# Patient Record
Sex: MALE | Race: WHITE | NOT HISPANIC OR LATINO | Employment: OTHER | URBAN - METROPOLITAN AREA
[De-identification: names, ages, dates, MRNs, and addresses within clinical notes are randomized per-mention and may not be internally consistent; named-entity substitution may affect disease eponyms.]

---

## 2023-02-24 PROBLEM — R13.10 DYSPHAGIA: Status: ACTIVE | Noted: 2023-02-24

## 2023-02-24 PROBLEM — G47.61 PERIODIC LIMB MOVEMENTS OF SLEEP: Status: ACTIVE | Noted: 2023-02-24

## 2023-02-24 PROBLEM — M79.604 PAIN IN BOTH LOWER EXTREMITIES: Status: ACTIVE | Noted: 2023-02-24

## 2023-02-24 PROBLEM — K52.9 CHRONIC DIARRHEA: Status: ACTIVE | Noted: 2023-02-24

## 2023-02-24 PROBLEM — R19.7 DIARRHEA: Status: ACTIVE | Noted: 2023-02-24

## 2023-02-24 PROBLEM — K59.00 CONSTIPATION: Status: ACTIVE | Noted: 2023-02-24

## 2023-02-24 PROBLEM — G62.9 NEUROPATHY: Status: ACTIVE | Noted: 2023-02-24

## 2023-02-24 PROBLEM — K21.9 ACID REFLUX: Status: ACTIVE | Noted: 2023-02-24

## 2023-02-24 PROBLEM — R60.9 EDEMA: Status: ACTIVE | Noted: 2023-02-24

## 2023-02-24 PROBLEM — R06.09 DYSPNEA ON EXERTION: Status: ACTIVE | Noted: 2023-02-24

## 2023-02-24 PROBLEM — J30.9 ALLERGIC RHINITIS: Status: ACTIVE | Noted: 2023-02-24

## 2023-02-24 PROBLEM — R10.9 RIGHT FLANK PAIN: Status: ACTIVE | Noted: 2023-02-24

## 2023-02-24 PROBLEM — N31.9 NEUROGENIC BLADDER: Status: ACTIVE | Noted: 2023-02-24

## 2023-02-24 PROBLEM — N31.8 FREQUENCY-URGENCY SYNDROME: Status: ACTIVE | Noted: 2023-02-24

## 2023-02-24 PROBLEM — I42.9 CARDIOMYOPATHY (MULTI): Status: ACTIVE | Noted: 2023-02-24

## 2023-02-24 PROBLEM — I73.9 CLAUDICATION, INTERMITTENT (CMS-HCC): Status: ACTIVE | Noted: 2023-02-24

## 2023-02-24 PROBLEM — M54.9 BACK PAIN: Status: ACTIVE | Noted: 2023-02-24

## 2023-02-24 PROBLEM — F51.04 CHRONIC INSOMNIA: Status: ACTIVE | Noted: 2023-02-24

## 2023-02-24 PROBLEM — R21 RASH: Status: ACTIVE | Noted: 2023-02-24

## 2023-02-24 PROBLEM — I48.91 ATRIAL FIBRILLATION (MULTI): Status: ACTIVE | Noted: 2023-02-24

## 2023-02-24 PROBLEM — S91.301A WOUND, OPEN, FOOT, RIGHT, INITIAL ENCOUNTER: Status: ACTIVE | Noted: 2023-02-24

## 2023-02-24 PROBLEM — I73.9 PVD (PERIPHERAL VASCULAR DISEASE) (CMS-HCC): Status: ACTIVE | Noted: 2023-02-24

## 2023-02-24 PROBLEM — R35.1 NOCTURIA: Status: ACTIVE | Noted: 2023-02-24

## 2023-02-24 PROBLEM — R20.2 PARESTHESIA: Status: ACTIVE | Noted: 2023-02-24

## 2023-02-24 PROBLEM — M79.605 PAIN IN BOTH LOWER EXTREMITIES: Status: ACTIVE | Noted: 2023-02-24

## 2023-02-24 PROBLEM — R42 DIZZINESS: Status: ACTIVE | Noted: 2023-02-24

## 2023-02-24 PROBLEM — Z95.810 CARDIAC DEFIBRILLATOR IN PLACE: Status: ACTIVE | Noted: 2023-02-24

## 2023-02-24 PROBLEM — I74.09 AORTOILIAC OCCLUSIVE DISEASE (MULTI): Status: ACTIVE | Noted: 2023-02-24

## 2023-02-24 PROBLEM — L98.9 SKIN LESION: Status: ACTIVE | Noted: 2023-02-24

## 2023-02-24 PROBLEM — Z95.0 PRESENCE OF CARDIAC PACEMAKER: Status: ACTIVE | Noted: 2023-02-24

## 2023-02-24 PROBLEM — J32.9 CHRONIC SINUSITIS: Status: ACTIVE | Noted: 2023-02-24

## 2023-02-24 PROBLEM — M79.606 CHRONIC LEG PAIN: Status: ACTIVE | Noted: 2023-02-24

## 2023-02-24 PROBLEM — E11.9 DIABETES MELLITUS, TYPE 2 (MULTI): Status: ACTIVE | Noted: 2023-02-24

## 2023-02-24 PROBLEM — R05.9 COUGH: Status: ACTIVE | Noted: 2023-02-24

## 2023-02-24 PROBLEM — Z86.79 HISTORY OF CARDIOMYOPATHY IN ADULTHOOD: Status: ACTIVE | Noted: 2023-02-24

## 2023-02-24 PROBLEM — G47.34 SLEEP-RELATED HYPOXIA: Status: ACTIVE | Noted: 2023-02-24

## 2023-02-24 PROBLEM — N32.89 UNSTABLE BLADDER: Status: ACTIVE | Noted: 2023-02-24

## 2023-02-24 PROBLEM — M54.50 LUMBAGO: Status: ACTIVE | Noted: 2023-02-24

## 2023-02-24 PROBLEM — R27.0 ATAXIA: Status: ACTIVE | Noted: 2023-02-24

## 2023-02-24 PROBLEM — E11.42 DIABETIC PERIPHERAL NEUROPATHY (MULTI): Status: ACTIVE | Noted: 2023-02-24

## 2023-02-24 PROBLEM — M54.16 LUMBAR RADICULOPATHY: Status: ACTIVE | Noted: 2023-02-24

## 2023-02-24 PROBLEM — I25.10 ASHD (ARTERIOSCLEROTIC HEART DISEASE): Status: ACTIVE | Noted: 2023-02-24

## 2023-02-24 PROBLEM — E78.5 DYSLIPIDEMIA: Status: ACTIVE | Noted: 2023-02-24

## 2023-02-24 PROBLEM — N20.1 CALCULUS OF RIGHT URETER: Status: ACTIVE | Noted: 2023-02-24

## 2023-02-24 PROBLEM — G62.9 PERIPHERAL NEUROPATHY: Status: ACTIVE | Noted: 2023-02-24

## 2023-02-24 PROBLEM — I48.19 PERSISTENT ATRIAL FIBRILLATION WITH RVR (MULTI): Status: ACTIVE | Noted: 2023-02-24

## 2023-02-24 PROBLEM — R30.0 DYSURIA: Status: ACTIVE | Noted: 2023-02-24

## 2023-02-24 PROBLEM — N28.9 RENAL INSUFFICIENCY: Status: ACTIVE | Noted: 2023-02-24

## 2023-02-24 PROBLEM — S81.801A LEG WOUND, RIGHT, INITIAL ENCOUNTER: Status: ACTIVE | Noted: 2023-02-24

## 2023-02-24 PROBLEM — I50.42 CHRONIC COMBINED SYSTOLIC AND DIASTOLIC HEART FAILURE, NYHA CLASS 2 (MULTI): Status: ACTIVE | Noted: 2023-02-24

## 2023-02-24 PROBLEM — J44.9 COPD (CHRONIC OBSTRUCTIVE PULMONARY DISEASE) (MULTI): Status: ACTIVE | Noted: 2023-02-24

## 2023-02-24 PROBLEM — G25.81 RESTLESS LEGS SYNDROME: Status: ACTIVE | Noted: 2023-02-24

## 2023-02-24 PROBLEM — E11.40 CHRONIC PAINFUL DIABETIC NEUROPATHY (MULTI): Status: ACTIVE | Noted: 2023-02-24

## 2023-02-24 PROBLEM — G47.33 OSA (OBSTRUCTIVE SLEEP APNEA): Status: ACTIVE | Noted: 2023-02-24

## 2023-02-24 PROBLEM — N40.0 BPH (BENIGN PROSTATIC HYPERPLASIA): Status: ACTIVE | Noted: 2023-02-24

## 2023-02-24 PROBLEM — G89.29 CHRONIC LEG PAIN: Status: ACTIVE | Noted: 2023-02-24

## 2023-02-24 PROBLEM — M48.07 SPINAL STENOSIS OF LUMBOSACRAL REGION: Status: ACTIVE | Noted: 2023-02-24

## 2023-02-24 PROBLEM — R79.1 ELEVATED INR: Status: ACTIVE | Noted: 2023-02-24

## 2023-02-24 PROBLEM — R06.02 SOBOE (SHORTNESS OF BREATH ON EXERTION): Status: ACTIVE | Noted: 2023-02-24

## 2023-02-24 PROBLEM — R53.83 FATIGUE: Status: ACTIVE | Noted: 2023-02-24

## 2023-02-24 RX ORDER — GUAIFENESIN 600 MG/1
TABLET, EXTENDED RELEASE ORAL
COMMUNITY
Start: 2022-09-22 | End: 2023-04-20 | Stop reason: ALTCHOICE

## 2023-02-24 RX ORDER — GLIPIZIDE 2.5 MG/1
2 TABLET, EXTENDED RELEASE ORAL DAILY
COMMUNITY
Start: 2021-07-20 | End: 2023-05-03 | Stop reason: SDUPTHER

## 2023-02-24 RX ORDER — HYDROCORTISONE, IODOQUINOL 10; 10 MG/G; MG/G
CREAM TOPICAL
COMMUNITY
Start: 2022-06-13 | End: 2024-06-03 | Stop reason: WASHOUT

## 2023-02-24 RX ORDER — LANOLIN ALCOHOL/MO/W.PET/CERES
1 CREAM (GRAM) TOPICAL DAILY
COMMUNITY
End: 2024-02-15 | Stop reason: ALTCHOICE

## 2023-02-24 RX ORDER — OXCARBAZEPINE 150 MG/1
TABLET, FILM COATED ORAL
COMMUNITY
End: 2024-01-03 | Stop reason: SDUPTHER

## 2023-02-24 RX ORDER — BUDESONIDE, GLYCOPYRROLATE, AND FORMOTEROL FUMARATE 160; 9; 4.8 UG/1; UG/1; UG/1
AEROSOL, METERED RESPIRATORY (INHALATION)
COMMUNITY
Start: 2022-10-31

## 2023-02-24 RX ORDER — ATORVASTATIN CALCIUM 10 MG/1
1 TABLET, FILM COATED ORAL DAILY
COMMUNITY
Start: 2015-09-11 | End: 2024-01-16 | Stop reason: SDUPTHER

## 2023-02-24 RX ORDER — FUROSEMIDE 20 MG/1
2 TABLET ORAL DAILY
COMMUNITY
Start: 2019-10-21 | End: 2023-03-21 | Stop reason: SDUPTHER

## 2023-02-24 RX ORDER — KETOCONAZOLE 20 MG/G
CREAM TOPICAL
COMMUNITY
Start: 2022-07-21 | End: 2024-06-03 | Stop reason: WASHOUT

## 2023-02-24 RX ORDER — LISINOPRIL 5 MG/1
1 TABLET ORAL DAILY
COMMUNITY
Start: 2015-11-23 | End: 2024-01-16 | Stop reason: SDUPTHER

## 2023-02-24 RX ORDER — CLONAZEPAM 0.5 MG/1
TABLET ORAL
COMMUNITY
Start: 2019-11-07 | End: 2023-10-31 | Stop reason: SDUPTHER

## 2023-02-24 RX ORDER — OMEPRAZOLE 40 MG/1
1 CAPSULE, DELAYED RELEASE ORAL DAILY
COMMUNITY
Start: 2019-07-22 | End: 2023-03-15 | Stop reason: SDUPTHER

## 2023-02-24 RX ORDER — WARFARIN SODIUM 5 MG/1
1.5 TABLET ORAL DAILY
COMMUNITY
Start: 2015-09-28 | End: 2023-10-31 | Stop reason: SDUPTHER

## 2023-02-24 RX ORDER — BLOOD SUGAR DIAGNOSTIC
1 STRIP MISCELLANEOUS 2 TIMES DAILY
COMMUNITY
Start: 2016-09-02 | End: 2023-03-21 | Stop reason: SDUPTHER

## 2023-03-07 ENCOUNTER — ANTICOAGULATION - OTHER VISIT (DOAC) (OUTPATIENT)
Dept: PRIMARY CARE | Facility: CLINIC | Age: 88
End: 2023-03-07
Payer: MEDICARE

## 2023-03-14 ENCOUNTER — TELEPHONE (OUTPATIENT)
Dept: PRIMARY CARE | Facility: CLINIC | Age: 88
End: 2023-03-14
Payer: MEDICARE

## 2023-03-14 ENCOUNTER — HOME MONITORING (OUTPATIENT)
Dept: PRIMARY CARE | Facility: CLINIC | Age: 88
End: 2023-03-14
Payer: MEDICARE

## 2023-03-14 LAB
INR IN PPP BY COAGULATION ASSAY EXTERNAL: 2.1
PROTHROMBIN TIME (PT) IN PPP BY COAGULATION ASSAY EXTERNAL: NORMAL SECONDS

## 2023-03-15 ENCOUNTER — TELEPHONE (OUTPATIENT)
Dept: PRIMARY CARE | Facility: CLINIC | Age: 88
End: 2023-03-15
Payer: MEDICARE

## 2023-03-15 DIAGNOSIS — K21.9 GASTROESOPHAGEAL REFLUX DISEASE WITHOUT ESOPHAGITIS: Primary | ICD-10-CM

## 2023-03-15 RX ORDER — OMEPRAZOLE 40 MG/1
40 CAPSULE, DELAYED RELEASE ORAL
Qty: 30 CAPSULE | Refills: 11 | Status: SHIPPED | OUTPATIENT
Start: 2023-03-15 | End: 2024-03-07 | Stop reason: SDUPTHER

## 2023-03-20 ENCOUNTER — TELEPHONE (OUTPATIENT)
Dept: PRIMARY CARE | Facility: CLINIC | Age: 88
End: 2023-03-20
Payer: MEDICARE

## 2023-03-20 NOTE — TELEPHONE ENCOUNTER
He needs a refill on his furosmide 25 mg takes it 2 time a day and he also needs one touch stripts and lancets please send to [facility]

## 2023-03-21 ENCOUNTER — OFFICE VISIT (OUTPATIENT)
Dept: PRIMARY CARE | Facility: CLINIC | Age: 88
End: 2023-03-21
Payer: MEDICARE

## 2023-03-21 ENCOUNTER — ANTICOAGULATION - WARFARIN VISIT (OUTPATIENT)
Dept: PRIMARY CARE | Facility: CLINIC | Age: 88
End: 2023-03-21

## 2023-03-21 VITALS
BODY MASS INDEX: 26.18 KG/M2 | RESPIRATION RATE: 16 BRPM | WEIGHT: 187 LBS | HEIGHT: 71 IN | DIASTOLIC BLOOD PRESSURE: 74 MMHG | SYSTOLIC BLOOD PRESSURE: 132 MMHG | HEART RATE: 85 BPM | OXYGEN SATURATION: 98 %

## 2023-03-21 DIAGNOSIS — I74.09 AORTOILIAC OCCLUSIVE DISEASE (MULTI): ICD-10-CM

## 2023-03-21 DIAGNOSIS — K21.9 GASTROESOPHAGEAL REFLUX DISEASE WITHOUT ESOPHAGITIS: ICD-10-CM

## 2023-03-21 DIAGNOSIS — E11.9 TYPE 2 DIABETES MELLITUS WITHOUT COMPLICATION, WITHOUT LONG-TERM CURRENT USE OF INSULIN (MULTI): Primary | ICD-10-CM

## 2023-03-21 DIAGNOSIS — I73.9 CLAUDICATION, INTERMITTENT (CMS-HCC): ICD-10-CM

## 2023-03-21 DIAGNOSIS — E11.9 TYPE 2 DIABETES MELLITUS WITHOUT COMPLICATION, WITHOUT LONG-TERM CURRENT USE OF INSULIN (MULTI): ICD-10-CM

## 2023-03-21 DIAGNOSIS — E55.9 VITAMIN D DEFICIENCY: ICD-10-CM

## 2023-03-21 DIAGNOSIS — Z00.00 MEDICARE ANNUAL WELLNESS VISIT, SUBSEQUENT: Primary | ICD-10-CM

## 2023-03-21 DIAGNOSIS — I42.8 OTHER CARDIOMYOPATHY (MULTI): ICD-10-CM

## 2023-03-21 DIAGNOSIS — J44.9 CHRONIC OBSTRUCTIVE PULMONARY DISEASE, UNSPECIFIED COPD TYPE (MULTI): ICD-10-CM

## 2023-03-21 DIAGNOSIS — E53.8 VITAMIN B12 DEFICIENCY: ICD-10-CM

## 2023-03-21 DIAGNOSIS — I50.42 CHRONIC COMBINED SYSTOLIC AND DIASTOLIC HEART FAILURE, NYHA CLASS 2 (MULTI): ICD-10-CM

## 2023-03-21 DIAGNOSIS — I25.10 ASHD (ARTERIOSCLEROTIC HEART DISEASE): ICD-10-CM

## 2023-03-21 DIAGNOSIS — E11.42 DIABETIC PERIPHERAL NEUROPATHY (MULTI): ICD-10-CM

## 2023-03-21 DIAGNOSIS — D64.9 FATIGUE ASSOCIATED WITH ANEMIA: ICD-10-CM

## 2023-03-21 DIAGNOSIS — I48.19 PERSISTENT ATRIAL FIBRILLATION WITH RVR (MULTI): ICD-10-CM

## 2023-03-21 DIAGNOSIS — I48.91 ATRIAL FIBRILLATION, UNSPECIFIED TYPE (MULTI): ICD-10-CM

## 2023-03-21 DIAGNOSIS — E78.5 DYSLIPIDEMIA: ICD-10-CM

## 2023-03-21 DIAGNOSIS — N28.9 RENAL INSUFFICIENCY: ICD-10-CM

## 2023-03-21 DIAGNOSIS — E11.40 CHRONIC PAINFUL DIABETIC NEUROPATHY (MULTI): ICD-10-CM

## 2023-03-21 DIAGNOSIS — N40.1 BENIGN PROSTATIC HYPERPLASIA WITH LOWER URINARY TRACT SYMPTOMS, SYMPTOM DETAILS UNSPECIFIED: ICD-10-CM

## 2023-03-21 DIAGNOSIS — Z95.810 CARDIAC DEFIBRILLATOR IN PLACE: ICD-10-CM

## 2023-03-21 DIAGNOSIS — I73.9 PVD (PERIPHERAL VASCULAR DISEASE) (CMS-HCC): ICD-10-CM

## 2023-03-21 PROBLEM — J32.9 CHRONIC SINUSITIS: Status: RESOLVED | Noted: 2023-02-24 | Resolved: 2023-03-21

## 2023-03-21 PROBLEM — R06.09 DYSPNEA ON EXERTION: Status: RESOLVED | Noted: 2023-02-24 | Resolved: 2023-03-21

## 2023-03-21 PROBLEM — N32.89 UNSTABLE BLADDER: Status: RESOLVED | Noted: 2023-02-24 | Resolved: 2023-03-21

## 2023-03-21 PROBLEM — G62.9 PERIPHERAL NEUROPATHY: Status: RESOLVED | Noted: 2023-02-24 | Resolved: 2023-03-21

## 2023-03-21 PROBLEM — M54.50 LUMBAGO: Status: RESOLVED | Noted: 2023-02-24 | Resolved: 2023-03-21

## 2023-03-21 PROBLEM — K52.9 CHRONIC DIARRHEA: Status: RESOLVED | Noted: 2023-02-24 | Resolved: 2023-03-21

## 2023-03-21 PROBLEM — L98.9 SKIN LESION: Status: RESOLVED | Noted: 2023-02-24 | Resolved: 2023-03-21

## 2023-03-21 PROBLEM — R27.0 ATAXIA: Status: RESOLVED | Noted: 2023-02-24 | Resolved: 2023-03-21

## 2023-03-21 PROBLEM — Z86.79 HISTORY OF CARDIOMYOPATHY IN ADULTHOOD: Status: RESOLVED | Noted: 2023-02-24 | Resolved: 2023-03-21

## 2023-03-21 PROBLEM — G47.34 SLEEP-RELATED HYPOXIA: Status: RESOLVED | Noted: 2023-02-24 | Resolved: 2023-03-21

## 2023-03-21 PROBLEM — K59.00 CONSTIPATION: Status: RESOLVED | Noted: 2023-02-24 | Resolved: 2023-03-21

## 2023-03-21 PROBLEM — N31.9 NEUROGENIC BLADDER: Status: RESOLVED | Noted: 2023-02-24 | Resolved: 2023-03-21

## 2023-03-21 PROBLEM — R10.9 RIGHT FLANK PAIN: Status: RESOLVED | Noted: 2023-02-24 | Resolved: 2023-03-21

## 2023-03-21 PROBLEM — R79.1 ELEVATED INR: Status: RESOLVED | Noted: 2023-02-24 | Resolved: 2023-03-21

## 2023-03-21 PROBLEM — N20.1 CALCULUS OF RIGHT URETER: Status: RESOLVED | Noted: 2023-02-24 | Resolved: 2023-03-21

## 2023-03-21 PROBLEM — G47.61 PERIODIC LIMB MOVEMENTS OF SLEEP: Status: RESOLVED | Noted: 2023-02-24 | Resolved: 2023-03-21

## 2023-03-21 PROBLEM — M54.9 BACK PAIN: Status: RESOLVED | Noted: 2023-02-24 | Resolved: 2023-03-21

## 2023-03-21 PROBLEM — R53.83 FATIGUE: Status: RESOLVED | Noted: 2023-02-24 | Resolved: 2023-03-21

## 2023-03-21 PROBLEM — R60.9 EDEMA: Status: RESOLVED | Noted: 2023-02-24 | Resolved: 2023-03-21

## 2023-03-21 PROBLEM — R30.0 DYSURIA: Status: RESOLVED | Noted: 2023-02-24 | Resolved: 2023-03-21

## 2023-03-21 PROBLEM — R19.7 DIARRHEA: Status: RESOLVED | Noted: 2023-02-24 | Resolved: 2023-03-21

## 2023-03-21 PROBLEM — S81.801A LEG WOUND, RIGHT, INITIAL ENCOUNTER: Status: RESOLVED | Noted: 2023-02-24 | Resolved: 2023-03-21

## 2023-03-21 PROBLEM — R21 RASH: Status: RESOLVED | Noted: 2023-02-24 | Resolved: 2023-03-21

## 2023-03-21 PROBLEM — M79.605 PAIN IN BOTH LOWER EXTREMITIES: Status: RESOLVED | Noted: 2023-02-24 | Resolved: 2023-03-21

## 2023-03-21 PROBLEM — N31.8 FREQUENCY-URGENCY SYNDROME: Status: RESOLVED | Noted: 2023-02-24 | Resolved: 2023-03-21

## 2023-03-21 PROBLEM — M48.07 SPINAL STENOSIS OF LUMBOSACRAL REGION: Status: RESOLVED | Noted: 2023-02-24 | Resolved: 2023-03-21

## 2023-03-21 PROBLEM — M54.16 LUMBAR RADICULOPATHY: Status: RESOLVED | Noted: 2023-02-24 | Resolved: 2023-03-21

## 2023-03-21 PROBLEM — M79.606 CHRONIC LEG PAIN: Status: RESOLVED | Noted: 2023-02-24 | Resolved: 2023-03-21

## 2023-03-21 PROBLEM — S91.301A WOUND, OPEN, FOOT, RIGHT, INITIAL ENCOUNTER: Status: RESOLVED | Noted: 2023-02-24 | Resolved: 2023-03-21

## 2023-03-21 PROBLEM — J30.9 ALLERGIC RHINITIS: Status: RESOLVED | Noted: 2023-02-24 | Resolved: 2023-03-21

## 2023-03-21 PROBLEM — R20.2 PARESTHESIA: Status: RESOLVED | Noted: 2023-02-24 | Resolved: 2023-03-21

## 2023-03-21 PROBLEM — R42 DIZZINESS: Status: RESOLVED | Noted: 2023-02-24 | Resolved: 2023-03-21

## 2023-03-21 PROBLEM — G89.29 CHRONIC LEG PAIN: Status: RESOLVED | Noted: 2023-02-24 | Resolved: 2023-03-21

## 2023-03-21 PROBLEM — R13.10 DYSPHAGIA: Status: RESOLVED | Noted: 2023-02-24 | Resolved: 2023-03-21

## 2023-03-21 PROBLEM — M79.604 PAIN IN BOTH LOWER EXTREMITIES: Status: RESOLVED | Noted: 2023-02-24 | Resolved: 2023-03-21

## 2023-03-21 PROBLEM — R35.1 NOCTURIA: Status: RESOLVED | Noted: 2023-02-24 | Resolved: 2023-03-21

## 2023-03-21 PROBLEM — Z95.0 PRESENCE OF CARDIAC PACEMAKER: Status: RESOLVED | Noted: 2023-02-24 | Resolved: 2023-03-21

## 2023-03-21 PROBLEM — R05.9 COUGH: Status: RESOLVED | Noted: 2023-02-24 | Resolved: 2023-03-21

## 2023-03-21 PROBLEM — G62.9 NEUROPATHY: Status: RESOLVED | Noted: 2023-02-24 | Resolved: 2023-03-21

## 2023-03-21 PROCEDURE — 1036F TOBACCO NON-USER: CPT

## 2023-03-21 PROCEDURE — 99214 OFFICE O/P EST MOD 30 MIN: CPT

## 2023-03-21 PROCEDURE — 1160F RVW MEDS BY RX/DR IN RCRD: CPT

## 2023-03-21 PROCEDURE — 1159F MED LIST DOCD IN RCRD: CPT

## 2023-03-21 RX ORDER — BLOOD SUGAR DIAGNOSTIC
1 STRIP MISCELLANEOUS 2 TIMES DAILY
Qty: 90 STRIP | Refills: 2 | Status: SHIPPED | OUTPATIENT
Start: 2023-03-21 | End: 2023-08-24 | Stop reason: SDUPTHER

## 2023-03-21 RX ORDER — FUROSEMIDE 20 MG/1
40 TABLET ORAL DAILY
Qty: 90 TABLET | Refills: 2 | Status: SHIPPED | OUTPATIENT
Start: 2023-03-21 | End: 2023-07-31 | Stop reason: SDUPTHER

## 2023-03-21 RX ORDER — LANCETS
EACH MISCELLANEOUS
Qty: 90 EACH | Refills: 2 | Status: SHIPPED | OUTPATIENT
Start: 2023-03-21 | End: 2023-08-24 | Stop reason: SDUPTHER

## 2023-03-21 RX ORDER — LANCETS
EACH MISCELLANEOUS
COMMUNITY
Start: 2022-08-06 | End: 2023-03-21 | Stop reason: SDUPTHER

## 2023-03-21 ASSESSMENT — ENCOUNTER SYMPTOMS
ALLERGIC/IMMUNOLOGIC NEGATIVE: 1
CONSTITUTIONAL NEGATIVE: 1
GASTROINTESTINAL NEGATIVE: 1
NEUROLOGICAL NEGATIVE: 1
CARDIOVASCULAR NEGATIVE: 1
EYES NEGATIVE: 1
PSYCHIATRIC NEGATIVE: 1
HEMATOLOGIC/LYMPHATIC NEGATIVE: 1
OCCASIONAL FEELINGS OF UNSTEADINESS: 0
MUSCULOSKELETAL NEGATIVE: 1
LOSS OF SENSATION IN FEET: 0
RESPIRATORY NEGATIVE: 1
ENDOCRINE NEGATIVE: 1

## 2023-03-21 ASSESSMENT — PATIENT HEALTH QUESTIONNAIRE - PHQ9
SUM OF ALL RESPONSES TO PHQ9 QUESTIONS 1 AND 2: 0
2. FEELING DOWN, DEPRESSED OR HOPELESS: NOT AT ALL
1. LITTLE INTEREST OR PLEASURE IN DOING THINGS: NOT AT ALL

## 2023-03-21 ASSESSMENT — COLUMBIA-SUICIDE SEVERITY RATING SCALE - C-SSRS
1. IN THE PAST MONTH, HAVE YOU WISHED YOU WERE DEAD OR WISHED YOU COULD GO TO SLEEP AND NOT WAKE UP?: NO
6. HAVE YOU EVER DONE ANYTHING, STARTED TO DO ANYTHING, OR PREPARED TO DO ANYTHING TO END YOUR LIFE?: NO
2. HAVE YOU ACTUALLY HAD ANY THOUGHTS OF KILLING YOURSELF?: NO

## 2023-03-21 NOTE — ASSESSMENT & PLAN NOTE
Hemoglobin A1c is ordered.  Continue to monitor sugars closely, follow diabetic diet and encouraged daily activity or exercise as able.  Continue glipizide 2.5 mg oral capsule daily.  Follow-up with endocrinology in 1 month.

## 2023-03-21 NOTE — ASSESSMENT & PLAN NOTE
Symptoms controlled with over-the-counter cream and nonpharmacological remedies.  Continue clonazepam 0.5 mg oral capsule at bedtime.

## 2023-03-21 NOTE — ASSESSMENT & PLAN NOTE
Patient under direct management of cardiology Dr. Milner.  Symptoms controlled.  Continue atorvastatin 10 mg oral tablet daily

## 2023-03-21 NOTE — ASSESSMENT & PLAN NOTE
Patient is currently on warfarin 5 mg oral tablet 1/2 tablet daily.  Patient's recent INR was 1.7.  He does report having increased green leafy vegetables due to [place] weekend.  Patient was instructed to take 10 mg of warfarin today and to continue the original dosage for the next 6 days and to reevaluate Monday, March 28.  Patient has a pacemaker.  Continues annual follow-up with his cardiologist.

## 2023-03-21 NOTE — TELEPHONE ENCOUNTER
He needs a refill on his Furosemide 20 mg takes 2 times a day,one touch ultra strips and lancets please send to [facility]

## 2023-03-21 NOTE — PROGRESS NOTES
"Subjective   Patient ID: Michele Maya is a 89 y.o. male who presents for No chief complaint on file..    HPI an 89-year-old male with past medical history of persistent A-fib currently on anticoagulant therapy Coumadin 5 mg oral tablet daily, pacemaker initiated in 1996 with a replacement battery in 2016, diabetic neuropathy, obstructive sleep apnea, COPD, ASHD, cardiomyopathy, PVD, acid reflux, BPH, renal insufficiency, type 2 diabetes mellitus, dyslipidemia arrives to the clinic with chief complaint of 6-month follow-up.  He is here to establish with a new provider as his previous one is now retired.  He reports that his INR that was completed today was 1.7.  He does state that due to [place] weekend, he was ingesting more green leafy vegetables.  He is wondering if he needs to go up on his dose for this week.  Other than that, he is here to report that he does have a follow-up appointment in April with his new endocrinologist for his type 2 diabetes mellitus.  He also has a follow-up appointment with his nephrologist sometime in May.  He reports no side effects or shortness of breath of his cardiovascular system.  He does follow annually with his cardiologist.  Other than this, patient denies any chest pain, shortness of breath, diarrhea, fevers, chills, head pain, COVID-like symptoms.    Review of Systems   Constitutional: Negative.    HENT: Negative.     Eyes: Negative.    Respiratory: Negative.     Cardiovascular: Negative.    Gastrointestinal: Negative.    Endocrine: Negative.    Genitourinary: Negative.    Musculoskeletal: Negative.    Skin: Negative.    Allergic/Immunologic: Negative.    Neurological: Negative.    Hematological: Negative.    Psychiatric/Behavioral: Negative.     All other systems reviewed and are negative.      Objective   /74   Pulse 85   Resp 16   Ht 1.803 m (5' 11\")   Wt 84.8 kg (187 lb)   SpO2 98%   BMI 26.08 kg/m²     Physical Exam  Vitals and nursing note " reviewed.   Constitutional:       Appearance: Normal appearance.   HENT:      Head: Normocephalic and atraumatic.      Right Ear: Tympanic membrane normal.      Left Ear: Tympanic membrane normal.      Nose: Nose normal.      Mouth/Throat:      Mouth: Mucous membranes are moist.      Pharynx: Oropharynx is clear.   Eyes:      Extraocular Movements: Extraocular movements intact.      Conjunctiva/sclera: Conjunctivae normal.      Pupils: Pupils are equal, round, and reactive to light.   Cardiovascular:      Rate and Rhythm: Normal rate and regular rhythm.   Pulmonary:      Effort: Pulmonary effort is normal.      Breath sounds: Normal breath sounds.   Abdominal:      General: Bowel sounds are normal.      Palpations: Abdomen is soft.   Musculoskeletal:         General: Normal range of motion.      Cervical back: Normal range of motion and neck supple.   Skin:     General: Skin is warm.      Capillary Refill: Capillary refill takes less than 2 seconds.   Neurological:      General: No focal deficit present.      Mental Status: He is alert and oriented to person, place, and time. Mental status is at baseline.   Psychiatric:         Mood and Affect: Mood normal.         Behavior: Behavior normal.         Thought Content: Thought content normal.         Judgment: Judgment normal.         Assessment/Plan   Problem List Items Addressed This Visit          Nervous    Chronic painful diabetic neuropathy (CMS/HCC)     Symptoms controlled with over-the-counter cream and nonpharmacological remedies.  Continue clonazepam 0.5 mg oral capsule at bedtime.         Relevant Orders    Comprehensive Metabolic Panel    Diabetic peripheral neuropathy (CMS/HCC)     Symptoms controlled with over-the-counter cream and nonpharmacological remedies.            Respiratory    COPD (chronic obstructive pulmonary disease) (CMS/HCC)     Continue Breztri Aerosphere and Mucinex.  Symptoms controlled.  No dyspnea upon exertion.            Circulatory     Aortoiliac occlusive disease (CMS/HCC)     Patient under direct management of cardiology Dr. Milner.  Symptoms controlled.         ASHD (arteriosclerotic heart disease)     Patient under direct management of cardiology Dr. Milner.  Symptoms controlled.  Continue atorvastatin 10 mg oral tablet daily         Relevant Orders    TSH with reflex to Free T4 if abnormal    Atrial fibrillation (CMS/HCC)     Patient is currently on warfarin 5 mg oral tablet 1/2 tablet daily.  Patient's recent INR was 1.7.  He does report having increased green leafy vegetables due to [place] weekend.  Patient was instructed to take 10 mg of warfarin today and to continue the original dosage for the next 6 days and to reevaluate Monday, March 28.  Patient has a pacemaker.  Continues annual follow-up with his cardiologist.         Relevant Orders    TSH with reflex to Free T4 if abnormal    Cardiac defibrillator in place     Patient under direct management of cardiology Dr. Milner.  Symptoms controlled.  Continue atorvastatin 10 mg oral tablet daily         Cardiomyopathy (CMS/HCC)     Patient under direct management of cardiology Dr. Milner.  Symptoms controlled.  Continue atorvastatin 10 mg oral tablet daily         Chronic combined systolic and diastolic heart failure, NYHA class 2 (CMS/[facility])     Patient under direct management of cardiology Dr. Milner.  Symptoms controlled.  Continue atorvastatin 10 mg oral tablet daily         Persistent atrial fibrillation with RVR (CMS/[facility])     Patient is currently on warfarin 5 mg oral tablet 1/2 tablet daily.  Patient's recent INR was 1.7.  He does report having increased green leafy vegetables due to [place] weekend.  Patient was instructed to take 10 mg of warfarin today and to continue the original dosage for the next 6 days and to reevaluate Monday, March 28.  Patient has a pacemaker.  Continues annual follow-up with his cardiologist.         PVD (peripheral vascular disease) (CMS/[facility])      Patient under direct management of cardiology Dr. Milner.  Symptoms controlled.  Continue atorvastatin 10 mg oral tablet daily            Digestive    Acid reflux     Symptoms controlled.  No return of acid reflux.  Continue omeprazole 40 mg oral capsule daily.            Genitourinary    BPH (benign prostatic hyperplasia)     PSA levels ordered.         Relevant Orders    PSA, Total and Free    Renal insufficiency       Musculoskeletal    Claudication, intermittent (CMS/HCC)     Symptoms are controlled.  No return of claudication.         Relevant Orders    CBC       Endocrine/Metabolic    Diabetes mellitus, type 2 (CMS/HCC)     Hemoglobin A1c is ordered.  Continue to monitor sugars closely, follow diabetic diet and encouraged daily activity or exercise as able.  Continue glipizide 2.5 mg oral capsule daily.  Follow-up with endocrinology in 1 month.         Relevant Orders    Hemoglobin A1C    Protein/Glucose, Urine       Other    Dyslipidemia     Continue atorvastatin 10 mg oral capsule daily.         Relevant Orders    Lipid Panel     Other Visit Diagnoses       Medicare annual wellness visit, subsequent    -  Primary    Relevant Orders    Follow Up In [facility] - PCP    Vitamin B12 deficiency        Relevant Orders    Vitamin B12    Vitamin D deficiency        Relevant Orders    Vitamin D, Total    Fatigue associated with anemia        Relevant Orders    CBC          It was a pleasure seeing in the office today.    Please begin the treatment plan as discussed in the office today.    Your next appoint will be a 6-month Medicare wellness.    Continue follow-up appointments with your nephrologist and endocrinologist and cardiologist.    I have ordered blood work for you to complete prior to your next appointment.  These labs require you to fast.    Continue weekly INR checks.    Increase Coumadin administration today to 10 mg daily then continue daily dose as prescribed originally.  Repeat INR next  week.    I also advised you to follow low fat diet and exercise for at least 30 minutes daily.    Anticipatory guidance, age appropriate vaccines, screening exams, health promotion and prevention discussed.    This document was generated using the assistance of voice recognition software. If there are any errors of spelling, grammar, syntax, or meaning; please feel free to contact me directly for clarification.

## 2023-03-28 ENCOUNTER — ANTICOAGULATION - WARFARIN VISIT (OUTPATIENT)
Dept: PRIMARY CARE | Facility: CLINIC | Age: 88
End: 2023-03-28
Payer: MEDICARE

## 2023-03-29 LAB
INR IN PPP BY COAGULATION ASSAY EXTERNAL: 1.9
PROTHROMBIN TIME (PT) IN PPP BY COAGULATION ASSAY EXTERNAL: NORMAL SECONDS

## 2023-03-29 NOTE — PROGRESS NOTES
Inr 1.9 today- current dose is 5 mg x 7 days, recheck 1 week- 4/5/23.  Continued current dose.    Mary Kirby MA

## 2023-04-04 LAB
INR IN PPP BY COAGULATION ASSAY EXTERNAL: 2.3
POC INR: 2.3
POC PROTHROMBIN TIME: NORMAL
PROTHROMBIN TIME (PT) IN PPP BY COAGULATION ASSAY EXTERNAL: NORMAL SECONDS

## 2023-04-05 ENCOUNTER — ANTICOAGULATION - WARFARIN VISIT (OUTPATIENT)
Dept: PRIMARY CARE | Facility: CLINIC | Age: 88
End: 2023-04-05
Payer: MEDICARE

## 2023-04-05 PROCEDURE — 85610 PROTHROMBIN TIME: CPT

## 2023-04-11 ENCOUNTER — ANTICOAGULATION - WARFARIN VISIT (OUTPATIENT)
Dept: PRIMARY CARE | Facility: CLINIC | Age: 88
End: 2023-04-11
Payer: MEDICARE

## 2023-04-11 LAB
INR IN PPP BY COAGULATION ASSAY EXTERNAL: 2.5
PROTHROMBIN TIME (PT) IN PPP BY COAGULATION ASSAY EXTERNAL: NORMAL SECONDS

## 2023-04-18 ENCOUNTER — ANTICOAGULATION - WARFARIN VISIT (OUTPATIENT)
Dept: PRIMARY CARE | Facility: CLINIC | Age: 88
End: 2023-04-18
Payer: MEDICARE

## 2023-04-18 LAB
INR IN PPP BY COAGULATION ASSAY EXTERNAL: 3.5
PROTHROMBIN TIME (PT) IN PPP BY COAGULATION ASSAY EXTERNAL: NORMAL SECONDS

## 2023-04-18 NOTE — PROGRESS NOTES
Today's inr is  3.5. patient was recently in [facility] and given antibiotics and steroid-prednisone- he completed them 4/17/23. Per rj continue his current dose and recheck in 1 week - it is elevated due to the prednisone and antibiotics.

## 2023-04-20 ENCOUNTER — OFFICE VISIT (OUTPATIENT)
Dept: PRIMARY CARE | Facility: CLINIC | Age: 88
End: 2023-04-20
Payer: MEDICARE

## 2023-04-20 VITALS
WEIGHT: 190 LBS | HEIGHT: 71 IN | BODY MASS INDEX: 26.6 KG/M2 | SYSTOLIC BLOOD PRESSURE: 130 MMHG | OXYGEN SATURATION: 95 % | HEART RATE: 61 BPM | DIASTOLIC BLOOD PRESSURE: 80 MMHG | RESPIRATION RATE: 18 BRPM

## 2023-04-20 DIAGNOSIS — M19.90 ARTHRITIS: Primary | ICD-10-CM

## 2023-04-20 PROCEDURE — 1160F RVW MEDS BY RX/DR IN RCRD: CPT

## 2023-04-20 PROCEDURE — 1036F TOBACCO NON-USER: CPT

## 2023-04-20 PROCEDURE — 99213 OFFICE O/P EST LOW 20 MIN: CPT

## 2023-04-20 PROCEDURE — 1159F MED LIST DOCD IN RCRD: CPT

## 2023-04-20 ASSESSMENT — ENCOUNTER SYMPTOMS
NEUROLOGICAL NEGATIVE: 1
CONSTITUTIONAL NEGATIVE: 1
GASTROINTESTINAL NEGATIVE: 1
ALLERGIC/IMMUNOLOGIC NEGATIVE: 1
EYES NEGATIVE: 1
CARDIOVASCULAR NEGATIVE: 1
PSYCHIATRIC NEGATIVE: 1
MUSCULOSKELETAL NEGATIVE: 1
ENDOCRINE NEGATIVE: 1
HEMATOLOGIC/LYMPHATIC NEGATIVE: 1
RESPIRATORY NEGATIVE: 1

## 2023-04-20 ASSESSMENT — PATIENT HEALTH QUESTIONNAIRE - PHQ9
2. FEELING DOWN, DEPRESSED OR HOPELESS: NOT AT ALL
1. LITTLE INTEREST OR PLEASURE IN DOING THINGS: NOT AT ALL
SUM OF ALL RESPONSES TO PHQ9 QUESTIONS 1 AND 2: 0

## 2023-04-20 NOTE — PROGRESS NOTES
Urgent care follow up     Still coughing  Right hand pain due to arthritis.    2.7 inr - current dose is  5 mg x 7 days

## 2023-04-20 NOTE — PROGRESS NOTES
"Subjective   Patient ID: Michele Maya is a 89 y.o. male who presents for Follow-up.    HPI an 89-year-old male with multiple comorbidities arrives to the clinic with chief complaint of worsening arthritis.  Patient reports having arthritic pain over the last few years.  He has tried over-the-counter Tylenol, IcyHot, Biofreeze, and other topical solutions.  He found no success with this.  He does have chronic kidney disease and currently follows nephrology.  [facility] who is hesitant about putting him on any arthritic medications due to his impaired kidney function.  He is here for further evaluation and health maintenance.  Patient denies any chest pain, shortness of breath, diarrhea, fevers, chills, head pain, COVID-like symptoms.    Review of Systems   Constitutional: Negative.    HENT: Negative.     Eyes: Negative.    Respiratory: Negative.     Cardiovascular: Negative.    Gastrointestinal: Negative.    Endocrine: Negative.    Genitourinary: Negative.    Musculoskeletal: Negative.    Skin: Negative.    Allergic/Immunologic: Negative.    Neurological: Negative.    Hematological: Negative.    Psychiatric/Behavioral: Negative.     All other systems reviewed and are negative.      Objective   /80 (BP Location: Right arm, BP Cuff Size: Large adult)   Pulse 61   Resp 18   Ht 1.803 m (5' 11\")   Wt 86.2 kg (190 lb)   SpO2 95%   BMI 26.50 kg/m²     Physical Exam  Vitals and nursing note reviewed.   Constitutional:       Appearance: Normal appearance.   HENT:      Head: Normocephalic and atraumatic.      Right Ear: Tympanic membrane normal.      Left Ear: Tympanic membrane normal.      Nose: Nose normal.      Mouth/Throat:      Mouth: Mucous membranes are moist.      Pharynx: Oropharynx is clear.   Eyes:      Extraocular Movements: Extraocular movements intact.      Conjunctiva/sclera: Conjunctivae normal.      Pupils: Pupils are equal, round, and reactive to light. "   Cardiovascular:      Rate and Rhythm: Normal rate and regular rhythm.   Pulmonary:      Effort: Pulmonary effort is normal.      Breath sounds: Normal breath sounds.   Abdominal:      General: Bowel sounds are normal.      Palpations: Abdomen is soft.   Musculoskeletal:         General: Normal range of motion.      Cervical back: Normal range of motion and neck supple.   Skin:     General: Skin is warm.      Capillary Refill: Capillary refill takes less than 2 seconds.   Neurological:      General: No focal deficit present.      Mental Status: He is alert and oriented to person, place, and time. Mental status is at baseline.   Psychiatric:         Mood and Affect: Mood normal.         Behavior: Behavior normal.         Thought Content: Thought content normal.         Judgment: Judgment normal.         Assessment/Plan   Problem List Items Addressed This Visit          Musculoskeletal    Arthritis - Primary    Relevant Orders    Referral to Pain Medicine     It was a pleasure seeing in the office today.    Due to your chronic kidney disease, we are very limited in the medications that we can prescribe for arthritis.  Continue using over-the-counter Tylenol, Biofreeze, IcyHot, and other topical regimens.  I did recommend pain management.  You agreed to the plan of care.  I have referred you to pain management today.  Please call the number in your discharge instructions to set an appointment up.    Continue the treatment plan and regimen set forth by your nephrologist and cardiologist.    Follow-up in September.    I also advised you to follow low fat diet and exercise for at least 30 minutes daily.    Anticipatory guidance, age appropriate vaccines, screening exams, health promotion and prevention discussed.    This document was generated using the assistance of voice recognition software. If there are any errors of spelling, grammar, syntax, or meaning; please feel free to contact me directly for clarification.

## 2023-04-20 NOTE — PATIENT INSTRUCTIONS
Follow up with scheduled appointment for this office    Follow up with cardiology and nephrology as scheduled    Pain management referral placed.

## 2023-04-25 ENCOUNTER — ANTICOAGULATION - WARFARIN VISIT (OUTPATIENT)
Dept: PRIMARY CARE | Facility: CLINIC | Age: 88
End: 2023-04-25
Payer: MEDICARE

## 2023-04-25 LAB
INR IN PPP BY COAGULATION ASSAY EXTERNAL: 2.7
PROTHROMBIN TIME (PT) IN PPP BY COAGULATION ASSAY EXTERNAL: NORMAL SECONDS

## 2023-05-02 ENCOUNTER — ANTICOAGULATION - WARFARIN VISIT (OUTPATIENT)
Dept: PRIMARY CARE | Facility: CLINIC | Age: 88
End: 2023-05-02
Payer: MEDICARE

## 2023-05-03 ENCOUNTER — TELEPHONE (OUTPATIENT)
Dept: PRIMARY CARE | Facility: CLINIC | Age: 88
End: 2023-05-03
Payer: MEDICARE

## 2023-05-03 DIAGNOSIS — E11.42 DIABETIC PERIPHERAL NEUROPATHY (MULTI): Primary | ICD-10-CM

## 2023-05-03 DIAGNOSIS — E11.00 TYPE 2 DIABETES MELLITUS WITH HYPEROSMOLARITY WITHOUT COMA, WITHOUT LONG-TERM CURRENT USE OF INSULIN (MULTI): ICD-10-CM

## 2023-05-03 LAB
ANION GAP IN SER/PLAS: 15 MMOL/L (ref 10–20)
APPEARANCE, URINE: NORMAL
BILIRUBIN, URINE: NEGATIVE
BLOOD, URINE: NEGATIVE
CALCIUM (MG/DL) IN SER/PLAS: 9.1 MG/DL (ref 8.6–10.3)
CARBON DIOXIDE, TOTAL (MMOL/L) IN SER/PLAS: 25 MMOL/L (ref 21–32)
CHLORIDE (MMOL/L) IN SER/PLAS: 102 MMOL/L (ref 98–107)
COLOR, URINE: YELLOW
CREATININE (MG/DL) IN SER/PLAS: 1.87 MG/DL (ref 0.5–1.3)
CREATININE (MG/DL) IN URINE: 95 MG/DL (ref 20–370)
GFR MALE: 34 ML/MIN/1.73M2
GLUCOSE (MG/DL) IN SER/PLAS: 165 MG/DL (ref 74–99)
GLUCOSE, URINE: NEGATIVE MG/DL
KETONES, URINE: NEGATIVE MG/DL
LEUKOCYTE ESTERASE, URINE: NEGATIVE
MAGNESIUM (MG/DL) IN SER/PLAS: 1.89 MG/DL (ref 1.6–2.4)
NITRITE, URINE: NEGATIVE
PH, URINE: 5 (ref 5–8)
POTASSIUM (MMOL/L) IN SER/PLAS: 4.6 MMOL/L (ref 3.5–5.3)
PROTEIN (MG/DL) IN URINE: 13 MG/DL (ref 5–25)
PROTEIN, URINE: NEGATIVE MG/DL
PROTEIN/CREATININE (MG/MG) IN URINE: 0.14 MG/MG CREAT (ref 0–0.17)
SODIUM (MMOL/L) IN SER/PLAS: 137 MMOL/L (ref 136–145)
SPECIFIC GRAVITY, URINE: 1.02 (ref 1–1.03)
UREA NITROGEN (MG/DL) IN SER/PLAS: 46 MG/DL (ref 6–23)
UROBILINOGEN, URINE: <2 MG/DL (ref 0–1.9)

## 2023-05-03 RX ORDER — GLIPIZIDE 2.5 MG/1
5 TABLET, EXTENDED RELEASE ORAL DAILY
Qty: 60 TABLET | Refills: 11 | Status: SHIPPED | OUTPATIENT
Start: 2023-05-03 | End: 2024-04-29 | Stop reason: SDUPTHER

## 2023-05-09 ENCOUNTER — ANTICOAGULATION - WARFARIN VISIT (OUTPATIENT)
Dept: PRIMARY CARE | Facility: CLINIC | Age: 88
End: 2023-05-09
Payer: MEDICARE

## 2023-05-09 LAB
INR IN PPP BY COAGULATION ASSAY EXTERNAL: 2.2
PROTHROMBIN TIME (PT) IN PPP BY COAGULATION ASSAY EXTERNAL: NORMAL SECONDS

## 2023-05-10 ENCOUNTER — TELEPHONE (OUTPATIENT)
Dept: PRIMARY CARE | Facility: CLINIC | Age: 88
End: 2023-05-10
Payer: MEDICARE

## 2023-05-23 ENCOUNTER — ANTICOAGULATION - WARFARIN VISIT (OUTPATIENT)
Dept: PRIMARY CARE | Facility: CLINIC | Age: 88
End: 2023-05-23
Payer: MEDICARE

## 2023-05-30 ENCOUNTER — ANTICOAGULATION - WARFARIN VISIT (OUTPATIENT)
Dept: PRIMARY CARE | Facility: CLINIC | Age: 88
End: 2023-05-30
Payer: MEDICARE

## 2023-05-30 LAB
INR IN PPP BY COAGULATION ASSAY EXTERNAL: 2.3
PROTHROMBIN TIME (PT) IN PPP BY COAGULATION ASSAY EXTERNAL: NORMAL SECONDS

## 2023-05-30 NOTE — PROGRESS NOTES
Patients wife notified for patient to continue with current dose of warfarin and recheck in a week

## 2023-06-06 ENCOUNTER — ANTICOAGULATION - WARFARIN VISIT (OUTPATIENT)
Dept: PRIMARY CARE | Facility: CLINIC | Age: 88
End: 2023-06-06
Payer: MEDICARE

## 2023-06-06 LAB
INR IN PPP BY COAGULATION ASSAY EXTERNAL: 2.4
PROTHROMBIN TIME (PT) IN PPP BY COAGULATION ASSAY EXTERNAL: NORMAL SECONDS

## 2023-06-06 NOTE — PROGRESS NOTES
Patients INR is 2.4, per RJ CNP patient is to continue current dose and recheck in a week, left a vm on patients vm

## 2023-06-13 ENCOUNTER — ANTICOAGULATION - WARFARIN VISIT (OUTPATIENT)
Dept: PRIMARY CARE | Facility: CLINIC | Age: 88
End: 2023-06-13
Payer: MEDICARE

## 2023-06-13 ENCOUNTER — TELEPHONE (OUTPATIENT)
Dept: PRIMARY CARE | Facility: CLINIC | Age: 88
End: 2023-06-13
Payer: MEDICARE

## 2023-06-13 NOTE — PROGRESS NOTES
We received patients INR at 2.2 per RJ patient is to continue current dose and recheck in 1 week patients wife is notified

## 2023-06-13 NOTE — TELEPHONE ENCOUNTER
I called patients wife to let her know about patients INR to have patient to continue current dose and patients wife wanted to inform you that [facility] and stated to the physician there that he's been having bad cramps in his calfs and the provider there suggested that patient not take his statin for 3 weeks to see if that helps with the cramping, if you have any other suggestions for this please advise

## 2023-06-20 ENCOUNTER — ANTICOAGULATION - WARFARIN VISIT (OUTPATIENT)
Dept: PRIMARY CARE | Facility: CLINIC | Age: 88
End: 2023-06-20
Payer: MEDICARE

## 2023-06-20 NOTE — PROGRESS NOTES
Patients INR came in at 2.3, per RJ patient is to continue current dose and recheck in a week, patients wife is notified

## 2023-06-27 ENCOUNTER — ANTICOAGULATION - WARFARIN VISIT (OUTPATIENT)
Dept: PRIMARY CARE | Facility: CLINIC | Age: 88
End: 2023-06-27
Payer: MEDICARE

## 2023-07-05 ENCOUNTER — ANTICOAGULATION - WARFARIN VISIT (OUTPATIENT)
Dept: PRIMARY CARE | Facility: CLINIC | Age: 88
End: 2023-07-05
Payer: MEDICARE

## 2023-07-05 NOTE — PROGRESS NOTES
Patients INR was 2.2, per RJ patient is to continue current dose and recheck in a week, patients wife is notified

## 2023-07-11 ENCOUNTER — ANTICOAGULATION - WARFARIN VISIT (OUTPATIENT)
Dept: PRIMARY CARE | Facility: CLINIC | Age: 88
End: 2023-07-11
Payer: MEDICARE

## 2023-07-18 ENCOUNTER — ANTICOAGULATION - WARFARIN VISIT (OUTPATIENT)
Dept: PRIMARY CARE | Facility: CLINIC | Age: 88
End: 2023-07-18
Payer: MEDICARE

## 2023-07-18 LAB
INR IN PPP BY COAGULATION ASSAY EXTERNAL: 2
PROTHROMBIN TIME (PT) IN PPP BY COAGULATION ASSAY EXTERNAL: NORMAL SECONDS

## 2023-07-18 NOTE — PROGRESS NOTES
Patients inr was received at 2.0, per RJ patient is to continue current dose of warfarin and recheck in a week, detailed vm was left for patient

## 2023-07-25 ENCOUNTER — ANTICOAGULATION - WARFARIN VISIT (OUTPATIENT)
Dept: PRIMARY CARE | Facility: CLINIC | Age: 88
End: 2023-07-25
Payer: MEDICARE

## 2023-07-25 LAB
INR IN PPP BY COAGULATION ASSAY EXTERNAL: 2.6
PROTHROMBIN TIME (PT) IN PPP BY COAGULATION ASSAY EXTERNAL: NORMAL SECONDS

## 2023-07-25 NOTE — PROGRESS NOTES
Per RJ patient is to continue current dose of warfarin and recheck in a week, patients wife is notified

## 2023-07-28 ENCOUNTER — TELEPHONE (OUTPATIENT)
Dept: PRIMARY CARE | Facility: CLINIC | Age: 88
End: 2023-07-28
Payer: MEDICARE

## 2023-07-28 DIAGNOSIS — E11.9 TYPE 2 DIABETES MELLITUS WITHOUT COMPLICATION, WITHOUT LONG-TERM CURRENT USE OF INSULIN (MULTI): Primary | ICD-10-CM

## 2023-07-28 NOTE — TELEPHONE ENCOUNTER
Can you please put in a endocrinology referral for this RJ patient, since RJ is leaving the referral will be invalid and needs to be sent in soon, please and thank you its for Dr. Leyva

## 2023-07-31 ENCOUNTER — TELEPHONE (OUTPATIENT)
Dept: PRIMARY CARE | Facility: CLINIC | Age: 88
End: 2023-07-31
Payer: MEDICARE

## 2023-07-31 DIAGNOSIS — I25.10 ASHD (ARTERIOSCLEROTIC HEART DISEASE): ICD-10-CM

## 2023-07-31 RX ORDER — FUROSEMIDE 20 MG/1
40 TABLET ORAL DAILY
Qty: 90 TABLET | Refills: 2 | Status: SHIPPED | OUTPATIENT
Start: 2023-07-31 | End: 2023-12-13 | Stop reason: SDUPTHER

## 2023-07-31 NOTE — TELEPHONE ENCOUNTER
Rx Refill Request Telephone Encounter    Name:  Michele Maya  :  [id number]  Medication Name:  Furosemide  20 mg          Specific Pharmacy location:  [facility]

## 2023-08-01 ENCOUNTER — ANTICOAGULATION - WARFARIN VISIT (OUTPATIENT)
Dept: PRIMARY CARE | Facility: CLINIC | Age: 88
End: 2023-08-01
Payer: MEDICARE

## 2023-08-08 ENCOUNTER — ANTICOAGULATION - WARFARIN VISIT (OUTPATIENT)
Dept: PRIMARY CARE | Facility: CLINIC | Age: 88
End: 2023-08-08
Payer: MEDICARE

## 2023-08-15 ENCOUNTER — ANTICOAGULATION - WARFARIN VISIT (OUTPATIENT)
Dept: PRIMARY CARE | Facility: CLINIC | Age: 88
End: 2023-08-15
Payer: MEDICARE

## 2023-08-22 ENCOUNTER — ANTICOAGULATION - WARFARIN VISIT (OUTPATIENT)
Dept: PRIMARY CARE | Facility: CLINIC | Age: 88
End: 2023-08-22
Payer: MEDICARE

## 2023-08-24 ENCOUNTER — TELEPHONE (OUTPATIENT)
Dept: PRIMARY CARE | Facility: CLINIC | Age: 88
End: 2023-08-24
Payer: MEDICARE

## 2023-08-24 DIAGNOSIS — E11.9 TYPE 2 DIABETES MELLITUS WITHOUT COMPLICATION, WITHOUT LONG-TERM CURRENT USE OF INSULIN (MULTI): ICD-10-CM

## 2023-08-24 RX ORDER — BLOOD SUGAR DIAGNOSTIC
1 STRIP MISCELLANEOUS 2 TIMES DAILY
Qty: 90 STRIP | Refills: 2 | Status: SHIPPED | OUTPATIENT
Start: 2023-08-24 | End: 2024-01-24 | Stop reason: SDUPTHER

## 2023-08-24 RX ORDER — LANCETS
EACH MISCELLANEOUS
Qty: 90 EACH | Refills: 2 | Status: SHIPPED | OUTPATIENT
Start: 2023-08-24

## 2023-08-30 ENCOUNTER — ANTICOAGULATION - WARFARIN VISIT (OUTPATIENT)
Dept: PRIMARY CARE | Facility: CLINIC | Age: 88
End: 2023-08-30
Payer: MEDICARE

## 2023-09-06 ENCOUNTER — ANTICOAGULATION - WARFARIN VISIT (OUTPATIENT)
Dept: PRIMARY CARE | Facility: CLINIC | Age: 88
End: 2023-09-06
Payer: MEDICARE

## 2023-09-13 ENCOUNTER — ANTICOAGULATION - WARFARIN VISIT (OUTPATIENT)
Dept: PRIMARY CARE | Facility: CLINIC | Age: 88
End: 2023-09-13
Payer: MEDICARE

## 2023-09-20 ENCOUNTER — ANTICOAGULATION - WARFARIN VISIT (OUTPATIENT)
Dept: PRIMARY CARE | Facility: CLINIC | Age: 88
End: 2023-09-20
Payer: MEDICARE

## 2023-09-20 NOTE — PROGRESS NOTES
Per Dr. French patient is to continue the current dose and recheck in a week, patients wife notified

## 2023-09-21 ENCOUNTER — APPOINTMENT (OUTPATIENT)
Dept: PRIMARY CARE | Facility: CLINIC | Age: 88
End: 2023-09-21
Payer: MEDICARE

## 2023-09-26 ENCOUNTER — ANTICOAGULATION - WARFARIN VISIT (OUTPATIENT)
Dept: PRIMARY CARE | Facility: CLINIC | Age: 88
End: 2023-09-26
Payer: MEDICARE

## 2023-10-02 ENCOUNTER — OFFICE VISIT (OUTPATIENT)
Dept: UROLOGY | Facility: CLINIC | Age: 88
End: 2023-10-02
Payer: MEDICARE

## 2023-10-02 DIAGNOSIS — N40.1 BENIGN PROSTATIC HYPERPLASIA WITH LOWER URINARY TRACT SYMPTOMS, SYMPTOM DETAILS UNSPECIFIED: Primary | ICD-10-CM

## 2023-10-02 DIAGNOSIS — R32 URINARY INCONTINENCE, UNSPECIFIED TYPE: ICD-10-CM

## 2023-10-02 LAB
POC BILIRUBIN, URINE: NEGATIVE
POC BLOOD, URINE: NEGATIVE
POC GLUCOSE, URINE: NEGATIVE MG/DL
POC KETONES, URINE: NEGATIVE MG/DL
POC LEUKOCYTES, URINE: NEGATIVE
POC NITRITE,URINE: NEGATIVE
POC PH, URINE: 5 PH
POC PROTEIN, URINE: NEGATIVE MG/DL
POC SPECIFIC GRAVITY, URINE: 1.02
POC UROBILINOGEN, URINE: 0.2 EU/DL

## 2023-10-02 PROCEDURE — 1160F RVW MEDS BY RX/DR IN RCRD: CPT | Performed by: UROLOGY

## 2023-10-02 PROCEDURE — 1036F TOBACCO NON-USER: CPT | Performed by: UROLOGY

## 2023-10-02 PROCEDURE — 99214 OFFICE O/P EST MOD 30 MIN: CPT | Performed by: UROLOGY

## 2023-10-02 PROCEDURE — 1159F MED LIST DOCD IN RCRD: CPT | Performed by: UROLOGY

## 2023-10-02 PROCEDURE — 81003 URINALYSIS AUTO W/O SCOPE: CPT | Performed by: UROLOGY

## 2023-10-02 RX ORDER — TAMSULOSIN HYDROCHLORIDE 0.4 MG/1
0.4 CAPSULE ORAL
Qty: 30 CAPSULE | Refills: 0 | Status: SHIPPED | OUTPATIENT
Start: 2023-10-02 | End: 2023-10-30 | Stop reason: SDUPTHER

## 2023-10-02 NOTE — PATIENT INSTRUCTIONS
"10/02/2023  Patient here for urinary incontinence.     Increased urinary incontinence during the day and at night. Weaker urinary stream. Urinary frequency 1-2x per hour.      No results found for: \"PSA\"     -Donaldo Benton     POC Specific Gravity, Urine  1.005 - 1.035 1.020   POC PH, Urine  No Reference Range Established PH 5.0   POC Protein, Urine  NEGATIVE, 30 (1+) mg/dl NEGATIVE   POC Glucose, Urine  NEGATIVE mg/dl NEGATIVE   POC Blood, Urine  NEGATIVE NEGATIVE   POC Ketones, Urine  NEGATIVE mg/dl NEGATIVE   POC Bilirubin, Urine  NEGATIVE NEGATIVE   POC Urobilinogen, Urine  0.2, 1.0 EU/DL 0.2   Poc Nitrate, Urine  NEGATIVE NEGATIVE   POC Leukocytes, Urine  NEGATIVE NEGATIVE     Complaint dysuria, incontinence    Patient has no nausea, no vomiting, no fever.    PVR:20 ml    JAY: Deferred    We discussed benign prostate hypertrophy  We discussed bladder empty well  All the questions were answered, the patient expressed understanding and agreed to the plan.    Impression  Right ureter calculus  Right flank pain  BPH  Neurogenic bladder  Nocturia  Frequency urgency     Plan  Flomax 0.4 mg daily x30-day trial  Conservative management for stone  Increase liquid intake  Possible surgical intervention  Call if pain worsens   Appointment 1 year     "

## 2023-10-02 NOTE — PROGRESS NOTES
"10/02/2023  Patient here for urinary incontinence.     Increased urinary incontinence during the day and at night. Weaker urinary stream. Urinary frequency 1-2x per hour.      No results found for: \"PSA\"     -Donaldo Serenity     POC Specific Gravity, Urine  1.005 - 1.035 1.020   POC PH, Urine  No Reference Range Established PH 5.0   POC Protein, Urine  NEGATIVE, 30 (1+) mg/dl NEGATIVE   POC Glucose, Urine  NEGATIVE mg/dl NEGATIVE   POC Blood, Urine  NEGATIVE NEGATIVE   POC Ketones, Urine  NEGATIVE mg/dl NEGATIVE   POC Bilirubin, Urine  NEGATIVE NEGATIVE   POC Urobilinogen, Urine  0.2, 1.0 EU/DL 0.2   Poc Nitrate, Urine  NEGATIVE NEGATIVE   POC Leukocytes, Urine  NEGATIVE NEGATIVE     Complaint dysuria, incontinence    Patient has no nausea, no vomiting, no fever.    PVR:20 ml    JAY: Deferred    We discussed benign prostate hypertrophy  We discussed bladder empty well  All the questions were answered, the patient expressed understanding and agreed to the plan.    Impression  Right ureter calculus  Right flank pain  BPH  Neurogenic bladder  Nocturia  Frequency urgency     Plan  Flomax 0.4 mg daily x30-day trial  Conservative management for stone  Increase liquid intake  Possible surgical intervention  Call if pain worsens   Appointment 1 year              10/04/21:     Patient here for urinary incontinence.     Increased urinary incontinence during the day and at night. Weaker urinary stream. Urinary frequency 1-2x per hour.      No results found for: \"PSA\"     -Donaldo Serenity     POC Specific Gravity, Urine  1.005 - 1.035 1.020   POC PH, Urine  No Reference Range Established PH 5.0   POC Protein, Urine  NEGATIVE, 30 (1+) mg/dl NEGATIVE   POC Glucose, Urine  NEGATIVE mg/dl NEGATIVE   POC Blood, Urine  NEGATIVE NEGATIVE   POC Ketones, Urine  NEGATIVE mg/dl NEGATIVE   POC Bilirubin, Urine  NEGATIVE NEGATIVE   POC Urobilinogen, Urine  0.2, 1.0 EU/DL 0.2   Poc Nitrate, Urine  NEGATIVE NEGATIVE   POC Leukocytes, " Urine  NEGATIVE NEGATIVE       10/04/21:   Patient here today following up after 10/02/21 ER visit for right flank pain, nausea, vomiting.   He was prescribed Cipro 500mg BID, Flomax 0.4mg daily and Percocet 5-325mg. He has some pain today, 2/10, intermittent. Pain started 10/01/21. First time for kidney stone.     Detrol LA does not seem to be helping. He has urinary hesitancy.      Patient has no fever      CT abd/pelvis done 10/2/21--IMPRESSION:  Enlarged heart. Transvenous pacemaker.  3 mm obstructing calculus in the distal right ureter.   Right hydronephrosis.     IO UA (automated w/o microscopy)           [id number] 01:22PM           Yinka Lara     Test Name       Result     Flag        Reference  IO Glucose - Urine         Negative                  IO Bilirubin       Negative                  IO Ketones      Negative                  IO Specific Gravity         1.015                       IO Blood          (++)moderate - 40                                IO pH               5.0                           IO Protein, Urine            (++)100                   IO Urobilinogen                                              Normal (0.2-1.0 mg/dl)  IO Nitrite, Urine              Negative                  IO Leukocytes Negative                  IO Glucose - Urine         Negative                  IO Bilirubin       Negative                  IO Ketones      Negative                  IO Specific Gravity         1.015                       IO Blood          (++)moderate - 40                                IO pH               5.0                           IO Protein, Urine            (++)100                   IO Urobilinogen                                              Normal (0.2-1.0 mg/dl)  IO Nitrite, Urine              Negative                  IO Leukocytes Negative     We discussed benign prostatic hyperplasia with mild voiding symptoms, continue Flomax 0.4mg daily , discontinue Detrol LA   We discussed right ureter  stone, right flank pain, recent ER visit, conservative management, increase liquid intake  All questions were answered, the patient expressed understanding and agreed to the plan.                                                                     Impression  Right ureter calculus  Right flank pain  BPH  Neurogenic bladder  Nocturia  Frequency urgency     Plan  Continue Flomax 0.4 mg daily  DC Detrol LA  Conservative management for stone  Increase liquid intake  Possible surgical intervention  Call if pain worsens   Appointment 1 week              9/17/21:   Patient is here today for cystoscopy for bph and dysuria.      Cystoscopy     Findings: Mild bulbar urethral stricture, mild enlarged prostate, trabeculated bladder, no stone no tumor     Pain evaluation: 0/10 before, 2/10 after      Prostate ultrasound: 18 cc prostate     We discussed intractable dysuria, Flomax and oxybutynin did not work  We discussed cystoscopy and ultrasound, both unremarkable  All the questions were answered, the patient expressed understanding and agreed to the plan.     Impression  BPH  Neurogenic bladder  Nocturia  Frequency urgency     Plan  Conservative management  No surgery for now  Continue Flomax 0.4 mg daily  DC oxybutynin  Detrol LA 4 mg daily x30 days trial call for refill  Appointment in 6 months           06/07/2021  Oxybutynin did not work, still complaining dysuria     Patient has no nausea, no vomiting, no fever.     PVR: 106 cc     We discussed intractable dysuria, Flomax and oxybutynin did not work  We discussed cystoscopy and ultrasound  All the questions were answered, the patient expressed understanding and agreed to the plan.     Impression  BPH  Neurogenic bladder  Nocturia  Frequency urgency     Plan  Continue Flomax 0.4 mg daily  DC oxybutynin 5 mg twice a day  Cystoscopy and prostate ultrasound.     I spent 25 minutes with this patient. Greater than 50% of this time was spent in counseling and/or coordination of  care        05/03/2021  87 gentleman presents urgency frequency and nocturia x3, for several years, on Flomax for 3 months     Patient has no nausea, no vomiting, no fever.     Exam: Uncircumcised, normal testes left spermatocele 3 cm     JAY: 1+, no nodule     IO Ultrasound, measurement post-void resid urine and/or bl cap; no imag         [id number] 02:21PM         Yinka Lara     Test Name       Result     Flag        Reference  IO Ultrasound, measurement post void resid urine and/or bl cap; non-imag       33 ml/min                                                                         We discussed benign prostate hypertrophy with moderate voiding symptoms  We discussed neurogenic bladder frequency urgency  All the questions were answered, the patient expressed understanding and agreed to the plan.     Impression  BPH  Neurogenic bladder  Nocturia  Frequency urgency     Plan  Continue Flomax 0.4 mg daily  Add oxybutynin 5 mg twice a day x 30 days  Appointment in 1 month      Review of Systems     Constitutional: no fever, no chills and not feeling poorly.   Eyes: no eyesight problems.   ENT: no hearing loss, no nosebleeds and no sore throat.   Cardiovascular: no chest pain, no palpitations and no extremity edema.   Respiratory: no shortness of breath, no wheezing, no cough and no shortness of breath during exertion.   Gastrointestinal: no abdominal pain, no constipation, no heartburn, no diarrhea, no vomiting and no blood in stools.   Genitourinary: no dysuria, no incontinence, normal micturition and no testicular pain.   Musculoskeletal: no arthralgias and no gait abnormality.   Integumentary: no skin lesions, no skin wound and no change in a mole.   Neurological: no confusion, no dizziness, no fainting and no difficulty walking.   Psychiatric: not suicidal, no anxiety and no depression.   All other systems have been reviewed and are negative for complaint.      Active Problems  Problems    · Acid reflux (530.81)  (K21.9)   · Allergic rhinitis (477.9) (J30.9)   · Anticoagulated on Coumadin (V58.61) (Z79.01)   · Aortoiliac occlusive disease (444.09) (I74.09)   · ASHD (arteriosclerotic heart disease) (414.00) (I25.10)   · Ataxia (781.3) (R27.0)   · Atrial fibrillation (427.31) (I48.91)   · Added by Problem List Migration; 2013-12-28   · Back pain (724.5) (M54.9)   · BPH (benign prostatic hyperplasia) (600.00) (N40.0)   · Cardiac defibrillator in place (V45.02) (Z95.810)   · Cardiomyopathy (425.4) (I42.9)   · Chronic diarrhea (787.91) (K52.9)   · Chronic insomnia (780.52) (F51.04)   · Chronic leg pain (729.5,338.29) (M79.606,G89.29)   · Chronic painful diabetic neuropathy (250.60,357.2) (E11.40)   · Chronic sinusitis (473.9) (J32.9)   · Claudication, intermittent (443.9) (I73.9)   · Constipation (564.00) (K59.00)   · COPD (chronic obstructive pulmonary disease) (496) (J44.9)   · Diabetes mellitus, type 2 (250.00) (E11.9)   · Diabetic peripheral neuropathy (250.60,357.2) (E11.42)   · Diarrhea (787.91) (R19.7)   · Dizziness (780.4) (R42)   · Dyslipidemia (272.4) (E78.5)   · Dysphagia (787.20) (R13.10)   · Dyspnea on exertion (786.09) (R06.00)   · Dysuria (788.1) (R30.0)   · Edema (782.3) (R60.9)   · Elevated INR (790.92) (R79.1)   · Fatigue (780.79) (R53.83)   · Frequency-urgency syndrome (596.51) (N31.8)   · Lumbago (724.2) (M54.5)   · Lumbar radiculopathy (724.4) (M54.16)   · Neurogenic bladder (596.54) (N31.9)   · Neuropathy (355.9) (G62.9)   · Nocturia (788.43) (R35.1)   · YAYA (obstructive sleep apnea) (327.23) (G47.33)   · Pacemaker (V45.01) (Z95.0)   · Pain in both lower extremities (729.5) (M79.604,M79.605)   · Paresthesia (782.0) (R20.2)   · Periodic limb movements of sleep (327.51) (G47.61)   · Peripheral neuropathy (356.9) (G62.9)   · Presence of cardiac pacemaker (V45.01) (Z95.0)   · PVD (peripheral vascular disease) (443.9) (I73.9)   · Renal insufficiency (593.9) (N28.9)   · Restless legs syndrome (333.94) (G25.81)   ·  Skin lesion (709.9) (L98.9)   · Sleep-related hypoxia (327.24) (G47.34)   · Spinal stenosis of lumbosacral region (724.02) (M48.07)   · Unstable bladder (596.89) (N32.89)     Past Medical History  Problems    · History of Anticoagulated on Coumadin (V58.61) (Z79.01)   · Atrial fibrillation (427.31) (I48.91)   · Added by Problem List Migration; 2013-12-28   · History of acute sinusitis (V12.69) (Z87.09)   · Resolved Date: 08 Nov 2019   · History of atrial fibrillation (V12.59) (Z86.79)   · History of diabetes mellitus (V12.29) (Z86.39)   · History of transient cerebral ischemia (V12.54) (Z86.73)   · History of Prostate cancer screening (V76.44) (Z12.5)   · done 07-     Surgical History  Problems    · History of Cardio-Defib Pulse Gen Venous Insertion Of Electrode For Ventricular Pacing   · History of Cataract Surgery   · History of Foot Surgery   · melanoma right toe/amputation   · History of Hand Surgery   · History of Knee Replacement   · right knee   · History of Neuroplasty Decompression Median Nerve At Carpal Tunnel   · History of Pacemaker Placement     Family History  Mother    · Family history of congestive heart failure (V17.49) (Z82.49)   · Family history of diabetes mellitus (V18.0) (Z83.3)  Father    · Family history of colorectal cancer (V16.0) (Z80.0)  Sister    · Family history of diabetes mellitus (V18.0) (Z83.3)  Brother    · Family history of CAD (coronary artery disease)     Social History  Problems    · Consumes alcohol occasionally (V49.89) (Z78.9)   · Daily caffeine consumption, 2-3 servings a day   · Former smoker (V15.82) (Z87.891)   · House   · Lives with spouse   ·    · Retired     Allergies  Medication    · amiodarone   Diarrhea; Recorded By: Rubia Estrada; 6/14/2018 10:15:02 AM   · carvedilol   Fatigue; Recorded By: Rubia Estrada; 5/31/2017 10:52:49 AM   · Tambocor   Unknown; Updated By: Rubia Estrada; 5/31/2017 10:52:49 AM   · Zocor   extreme fatigue; Updated By: Natalie,  Rubia; 5/31/2017 10:52:49 AM   · baclofen   Recorded By: Loree Pleitez; 11/7/2019 2:18:03 PM  Additional reactions - Other(See Desc)   · quinidine   Recorded By: Yinka Guillermo; 2/15/2016 8:43:32 AM     Current Meds     Medication Name Instruction   Atorvastatin Calcium 10 MG Oral Tablet Take 1 tablet daily   Citracal +D3 250-107-500 MG-MG-UNIT Oral Tablet Chewable 2 daily   clonazePAM 0.5 MG Oral Tablet TAKE 1 TABLET AT BEDTIME.   Doxycycline Hyclate 100 MG Oral Tablet 1 tablet daily   Fluticasone-Salmeterol 250-50 MCG/DOSE Inhalation Aerosol Powder Breath Activated INHALE 1 PUFF TWICE DAILY.   Furosemide 20 MG Oral Tablet TAKE 2 TABLET Daily   glipiZIDE ER 2.5 MG Oral Tablet Extended Release 24 Hour TAKE 2 TABLET Daily   Lisinopril 5 MG Oral Tablet TAKE 1 TABLET DAILY AS DIRECTED.   Metamucil CAPS TAKE 1 CAPSULE Daily   Omeprazole 40 MG Oral Capsule Delayed Release TAKE 1 CAPSULE Daily   OneTouch Ultra 2 w/Device Kit USE AS DIRECTED.   OneTouch Ultra In Vitro Strip USE 1 STRIP TWICE DAILY.   OneTouch UltraSoft Lancets USE AS DIRECTED.   PreserVision AREDS CAPS TAKE 2 CAPSULE Twice daily   Symbicort AERO     Tolterodine Tartrate ER 4 MG Oral Capsule Extended Release 24 Hour TAKE 1 CAPSULE Daily   Vitamin B-12 1000 MCG Oral Tablet TAKE 1 TABLET DAILY AS DIRECTED.   Warfarin Sodium 5 MG Oral Tablet TAKE AS DIRECTED.      Vitals  Vital Signs     Recorded: [id number] 01:13PM   Temperature 97.4 F   Heart Rate 88   Systolic 108   Diastolic 57   Height 6 ft    Weight 182 lb    BMI Calculated 24.68 kg/m2   BSA Calculated 2.05      Physical Exam     Constitutional: No acute distress, well appearing and well nourished.    HEENT: Normal appearance, no asymmetry.    Pulmonary: Respiratory effort: No retractions, no use of accessory muscles, normal diaphragmatic movement.    Cardiovascular:   - Extremities with no signs of clubbing, cyanosis, or edema.    Skin: No rash, no lesions, no ulcers, no pretibial edema, no jaundice.     Neurological: Alert and oriented to person, place, and time.    Psychiatric: No appreciable depression, no anxiety, no agitation. Normal mood and affect.       Results/Data  IO UA (automated w/o microscopy) 04Oct2021 01:22PM Yinka Lara      Test Name Result Flag Reference   IO Glucose - Urine Negative       IO Bilirubin Negative       IO Ketones Negative       IO Specific Gravity 1.015       IO Blood (++)moderate - 40       IO pH 5.0       IO Protein, Urine (++)100       IO Urobilinogen         Normal (0.2-1.0 mg/dl)   IO Nitrite, Urine Negative       IO Leukocytes Negative       IO Glucose - Urine Negative       IO Bilirubin Negative       IO Ketones Negative       IO Specific Gravity 1.015       IO Blood (++)moderate - 40       IO pH 5.0       IO Protein, Urine (++)100       IO Urobilinogen         Normal (0.2-1.0 mg/dl)   IO Nitrite, Urine Negative       IO Leukocytes Negative                       Signatures   Electronically signed by : Yinka Lara MD; Oct  4 2021  1:43PM EST (Author)

## 2023-10-03 ENCOUNTER — ANTICOAGULATION - WARFARIN VISIT (OUTPATIENT)
Dept: PRIMARY CARE | Facility: CLINIC | Age: 88
End: 2023-10-03
Payer: MEDICARE

## 2023-10-10 ENCOUNTER — ANTICOAGULATION - WARFARIN VISIT (OUTPATIENT)
Dept: PRIMARY CARE | Facility: CLINIC | Age: 88
End: 2023-10-10

## 2023-10-10 ENCOUNTER — OFFICE VISIT (OUTPATIENT)
Dept: PAIN MEDICINE | Facility: HOSPITAL | Age: 88
End: 2023-10-10
Payer: MEDICARE

## 2023-10-10 VITALS
HEIGHT: 71 IN | BODY MASS INDEX: 25.2 KG/M2 | SYSTOLIC BLOOD PRESSURE: 120 MMHG | WEIGHT: 180 LBS | HEART RATE: 63 BPM | DIASTOLIC BLOOD PRESSURE: 71 MMHG | RESPIRATION RATE: 16 BRPM

## 2023-10-10 DIAGNOSIS — M19.90 ARTHRITIS: Primary | ICD-10-CM

## 2023-10-10 LAB
INR IN PPP BY COAGULATION ASSAY EXTERNAL: 3.3
PROTHROMBIN TIME (PT) IN PPP BY COAGULATION ASSAY EXTERNAL: NORMAL SECONDS

## 2023-10-10 PROCEDURE — 1160F RVW MEDS BY RX/DR IN RCRD: CPT | Performed by: CLINICAL NURSE SPECIALIST

## 2023-10-10 PROCEDURE — 1036F TOBACCO NON-USER: CPT | Performed by: CLINICAL NURSE SPECIALIST

## 2023-10-10 PROCEDURE — 99214 OFFICE O/P EST MOD 30 MIN: CPT | Performed by: CLINICAL NURSE SPECIALIST

## 2023-10-10 PROCEDURE — 1159F MED LIST DOCD IN RCRD: CPT | Performed by: CLINICAL NURSE SPECIALIST

## 2023-10-10 PROCEDURE — 1125F AMNT PAIN NOTED PAIN PRSNT: CPT | Performed by: CLINICAL NURSE SPECIALIST

## 2023-10-10 ASSESSMENT — PAIN SCALES - GENERAL: PAINLEVEL: 8

## 2023-10-10 ASSESSMENT — ENCOUNTER SYMPTOMS
LOSS OF SENSATION IN FEET: 0
DEPRESSION: 0
OCCASIONAL FEELINGS OF UNSTEADINESS: 0
NUMBNESS: 1
JOINT SWELLING: 1
ARTHRALGIAS: 1

## 2023-10-10 ASSESSMENT — PATIENT HEALTH QUESTIONNAIRE - PHQ9
SUM OF ALL RESPONSES TO PHQ9 QUESTIONS 1 AND 2: 0
1. LITTLE INTEREST OR PLEASURE IN DOING THINGS: NOT AT ALL
2. FEELING DOWN, DEPRESSED OR HOPELESS: NOT AT ALL

## 2023-10-10 NOTE — ASSESSMENT & PLAN NOTE
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  Patient also has chronic neuropathy bilateral lower extremities and restless leg syndrome.  Previously tried duloxetine for neuropathic component of pain.  Patient discontinued this medication secondary to feeling it was ineffective.  He has failed multiple over-the-counter ointments.  Unfortunately the patient's medical conditions limit the addition of many neuropathic medications.  Patient has a history of CKD and is followed closely by nephrology.  We discussed a trial of a low-dose of gabapentin at bedtime if okay with nephrology.  Patient and his wife will discuss the addition of gabapentin at their next nephrology visit on 11/2/2023.  Patient will repeat lab work prior to this visit.  Patient may also benefit from a corticosteroid injection right middle finger.  Considering the amount of arthritis he may not get adequate relief with this injection.  We will discuss further injections at his next office visit.  Discussed low-dose opiate therapy.  Patient and wife state that they are not interested in opiates at this time.  Patient also continues on a low-dose of clonazepam at bedtime to help with sleep which has been very effective.  Plan discussed with patient and his wife.    -Suggest adding gabapentin 300 mg at bedtime if this dose is acceptable to nephrology.  Patient and wife will discuss the addition of gabapentin with nephrology at their next office visit and will notify our office if they wish to proceed.  -Patient and wife would like to retry Voltaren gel topically for joint pain and stiffness.  Also recommended they discuss utilizing Voltaren gel with nephrology.  -We will consider corticosteroid injections in the future.  -Patient will call our office and schedule a follow-up visit after discussing the addition of gabapentin and Voltaren gel with nephrology.

## 2023-10-10 NOTE — PROGRESS NOTES
Per praful patient is to hold warfarin today and then resume current dosage and recheck in a week, patients wife notified

## 2023-10-10 NOTE — PROGRESS NOTES
Subjective   Patient ID: Michele Maya is a 89 y.o. male who presents for Finger Pain.    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  Patient also has chronic neuropathy bilateral feet.  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  Options are limited and the patient has chronic kidney disease and will need to be careful with medication.  Low-dose opiates may be a reasonable option however he is on concomitant benzodiazepines at night for sleep.  Started patient on a low-dose of duloxetine 20 mg after approval by nephrology.  Patient presents at today's office visit with chronic pain to right middle finger as well as neuropathy bilateral feet.  Patient describes pain as burning, aching.  Pain is constant and limits his ability to  and to use his right hand effectively.  Patient discontinued duloxetine and states it was ineffective.  He did not feel any relief with his current dose.  He has tried multiple over-the-counter ointments and creams without success.  Previously tried Voltaren gel utilizing once daily.      7-8/10 pain score    OARRS:Sandy Sher, APRN-CNP, APRN-CNS on 10/10/2023  2:53 PM  I have personally reviewed the OARRS report for Michele Maya. I have considered the risks of abuse, dependence, addiction and diversion    Is the patient prescribed a combination of a benzodiazepine and opioid?  No    Last Urine Drug Screen / ordered today: No  No results found for this or any previous visit (from the past 8760 hour(s)).  N/A    Controlled Substance Agreement:  Date of the Last Agreement: n/a      Monitoring and compliance:    ORT: n/a    PDUQ: n/a    Office Agreement: 7/24/23      Review of Systems   Musculoskeletal:  Positive for arthralgias and joint swelling.   Neurological:   Positive for numbness.   All other systems reviewed and are negative.         Current Outpatient Medications:     atorvastatin (Lipitor) 10 mg tablet, Take 1 tablet (10 mg) by mouth once daily., Disp: , Rfl:     blood sugar diagnostic (OneTouch Ultra Test) strip, Inject 1 strip as directed 2 times a day., Disp: 90 strip, Rfl: 2    budesonide-glycopyr-formoterol (Breztri Aerosphere) 160-9-4.8 mcg/actuation HFA aerosol inhaler, Breztri Aerosphere 160-9-4.8 MCG/ACT Inhalation Aerosol  Refills: 0      Start : 31-Oct-2022  Active 5.9 GM Inhaler, Disp: , Rfl:     budesonide/glycopyr/formoterol (BREZTRI AEROSPHERE INHL), Inhale., Disp: , Rfl:     clonazePAM (KlonoPIN) 0.5 mg tablet, TAKE 1.5 TABLET Bedtime, Disp: , Rfl:     cyanocobalamin (Vitamin B-12) 1,000 mcg tablet, Take 1 tablet (1,000 mcg) by mouth once daily., Disp: , Rfl:     furosemide (Lasix) 20 mg tablet, Take 2 tablets (40 mg) by mouth once daily., Disp: 90 tablet, Rfl: 2    glipiZIDE XL (Glucotrol XL) 2.5 mg 24 hr tablet, Take 2 tablets (5 mg) by mouth once daily., Disp: 60 tablet, Rfl: 11    lancets (OneTouch UltraSoft Lancets) misc, test twice a day, Disp: 90 each, Rfl: 2    lisinopril 5 mg tablet, Take 1 tablet (5 mg) by mouth once daily. As directed, Disp: , Rfl:     omeprazole (PriLOSEC) 40 mg DR capsule, Take 1 capsule (40 mg) by mouth once daily in the morning. Take before meals., Disp: 30 capsule, Rfl: 11    warfarin (Coumadin) 5 mg tablet, Take 1.5 tablets (7.5 mg) by mouth once daily., Disp: , Rfl:     hydrocortisone-iodoquinoL (Dermazene) 1-1 % cream cream, APPLY TOPICALLY TWICE DAILY AS NEEDED TO GROIN AREA, Disp: , Rfl:     ketoconazole (NIZOral) 2 % cream, APPLY TWICE DAILY TO GROIN, Disp: , Rfl:     OXcarbazepine (Trileptal) 150 mg tablet, Take 150 mg every morning and 300 mg every evening, Disp: , Rfl:     tamsulosin (Flomax) 0.4 mg 24 hr capsule, Take 1 capsule (0.4 mg) by mouth once daily in the morning. Take before meals. (Patient not  taking: Reported on 10/10/2023), Disp: 30 capsule, Rfl: 0     Past Medical History:   Diagnosis Date    Encounter for screening for malignant neoplasm of prostate 06/15/2016    Prostate cancer screening    Long term (current) use of anticoagulants 06/15/2016    Anticoagulated on Coumadin    Personal history of other diseases of the circulatory system     History of atrial fibrillation    Personal history of other diseases of the respiratory system 11/01/2019    History of acute sinusitis    Personal history of other endocrine, nutritional and metabolic disease     History of diabetes mellitus    Personal history of transient ischemic attack (TIA), and cerebral infarction without residual deficits     History of transient cerebral ischemia    Unspecified atrial fibrillation (CMS/HCC) 10/31/2022    Atrial fibrillation        Past Surgical History:   Procedure Laterality Date    CARDIAC PACEMAKER PLACEMENT  06/08/2017    Pacemaker Placement    CARDIAC PACEMAKER PLACEMENT  06/08/2017    Pacemaker Placement    CARPAL TUNNEL RELEASE  02/15/2016    Neuroplasty Decompression Median Nerve At Carpal Tunnel    CATARACT EXTRACTION  02/15/2016    Cataract Surgery    FOOT SURGERY  06/15/2016    Foot Surgery    HAND SURGERY  02/15/2016    Hand Surgery                                                                                                                                                          OTHER SURGICAL HISTORY  06/08/2017    Cardio-Defib Pulse Gen Venous Insertion Of Electrode For Ventricular Pacing    OTHER SURGICAL HISTORY  06/08/2017    Cardio-Defib Pulse Gen Venous Insertion Of Electrode For Ventricular Pacing    OTHER SURGICAL HISTORY  06/08/2017    Cardio-Defib Pulse Gen Venous Insertion Of Electrode For Ventricular Pacing    TOTAL KNEE ARTHROPLASTY  05/26/2016    Knee Replacement        Family History   Problem Relation Name Age of Onset    Heart failure Mother      Diabetes Mother          mellitus    Colon  "cancer Father      Diabetes Sister          mellitus    Coronary artery disease Brother          Allergies   Allergen Reactions    Amiodarone Diarrhea    Baclofen Unknown    Carvedilol Other     fatigue    Cilostazol Unknown    Flecainide Unknown    Pentoxifylline Unknown    Quinidine Unknown    Simvastatin Other     Extreme fatigue        Objective     Visit Vitals  /71   Pulse 63 Comment: Co 95%   Resp 16   Ht 1.803 m (5' 11\")   Wt 81.6 kg (180 lb)   BMI 25.10 kg/m²   Smoking Status Former   BSA 2.02 m²        Physical Exam  Vitals and nursing note reviewed.   Constitutional:       Appearance: Normal appearance.   Musculoskeletal:         General: Swelling, tenderness and deformity present.      Comments: Tenderness/stiffness and swelling right middle finger.  Painful range of motion.   Neurological:      Mental Status: He is alert.           Assessment/Plan   Problem List Items Addressed This Visit             ICD-10-CM    Arthritis - Primary M19.90     89-year-old male with history of osteoarthritis of the MCP, PIP and DIP joint of his right middle finger.  Patient also has chronic neuropathy bilateral lower extremities and restless leg syndrome.  Previously tried duloxetine for neuropathic component of pain.  Patient discontinued this medication secondary to feeling it was ineffective.  He has failed multiple over-the-counter ointments.  Unfortunately the patient's medical conditions limit the addition of many neuropathic medications.  Patient has a history of CKD and is followed closely by nephrology.  We discussed a trial of a low-dose of gabapentin at bedtime if okay with nephrology.  Patient and his wife will discuss the addition of gabapentin at their next nephrology visit on 11/2/2023.  Patient will repeat lab work prior to this visit.  Patient may also benefit from a corticosteroid injection right middle finger.  Considering the amount of arthritis he may not get adequate relief with this " injection.  We will discuss further injections at his next office visit.  Discussed low-dose opiate therapy.  Patient and wife state that they are not interested in opiates at this time.  Patient also continues on a low-dose of clonazepam at bedtime to help with sleep which has been very effective.  Plan discussed with patient and his wife.    -Suggest adding gabapentin 300 mg at bedtime if this dose is acceptable to nephrology.  Patient and wife will discuss the addition of gabapentin with nephrology at their next office visit and will notify our office if they wish to proceed.  -Patient and wife would like to retry Voltaren gel topically for joint pain and stiffness.  Also recommended they discuss utilizing Voltaren gel with nephrology.  -We will consider corticosteroid injections in the future.  -Patient will call our office and schedule a follow-up visit after discussing the addition of gabapentin and Voltaren gel with nephrology.

## 2023-10-13 ENCOUNTER — EDUCATION (OUTPATIENT)
Dept: PAIN MEDICINE | Facility: HOSPITAL | Age: 88
End: 2023-10-13
Payer: MEDICARE

## 2023-10-13 DIAGNOSIS — M19.90 ARTHRITIS: Primary | ICD-10-CM

## 2023-10-17 ENCOUNTER — ANTICOAGULATION - WARFARIN VISIT (OUTPATIENT)
Dept: PRIMARY CARE | Facility: CLINIC | Age: 88
End: 2023-10-17
Payer: MEDICARE

## 2023-10-17 LAB
INR IN PPP BY COAGULATION ASSAY EXTERNAL: 2.9
PROTHROMBIN TIME (PT) IN PPP BY COAGULATION ASSAY EXTERNAL: NORMAL SECONDS

## 2023-10-24 ENCOUNTER — ANTICOAGULATION - WARFARIN VISIT (OUTPATIENT)
Dept: PRIMARY CARE | Facility: CLINIC | Age: 88
End: 2023-10-24
Payer: MEDICARE

## 2023-10-24 LAB
INR IN PPP BY COAGULATION ASSAY EXTERNAL: 2.8
PROTHROMBIN TIME (PT) IN PPP BY COAGULATION ASSAY EXTERNAL: NORMAL SECONDS

## 2023-10-26 ENCOUNTER — LAB (OUTPATIENT)
Dept: LAB | Facility: LAB | Age: 88
End: 2023-10-26
Payer: MEDICARE

## 2023-10-26 DIAGNOSIS — N18.32 CHRONIC KIDNEY DISEASE, STAGE 3B (MULTI): Primary | ICD-10-CM

## 2023-10-26 LAB
ANION GAP SERPL CALC-SCNC: 15 MMOL/L (ref 10–20)
BUN SERPL-MCNC: 39 MG/DL (ref 6–23)
CALCIUM SERPL-MCNC: 9 MG/DL (ref 8.6–10.3)
CHLORIDE SERPL-SCNC: 104 MMOL/L (ref 98–107)
CO2 SERPL-SCNC: 24 MMOL/L (ref 21–32)
CREAT SERPL-MCNC: 1.51 MG/DL (ref 0.5–1.3)
GFR SERPL CREATININE-BSD FRML MDRD: 44 ML/MIN/1.73M*2
GLUCOSE SERPL-MCNC: 161 MG/DL (ref 74–99)
POTASSIUM SERPL-SCNC: 4.2 MMOL/L (ref 3.5–5.3)
SODIUM SERPL-SCNC: 139 MMOL/L (ref 136–145)

## 2023-10-26 PROCEDURE — 80048 BASIC METABOLIC PNL TOTAL CA: CPT

## 2023-10-26 PROCEDURE — 36415 COLL VENOUS BLD VENIPUNCTURE: CPT

## 2023-10-30 ENCOUNTER — TELEPHONE (OUTPATIENT)
Dept: PRIMARY CARE | Facility: CLINIC | Age: 88
End: 2023-10-30
Payer: MEDICARE

## 2023-10-30 ENCOUNTER — TELEPHONE (OUTPATIENT)
Dept: UROLOGY | Facility: CLINIC | Age: 88
End: 2023-10-30
Payer: MEDICARE

## 2023-10-30 DIAGNOSIS — N40.1 BENIGN PROSTATIC HYPERPLASIA WITH LOWER URINARY TRACT SYMPTOMS, SYMPTOM DETAILS UNSPECIFIED: ICD-10-CM

## 2023-10-30 DIAGNOSIS — R32 URINARY INCONTINENCE, UNSPECIFIED TYPE: ICD-10-CM

## 2023-10-30 RX ORDER — TAMSULOSIN HYDROCHLORIDE 0.4 MG/1
0.4 CAPSULE ORAL
Qty: 90 CAPSULE | Refills: 3 | Status: SHIPPED | OUTPATIENT
Start: 2023-10-30 | End: 2024-02-01

## 2023-10-31 ENCOUNTER — TELEPHONE (OUTPATIENT)
Dept: NEUROLOGY | Facility: HOSPITAL | Age: 88
End: 2023-10-31
Payer: MEDICARE

## 2023-10-31 ENCOUNTER — ANTICOAGULATION - WARFARIN VISIT (OUTPATIENT)
Dept: PRIMARY CARE | Facility: CLINIC | Age: 88
End: 2023-10-31
Payer: MEDICARE

## 2023-10-31 DIAGNOSIS — G25.81 RESTLESS LEGS SYNDROME: Primary | ICD-10-CM

## 2023-10-31 DIAGNOSIS — I48.91 ATRIAL FIBRILLATION, UNSPECIFIED TYPE (MULTI): Primary | ICD-10-CM

## 2023-10-31 RX ORDER — WARFARIN SODIUM 5 MG/1
7.5 TABLET ORAL EVERY EVENING
Qty: 90 TABLET | Refills: 0 | Status: SHIPPED | OUTPATIENT
Start: 2023-10-31 | End: 2024-01-23 | Stop reason: SDUPTHER

## 2023-10-31 RX ORDER — CLONAZEPAM 0.5 MG/1
TABLET ORAL
Qty: 45 TABLET | Refills: 2 | Status: SHIPPED | OUTPATIENT
Start: 2023-10-31 | End: 2024-01-03 | Stop reason: SDUPTHER

## 2023-10-31 NOTE — TELEPHONE ENCOUNTER
Spouse called for refill on Clonazepam 0.5 mg 1.5 tab at bedtime    [facility].

## 2023-11-01 ENCOUNTER — OFFICE VISIT (OUTPATIENT)
Dept: PRIMARY CARE | Facility: CLINIC | Age: 88
End: 2023-11-01
Payer: MEDICARE

## 2023-11-01 VITALS
HEIGHT: 71 IN | WEIGHT: 180 LBS | RESPIRATION RATE: 16 BRPM | HEART RATE: 60 BPM | DIASTOLIC BLOOD PRESSURE: 78 MMHG | BODY MASS INDEX: 25.2 KG/M2 | SYSTOLIC BLOOD PRESSURE: 128 MMHG | OXYGEN SATURATION: 98 %

## 2023-11-01 DIAGNOSIS — Z23 FLU VACCINE NEED: ICD-10-CM

## 2023-11-01 DIAGNOSIS — Z78.9 ADVANCE DIRECTIVE IN CHART: ICD-10-CM

## 2023-11-01 DIAGNOSIS — I48.19 PERSISTENT ATRIAL FIBRILLATION WITH RVR (MULTI): ICD-10-CM

## 2023-11-01 DIAGNOSIS — N18.32 STAGE 3B CHRONIC KIDNEY DISEASE (MULTI): ICD-10-CM

## 2023-11-01 DIAGNOSIS — I50.42 CHRONIC COMBINED SYSTOLIC AND DIASTOLIC HEART FAILURE, NYHA CLASS 2 (MULTI): ICD-10-CM

## 2023-11-01 DIAGNOSIS — Z00.00 MEDICARE ANNUAL WELLNESS VISIT, SUBSEQUENT: Primary | ICD-10-CM

## 2023-11-01 DIAGNOSIS — E11.42 DIABETIC POLYNEUROPATHY ASSOCIATED WITH TYPE 2 DIABETES MELLITUS (MULTI): ICD-10-CM

## 2023-11-01 DIAGNOSIS — E78.5 DYSLIPIDEMIA: ICD-10-CM

## 2023-11-01 PROBLEM — L97.501: Status: ACTIVE | Noted: 2023-11-01

## 2023-11-01 PROBLEM — I73.9 PERIPHERAL VASCULAR DISEASE, UNSPECIFIED (CMS-HCC): Status: ACTIVE | Noted: 2023-11-01

## 2023-11-01 PROBLEM — K59.09 CHRONIC CONSTIPATION: Status: ACTIVE | Noted: 2023-11-01

## 2023-11-01 PROBLEM — E11.621 DIABETIC FOOT ULCER (MULTI): Status: ACTIVE | Noted: 2023-11-01

## 2023-11-01 PROBLEM — H90.3 SENSORINEURAL HEARING LOSS, BILATERAL: Status: ACTIVE | Noted: 2023-11-01

## 2023-11-01 PROBLEM — Z95.0 PRESENCE OF CARDIAC PACEMAKER: Status: ACTIVE | Noted: 2023-11-01

## 2023-11-01 PROBLEM — Z85.9 HISTORY OF MALIGNANT NEOPLASM: Status: ACTIVE | Noted: 2023-11-01

## 2023-11-01 PROBLEM — E53.8 VITAMIN B12 DEFICIENCY: Status: ACTIVE | Noted: 2023-11-01

## 2023-11-01 PROBLEM — E11.40 TYPE 2 DIABETES MELLITUS WITH DIABETIC NEUROPATHY, UNSPECIFIED (MULTI): Status: ACTIVE | Noted: 2023-11-01

## 2023-11-01 PROBLEM — I42.0 DILATED CARDIOMYOPATHY (MULTI): Status: ACTIVE | Noted: 2023-11-01

## 2023-11-01 PROBLEM — H90.6 MIXED CONDUCTIVE AND SENSORINEURAL HEARING LOSS OF BOTH EARS: Status: ACTIVE | Noted: 2023-11-01

## 2023-11-01 PROBLEM — E11.40 DIABETIC NEUROPATHY (MULTI): Status: ACTIVE | Noted: 2023-11-01

## 2023-11-01 PROBLEM — B35.1 ONYCHOMYCOSIS: Status: ACTIVE | Noted: 2023-11-01

## 2023-11-01 PROBLEM — B35.9 TINEA: Status: ACTIVE | Noted: 2023-11-01

## 2023-11-01 PROBLEM — L84 CALLUS OF TOE: Status: ACTIVE | Noted: 2023-11-01

## 2023-11-01 PROBLEM — I73.9 PERIPHERAL VASCULAR DISEASE (CMS-HCC): Status: ACTIVE | Noted: 2023-11-01

## 2023-11-01 PROBLEM — I48.20 CHRONIC ATRIAL FIBRILLATION (MULTI): Status: ACTIVE | Noted: 2023-11-01

## 2023-11-01 PROBLEM — L97.509 DIABETIC FOOT ULCER (MULTI): Status: ACTIVE | Noted: 2023-11-01

## 2023-11-01 PROBLEM — M25.512 PAIN IN LEFT SHOULDER: Status: ACTIVE | Noted: 2023-11-01

## 2023-11-01 PROBLEM — E11.9 DIABETES MELLITUS (MULTI): Status: ACTIVE | Noted: 2023-11-01

## 2023-11-01 PROBLEM — N18.30 STAGE 3 CHRONIC KIDNEY DISEASE (MULTI): Status: ACTIVE | Noted: 2023-11-01

## 2023-11-01 PROBLEM — M25.519 SHOULDER PAIN: Status: ACTIVE | Noted: 2023-11-01

## 2023-11-01 PROBLEM — I10 PRIMARY HYPERTENSION: Status: ACTIVE | Noted: 2023-11-01

## 2023-11-01 PROBLEM — G90.09 OTHER IDIOPATHIC PERIPHERAL AUTONOMIC NEUROPATHY: Status: ACTIVE | Noted: 2023-11-01

## 2023-11-01 PROBLEM — M54.2 NECK PAIN: Status: ACTIVE | Noted: 2023-11-01

## 2023-11-01 PROBLEM — E11.9 TYPE 2 DIABETES MELLITUS WITHOUT COMPLICATIONS (MULTI): Status: ACTIVE | Noted: 2023-11-01

## 2023-11-01 PROBLEM — E11.9 TYPE 2 DIABETES MELLITUS (MULTI): Status: ACTIVE | Noted: 2023-11-01

## 2023-11-01 PROBLEM — M20.60 ACQUIRED DEFORMITY OF TOE: Status: ACTIVE | Noted: 2023-11-01

## 2023-11-01 PROCEDURE — G0439 PPPS, SUBSEQ VISIT: HCPCS | Performed by: NURSE PRACTITIONER

## 2023-11-01 PROCEDURE — 1036F TOBACCO NON-USER: CPT | Performed by: NURSE PRACTITIONER

## 2023-11-01 PROCEDURE — 1170F FXNL STATUS ASSESSED: CPT | Performed by: NURSE PRACTITIONER

## 2023-11-01 PROCEDURE — G0008 ADMIN INFLUENZA VIRUS VAC: HCPCS | Performed by: NURSE PRACTITIONER

## 2023-11-01 PROCEDURE — 3074F SYST BP LT 130 MM HG: CPT | Performed by: NURSE PRACTITIONER

## 2023-11-01 PROCEDURE — 99214 OFFICE O/P EST MOD 30 MIN: CPT | Performed by: NURSE PRACTITIONER

## 2023-11-01 PROCEDURE — 1125F AMNT PAIN NOTED PAIN PRSNT: CPT | Performed by: NURSE PRACTITIONER

## 2023-11-01 PROCEDURE — 90662 IIV NO PRSV INCREASED AG IM: CPT | Performed by: NURSE PRACTITIONER

## 2023-11-01 PROCEDURE — 3078F DIAST BP <80 MM HG: CPT | Performed by: NURSE PRACTITIONER

## 2023-11-01 PROCEDURE — 1159F MED LIST DOCD IN RCRD: CPT | Performed by: NURSE PRACTITIONER

## 2023-11-01 PROCEDURE — G0444 DEPRESSION SCREEN ANNUAL: HCPCS | Performed by: NURSE PRACTITIONER

## 2023-11-01 PROCEDURE — 1160F RVW MEDS BY RX/DR IN RCRD: CPT | Performed by: NURSE PRACTITIONER

## 2023-11-01 ASSESSMENT — ENCOUNTER SYMPTOMS
DEPRESSION: 0
LOSS OF SENSATION IN FEET: 0
OCCASIONAL FEELINGS OF UNSTEADINESS: 0

## 2023-11-01 ASSESSMENT — ACTIVITIES OF DAILY LIVING (ADL)
DOING_HOUSEWORK: NEEDS ASSISTANCE
MANAGING_FINANCES: NEEDS ASSISTANCE
GROCERY_SHOPPING: NEEDS ASSISTANCE
BATHING: INDEPENDENT
DRESSING: INDEPENDENT
TAKING_MEDICATION: NEEDS ASSISTANCE

## 2023-11-01 NOTE — PROGRESS NOTES
"Subjective   Reason for Visit: Michele Maya is an 89 y.o. male here for a Medicare Wellness visit.     Past Medical, Surgical, and Family History reviewed and updated in chart.    Reviewed all medications by prescribing practitioner or clinical pharmacist (such as prescriptions, OTCs, herbal therapies and supplements) and documented in the medical record.    Patient is in a wheelchair accompanied by his wife following up for annual Medicare wellness exam and management of multiple chronic disease.  Advises that he takes his medication as prescribed with no side effect noted.  He is under the care of nephrology for management of chronic kidney disease, endocrinologist for management of diabetes, cardiologist, no cardiac comorbidities and pain management for management of chronic pain.  Advises he has no acute medical complaint.        Patient Care Team:  EUGENE Blue-CNP as PCP - General (Family Medicine)  Jasmyn French DO as PCP - [employer] PCP     Review of Systems   All other systems reviewed and are negative.      Objective   Vitals:  /78   Pulse 60   Resp 16   Ht 1.803 m (5' 11\")   Wt 81.6 kg (180 lb)   SpO2 98%   BMI 25.10 kg/m²       Physical Exam  Constitutional:       Appearance: Normal appearance.   HENT:      Head: Normocephalic and atraumatic.      Right Ear: External ear normal.      Left Ear: External ear normal.      Nose: Nose normal.      Mouth/Throat:      Mouth: Mucous membranes are moist.   Cardiovascular:      Rate and Rhythm: Normal rate and regular rhythm.      Pulses: Normal pulses.      Heart sounds: Normal heart sounds.   Pulmonary:      Effort: Pulmonary effort is normal.      Breath sounds: Normal breath sounds.   Abdominal:      General: Abdomen is flat. Bowel sounds are normal.      Palpations: Abdomen is soft.   Musculoskeletal:         General: Normal range of motion.      Cervical back: Normal range of motion and neck supple.   Skin:     " General: Skin is warm and dry.   Neurological:      Mental Status: He is alert and oriented to person, place, and time. Mental status is at baseline.   Psychiatric:         Mood and Affect: Mood normal.         Behavior: Behavior normal.         Thought Content: Thought content normal.         Judgment: Judgment normal.     PHQ-9 score equals 0, which indicates negative depression.    Assessment/Plan   Problem List Items Addressed This Visit    None  Visit Diagnoses       Flu vaccine need    -  Primary    Medicare annual wellness visit, subsequent        Routine general medical examination at health care facility

## 2023-11-22 ENCOUNTER — TELEPHONE (OUTPATIENT)
Dept: PRIMARY CARE | Facility: CLINIC | Age: 88
End: 2023-11-22
Payer: MEDICARE

## 2023-11-22 ENCOUNTER — ANTICOAGULATION - WARFARIN VISIT (OUTPATIENT)
Dept: PRIMARY CARE | Facility: CLINIC | Age: 88
End: 2023-11-22
Payer: MEDICARE

## 2023-11-22 LAB
INR IN PPP BY COAGULATION ASSAY EXTERNAL: 1.7
PROTHROMBIN TIME (PT) IN PPP BY COAGULATION ASSAY EXTERNAL: NORMAL SECONDS

## 2023-11-22 NOTE — TELEPHONE ENCOUNTER
She hasn't heard anything regarding his INR from yesterday. Please call recommendations for Warfarin.

## 2023-11-22 NOTE — PROGRESS NOTES
Per praful patient is to double warfarin dose today only and continue current dose tomorrow then recheck in a week, patients wife notified

## 2023-11-28 ENCOUNTER — OFFICE VISIT (OUTPATIENT)
Dept: VASCULAR SURGERY | Facility: CLINIC | Age: 88
End: 2023-11-28
Payer: MEDICARE

## 2023-11-28 ENCOUNTER — ANTICOAGULATION - WARFARIN VISIT (OUTPATIENT)
Dept: PRIMARY CARE | Facility: CLINIC | Age: 88
End: 2023-11-28
Payer: MEDICARE

## 2023-11-28 VITALS
SYSTOLIC BLOOD PRESSURE: 145 MMHG | HEART RATE: 67 BPM | DIASTOLIC BLOOD PRESSURE: 81 MMHG | WEIGHT: 183 LBS | BODY MASS INDEX: 25.52 KG/M2

## 2023-11-28 DIAGNOSIS — I73.9 PAD (PERIPHERAL ARTERY DISEASE) (CMS-HCC): Primary | ICD-10-CM

## 2023-11-28 PROCEDURE — 1160F RVW MEDS BY RX/DR IN RCRD: CPT | Performed by: SURGERY

## 2023-11-28 PROCEDURE — 3079F DIAST BP 80-89 MM HG: CPT | Performed by: SURGERY

## 2023-11-28 PROCEDURE — 1036F TOBACCO NON-USER: CPT | Performed by: SURGERY

## 2023-11-28 PROCEDURE — 99213 OFFICE O/P EST LOW 20 MIN: CPT | Performed by: SURGERY

## 2023-11-28 PROCEDURE — 1125F AMNT PAIN NOTED PAIN PRSNT: CPT | Performed by: SURGERY

## 2023-11-28 PROCEDURE — 3077F SYST BP >= 140 MM HG: CPT | Performed by: SURGERY

## 2023-11-28 PROCEDURE — 1159F MED LIST DOCD IN RCRD: CPT | Performed by: SURGERY

## 2023-11-28 RX ORDER — PENTOXIFYLLINE 400 MG/1
400 TABLET, EXTENDED RELEASE ORAL
Qty: 90 TABLET | Refills: 11 | Status: SHIPPED | OUTPATIENT
Start: 2023-11-28 | End: 2024-02-15 | Stop reason: ALTCHOICE

## 2023-11-28 NOTE — PROGRESS NOTES
Subjective   Patient ID: Michele Maya is a 89 y.o. male who presents for No chief complaint on file..  HPI  Patient still having calf claudication short distance also moves up to the [facility] area.  Definitely with short distance essentially unchanged since his last visit a year ago.  Could not tolerate Pletal with minimal improvement  No active ulcer sores  No evidence of rest pain noted    Review of Systems  Review of Systems    Constitutional:  no generalized malaise overall feels well, energy levels intact, no complaints specifically noted  HEENT:  No blurry vision, no visual aides noted, no hearing loss no ear ache no nose bleeds noted, no dysphagia, no congestion otherwise no pertinent positives noted  Cardiovascular:  no palpitations, chest pain or heaviness noted, no leg swelling, no numbness or tingling in the lower extremity noted  Respiratory:  no shortness of breath, no productive cough noted, no conversation dyspnea or difficulty breathing  Gastrointestinal:  no abdominal pain, no nausea or vomiting, appetite intact, no bowel irregularities noted  Genitourinary:   no urinary incontinence, frequency or urgency issues noted, no hematuria or burning sensation issues  Musculoskeletal:  No muscle aches or pains, no joint discomfort noted, no back pain noted otherwise feels well  Skin: no ulcerations, skin color issues or wounds upper or lower extremities  Neurologic: no dizziness, no hemiplegia, no hemiparesis, no obvious visual deficits noted  Psychiatric: no depression, no memory loss noted, no suicidal ideation  Endocrine: no weight loss or gain, no temperature concerns hot or cold intolerance  Hemogolotic/Lymphatic: no bruising, excessive bleeding, no swelling in the groins or neck noted      Objective   Physical Exam  Physical exam    Constitutional: alert and in no acute distress verbal  Eyes: No erythema swelling or discharge noted  Neck: supple, symmetric, trachea midline, no masses  noted  Cardiovascular: Carotid pulses 2+, no obvious bruit, no Jugular distension noted, no thrill, heart regular rate, lower extremity vascular exam intact, cap refill <2 sec  Pulmonary:  Bilateral breath sounds intact, clear with rales rhonchi or wheeze  Abdomen: soft non tender, no pulsatile masses noted, no rebound rigidity or guarding noted  Skin: intact warm no abnormal turgor  Psychiatric: alert without any obvious cognitive issues, oriented to person, place, and time      Assessment/Plan   PAD  Short distance claudication  Will trial Trental p.o. twice daily for a week then increase if tolerable  Continue active ambulation  Patient to call back for progress  Repeat PVR

## 2023-11-28 NOTE — PROGRESS NOTES
Per praful patient is to continue current dose and recheck in a week, left vm to notify patients wife

## 2023-11-30 ENCOUNTER — HOSPITAL ENCOUNTER (OUTPATIENT)
Dept: CARDIOLOGY | Facility: HOSPITAL | Age: 88
Discharge: HOME | End: 2023-11-30
Payer: MEDICARE

## 2023-11-30 DIAGNOSIS — Z95.0 PRESENCE OF CARDIAC PACEMAKER: ICD-10-CM

## 2023-11-30 DIAGNOSIS — I48.91 UNSPECIFIED ATRIAL FIBRILLATION (MULTI): ICD-10-CM

## 2023-11-30 DIAGNOSIS — Z95.0 PRESENCE OF CARDIAC PACEMAKER: Primary | ICD-10-CM

## 2023-11-30 DIAGNOSIS — I42.9 CARDIOMYOPATHY, UNSPECIFIED (MULTI): ICD-10-CM

## 2023-11-30 DIAGNOSIS — I44.2 ATRIOVENTRICULAR BLOCK, COMPLETE (MULTI): ICD-10-CM

## 2023-11-30 PROCEDURE — 93280 PM DEVICE PROGR EVAL DUAL: CPT | Performed by: INTERNAL MEDICINE

## 2023-11-30 PROCEDURE — 93280 PM DEVICE PROGR EVAL DUAL: CPT

## 2023-11-30 PROCEDURE — 93290 INTERROG DEV EVAL ICPMS IP: CPT | Performed by: INTERNAL MEDICINE

## 2023-12-04 DIAGNOSIS — I47.29 NSVT (NONSUSTAINED VENTRICULAR TACHYCARDIA) (MULTI): Primary | ICD-10-CM

## 2023-12-04 RX ORDER — METOPROLOL SUCCINATE 25 MG/1
25 TABLET, EXTENDED RELEASE ORAL DAILY
Qty: 90 TABLET | Refills: 3 | Status: SHIPPED | OUTPATIENT
Start: 2023-12-04

## 2023-12-04 NOTE — TELEPHONE ENCOUNTER
----- Message from Rod Milner DO sent at 11/30/2023  5:29 PM EST -----  Regarding: RE: orange alert/probable recurrent NSVT per clinic eval  Start the patient on metoprolol succinate 25 mg daily.  ----- Message -----  From: Ta Dickerson RN  Sent: 11/30/2023   5:01 PM EST  To: Rod Milner DO  Subject: FW: orange alert/probable recurrent NSVT per#    This one I am not so sure about. I don't know if I ever got an alert on him.  ----- Message -----  From: Shwetha Mann RN  Sent: 11/30/2023   4:55 PM EST  To: Ta Dickerson RN  Subject: orange alert/probable recurrent NSVT per cli#    1 'V sensing episode', 5 sec, max V 130 bpm, markers only- show runs of tachy, suggestive of NSVT (as pacer dependent) , but cannot verify.    Thx.

## 2023-12-04 NOTE — TELEPHONE ENCOUNTER
Ta Dickerson RN at 12/4/2023 11:42 AM    Status: Signed   ----- Message from Rod Milner DO sent at 11/30/2023  5:29 PM EST -----  Regarding: RE: orange alert/probable recurrent NSVT per clinic eval  Start the patient on metoprolol succinate 25 mg daily.  ----- Message -----  From: Ta Dickerson RN  Sent: 11/30/2023   5:01 PM EST  To: Rod Milner DO  Subject: FW: orange alert/probable recurrent NSVT per#     This one I am not so sure about. I don't know if I ever got an alert on him.  ----- Message -----  From: Shwetha Mann RN  Sent: 11/30/2023   4:55 PM EST  To: Ta Dickerson RN  Subject: orange alert/probable recurrent NSVT per cli#     1 'V sensing episode', 5 sec, max V 130 bpm, markers only- show runs of tachy, suggestive of NSVT (as pacer dependent) , but cannot verify.     Thx.     12/4/23  1147  Per Dr. Milner patient to start Metoprolol Succinate.    Called and spoke with spouse patient who relayed information to patient who was agreeable to start metoprolol succinate; also discussed medication with wife regarding him taking Trental and Flomax.    New prescription sent for approval.

## 2023-12-05 ENCOUNTER — ANTICOAGULATION - WARFARIN VISIT (OUTPATIENT)
Dept: PRIMARY CARE | Facility: CLINIC | Age: 88
End: 2023-12-05
Payer: MEDICARE

## 2023-12-13 ENCOUNTER — ANTICOAGULATION - WARFARIN VISIT (OUTPATIENT)
Dept: PRIMARY CARE | Facility: CLINIC | Age: 88
End: 2023-12-13
Payer: MEDICARE

## 2023-12-13 DIAGNOSIS — I25.10 ASHD (ARTERIOSCLEROTIC HEART DISEASE): ICD-10-CM

## 2023-12-13 LAB
INR IN PPP BY COAGULATION ASSAY EXTERNAL: 1.8
PROTHROMBIN TIME (PT) IN PPP BY COAGULATION ASSAY EXTERNAL: NORMAL SECONDS

## 2023-12-13 RX ORDER — FUROSEMIDE 20 MG/1
40 TABLET ORAL DAILY
Qty: 90 TABLET | Refills: 2 | Status: SHIPPED | OUTPATIENT
Start: 2023-12-13 | End: 2024-04-22 | Stop reason: WASHOUT

## 2023-12-13 NOTE — PROGRESS NOTES
Per praful patient is to double warfarin dose today only then continue current dose tomorrow and recheck in a week, patients wife notified

## 2023-12-15 ENCOUNTER — HOSPITAL ENCOUNTER (OUTPATIENT)
Dept: VASCULAR MEDICINE | Facility: HOSPITAL | Age: 88
Discharge: HOME | End: 2023-12-15
Payer: MEDICARE

## 2023-12-15 DIAGNOSIS — I73.9 PAD (PERIPHERAL ARTERY DISEASE) (CMS-HCC): ICD-10-CM

## 2023-12-15 PROCEDURE — 93922 UPR/L XTREMITY ART 2 LEVELS: CPT | Performed by: INTERNAL MEDICINE

## 2023-12-15 PROCEDURE — 93922 UPR/L XTREMITY ART 2 LEVELS: CPT

## 2023-12-18 ENCOUNTER — TELEPHONE (OUTPATIENT)
Dept: PAIN MEDICINE | Facility: HOSPITAL | Age: 88
End: 2023-12-18
Payer: MEDICARE

## 2023-12-18 NOTE — TELEPHONE ENCOUNTER
Called and spoke with pt's wife who sts pt was supposed to get an injection but never received a call back from out office for the injection to be scheduled.  Reinforced teaching for joint inj and transferred pt to JS to schedule injection.  Shayla Pleitez RN

## 2023-12-18 NOTE — TELEPHONE ENCOUNTER
Patient wife is calling in regards to a letter they received about pacemaker and holding blood thinners for a injection that is ordered but its not scheduled at this time.

## 2023-12-19 ENCOUNTER — ANTICOAGULATION - WARFARIN VISIT (OUTPATIENT)
Dept: PRIMARY CARE | Facility: CLINIC | Age: 88
End: 2023-12-19
Payer: MEDICARE

## 2024-01-03 ENCOUNTER — ANTICOAGULATION - WARFARIN VISIT (OUTPATIENT)
Dept: PRIMARY CARE | Facility: CLINIC | Age: 89
End: 2024-01-03
Payer: MEDICARE

## 2024-01-03 ENCOUNTER — OFFICE VISIT (OUTPATIENT)
Dept: NEUROLOGY | Facility: HOSPITAL | Age: 89
End: 2024-01-03
Payer: MEDICARE

## 2024-01-03 VITALS — HEART RATE: 73 BPM | DIASTOLIC BLOOD PRESSURE: 64 MMHG | SYSTOLIC BLOOD PRESSURE: 113 MMHG

## 2024-01-03 DIAGNOSIS — G25.81 RESTLESS LEGS SYNDROME: Primary | ICD-10-CM

## 2024-01-03 DIAGNOSIS — G90.09 OTHER IDIOPATHIC PERIPHERAL AUTONOMIC NEUROPATHY: ICD-10-CM

## 2024-01-03 DIAGNOSIS — G47.33 OSA (OBSTRUCTIVE SLEEP APNEA): ICD-10-CM

## 2024-01-03 DIAGNOSIS — I48.91 ATRIAL FIBRILLATION, UNSPECIFIED TYPE (MULTI): ICD-10-CM

## 2024-01-03 DIAGNOSIS — E11.42 DIABETIC PERIPHERAL NEUROPATHY (MULTI): ICD-10-CM

## 2024-01-03 DIAGNOSIS — I73.9 PVD (PERIPHERAL VASCULAR DISEASE) (CMS-HCC): ICD-10-CM

## 2024-01-03 PROCEDURE — 1157F ADVNC CARE PLAN IN RCRD: CPT | Performed by: PSYCHIATRY & NEUROLOGY

## 2024-01-03 PROCEDURE — 1125F AMNT PAIN NOTED PAIN PRSNT: CPT | Performed by: PSYCHIATRY & NEUROLOGY

## 2024-01-03 PROCEDURE — 1159F MED LIST DOCD IN RCRD: CPT | Performed by: PSYCHIATRY & NEUROLOGY

## 2024-01-03 PROCEDURE — 99214 OFFICE O/P EST MOD 30 MIN: CPT | Performed by: PSYCHIATRY & NEUROLOGY

## 2024-01-03 PROCEDURE — 3078F DIAST BP <80 MM HG: CPT | Performed by: PSYCHIATRY & NEUROLOGY

## 2024-01-03 PROCEDURE — 1160F RVW MEDS BY RX/DR IN RCRD: CPT | Performed by: PSYCHIATRY & NEUROLOGY

## 2024-01-03 PROCEDURE — 1036F TOBACCO NON-USER: CPT | Performed by: PSYCHIATRY & NEUROLOGY

## 2024-01-03 PROCEDURE — 3074F SYST BP LT 130 MM HG: CPT | Performed by: PSYCHIATRY & NEUROLOGY

## 2024-01-03 RX ORDER — INSULIN PUMP SYRINGE, 3 ML
1 EACH MISCELLANEOUS AS NEEDED
COMMUNITY

## 2024-01-03 RX ORDER — CLONAZEPAM 0.5 MG/1
TABLET ORAL
Qty: 45 TABLET | Refills: 2 | Status: SHIPPED | OUTPATIENT
Start: 2024-01-03 | End: 2024-05-02 | Stop reason: SDUPTHER

## 2024-01-03 RX ORDER — PSYLLIUM HUSK 0.4 G
CAPSULE ORAL
COMMUNITY
End: 2024-02-15 | Stop reason: ALTCHOICE

## 2024-01-03 RX ORDER — OXCARBAZEPINE 150 MG/1
TABLET, FILM COATED ORAL
Qty: 270 TABLET | Refills: 1 | Status: SHIPPED | OUTPATIENT
Start: 2024-01-03

## 2024-01-03 RX ORDER — PENTOXIFYLLINE 400 MG/1
400 TABLET, EXTENDED RELEASE ORAL EVERY 12 HOURS
COMMUNITY
End: 2024-01-03 | Stop reason: DRUGHIGH

## 2024-01-03 ASSESSMENT — ENCOUNTER SYMPTOMS
DEPRESSION: 0
OCCASIONAL FEELINGS OF UNSTEADINESS: 1
LOSS OF SENSATION IN FEET: 1

## 2024-01-03 ASSESSMENT — PAIN SCALES - GENERAL: PAINLEVEL: 1

## 2024-01-03 NOTE — PATIENT INSTRUCTIONS
Continue /300--erx'd  Continue clonazepam 0.5mg 1.5 tabs at bedtime--erx'd  Do ferritin/B12 level  Update CSA for benzo    Rtc 6 mo

## 2024-01-03 NOTE — PROGRESS NOTES
"Follow-up visit    Visit type: Follow-up    PCP: Charanjit Mujica, APRN-CNP.    Subjective     Michele Maya is a 89 y.o. year old male here for follow-up. Last seen 7/6/23.     Patient is accompanied by: spouse.       HPI    I first saw him 4/24/19 for neuropathy. Also has RLS. Prev saw Dr Estrada, then saw Dr Fan. Has had RLS since about 70 years old, legs move at night, has to rearrange feet, prevents him from falling asleep. Legs move as well during sleep. Once asleep, stays asleep. Was on clonazepam 3mg daily. On clonazepam 1mg qhs now, lowered by Dr Fan. Having some trouble falling asleep. Per wife, nighttime movements same even on lower dose of clonazepam. Ropinirole 0.25mg tried caused leg cramps. Pramipexole 0.125mg caused lightheadedness. Has \"very bad neuropathy\" in feet, presumed to be diabetic in origin. Having trouble in calves, muscles are tight in morning. NCT done? States has tried \"everything for neuropathy\". When asked to specify, tried gabapentin, pregabalin, and amitriptyline in past, didn't help. Tried duloxetine didn't help. Has tried \"seizure medications\". Has had back pain. s/p epidural injection and \"didn't work\". On warfarin for afib. CPK/ferritin wnl. Venlafaxine didn't help. Baclofen tried, had behavioral changes, irritable, swearing. Alpha lipoic acid tried didn't help. Horizant never tried due to expense. On B12. I tried to lower clonazepam to 0.25mg qhs, but pt had difficulty and dose increased back to 0.5mg qhs. Horizant 300mg once daily tried thru cash-pay program as well, had more leg movements. Was to continue clonazepam 0.5mg qhs. He was tested for YAYA by PCP and was ordered CPAP. Didn't tolerate. I offered to follow-up for CPAP but pt didn't want to--pt refused YAYA treatment. s/p epidural blocks in the past. Discussed Inspire therapy, not interested. EMG/NCT confirmed neuropathy--pt has tried and failed symptomatic neuropathy treatments in the past and " didn't want anymore treatment. Had seen vascular surgery, suspected vascular claudication, high risk for contrast-induced nephropathy and instrumentation. He is on clonazepam 0.75mg qhs. Previously discussed about use of oxcarbazepine for neuropathic help, prescribed, but evidently did not try. B12 level wnl. He sees vascular surgery for what was thought to be vascular claudication. I restarted trial of OXC, and last visit recommended increase dose incrementally up to 300mg bid and continue clonazepam 0.5mg 1.5 tabs at bedtime.    Comes in today for follow-up. On wheelchair.    Per spouse seems to be doing well on current meds.    Not on B12--on B complex tablet. 4/2022 B12 800+. No repeat B12 level.    No falls.    Sodium ok.      Patient Active Problem List   Diagnosis    Acid reflux    Aortoiliac occlusive disease (CMS/HCC)    ASHD (arteriosclerotic heart disease)    Atrial fibrillation (CMS/HCC)    BPH (benign prostatic hyperplasia)    Cardiac defibrillator in place    Cardiomyopathy (CMS/HCC)    Chronic combined systolic and diastolic heart failure, NYHA class 2 (CMS/[facility])    Chronic insomnia    Chronic painful diabetic neuropathy (CMS/HCC)    Claudication, intermittent (CMS/HCC)    COPD (chronic obstructive pulmonary disease) (CMS/HCC)    Diabetes mellitus, type 2 (CMS/HCC)    Dyslipidemia    Diabetic peripheral neuropathy (CMS/HCC)    YAYA (obstructive sleep apnea)    Persistent atrial fibrillation with RVR (CMS/HCC)    PVD (peripheral vascular disease) (CMS/[facility])    Renal insufficiency    Restless legs syndrome    SOBOE (shortness of breath on exertion)    Arthritis    Urinary incontinence    Acquired deformity of toe    Callus of toe    Diabetic foot ulcer (CMS/HCC)    Non-pressure chronic ulcer of other part of unspecified foot limited to breakdown of skin (CMS/HCC)    Diabetic polyneuropathy associated with type 2 diabetes mellitus (CMS/HCC)    History of malignant neoplasm    Hyperlipidemia    Sensorineural  hearing loss, bilateral    Mixed conductive and sensorineural hearing loss of both ears    Onychomycosis    Other idiopathic peripheral autonomic neuropathy    Pain in left shoulder    Shoulder pain    Primary hypertension    Stage 3 chronic kidney disease (CMS/[facility])    Tinea    Vitamin B12 deficiency    Dilated cardiomyopathy (CMS/[facility])    Chronic constipation    Diabetes mellitus (CMS/[facility])    Type 2 diabetes mellitus without complications (CMS/[facility])    Type 2 diabetes mellitus (CMS/[facility])    Diabetic neuropathy (CMS/[facility])    Type 2 diabetes mellitus with diabetic neuropathy, unspecified (CMS/[facility])    Neck pain    Chronic atrial fibrillation (CMS/[facility])    Presence of cardiac pacemaker    Peripheral vascular disease (CMS/[facility])    Peripheral vascular disease, unspecified (CMS/[facility])    Medicare annual wellness visit, subsequent    Flu vaccine need    Advance directive in chart       Allergies   Allergen Reactions    Other Unknown    Amiodarone Diarrhea    Baclofen Unknown    Carvedilol Other     fatigue    Cilostazol Unknown    Flecainide Unknown    Pentoxifylline Unknown    Quinidine Unknown    Simvastatin Other     Extreme fatigue       Current Outpatient Medications:     atorvastatin (Lipitor) 10 mg tablet, Take 1 tablet (10 mg) by mouth once daily., Disp: , Rfl:     blood sugar diagnostic (OneTouch Ultra Test) strip, Inject 1 strip as directed 2 times a day., Disp: 90 strip, Rfl: 2    budesonide-glycopyr-formoterol (Breztri Aerosphere) 160-9-4.8 mcg/actuation HFA aerosol inhaler, Breztri Aerosphere 160-9-4.8 MCG/ACT Inhalation Aerosol  Refills: 0      Start : 31-Oct-2022  Active 5.9 GM Inhaler, Disp: , Rfl:     budesonide/glycopyr/formoterol (BREZTRI AEROSPHERE INHL), Inhale., Disp: , Rfl:     FreeStyle glucose monitoring kit, 1 each if needed., Disp: , Rfl:     furosemide (Lasix) 20 mg tablet, Take 2 tablets (40 mg) by mouth once daily., Disp: 90 tablet, Rfl: 2    glipiZIDE XL (Glucotrol XL) 2.5 mg 24 hr tablet, Take 2  tablets (5 mg) by mouth once daily., Disp: 60 tablet, Rfl: 11    lancets (OneTouch UltraSoft Lancets) misc, test twice a day, Disp: 90 each, Rfl: 2    lisinopril 5 mg tablet, Take 1 tablet (5 mg) by mouth once daily. As directed, Disp: , Rfl:     metoprolol succinate XL (Toprol-XL) 25 mg 24 hr tablet, Take 1 tablet (25 mg) by mouth once daily. Do not crush or chew., Disp: 90 tablet, Rfl: 3    omeprazole (PriLOSEC) 40 mg DR capsule, Take 1 capsule (40 mg) by mouth once daily in the morning. Take before meals., Disp: 30 capsule, Rfl: 11    pentoxifylline (Trental) 400 mg ER tablet, Take 1 tablet (400 mg) by mouth 3 times a day with meals. Do not crush, chew, or split., Disp: 90 tablet, Rfl: 11    psyllium (MetamuciL) 0.4 gram capsule, as directed Orally, Disp: , Rfl:     tamsulosin (Flomax) 0.4 mg 24 hr capsule, Take 1 capsule (0.4 mg) by mouth once daily in the morning. Take before meals., Disp: 90 capsule, Rfl: 3    vitamin B complex/folic acid (B COMPLEX 100 ORAL), as directed Orally, Disp: , Rfl:     warfarin (Coumadin) 5 mg tablet, Take 1.5 tablets (7.5 mg) by mouth once daily in the evening., Disp: 90 tablet, Rfl: 0    clonazePAM (KlonoPIN) 0.5 mg tablet, TAKE 1.5 TABLET at Bedtime, Disp: 45 tablet, Rfl: 2    cyanocobalamin (Vitamin B-12) 1,000 mcg tablet, Take 1 tablet (1,000 mcg) by mouth once daily., Disp: , Rfl:     hydrocortisone-iodoquinoL (Dermazene) 1-1 % cream cream, APPLY TOPICALLY TWICE DAILY AS NEEDED TO GROIN AREA, Disp: , Rfl:     ketoconazole (NIZOral) 2 % cream, APPLY TWICE DAILY TO GROIN, Disp: , Rfl:     OXcarbazepine (Trileptal) 150 mg tablet, Take 150 mg every morning and 300 mg every evening, Disp: 270 tablet, Rfl: 1     Objective     /64   Pulse 73        Awake, alert, oriented x3, in no distress  Well-nourished, on wheelchair    Mental status exam as above, conversant   Full EOMs intact, no nystagmus, no ptosis   No facial droop   Hearing grossly intact   No dysarthria    Motor  strength is at least antigravity on all extremities  I did not have him stand or walk    Lab Results   Component Value Date    WBC 7.9 01/04/2023    HGB 13.8 01/04/2023    HCT 42.5 01/04/2023    MCV 99 01/04/2023     01/04/2023     Lab Results   Component Value Date    GLUCOSE 161 (H) 10/26/2023    CALCIUM 9.0 10/26/2023     10/26/2023    K 4.2 10/26/2023    CO2 24 10/26/2023     10/26/2023    BUN 39 (H) 10/26/2023    CREATININE 1.51 (H) 10/26/2023     Lab Results   Component Value Date    HGBA1C 7.9 07/19/2021      Lab Results   Component Value Date    TYMRFWVL74 805 07/26/2022       Assessment/Plan   Problem List Items Addressed This Visit             ICD-10-CM    Atrial fibrillation (CMS/[facility]) I48.91    Relevant Orders    Ferritin    Diabetic peripheral neuropathy (CMS/[facility]) E11.42    Relevant Medications    OXcarbazepine (Trileptal) 150 mg tablet    Other Relevant Orders    Vitamin B12    Follow Up In Neurology    YAYA (obstructive sleep apnea) G47.33    PVD (peripheral vascular disease) (CMS/[facility]) I73.9    Restless legs syndrome - Primary G25.81    Relevant Medications    clonazePAM (KlonoPIN) 0.5 mg tablet    Other Relevant Orders    Follow Up In Neurology    Other idiopathic peripheral autonomic neuropathy G90.09       1. Neuropathy, as stated  No records  On /300 (300mg bid wasn't more helpful), helping, continue--erx'd    2. Vascular claudication  3. Peripheral neuropathy, likely diabetic  Has pacemaker  s/p multiple failed symptomatic neuropathic treatment in past    4. RLS  5. PLMs  Ferritin/CPK wnl  Likely secondary RLS  PSG showed severe PLMs--pt has already tried and failed multiple meds including creams  On clonazepam 0.75mg qhs, continue--erx'd today    6. YAYA  YAYA, mild with sleep-related hypoxia  CPAP titration study showed hypoxemia improved with PAP therapy  On CPAP but couldn't tolerate (even at low pressures)--pt has stopped using and adamant on not using  Other options,  including oral appliance (thru dental evaluation) and/or Inspire upper airway stimulation surgery previously discussed  Not discussed today    I have personally reviewed the [place]AR[place] report for this patient. This report is in the electronic medical record. I have considered the risks of abuse, dependence, addiction and diversion. I believe that it is clinically appropriate for this patient to be prescribed this medication.     Need to update controlled substance agreement or clonazepam today    Plans:  Continue /300--erx'd  Continue clonazepam 0.5mg 1.5 tabs at bedtime--erx'd  Do ferritin/B12 level  Update CSA for benzo    Rtc 6 mo    All questions were answered.  Pt knows how to contact my office in case pt has any questions or concerns.    Willian Castellanos MD

## 2024-01-12 ENCOUNTER — TELEPHONE (OUTPATIENT)
Dept: VASCULAR SURGERY | Facility: CLINIC | Age: 89
End: 2024-01-12
Payer: MEDICARE

## 2024-01-16 ENCOUNTER — TELEPHONE (OUTPATIENT)
Dept: PRIMARY CARE | Facility: CLINIC | Age: 89
End: 2024-01-16
Payer: MEDICARE

## 2024-01-16 ENCOUNTER — ANTICOAGULATION - WARFARIN VISIT (OUTPATIENT)
Dept: PRIMARY CARE | Facility: CLINIC | Age: 89
End: 2024-01-16
Payer: MEDICARE

## 2024-01-16 DIAGNOSIS — I25.10 ASHD (ARTERIOSCLEROTIC HEART DISEASE): Primary | ICD-10-CM

## 2024-01-16 DIAGNOSIS — I10 PRIMARY HYPERTENSION: Primary | ICD-10-CM

## 2024-01-16 RX ORDER — ATORVASTATIN CALCIUM 10 MG/1
10 TABLET, FILM COATED ORAL DAILY
Qty: 90 TABLET | Refills: 3 | Status: SHIPPED | OUTPATIENT
Start: 2024-01-16 | End: 2024-02-07 | Stop reason: SDUPTHER

## 2024-01-16 RX ORDER — LISINOPRIL 5 MG/1
5 TABLET ORAL DAILY
Qty: 90 TABLET | Refills: 1 | Status: SHIPPED | OUTPATIENT
Start: 2024-01-16

## 2024-01-16 NOTE — PROGRESS NOTES
Per Charanjit patient is to double dose today only then resume current dose tomorrow and recheck in a week, patients wife notified

## 2024-01-23 ENCOUNTER — TELEPHONE (OUTPATIENT)
Dept: PRIMARY CARE | Facility: CLINIC | Age: 89
End: 2024-01-23
Payer: MEDICARE

## 2024-01-23 DIAGNOSIS — I48.91 ATRIAL FIBRILLATION, UNSPECIFIED TYPE (MULTI): ICD-10-CM

## 2024-01-23 RX ORDER — WARFARIN SODIUM 5 MG/1
7.5 TABLET ORAL EVERY EVENING
Qty: 180 TABLET | Refills: 2 | Status: SHIPPED | OUTPATIENT
Start: 2024-01-23

## 2024-01-23 NOTE — TELEPHONE ENCOUNTER
Med refill     warfarin (Coumadin) 5 mg tablet [[id number]]     Patient is requesting an increase in quantity has been having to occasionally take an extra pill when blood is to thick, throwing odd the quantity to make it 3 months.    [facility] [address] [address], [facility] [address]  Phone: [phone]  Fax: [phone]

## 2024-01-24 ENCOUNTER — TELEPHONE (OUTPATIENT)
Dept: PRIMARY CARE | Facility: CLINIC | Age: 89
End: 2024-01-24
Payer: MEDICARE

## 2024-01-24 DIAGNOSIS — E11.9 TYPE 2 DIABETES MELLITUS WITHOUT COMPLICATION, WITHOUT LONG-TERM CURRENT USE OF INSULIN (MULTI): ICD-10-CM

## 2024-01-24 RX ORDER — BLOOD SUGAR DIAGNOSTIC
1 STRIP MISCELLANEOUS 2 TIMES DAILY
Qty: 90 STRIP | Refills: 2 | Status: SHIPPED | OUTPATIENT
Start: 2024-01-24

## 2024-01-24 NOTE — TELEPHONE ENCOUNTER
One Touch Ultra Test Strips     [facility]    LOV: 11/01/23  NOV: 5/2/24

## 2024-01-26 ENCOUNTER — HOSPITAL ENCOUNTER (OUTPATIENT)
Dept: GASTROENTEROLOGY | Facility: HOSPITAL | Age: 89
Discharge: HOME | End: 2024-01-26
Payer: MEDICARE

## 2024-01-26 VITALS
TEMPERATURE: 97.9 F | DIASTOLIC BLOOD PRESSURE: 86 MMHG | OXYGEN SATURATION: 95 % | RESPIRATION RATE: 16 BRPM | HEART RATE: 63 BPM | SYSTOLIC BLOOD PRESSURE: 98 MMHG

## 2024-01-26 DIAGNOSIS — M15.2 OSTEOARTHRITIS OF PROXIMAL INTERPHALANGEAL (PIP) JOINT OF RIGHT MIDDLE FINGER: Primary | ICD-10-CM

## 2024-01-26 DIAGNOSIS — M19.90 ARTHRITIS: ICD-10-CM

## 2024-01-26 PROCEDURE — 20600 DRAIN/INJ JOINT/BURSA W/O US: CPT | Performed by: PHYSICAL MEDICINE & REHABILITATION

## 2024-01-26 PROCEDURE — 2500000005 HC RX 250 GENERAL PHARMACY W/O HCPCS: Performed by: PHYSICAL MEDICINE & REHABILITATION

## 2024-01-26 PROCEDURE — 2500000004 HC RX 250 GENERAL PHARMACY W/ HCPCS (ALT 636 FOR OP/ED): Performed by: PHYSICAL MEDICINE & REHABILITATION

## 2024-01-26 PROCEDURE — 20600 DRAIN/INJ JOINT/BURSA W/O US: CPT | Mod: RT | Performed by: PHYSICAL MEDICINE & REHABILITATION

## 2024-01-26 RX ORDER — LIDOCAINE HYDROCHLORIDE 5 MG/ML
INJECTION, SOLUTION INFILTRATION; INTRAVENOUS AS NEEDED
Status: COMPLETED | OUTPATIENT
Start: 2024-01-26 | End: 2024-01-26

## 2024-01-26 RX ORDER — METHYLPREDNISOLONE ACETATE 40 MG/ML
INJECTION, SUSPENSION INTRA-ARTICULAR; INTRALESIONAL; INTRAMUSCULAR; SOFT TISSUE AS NEEDED
Status: COMPLETED | OUTPATIENT
Start: 2024-01-26 | End: 2024-01-26

## 2024-01-26 RX ADMIN — LIDOCAINE HYDROCHLORIDE 10 ML: 5 INJECTION, SOLUTION INFILTRATION at 12:10

## 2024-01-26 RX ADMIN — METHYLPREDNISOLONE ACETATE 40 MG: 40 INJECTION, SUSPENSION INTRA-ARTICULAR; INTRALESIONAL; INTRAMUSCULAR; SOFT TISSUE at 12:10

## 2024-01-26 ASSESSMENT — COLUMBIA-SUICIDE SEVERITY RATING SCALE - C-SSRS
6. HAVE YOU EVER DONE ANYTHING, STARTED TO DO ANYTHING, OR PREPARED TO DO ANYTHING TO END YOUR LIFE?: NO
1. IN THE PAST MONTH, HAVE YOU WISHED YOU WERE DEAD OR WISHED YOU COULD GO TO SLEEP AND NOT WAKE UP?: NO
2. HAVE YOU ACTUALLY HAD ANY THOUGHTS OF KILLING YOURSELF?: NO

## 2024-01-26 ASSESSMENT — PAIN - FUNCTIONAL ASSESSMENT
PAIN_FUNCTIONAL_ASSESSMENT: 0-10
PAIN_FUNCTIONAL_ASSESSMENT: 0-10

## 2024-01-26 ASSESSMENT — PAIN SCALES - GENERAL
PAINLEVEL_OUTOF10: 0 - NO PAIN
PAINLEVEL_OUTOF10: 1

## 2024-01-26 NOTE — PROCEDURES
Joint Aspiration/Injection    Date/Time: 1/26/2024 12:13 PM    Performed by: Yinka Morales MD  Authorized by: Yinka Morales MD    Consent:     Consent obtained:  Written    Consent given by:  Patient    Risks, benefits, and alternatives were discussed: yes      Risks discussed:  Bleeding, infection, pain and nerve damage    Alternatives discussed:  No treatment, delayed treatment and alternative treatment  Universal protocol:     Procedure explained and questions answered to patient or proxy's satisfaction: yes      Relevant documents present and verified: yes      Test results available: yes      Imaging studies available: yes      Required blood products, implants, devices, and special equipment available: yes      Site/side marked: yes      Immediately prior to procedure, a time out was called: yes      Patient identity confirmed:  Verbally with patient, hospital-assigned identification number and arm band  Comments:      PIP Joint injection    The patient was positioned comfortably in the seated position, the 3rd PIP joint was prepped and draped in the usual sterile manner.  The 3rd PIP joint was palpated.  There was no evidence of infection at the site(s) of needle insertion.  A final time-out was performed.    The needle was inserted to the 3rd PIP joint.  The therapeutic solution was injected slowly while periodically aspirating to make sure there was no intravascular spread.  The needle was flushed and/or restyletted and removed.  Pressure was held over the site, and after ensuring hemostasis and the absence of hematoma, an adhesive bandage was applied to the site of needle insertion.     Laterality:  [Right]   Medication(s):  [1mL 1% lidocaine] [40 mg methylprednisolone]

## 2024-01-26 NOTE — DISCHARGE INSTRUCTIONS
You had a pain management procedure today.    Observe/ monitor for the following signs & symptoms:  If you notice Excessive bleeding (slow general oozing that completely soaks the dressing, or fresh bright red bleeding).   In either case, apply pressure to the area, elevate it if possible & call your doctor at once.    Also observe for:  Change in color  Numbness/tingling  Coldness to the touch  Swelling  Drainage  Temperature of 101.5 or higher.  Increased, uncontrollable pain.    *If you notice the above signs & symptoms, please call your doctor right away!*      Discharge Instructions:    Your pain may not be gone immediately after this procedure; it generally takes 3 to 5 days for the steroid to work.   Keep the needle site clean & dry for 24 hours.  Continue your present medications.  Make an appointment to see your doctor in 2-3 weeks.  If any problems occur, or if you have any further questions, please call as soon as possible. If you find that you cannot reach your doctor, but feel that the condition nees a doctor's attention, go to the closest emergency department & take this discharge paper with you.       Dr. Morales's Office: [phone]

## 2024-01-26 NOTE — H&P
Patient ID: Patient with right middle finger osteoarthritis who presents for the scheduled procedure.  No changes since last visit      Patient denies any recent antibiotic use or infections, denies any blood thinner use, and denies contrast or local anesthetic allergies           GENERAL EXAM  Vital Signs: Vital signs to include heart rate, respiration rate, blood pressure, and temperature were reviewed.  General Appearance:  Awake, alert, healthy appearing, well developed, No acute distress.  Head: Normocephalic without evidence of head injury.  Neck: The appearance of the neck was normal without swelling with a midline trachea.  Eyes: The eyelids and eyebrows exhibited no abnormalities.  Pupils were not pin-point.  Sclera was without icterus.  Lungs: Respiration rhythm and depth was normal.  Respiratory movements were normal without labored breathing.  Cardiovascular: No peripheral edema was present.    Neurological: Patient was oriented to time, place, and person.  Speech was normal.  Balance, gait, and stance were unremarkable.    Psychiatric: Appearance was normal with appropriate dress.  Mood was euthymic and affect was normal.  Skin: Affected regions were without ecchymosis or skin lesions.        Physical exam as above except:        Assessment/Plan    Patient with right middle finger osteoarthritis here for right middle finger injection

## 2024-01-30 ENCOUNTER — ANTICOAGULATION - WARFARIN VISIT (OUTPATIENT)
Dept: PRIMARY CARE | Facility: CLINIC | Age: 89
End: 2024-01-30
Payer: MEDICARE

## 2024-01-31 ENCOUNTER — TELEPHONE (OUTPATIENT)
Dept: CARDIOLOGY | Facility: CLINIC | Age: 89
End: 2024-01-31

## 2024-01-31 ENCOUNTER — LAB (OUTPATIENT)
Dept: LAB | Facility: LAB | Age: 89
End: 2024-01-31
Payer: MEDICARE

## 2024-01-31 DIAGNOSIS — Z86.79 PERSONAL HISTORY OF OTHER DISEASES OF THE CIRCULATORY SYSTEM: ICD-10-CM

## 2024-01-31 DIAGNOSIS — I48.19 OTHER PERSISTENT ATRIAL FIBRILLATION (MULTI): ICD-10-CM

## 2024-01-31 DIAGNOSIS — E78.5 HYPERLIPIDEMIA, UNSPECIFIED: ICD-10-CM

## 2024-01-31 DIAGNOSIS — Z95.0 PRESENCE OF CARDIAC PACEMAKER: ICD-10-CM

## 2024-01-31 DIAGNOSIS — E11.9 TYPE 2 DIABETES MELLITUS WITHOUT COMPLICATIONS (MULTI): ICD-10-CM

## 2024-01-31 DIAGNOSIS — Z79.01 LONG TERM (CURRENT) USE OF ANTICOAGULANTS: ICD-10-CM

## 2024-01-31 DIAGNOSIS — I25.10 ATHEROSCLEROTIC HEART DISEASE OF NATIVE CORONARY ARTERY WITHOUT ANGINA PECTORIS: Primary | ICD-10-CM

## 2024-01-31 DIAGNOSIS — I25.10 ATHEROSCLEROTIC HEART DISEASE OF NATIVE CORONARY ARTERY WITHOUT ANGINA PECTORIS: ICD-10-CM

## 2024-01-31 LAB
ALBUMIN SERPL BCP-MCNC: 4.2 G/DL (ref 3.4–5)
ALP SERPL-CCNC: 71 U/L (ref 33–136)
ALT SERPL W P-5'-P-CCNC: 24 U/L (ref 10–52)
ANION GAP SERPL CALC-SCNC: 14 MMOL/L (ref 10–20)
AST SERPL W P-5'-P-CCNC: 22 U/L (ref 9–39)
BILIRUB SERPL-MCNC: 0.5 MG/DL (ref 0–1.2)
BNP SERPL-MCNC: 52 PG/ML (ref 0–99)
BUN SERPL-MCNC: 50 MG/DL (ref 6–23)
CALCIUM SERPL-MCNC: 9.3 MG/DL (ref 8.6–10.3)
CHLORIDE SERPL-SCNC: 104 MMOL/L (ref 98–107)
CHOLEST SERPL-MCNC: 152 MG/DL (ref 0–199)
CHOLESTEROL/HDL RATIO: 4.5
CO2 SERPL-SCNC: 27 MMOL/L (ref 21–32)
CREAT SERPL-MCNC: 2.05 MG/DL (ref 0.5–1.3)
EGFRCR SERPLBLD CKD-EPI 2021: 30 ML/MIN/1.73M*2
ERYTHROCYTE [DISTWIDTH] IN BLOOD BY AUTOMATED COUNT: 13.3 % (ref 11.5–14.5)
GLUCOSE SERPL-MCNC: 159 MG/DL (ref 74–99)
HCT VFR BLD AUTO: 43.9 % (ref 41–52)
HDLC SERPL-MCNC: 33.5 MG/DL
HGB BLD-MCNC: 14.3 G/DL (ref 13.5–17.5)
LDLC SERPL CALC-MCNC: 94 MG/DL
MAGNESIUM SERPL-MCNC: 1.93 MG/DL (ref 1.6–2.4)
MCH RBC QN AUTO: 32.2 PG (ref 26–34)
MCHC RBC AUTO-ENTMCNC: 32.6 G/DL (ref 32–36)
MCV RBC AUTO: 99 FL (ref 80–100)
NON HDL CHOLESTEROL: 119 MG/DL (ref 0–149)
NRBC BLD-RTO: 0 /100 WBCS (ref 0–0)
PLATELET # BLD AUTO: 194 X10*3/UL (ref 150–450)
POTASSIUM SERPL-SCNC: 4.8 MMOL/L (ref 3.5–5.3)
PROT SERPL-MCNC: 7.2 G/DL (ref 6.4–8.2)
RBC # BLD AUTO: 4.44 X10*6/UL (ref 4.5–5.9)
SODIUM SERPL-SCNC: 140 MMOL/L (ref 136–145)
TRIGL SERPL-MCNC: 124 MG/DL (ref 0–149)
VLDL: 25 MG/DL (ref 0–40)
WBC # BLD AUTO: 8.3 X10*3/UL (ref 4.4–11.3)

## 2024-01-31 PROCEDURE — 36415 COLL VENOUS BLD VENIPUNCTURE: CPT

## 2024-01-31 PROCEDURE — 85027 COMPLETE CBC AUTOMATED: CPT

## 2024-01-31 PROCEDURE — 80053 COMPREHEN METABOLIC PANEL: CPT

## 2024-01-31 PROCEDURE — 83880 ASSAY OF NATRIURETIC PEPTIDE: CPT

## 2024-01-31 PROCEDURE — 83735 ASSAY OF MAGNESIUM: CPT

## 2024-01-31 PROCEDURE — 80061 LIPID PANEL: CPT

## 2024-01-31 NOTE — TELEPHONE ENCOUNTER
1/31/24  [id number]  Called results to wife of patient; patient taking a nap. Informed wife to ensure patient is getting enough water to drink.    Wife verbalized understanding.    ----- Message from Rod Milner DO sent at 1/31/2024 11:55 AM EST -----  Blood work shows elevated serum creatinine of 2.05.  Please make sure patient is staying well-hydrated.

## 2024-02-01 ENCOUNTER — OFFICE VISIT (OUTPATIENT)
Dept: UROLOGY | Facility: CLINIC | Age: 89
End: 2024-02-01
Payer: MEDICARE

## 2024-02-01 DIAGNOSIS — N40.1 BENIGN PROSTATIC HYPERPLASIA WITH LOWER URINARY TRACT SYMPTOMS, SYMPTOM DETAILS UNSPECIFIED: Primary | ICD-10-CM

## 2024-02-01 DIAGNOSIS — R32 URINARY INCONTINENCE, UNSPECIFIED TYPE: ICD-10-CM

## 2024-02-01 PROCEDURE — 1157F ADVNC CARE PLAN IN RCRD: CPT | Performed by: UROLOGY

## 2024-02-01 PROCEDURE — 1159F MED LIST DOCD IN RCRD: CPT | Performed by: UROLOGY

## 2024-02-01 PROCEDURE — 1160F RVW MEDS BY RX/DR IN RCRD: CPT | Performed by: UROLOGY

## 2024-02-01 PROCEDURE — 1036F TOBACCO NON-USER: CPT | Performed by: UROLOGY

## 2024-02-01 PROCEDURE — 99214 OFFICE O/P EST MOD 30 MIN: CPT | Performed by: UROLOGY

## 2024-02-01 PROCEDURE — 1125F AMNT PAIN NOTED PAIN PRSNT: CPT | Performed by: UROLOGY

## 2024-02-01 RX ORDER — FINASTERIDE 5 MG/1
5 TABLET, FILM COATED ORAL DAILY
Qty: 30 TABLET | Refills: 2 | Status: SHIPPED | OUTPATIENT
Start: 2024-02-01 | End: 2024-04-29 | Stop reason: SDUPTHER

## 2024-02-01 RX ORDER — TAMSULOSIN HYDROCHLORIDE 0.4 MG/1
0.4 CAPSULE ORAL DAILY
Qty: 90 CAPSULE | Refills: 3 | Status: SHIPPED | OUTPATIENT
Start: 2024-02-01

## 2024-02-01 NOTE — PROGRESS NOTES
"02/01/2024  Complaining oxybutynin and Flomax did not work well    Still complaining weak stream and frequency 20 times a day    We discussed benign prostate hypertrophy, continue alpha-blocker and oxybutynin  We discussed Proscar trial  We discussed the Short catheter option, surgery options etc.  All the questions were answered, the patient expressed understanding and agreed to the plan.    Impression  Right ureter calculus  Right flank pain  BPH  Neurogenic bladder  Nocturia  Frequency urgency     Plan  Continue Flomax 0.4 mg daily   Proscar 5 mg daily x 30-day trial refill x 2  Conservative management for stone  Increase liquid intake  Possible surgical intervention  Call if pain worsens   Appointment 3 m      Chief Complaint   Patient presents with    Urinary Incontinence     Patient still having a weak urinary stream and takes longer to void. He states Flomax is not helping         Physical Exam     TODAYS LAB RESULTS:    No urine sample      mL    ASSESSMENT&PLAN:      IMPRESSIONS:       10/02/2023  Patient here for urinary incontinence.      Increased urinary incontinence during the day and at night. Weaker urinary stream. Urinary frequency 1-2x per hour.       No results found for: \"PSA\"      -Donaldo Benton      POC Specific Gravity, Urine  1.005 - 1.035 1.020   POC PH, Urine  No Reference Range Established PH 5.0   POC Protein, Urine  NEGATIVE, 30 (1+) mg/dl NEGATIVE   POC Glucose, Urine  NEGATIVE mg/dl NEGATIVE   POC Blood, Urine  NEGATIVE NEGATIVE   POC Ketones, Urine  NEGATIVE mg/dl NEGATIVE   POC Bilirubin, Urine  NEGATIVE NEGATIVE   POC Urobilinogen, Urine  0.2, 1.0 EU/DL 0.2   Poc Nitrate, Urine  NEGATIVE NEGATIVE   POC Leukocytes, Urine  NEGATIVE NEGATIVE      Complaint dysuria, incontinence     Patient has no nausea, no vomiting, no fever.     PVR:20 ml     JAY: Deferred     We discussed benign prostate hypertrophy  We discussed bladder empty well  All the questions were answered, the " "patient expressed understanding and agreed to the plan.     Impression  Right ureter calculus  Right flank pain  BPH  Neurogenic bladder  Nocturia  Frequency urgency     Plan  Flomax 0.4 mg daily x30-day trial  Conservative management for stone  Increase liquid intake  Possible surgical intervention  Call if pain worsens   Appointment 1 year                 10/04/21:      Patient here for urinary incontinence.      Increased urinary incontinence during the day and at night. Weaker urinary stream. Urinary frequency 1-2x per hour.       No results found for: \"PSA\"      -Donaldo Serenity      POC Specific Gravity, Urine  1.005 - 1.035 1.020   POC PH, Urine  No Reference Range Established PH 5.0   POC Protein, Urine  NEGATIVE, 30 (1+) mg/dl NEGATIVE   POC Glucose, Urine  NEGATIVE mg/dl NEGATIVE   POC Blood, Urine  NEGATIVE NEGATIVE   POC Ketones, Urine  NEGATIVE mg/dl NEGATIVE   POC Bilirubin, Urine  NEGATIVE NEGATIVE   POC Urobilinogen, Urine  0.2, 1.0 EU/DL 0.2   Poc Nitrate, Urine  NEGATIVE NEGATIVE   POC Leukocytes, Urine  NEGATIVE NEGATIVE         10/04/21:   Patient here today following up after 10/02/21 ER visit for right flank pain, nausea, vomiting.   He was prescribed Cipro 500mg BID, Flomax 0.4mg daily and Percocet 5-325mg. He has some pain today, 2/10, intermittent. Pain started 10/01/21. First time for kidney stone.     Detrol LA does not seem to be helping. He has urinary hesitancy.      Patient has no fever      CT abd/pelvis done 10/2/21--IMPRESSION:  Enlarged heart. Transvenous pacemaker.  3 mm obstructing calculus in the distal right ureter.   Right hydronephrosis.     IO UA (automated w/o microscopy)           [id number] 01:22PM           Yinka Lara     Test Name       Result     Flag        Reference  IO Glucose - Urine         Negative                  IO Bilirubin       Negative                  IO Ketones      Negative                  IO Specific Gravity         1.015                       IO Blood   "        (++)moderate - 40                                IO pH               5.0                           IO Protein, Urine            (++)100                   IO Urobilinogen                                              Normal (0.2-1.0 mg/dl)  IO Nitrite, Urine              Negative                  IO Leukocytes Negative                  IO Glucose - Urine         Negative                  IO Bilirubin       Negative                  IO Ketones      Negative                  IO Specific Gravity         1.015                       IO Blood          (++)moderate - 40                                IO pH               5.0                           IO Protein, Urine            (++)100                   IO Urobilinogen                                              Normal (0.2-1.0 mg/dl)  IO Nitrite, Urine              Negative                  IO Leukocytes Negative     We discussed benign prostatic hyperplasia with mild voiding symptoms, continue Flomax 0.4mg daily , discontinue Detrol LA   We discussed right ureter stone, right flank pain, recent ER visit, conservative management, increase liquid intake  All questions were answered, the patient expressed understanding and agreed to the plan.                                                                     Impression  Right ureter calculus  Right flank pain  BPH  Neurogenic bladder  Nocturia  Frequency urgency     Plan  Continue Flomax 0.4 mg daily  DC Detrol LA  Conservative management for stone  Increase liquid intake  Possible surgical intervention  Call if pain worsens   Appointment 1 week              9/17/21:   Patient is here today for cystoscopy for bph and dysuria.      Cystoscopy     Findings: Mild bulbar urethral stricture, mild enlarged prostate, trabeculated bladder, no stone no tumor     Pain evaluation: 0/10 before, 2/10 after      Prostate ultrasound: 18 cc prostate     We discussed intractable dysuria, Flomax and oxybutynin did not work  We  discussed cystoscopy and ultrasound, both unremarkable  All the questions were answered, the patient expressed understanding and agreed to the plan.     Impression  BPH  Neurogenic bladder  Nocturia  Frequency urgency     Plan  Conservative management  No surgery for now  Continue Flomax 0.4 mg daily  DC oxybutynin  Detrol LA 4 mg daily x30 days trial call for refill  Appointment in 6 months           06/07/2021  Oxybutynin did not work, still complaining dysuria     Patient has no nausea, no vomiting, no fever.     PVR: 106 cc     We discussed intractable dysuria, Flomax and oxybutynin did not work  We discussed cystoscopy and ultrasound  All the questions were answered, the patient expressed understanding and agreed to the plan.     Impression  BPH  Neurogenic bladder  Nocturia  Frequency urgency     Plan  Continue Flomax 0.4 mg daily  DC oxybutynin 5 mg twice a day  Cystoscopy and prostate ultrasound.     I spent 25 minutes with this patient. Greater than 50% of this time was spent in counseling and/or coordination of care        05/03/2021  87 gentleman presents urgency frequency and nocturia x3, for several years, on Flomax for 3 months     Patient has no nausea, no vomiting, no fever.     Exam: Uncircumcised, normal testes left spermatocele 3 cm     JAY: 1+, no nodule     IO Ultrasound, measurement post-void resid urine and/or bl cap; no imag         [id number] 02:21PM         Yinka Lara     Test Name       Result     Flag        Reference  IO Ultrasound, measurement post void resid urine and/or bl cap; non-imag       33 ml/min                                                                         We discussed benign prostate hypertrophy with moderate voiding symptoms  We discussed neurogenic bladder frequency urgency  All the questions were answered, the patient expressed understanding and agreed to the plan.     Impression  BPH  Neurogenic bladder  Nocturia  Frequency urgency     Plan  Continue Flomax 0.4 mg  daily  Add oxybutynin 5 mg twice a day x 30 days  Appointment in 1 month

## 2024-02-03 PROBLEM — Z79.01 WARFARIN ANTICOAGULATION: Status: ACTIVE | Noted: 2024-02-03

## 2024-02-03 NOTE — PROGRESS NOTES
[facility]    Patient Name: Michele Maya  Patient : 1933      Scribe Attestation  By signing my name below, I, Ceci Ridley   attest that this documentation has been prepared under the direction and in the presence of Rod Milner DO.      Reason for visit:  This is a 89-year-old male here for follow-up regarding persistent atrial fibrillation/pacemaker placed after AV node ablation done in , anticoagulation with warfarin managed by his primary care provider, history of cardiomyopathy with now normalized LV function, moderate coronary artery disease as seen on cardiac catheterization done in 2019, dyslipidemia, and diabetes mellitus.     HPI:  This is a 89-year-old male here for follow-up regarding persistent atrial fibrillation/pacemaker placed after AV node ablation done in , anticoagulation with warfarin managed by his primary care provider, history of cardiomyopathy with now normalized LV function, moderate coronary artery disease as seen on cardiac catheterization done in 2019, dyslipidemia, and diabetes mellitus.  The patient was last evaluated by me in 2023.  At that visit I ordered blood work including CMP/lipid/magnesium/CBC/BNP to be drawn in 6 months, and asked the patient to follow-up in 6 months and sooner if necessary. CMP done on 24 showed normal serum sodium and potassium, and a serum creatinine of 2.05. Normal ALT and AST. Serum magnesium was 1.93. BNP was 52. CBC showed a hemoglobin of 14.3.  Lipid panel done in 2024 showed an LDL cholesterol of 94 and triglycerides of 124 on atorvastatin 10 mg daily.  ECG done today showed ventricularly paced rhythm with a heart rate of 67 bpm.  The patient reports that he has been feeling generally well from the cardiac standpoint. He denies any new chest pain, shortness of breath, palpitations and lightheadedness. The patient states that he has  cramping of his calf muscles which could be multifactorial. Holding Lipitor for a couple of weeks showed no improvement in his cramping. He states that he takes all of his medications as prescribed. During my exam, he was resting comfortably on the exam table.               Assessment/Plan:   1. Persistent atrial fibrillation/pacemaker  The patient has a history of atrial fibrillation and underwent AV node ablation and pacemaker implant done in 1996. Patient subsequently developed cardiomyopathy and had an ICD implanted but as his LV function improved it was transitioned to a CRT-D device.  The patient is on warfarin for thromboembolism prophylaxis and is being managed by his PCP.  ECG done today showed ventricularly paced rhythm with a heart rate of 67 bpm.     He denies chest pain, palpitations or lightheadedness.   Recent lab works as noted in the HPI.  Lab works as noted below will be done in 6 months prior to his next visit.  He should continue to follow with the device clinic.  Follow up in 6 months and sooner if necessary.      2. Anticoagulation with warfarin  The patient is on warfarin for persistent atrial fibrillation and is being managed by his PCP.  Recent lab works as noted in the HPI.   Lab works as noted below will be done in 6 months prior to his next visit.      3. History of cardiomyopathy  The patient has a history of cardiomyopathy which improved after placement of CRT-D device.  Echocardiogram done in October 2021 showed normal left ventricular systolic function, normal right ventricular systolic function, no significant valve abnormalities.   He does not appear significantly volume overloaded on exam today except for trace to 1+ pitting bilateral lower extremity edema.  He should continue current cardiac medications.  Recent lab works as noted in the HPI.   Lab works as noted below will be done in 6 months prior to his next visit.   I discussed with him the importance of following a low-sodium  heart healthy diet, wearing compression stockings and elevating legs when seated.   Follow up in 6 months and sooner if necessary.      4. Coronary artery disease  The patient has a history of moderate coronary artery disease as seen on cardiac catheterization done in December 2019.  ECG done today showed ventricularly paced rhythm with a heart rate of 67 bpm.    He denies anginal chest discomfort.   Blood pressure appears moderately controlled on exam today.  He should continue his current antihypertensive medications.  Echocardiogram done in October 2021 showed normal left ventricular systolic function, normal right ventricular systolic function, no significant valve abnormalities.   Lipid panel done in January 2024 showed an LDL cholesterol of 94 and triglycerides of 124 on atorvastatin 10 mg daily.   I would like to see a further decrease in the LDL cholesterol. I will increase atorvastatin to 20 mg daily.   He will call to let us know if muscle cramping worsens with higher dose of atorvastatin.   Recent lab works as noted in the HPI.   Lab works as noted below will be done in 6 months prior to his next visit.   Please see lifestyle recommendations below.  Follow up in 6 months and sooner if necessary.     5. Hypertension  The patient has a history of hypertension which appears moderately controlled on exam today.  He should continue his current antihypertensive medications.      6. Dyslipidemia  Lipid panel done in January 2024 showed an LDL cholesterol of 94 and triglycerides of 124 on atorvastatin 10 mg daily.   I would like to see a further decrease in the LDL cholesterol. I will increase atorvastatin to 20 mg daily.   He will call to let us know if muscle cramping worsens with higher dose of atorvastatin.   Please see lifestyle recommendations below.     7. Diabetes mellitus  Stable, medication management per PCP.     8. CKD   Serum creatinine done on 1/31/24 was 2.05.  Management as per PCP.  Lab works as  noted below will be done in 6 months prior to his next visit.          Order:   Increase atorvastatin to 20 mg daily.   BMP/CBC/Mg in 6 months  Follow-up in 6 months.      Lifestyle Recommendations  I recommend a whole-food plant-based diet, an eating pattern that encourages the consumption of unrefined plant foods (such as fruits, vegetables, tubers, whole grains, legumes, nuts and seeds) and discourages meats, dairy products, eggs and processed foods.     The AHA/ACC recommends that the patient consume a dietary pattern that emphasizes intake of vegetables, fruits, and whole grains; includes low-fat dairy products, poultry, fish, legumes, non-tropical vegetable oils, and nuts; and limits intake of sodium, sweets, sugar-sweetened beverages, and red meats.  Adapt this dietary pattern to appropriate calorie requirements (a 500-750 kcal/day deficit to loose weight), personal and cultural food preferences, and nutrition therapy for other medical conditions (including diabetes).  Achieve this pattern by following plans such as the [place] Mediterranean, DASH dietary pattern, or AHA diet.     Engage in 2 hours and 30 minutes per week of moderate-intensity physical activity, or 1 hour and 15 minutes (75 minutes) per week of vigorous-intensity aerobic physical activity, or an equivalent combination of moderate and vigorous-intensity aerobic physical activity. Aerobic activity should be performed in episodes of at least 10 minutes preferably spread throughout the week.     Adhering to a heart healthy diet, regular exercise habits, avoidance of tobacco products, and maintenance of a healthy weight are crucial components of their heart disease risk reduction.     Any positive review of systems not specifically addressed in the office visit today should be evaluated and treated by the patients primary care physician or in an emergency department if necessary     Patient was notified that results from ordered tests will be called  to the patient if it changes current management; it will otherwise be discussed at a future appointment and available on [facility].     Thank you for allowing me to participate in the care of this patient.        This document was generated using the assistance of voice recognition software. If there are any errors of spelling, grammar, syntax, or meaning; please feel free to contact me directly for clarification.    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    Past Surgical History:  He has a past surgical history that includes Carpal tunnel release (02/15/2016); Cataract extraction (02/15/2016); Hand surgery (02/15/2016); Total knee arthroplasty (05/26/2016); Foot surgery (06/15/2016); Cardiac pacemaker placement (06/08/2017); Cardiac pacemaker placement (06/08/2017); Other surgical history (06/08/2017); Other surgical history (06/08/2017); and Other surgical history (06/08/2017).      Social History:  He reports that he has quit smoking. His smoking use included cigarettes. He has been exposed to tobacco smoke. He has never used smokeless tobacco. He reports that he does not drink alcohol and does not use drugs.    Family History:  Family History   Problem Relation Name Age of Onset    Heart failure Mother      Diabetes Mother          mellitus    Colon cancer Father      Diabetes Sister          mellitus    Coronary artery disease Brother          Allergies:  Pentoxifylline, Other, Amiodarone, Baclofen,  Carvedilol, Cilostazol, Flecainide, Quinidine, and Simvastatin    Outpatient Medications:  Current Outpatient Medications   Medication Instructions    atorvastatin (LIPITOR) 10 mg, oral, Daily    blood sugar diagnostic (OneTouch Ultra Test) strip 1 strip, injection, 2 times daily    budesonide-glycopyr-formoterol (Breztri Aerosphere) 160-9-4.8 mcg/actuation HFA aerosol inhaler Breztri Aerosphere 160-9-4.8 MCG/ACT Inhalation Aerosol   Refills: 0        Start : 31-Oct-2022   Active  5.9 GM Inhaler    budesonide/glycopyr/formoterol (BREZTRI AEROSPHERE INHL) inhalation    clonazePAM (KlonoPIN) 0.5 mg tablet TAKE 1.5 TABLET at Bedtime    cyanocobalamin (Vitamin B-12) 1,000 mcg tablet 1 tablet, oral, Daily    finasteride (PROSCAR) 5 mg, oral, Daily, Do not crush, chew, or split.    FreeStyle glucose monitoring kit 1 each, miscellaneous, As needed    furosemide (LASIX) 40 mg, oral, Daily    glipiZIDE XL (GLUCOTROL XL) 5 mg, oral, Daily    hydrocortisone-iodoquinoL (Dermazene) 1-1 % cream cream APPLY TOPICALLY TWICE DAILY AS NEEDED TO GROIN AREA    ketoconazole (NIZOral) 2 % cream APPLY TWICE DAILY TO GROIN    lancets (OneTouch UltraSoft Lancets) misc test twice a day    lisinopril 5 mg, oral, Daily, As directed    metoprolol succinate XL (TOPROL-XL) 25 mg, oral, Daily, Do not crush or chew.    mv-min/FA/vit K/lutein/zeaxant (PRESERVISION AREDS 2 PLUS MV ORAL) oral    omeprazole (PRILOSEC) 40 mg, oral, Daily before breakfast    OXcarbazepine (Trileptal) 150 mg tablet Take 150 mg every morning and 300 mg every evening    pentoxifylline (TRENTAL) 400 mg, oral, 3 times daily with meals, Do not crush, chew, or split.    psyllium (MetamuciL) 0.4 gram capsule as directed Orally    tamsulosin (FLOMAX) 0.4 mg, oral, Daily, Daily before bed    vitamin B complex/folic acid (B COMPLEX 100 ORAL) as directed Orally    warfarin (COUMADIN) 7.5 mg, oral, Every evening        ROS:  A 14 point review of systems was done and is negative other  "than as stated in HPI    Vitals:      9/11/2023     2:48 PM 10/10/2023     2:03 PM 11/1/2023    10:33 AM 11/28/2023     1:11 PM 1/3/2024    11:39 AM 1/26/2024    11:38 AM 1/26/2024    12:12 PM   Vitals   Systolic 138 120 128 145 113 117 98   Diastolic 82 71 78 81 64 57 86   Heart Rate 89 63 60 67 73 62 63   Temp      36.6 °C (97.9 °F)    Resp  16 16   16 16   Height (in) 1.803 m (5' 11\") 1.803 m (5' 11\") 1.803 m (5' 11\")       Weight (lb) 188 180 180 183      BMI 26.22 kg/m2 25.1 kg/m2 25.1 kg/m2 25.52 kg/m2      BSA (m2) 2.07 m2 2.02 m2 2.02 m2 2.04 m2           Physical Exam:   Constitutional: Cooperative, in no acute distress, alert, appears stated age.  Skin: Skin color, texture, turgor normal. No rashes or lesions.  Head: Normocephalic. No masses, lesions, tenderness or abnormalities  Eyes: Extraocular movements are grossly intact.  Mouth and throat: Mucous membranes moist  Neck: Neck supple, no carotid bruits, no JVD  Respiratory: Lungs clear to auscultation, no wheezing or rhonchi, no use of accessory muscles  Chest wall: PPM, normal excursion with respiration  Cardiovascular: Regular rhythm without murmur  Gastrointestinal: Abdomen soft, nontender. Bowel sounds normal.  Musculoskeletal: Strength equal in upper extremities  Extremities: Trace to 1+ pitting edema  Neurologic: Sensation grossly intact, alert and oriented x3       Intake/Output:   No intake/output data recorded.    Outpatient Medications  Current Outpatient Medications on File Prior to Visit   Medication Sig Dispense Refill    atorvastatin (Lipitor) 10 mg tablet Take 1 tablet (10 mg) by mouth once daily. 90 tablet 3    blood sugar diagnostic (OneTouch Ultra Test) strip Inject 1 strip as directed 2 times a day. 90 strip 2    budesonide-glycopyr-formoterol (Breztri Aerosphere) 160-9-4.8 mcg/actuation HFA aerosol inhaler Breztri Aerosphere 160-9-4.8 MCG/ACT Inhalation Aerosol   Refills: 0        Start : 31-Oct-2022   Active  5.9 GM Inhaler      " budesonide/glycopyr/formoterol (BREZTRI AEROSPHERE INHL) Inhale.      clonazePAM (KlonoPIN) 0.5 mg tablet TAKE 1.5 TABLET at Bedtime 45 tablet 2    FreeStyle glucose monitoring kit 1 each if needed.      furosemide (Lasix) 20 mg tablet Take 2 tablets (40 mg) by mouth once daily. 90 tablet 2    glipiZIDE XL (Glucotrol XL) 2.5 mg 24 hr tablet Take 2 tablets (5 mg) by mouth once daily. 60 tablet 11    lancets (OneTouch UltraSoft Lancets) misc test twice a day 90 each 2    lisinopril 5 mg tablet Take 1 tablet (5 mg) by mouth once daily. As directed 90 tablet 1    metoprolol succinate XL (Toprol-XL) 25 mg 24 hr tablet Take 1 tablet (25 mg) by mouth once daily. Do not crush or chew. 90 tablet 3    mv-min/FA/vit K/lutein/zeaxant (PRESERVISION AREDS 2 PLUS MV ORAL) Take by mouth.      omeprazole (PriLOSEC) 40 mg DR capsule Take 1 capsule (40 mg) by mouth once daily in the morning. Take before meals. 30 capsule 11    OXcarbazepine (Trileptal) 150 mg tablet Take 150 mg every morning and 300 mg every evening 270 tablet 1    vitamin B complex/folic acid (B COMPLEX 100 ORAL) as directed Orally      warfarin (Coumadin) 5 mg tablet Take 1.5 tablets (7.5 mg) by mouth once daily in the evening. 180 tablet 2    cyanocobalamin (Vitamin B-12) 1,000 mcg tablet Take 1 tablet (1,000 mcg) by mouth once daily.      finasteride (Proscar) 5 mg tablet Take 1 tablet (5 mg) by mouth once daily. Do not crush, chew, or split. 30 tablet 2    hydrocortisone-iodoquinoL (Dermazene) 1-1 % cream cream APPLY TOPICALLY TWICE DAILY AS NEEDED TO GROIN AREA      ketoconazole (NIZOral) 2 % cream APPLY TWICE DAILY TO GROIN      pentoxifylline (Trental) 400 mg ER tablet Take 1 tablet (400 mg) by mouth 3 times a day with meals. Do not crush, chew, or split. 90 tablet 11    psyllium (MetamuciL) 0.4 gram capsule as directed Orally      tamsulosin (Flomax) 0.4 mg 24 hr capsule Take 1 capsule (0.4 mg) by mouth once daily. Daily before bed 90 capsule 3     [DISCONTINUED] tamsulosin (Flomax) 0.4 mg 24 hr capsule Take 1 capsule (0.4 mg) by mouth once daily in the morning. Take before meals. 90 capsule 3     No current facility-administered medications on file prior to visit.       Labs: (past 26 weeks)  Recent Results (from the past 4368 hour(s))   Protime-INR    Collection Time: 08/08/23 12:00 AM   Result Value Ref Range    Protime External  seconds    INR External 2.60    Protime-INR    Collection Time: 08/15/23 12:00 AM   Result Value Ref Range    Protime External  seconds    INR External 2.10    Protime-INR    Collection Time: 08/22/23 12:00 AM   Result Value Ref Range    Protime External  seconds    INR External 2.40    Protime-INR    Collection Time: 08/22/23 12:00 AM   Result Value Ref Range    Protime External  seconds    INR External 2.60    Protime-INR    Collection Time: 08/29/23 12:00 AM   Result Value Ref Range    Protime External  seconds    INR External 2.50    Protime-INR    Collection Time: 09/12/23 12:00 AM   Result Value Ref Range    Protime External  seconds    INR External 2.30    Protime-INR    Collection Time: 09/19/23 12:00 AM   Result Value Ref Range    Protime External  seconds    INR External 2.40    Protime-INR    Collection Time: 09/26/23 12:00 AM   Result Value Ref Range    Protime External  seconds    INR External 2.20    POCT UA Automated manually resulted    Collection Time: 10/02/23  3:13 PM   Result Value Ref Range    POC Specific Gravity, Urine 1.020 1.005 - 1.035    POC PH, Urine 5.0 No Reference Range Established PH    POC Protein, Urine NEGATIVE NEGATIVE, 30 (1+) mg/dl    POC Glucose, Urine NEGATIVE NEGATIVE mg/dl    POC Blood, Urine NEGATIVE NEGATIVE    POC Ketones, Urine NEGATIVE NEGATIVE mg/dl    POC Bilirubin, Urine NEGATIVE NEGATIVE    POC Urobilinogen, Urine 0.2 0.2, 1.0 EU/DL    Poc Nitrite, Urine NEGATIVE NEGATIVE    POC Leukocytes, Urine NEGATIVE NEGATIVE   Protime-INR    Collection Time: 10/03/23 12:00 AM   Result Value  Ref Range    Protime External  seconds    INR External 2.00    Protime-INR    Collection Time: 10/10/23 12:00 AM   Result Value Ref Range    Protime External  seconds    INR External 3.30    Protime-INR    Collection Time: 10/17/23 12:00 AM   Result Value Ref Range    Protime External  seconds    INR External 2.90    Protime-INR    Collection Time: 10/24/23 12:00 AM   Result Value Ref Range    Protime External  seconds    INR External 2.80    Basic Metabolic Panel    Collection Time: 10/26/23 10:46 AM   Result Value Ref Range    Glucose 161 (H) 74 - 99 mg/dL    Sodium 139 136 - 145 mmol/L    Potassium 4.2 3.5 - 5.3 mmol/L    Chloride 104 98 - 107 mmol/L    Bicarbonate 24 21 - 32 mmol/L    Anion Gap 15 10 - 20 mmol/L    Urea Nitrogen 39 (H) 6 - 23 mg/dL    Creatinine 1.51 (H) 0.50 - 1.30 mg/dL    eGFR 44 (L) >60 mL/min/1.73m*2    Calcium 9.0 8.6 - 10.3 mg/dL   Protime-INR    Collection Time: 10/31/23 12:00 AM   Result Value Ref Range    Protime External  seconds    INR External 2.00    Protime-INR    Collection Time: 11/21/23 12:00 AM   Result Value Ref Range    Protime External  seconds    INR External 1.70    Protime-INR    Collection Time: 11/28/23 12:00 AM   Result Value Ref Range    Protime External  seconds    INR External 1.90    Protime-INR    Collection Time: 12/05/23 12:00 AM   Result Value Ref Range    Protime External  seconds    INR External 1.90    Protime-INR    Collection Time: 12/13/23 12:00 AM   Result Value Ref Range    Protime External  seconds    INR External 1.80    Protime-INR    Collection Time: 12/19/23 12:00 AM   Result Value Ref Range    Protime External  seconds    INR External 2.30    Protime-INR    Collection Time: 01/02/24 12:00 AM   Result Value Ref Range    Protime External  seconds    INR External 2.70    Protime-INR    Collection Time: 01/16/24 12:00 AM   Result Value Ref Range    Protime External  seconds    INR External 1.70    Protime-INR    Collection Time: 01/30/24 12:00 AM    Result Value Ref Range    Protime External  seconds    INR External 2.10    B-Type Natriuretic Peptide    Collection Time: 01/31/24  9:30 AM   Result Value Ref Range    BNP 52 0 - 99 pg/mL   CBC    Collection Time: 01/31/24  9:30 AM   Result Value Ref Range    WBC 8.3 4.4 - 11.3 x10*3/uL    nRBC 0.0 0.0 - 0.0 /100 WBCs    RBC 4.44 (L) 4.50 - 5.90 x10*6/uL    Hemoglobin 14.3 13.5 - 17.5 g/dL    Hematocrit 43.9 41.0 - 52.0 %    MCV 99 80 - 100 fL    MCH 32.2 26.0 - 34.0 pg    MCHC 32.6 32.0 - 36.0 g/dL    RDW 13.3 11.5 - 14.5 %    Platelets 194 150 - 450 x10*3/uL   Comprehensive Metabolic Panel    Collection Time: 01/31/24  9:30 AM   Result Value Ref Range    Glucose 159 (H) 74 - 99 mg/dL    Sodium 140 136 - 145 mmol/L    Potassium 4.8 3.5 - 5.3 mmol/L    Chloride 104 98 - 107 mmol/L    Bicarbonate 27 21 - 32 mmol/L    Anion Gap 14 10 - 20 mmol/L    Urea Nitrogen 50 (H) 6 - 23 mg/dL    Creatinine 2.05 (H) 0.50 - 1.30 mg/dL    eGFR 30 (L) >60 mL/min/1.73m*2    Calcium 9.3 8.6 - 10.3 mg/dL    Albumin 4.2 3.4 - 5.0 g/dL    Alkaline Phosphatase 71 33 - 136 U/L    Total Protein 7.2 6.4 - 8.2 g/dL    AST 22 9 - 39 U/L    Bilirubin, Total 0.5 0.0 - 1.2 mg/dL    ALT 24 10 - 52 U/L   Lipid Panel    Collection Time: 01/31/24  9:30 AM   Result Value Ref Range    Cholesterol 152 0 - 199 mg/dL    HDL-Cholesterol 33.5 mg/dL    Cholesterol/HDL Ratio 4.5     LDL Calculated 94 <=99 mg/dL    VLDL 25 0 - 40 mg/dL    Triglycerides 124 0 - 149 mg/dL    Non HDL Cholesterol 119 0 - 149 mg/dL   Magnesium    Collection Time: 01/31/24  9:30 AM   Result Value Ref Range    Magnesium 1.93 1.60 - 2.40 mg/dL       ECG  No results found for this or any previous visit (from the past 4464 hour(s)).    Echocardiogram  No results found for this or any previous visit from the past 1095 days.      CV Studies:  EKG: No results found for this or any previous visit (from the past 4464 hour(s)).  Echocardiogram:   Echocardiogram     Narrative  [facility]  [place] [id number]  Phone [phone] Fax [phone]    TRANSTHORACIC ECHOCARDIOGRAM REPORT      Patient Name:     LESIA RODRIGUEZMARIOCHRISTIANE Reading Physician:   [id number] Suedep Blackwood MD  Study Date:       10/29/2021         Referring Physician: [id number] MELENDEZ  MRN/PID:          [id number]           PCP:  Accession/Order#: [id number]       Department Location: [facility]  YOB: 1933          Fellow:  Gender:           M                  Nurse:  Admit Date:       10/29/2021         Sonographer:         Raven Elias [facility]  Admission Status: Outpatient         Additional Staff:  Height:           180.34 cm          CC Report to:  Weight:           79.83 kg           Study Type:          Echocardiogram  BSA:              2.00 m2  Blood Pressure: 166 /102 mmHg    Diagnosis/ICD: R06.02-Shortness of breath  Indication:    Dyspnea on Exertion  Procedure/CPT: Echo Complete w Full [id number]    Patient History:  Pacer/Defib:       Permanent pacemaker  Pertinent History: Dyspnea.    Study Detail: The following Echo studies were performed: 2D, M-Mode, Doppler and  color flow. Technically challenging study due to prominent lung  artifact.      PHYSICIAN INTERPRETATION:  Left Ventricle: The left ventricular systolic function is normal. There are no regional wall motion abnormalities. The left ventricular cavity size is normal. There is mild left ventricular hypertrophy. Abnormal (paradoxical) septal motion, consistent with RV pacemaker. Spectral Doppler shows an impaired relaxation pattern of left ventricular diastolic filling.  Left Atrium: The left atrium is normal in size.  Right Ventricle: The right ventricle is normal in size. There is normal right ventricular global systolic function. A device is visualized in the right ventricle.  Right Atrium: The right atrium is normal in size.  Aortic Valve: The aortic valve appears structurally normal. There is no  evidence of aortic valve regurgitation. The peak instantaneous gradient of the aortic valve is 11.7 mmHg. The mean gradient of the aortic valve is 6.0 mmHg.  Mitral Valve: The mitral valve is normal in structure. There is no evidence of mitral valve regurgitation.  Tricuspid Valve: The tricuspid valve is structurally normal. There is trace tricuspid regurgitation.  Pulmonic Valve: The pulmonic valve is structurally normal. There is no indication of pulmonic valve regurgitation.  Pericardium: There is no pericardial effusion noted.  Aorta: The aortic root is normal.      CONCLUSIONS:  1. The left ventricular systolic function is normal.  2. Abnormal septal motion consistent with RV pacemaker.  3. Spectral Doppler shows an impaired relaxation pattern of left ventricular diastolic filling.    QUANTITATIVE DATA SUMMARY:  2D MEASUREMENTS:  Normal Ranges:  Ao Root d:     3.40 cm    (2.0-3.7cm)  IVSd:          1.25 cm    (0.6-1.1cm)  LVPWd:         1.33 cm    (0.6-1.1cm)  LVIDd:         4.47 cm    (3.9-5.9cm)  LVIDs:         3.56 cm  LV Mass Index: 109.1 g/m2  LV % FS        20.4 %    LA VOLUME:  Normal Ranges:  LA Vol A4C:        51.1 ml    (22+/-6mL/m2)  LA Vol A2C:        56.7 ml  LA Vol BP:         53.8 ml  LA Vol Index A4C:  25.6ml/m2  LA Vol Index A2C:  28.4 ml/m2  LA Vol Index BP:   27.0 ml/m2  LA Area A4C:       19.0 cm2  LA Area A2C:       20.0 cm2  LA Major Axis A4C: 6.0 cm  LA Major Axis A2C: 6.0 cm  LA Volume Index:   27.0 ml/m2    RA VOLUME BY A/L METHOD:  Normal Ranges:  RA Area A4C: 17.0 cm2    M-MODE MEASUREMENTS:  Normal Ranges:  Ao Root: 3.30 cm (2.0-3.7cm)  AoV Exc: 1.90 cm (1.5-2.5cm)  LAs:     2.80 cm (2.7-4.0cm)    AORTA MEASUREMENTS:  Normal Ranges:  AoV Exc:   1.90 cm (1.5-2.5cm)  Asc Ao, d: 3.30 cm (2.1-3.4cm)    LV SYSTOLIC FUNCTION BY 2D PLANIMETRY (MOD):  Normal Ranges:  EF-A4C View: 61.7 % (>55%)  EF-A2C View: 62.5 %  EF-Biplane:  61.1 %    LV DIASTOLIC FUNCTION:  Normal Ranges:  MV Peak E:     0.81 m/s   (0.7-1.2 m/s)  MV Peak A:    0.44 m/s   (0.42-0.7 m/s)  E/A Ratio:    1.86       (1.0-2.2)  MV e'         0.10 m/s   (>8.0)  MV lateral e' 0.10 m/s  MV medial e'  0.09 m/s  MV A Dur:     95.00 msec  E/e' Ratio:   8.57       (<8.0)  LV IVRT:      100 msec   (<100ms)    MITRAL VALVE:  Normal Ranges:  MV DT: 135 msec (150-240msec)    AORTIC VALVE:  Normal Ranges:  AoV Vmax:                1.71 m/s  (<1.7m/s)  AoV Peak P.7 mmHg (<20mmHg)  AoV Mean P.0 mmHg  (1.7-11.5mmHg)  LVOT Max King:            0.84 m/s  (<1.1m/s)  AoV VTI:                 33.00 cm  (18-25cm)  LVOT VTI:                17.80 cm  LVOT Diameter:           2.00 cm   (1.8-2.4cm)  AoV Area, VTI:           1.69 cm2  (2.5-5.5cm2)  AoV Area,Vmax:           1.54 cm2  (2.5-4.5cm2)  AoV Area, planim:        2.23 cm2  (2.5-4.5cm2)  AoV Dimensionless Index: 0.54    RIGHT VENTRICLE:  RV 1   3.5 cm  RV 2   3.1 cm  RV 3   6.0 cm  TAPSE: 16.1 mm  RV s'  0.13 m/s    TRICUSPID VALVE/RVSP:  Normal Ranges:  Peak TR Velocity: 2.70 m/s  RV Syst Pressure: 32.2 mmHg (< 30mmHg)  IVC Diam:         1.45 cm      [id number] Sudeep Blackwood MD  Electronically signed on 10/30/2021 at 10:29:19 AM         Final     Stress Testing IMGRESULT([id number]:1:1825):   NM CARDIAC STRESS REST (MYOCARDIAL PERFUSION MIBI) 10/29/2021    Narrative  Addendum Begins  MRN: [id number]  Patient Name: LESIA PEÑA    ADDENDUM:  Please disregard the prior report as it was signed in error.    STUDY:  CARDIAC STRESS/REST INJECTION; CARDIAC STRESS/REST (MYOCARDIAL  PERFUSION/MIBI); PART 2 STRESS OR REST (NO CHARGE); 10/29/2021 10:36  am    INDICATION:  SOBOE  R06.02: SOBOE (shortness of breath on exertion).    COMPARISON:  None.    ACCESSION NUMBER(S):  [id number]; [id number]; [id number]    ORDERING CLINICIAN:  GRISELDA MELENDEZ    TECHNIQUE:  DIVISION OF NUCLEAR MEDICINE  PHARMACOLOGIC STRESS MYOCARDIAL PERFUSION SCAN, ONE DAY PROTOCOL    The patient received an intravenous dose of  11.6 mCi of Tc-99m  tetrofosmin and resting emission tomographic (SPECT) images of the  myocardium were acquired. The patient then received an intravenous  infusion of 0.4mg regadenoson (Lexiscan) followed by an additional  dose of 35.4 mCi of Tc-99m tetrofosmin. Stress phase SPECT images of  the myocardium were then acquired. These included ECG-gated images to  assess and quantify ventricular function.    FINDINGS:  Stress and rest images both demonstrate a normal distribution of  perfusion throughout all LV segments with no sign of ischemia.    ECG-gated images demonstrate normal LV size and myocardial  contractility with an LV ejection fraction of  63 % (normal above 50  percent).    Impression  1. Normal stress myocardial perfusion imaging in response to  pharmacologic stress.  2. Well-maintained left ventricular function.    Addendum Ends  MRN: [id number]  Patient Name: LESIA PEÑA    STUDY:  CARDIAC STRESS/REST INJECTION; PART 2 STRESS OR REST (NO CHARGE);  CARDIAC STRESS/REST (MYOCARDIAL PERFUSION/MIBI);  10/29/2021 10:36 am    INDICATION:  SOBOE  R06.02: SOBOE (shortness of breath on exertion).    COMPARISON:  None.    ACCESSION NUMBER(S):  [id number]; [id number]; [id number]    ORDERING CLINICIAN:  GRISELDA MELENDEZ    TECHNIQUE:  DIVISION OF NUCLEAR MEDICINE  PHARMACOLOGIC STRESS MYOCARDIAL PERFUSION SCAN, ONE DAY PROTOCOL    The patient received an intravenous dose of  11.4 mCi of Tc-99m  tetrofosmin and resting emission tomographic (SPECT) images of the  myocardium were acquired. The patient then received an intravenous  infusion of 0.4mg regadenoson (Lexiscan) followed by an additional  dose of  34.2 mCi of Tc-99m tetrofosmin. Stress phase SPECT images of  the myocardium were then acquired. These included ECG-gated images to  assess and quantify ventricular function.    FINDINGS:  Stress and rest images both demonstrate a normal distribution of  perfusion throughout all LV segments with no sign of  ischemia.    ECG-gated images demonstrate normal LV size and myocardial  contractility with an LV ejection fraction of   86 % (normal above 50  percent).    IMPRESSION:  1. Normal stress myocardial perfusion imaging in response to  pharmacologic stress.  2. Well-maintained left ventricular function.    Cardiac Catheterization:   Adult Cath     [facility], Cath Lab  [address] [id number]  Phone [phone] Fax [phone]    Cardiovascular Catheterization Report    Patient Name:     LESIA PEÑA Performing Physician: [id number] Blackwood MD  Study Date:       12/9/2019        Verifying Physician:  [id number] Blackwood MD  MRN/PID:          [id number]         Cardiologist:  Accession/Order#: [id number]        Referring Physician:  [id number] Thor Baker MD  YOB: 1933        Referring Physician:  Gender:           M                Referring Physician:      Study: Left and Right Heart Catheterization      Indications:  LESIA PEÑA is a 86 year old male who presents with hypertension and dyslipidemia. Suspected coronary artery disease, cardiomyopathy and left ventricular dysfunction, with a chest pain assessment of atypical angina. Study performed as an elective cath procedure.    Medical History:  Stress test performed: No. CTA performed: No. Agatston accessed: No. LVEF Assessed: Yes.    Procedure Description:  After infiltration with 2% Lidocaine, the right radial artery was cannulated with a modified Seldinger technique. Subsequently a 6 [place] sheath was placed in the right radial artery. After infiltration of local anesthetic, the Right Brachial vein was cannulated with a micropuncture technique. A 5 [place] sheath was placed in the vein. Selective coronary catheterization was performed using a 6 Fr catheter(s) exchanged over a guide wire to cannulate the coronary arteries.  Additional catheter(s) used to visualize the coronary arteries were: Meka. A  balloon tipped catheter was advanced through the right heart to record pressures. Cardiac output was calculated via the Reggie method. After completion of the procedure, the arterial sheath was pulled and a TR Band Radial Compression Device was utilized to obtain patent hemostasis.    Coronary Angiography:  The coronary circulation is right dominant.    Left Main Coronary Artery:  The left main coronary artery is a normal caliber vessel. The left main arises normally from the left coronary sinus of Valsalva and bifurcates into the LAD and circumflex coronary arteries. The left main coronary artery showed no significant disease or stenosis greater than 30%.    Left Anterior Descending Coronary Artery Distribution:  The left anterior descending coronary artery is a normal caliber vessel. The LAD arises normally from the left main coronary artery. The LAD demonstrated mild atherosclerotic disease.    Circumflex Coronary Artery Distribution:  The circumflex coronary artery is a normal caliber vessel. The circumflex arises normally from the left main coronary artery and terminates in the AV groove. The circumflex revealed moderate atherosclerotic disease. The proximal to mid circumflex coronary artery showed 50% stenosis. This lesion was eccentric.    Right Heart Catheterization:  A balloon tipped catheter was advanced through the right heart to record pressures. Cardiac output was calculated via the Reggie method. Elevated left sided filling pressures with normal cardiac output. Elevated ventricular filling pressure. Cardiac output is normal. Preserved cardiac output at rest. No pulmonary vascular resistance.    Right Coronary Artery Distribution:    The right coronary artery is a normal caliber vessel. The RCA arises normally from the right sinus of Valsalva. The RCA showed no significant disease or stenosis greater than 30%.    Coronary Interventions:  Angiography reveals a 50% stenosis of the proximal to mid circumflex  and rFR - 1. Percutaneous coronary intervention was performed within the proximal to mid circumflex and rFR - 1.    Coronary Lesion Summary:  Vessel       Stenosis   Vessel Segment  Circumflex 50% stenosis proximal to mid        Cardiac Cath Transition of Care Summary:  Post Procedure Diagnosis: Single vessel disease.  Blood Loss:               Estimated blood loss during the procedure was 0 mls.  Specimens Removed:        Number of specimen(s) removed: none.      Recommendations:  Maximize medical therapy.    ____________________________________________________________________________________  CONCLUSIONS:  1. Moderate non-obstructive coronary artery disease.  2. Elevated LV filling pressures.    ____________________________________________________________________________________  CPT Codes:  Right Heart Cath, O2/Cardiac output (RH)-74236; Coronary Angiography S&I (RHC)([facility])-45935; Moderate Sedation Services initial 15 minutes patient >5 years-82198; Moderate Sedation Services 1st additional 15 minutes patient >5 years-90919; FFR, initial Vessel (PCI)-26734    ICD 10 Codes:  I20.9-Angina pectoris, unspecified Class III; I50.42-Chronic combined systolic (congestive) and diastolic (congestive) heart failure    [id number] Sudeep Blackwood MD  Performing Physician  Electronically signed by [id number] Sudeep Blackwood MD on 12/9/2019 at 3:02:59 PM      cc Report to: [id number] Thor Baker MD           Final   No results found for this or any previous visit from the past 3650 days.     Cardiac Scoring: No results found for this or any previous visit from the past 1825 days.    AAA : No results found for this or any previous visit from the past 1825 days.    OTHER: No results found for this or any previous visit from the past 1825 days.    LAST IMAGING RESULTS  Vascular US ankle brachial index (MARNI) without exercise                [address] [id number]       Phone [phone] Fax  [phone]       Vascular Lab Report     [facility] US PVR WITHOUT EXERCISE    Patient Name:      LESIA CARNEY MARIANA   Reading Physician:  [id number] Wm Rivero MD, RPVI  Study Date:        12/15/2023           Ordering Provider:  [id number] JONELLE GALLARDO  MRN/PID:           [id number]             Fellow:  Accession#:        [id number]         Technologist:       Alexandra Bruno RVT  Date of Birth/Age: 6/27/1933 / 89 years Technologist 2:  Gender:            M                    Encounter#:         [id number]  Admission Status:  Outpatient           Location Performed: [facility]       Diagnosis/ICD: Peripheral vascular disease, unspecified-I73.9       CONCLUSIONS:  Right Lower PVR: Right pressures of >220 mmHg suggest no compressibility of vessels and may make absolute Segmental Limb Pressures (SLP) unreliable. Decreased digital perfusion noted. Biphasic flow is noted in the right posterior tibial artery and right dorsalis pedis artery. Triphasic flow is noted in the right common femoral artery and right popliteal artery.  Left Lower PVR: Left pressures of >220 mmHg suggest no compressibility of vessels and may make absolute Segmental Limb Pressures (SLP) unreliable. Decreased digital perfusion noted. Biphasic flow is noted in the left posterior tibial artery and left dorsalis pedis artery. Triphasic flow is noted in the left common femoral artery and left popliteal artery.  *Based on doppler waveforms mild disease would be suspected at the tibial level bilaterally.     Comparison:  Compared with study from 8/17/2021, Right MARNI was .71 and today is non compressible. No change on the left.     Imaging & Doppler Findings:     RIGHT Lower PVR                Pressures Ratios  Right Posterior Tibial (Ankle) 150 mmHg  1.13  Right Dorsalis Pedis (Ankle)   200 mmHg  1.50  Right Digit (Great Toe)        32 mmHg   0.24          LEFT Lower PVR                Pressures  Ratios  Left Posterior Tibial (Ankle) 200 mmHg  1.50  Left Dorsalis Pedis (Ankle)   200 mmHg  1.50  Left Digit (Great Toe)        34 mmHg   0.26                             Right     Left  Brachial Pressure 133 mmHg 132 mmHg          [id number] Wm Rivero MD, RPVI  Electronically signed by [id number] Wm Rivero MD, RPELISHA on 12/17/2023 at 10:42:15 PM       ** Final **      Problem List Items Addressed This Visit       ASHD (arteriosclerotic heart disease)    Relevant Orders    ECG 12 Lead (Completed)    Cardiomyopathy (CMS/HCC)    Relevant Orders    ECG 12 Lead (Completed)    Diabetes mellitus, type 2 (CMS/HCC)    Relevant Orders    ECG 12 Lead (Completed)    Dyslipidemia    Relevant Orders    ECG 12 Lead (Completed)    Persistent atrial fibrillation with RVR (CMS/HCC) - Primary    Relevant Orders    ECG 12 Lead (Completed)    Primary hypertension    Presence of cardiac pacemaker    Relevant Orders    ECG 12 Lead (Completed)    Warfarin anticoagulation    Relevant Orders    ECG 12 Lead (Completed)    CKD (chronic kidney disease)               Rod Milner DO, FACC, FACOI

## 2024-02-07 ENCOUNTER — OFFICE VISIT (OUTPATIENT)
Dept: CARDIOLOGY | Facility: CLINIC | Age: 89
End: 2024-02-07
Payer: MEDICARE

## 2024-02-07 ENCOUNTER — ANTICOAGULATION - WARFARIN VISIT (OUTPATIENT)
Dept: PRIMARY CARE | Facility: CLINIC | Age: 89
End: 2024-02-07

## 2024-02-07 ENCOUNTER — TELEPHONE (OUTPATIENT)
Dept: PRIMARY CARE | Facility: CLINIC | Age: 89
End: 2024-02-07

## 2024-02-07 VITALS
HEART RATE: 67 BPM | BODY MASS INDEX: 25.2 KG/M2 | DIASTOLIC BLOOD PRESSURE: 68 MMHG | WEIGHT: 180 LBS | HEIGHT: 71 IN | SYSTOLIC BLOOD PRESSURE: 140 MMHG

## 2024-02-07 DIAGNOSIS — I10 PRIMARY HYPERTENSION: ICD-10-CM

## 2024-02-07 DIAGNOSIS — Z95.0 PRESENCE OF CARDIAC PACEMAKER: ICD-10-CM

## 2024-02-07 DIAGNOSIS — I25.10 ASHD (ARTERIOSCLEROTIC HEART DISEASE): ICD-10-CM

## 2024-02-07 DIAGNOSIS — E11.00 TYPE 2 DIABETES MELLITUS WITH HYPEROSMOLARITY WITHOUT COMA, WITHOUT LONG-TERM CURRENT USE OF INSULIN (MULTI): ICD-10-CM

## 2024-02-07 DIAGNOSIS — Z79.01 WARFARIN ANTICOAGULATION: ICD-10-CM

## 2024-02-07 DIAGNOSIS — N18.9 CHRONIC KIDNEY DISEASE, UNSPECIFIED CKD STAGE: ICD-10-CM

## 2024-02-07 DIAGNOSIS — E78.5 DYSLIPIDEMIA: ICD-10-CM

## 2024-02-07 DIAGNOSIS — I42.9 CARDIOMYOPATHY, UNSPECIFIED TYPE (MULTI): ICD-10-CM

## 2024-02-07 DIAGNOSIS — I48.19 PERSISTENT ATRIAL FIBRILLATION WITH RVR (MULTI): Primary | ICD-10-CM

## 2024-02-07 PROCEDURE — 1157F ADVNC CARE PLAN IN RCRD: CPT | Performed by: INTERNAL MEDICINE

## 2024-02-07 PROCEDURE — 99214 OFFICE O/P EST MOD 30 MIN: CPT | Performed by: INTERNAL MEDICINE

## 2024-02-07 PROCEDURE — 3077F SYST BP >= 140 MM HG: CPT | Performed by: INTERNAL MEDICINE

## 2024-02-07 PROCEDURE — 1125F AMNT PAIN NOTED PAIN PRSNT: CPT | Performed by: INTERNAL MEDICINE

## 2024-02-07 PROCEDURE — 1160F RVW MEDS BY RX/DR IN RCRD: CPT | Performed by: INTERNAL MEDICINE

## 2024-02-07 PROCEDURE — 1159F MED LIST DOCD IN RCRD: CPT | Performed by: INTERNAL MEDICINE

## 2024-02-07 PROCEDURE — 3078F DIAST BP <80 MM HG: CPT | Performed by: INTERNAL MEDICINE

## 2024-02-07 PROCEDURE — 93000 ELECTROCARDIOGRAM COMPLETE: CPT | Performed by: INTERNAL MEDICINE

## 2024-02-07 PROCEDURE — 1036F TOBACCO NON-USER: CPT | Performed by: INTERNAL MEDICINE

## 2024-02-07 RX ORDER — ATORVASTATIN CALCIUM 20 MG/1
20 TABLET, FILM COATED ORAL DAILY
Qty: 90 TABLET | Refills: 1 | Status: SHIPPED | OUTPATIENT
Start: 2024-02-07

## 2024-02-07 NOTE — TELEPHONE ENCOUNTER
Patients wife has some concerns with yesterdays INR. She said she would like a call regarding the INR.

## 2024-02-13 ENCOUNTER — ANTICOAGULATION - WARFARIN VISIT (OUTPATIENT)
Dept: PRIMARY CARE | Facility: CLINIC | Age: 89
End: 2024-02-13
Payer: MEDICARE

## 2024-02-15 ENCOUNTER — OFFICE VISIT (OUTPATIENT)
Dept: PAIN MEDICINE | Facility: HOSPITAL | Age: 89
End: 2024-02-15
Payer: MEDICARE

## 2024-02-15 VITALS
BODY MASS INDEX: 25.2 KG/M2 | SYSTOLIC BLOOD PRESSURE: 109 MMHG | HEART RATE: 68 BPM | HEIGHT: 71 IN | RESPIRATION RATE: 16 BRPM | DIASTOLIC BLOOD PRESSURE: 64 MMHG | WEIGHT: 180 LBS

## 2024-02-15 DIAGNOSIS — E11.40 CHRONIC PAINFUL DIABETIC NEUROPATHY (MULTI): ICD-10-CM

## 2024-02-15 DIAGNOSIS — E11.42 DIABETIC PERIPHERAL NEUROPATHY (MULTI): ICD-10-CM

## 2024-02-15 DIAGNOSIS — M19.90 ARTHRITIS: Primary | ICD-10-CM

## 2024-02-15 PROCEDURE — 1125F AMNT PAIN NOTED PAIN PRSNT: CPT | Performed by: CLINICAL NURSE SPECIALIST

## 2024-02-15 PROCEDURE — 1157F ADVNC CARE PLAN IN RCRD: CPT | Performed by: CLINICAL NURSE SPECIALIST

## 2024-02-15 PROCEDURE — 1036F TOBACCO NON-USER: CPT | Performed by: CLINICAL NURSE SPECIALIST

## 2024-02-15 PROCEDURE — 3078F DIAST BP <80 MM HG: CPT | Performed by: CLINICAL NURSE SPECIALIST

## 2024-02-15 PROCEDURE — 99214 OFFICE O/P EST MOD 30 MIN: CPT | Performed by: CLINICAL NURSE SPECIALIST

## 2024-02-15 PROCEDURE — 1160F RVW MEDS BY RX/DR IN RCRD: CPT | Performed by: CLINICAL NURSE SPECIALIST

## 2024-02-15 PROCEDURE — 1159F MED LIST DOCD IN RCRD: CPT | Performed by: CLINICAL NURSE SPECIALIST

## 2024-02-15 PROCEDURE — 3074F SYST BP LT 130 MM HG: CPT | Performed by: CLINICAL NURSE SPECIALIST

## 2024-02-15 SDOH — SOCIAL STABILITY: SOCIAL NETWORK: SOCIAL ACTIVITY:: 8

## 2024-02-15 ASSESSMENT — ENCOUNTER SYMPTOMS
DEPRESSION: 0
OCCASIONAL FEELINGS OF UNSTEADINESS: 1
LOSS OF SENSATION IN FEET: 1

## 2024-02-15 ASSESSMENT — COLUMBIA-SUICIDE SEVERITY RATING SCALE - C-SSRS
2. HAVE YOU ACTUALLY HAD ANY THOUGHTS OF KILLING YOURSELF?: NO
1. IN THE PAST MONTH, HAVE YOU WISHED YOU WERE DEAD OR WISHED YOU COULD GO TO SLEEP AND NOT WAKE UP?: NO
6. HAVE YOU EVER DONE ANYTHING, STARTED TO DO ANYTHING, OR PREPARED TO DO ANYTHING TO END YOUR LIFE?: NO

## 2024-02-15 ASSESSMENT — PATIENT HEALTH QUESTIONNAIRE - PHQ9
1. LITTLE INTEREST OR PLEASURE IN DOING THINGS: NOT AT ALL
2. FEELING DOWN, DEPRESSED OR HOPELESS: NOT AT ALL
SUM OF ALL RESPONSES TO PHQ9 QUESTIONS 1 AND 2: 0

## 2024-02-15 NOTE — ASSESSMENT & PLAN NOTE
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  Patient also has chronic neuropathy bilateral lower extremities and restless leg syndrome.  Previously tried duloxetine for neuropathic component of pain.  Patient discontinued this medication secondary to feeling it was ineffective.  He has failed multiple neuromodulators and over-the-counter ointments.  Unfortunately the patient's medical conditions limit the addition of many neuropathic medications.  Patient has a history of CKD and is followed closely by nephrology.  He does not want to retry gabapentin orally.  Unfortunately the patient did not receive relief with recent corticosteroid injection for right PIP joint.  Discussed low-dose opiate therapy and patient not interested at this time.   Patient also continues on a low-dose of clonazepam at bedtime to help with sleep which has been very effective.  Discussed the possibility of Qutenza treatments targeting diabetic peripheral neuropathy most bothersome to the patient's feet.  Provided written information for the patient and his wife to review.  Added NSAID compounding cream which the patient can utilize for polyarticular pain as well as neuropathic pain to lower extremities.  The patient and his wife will follow-up with our office as needed.  They may contact our office if they would like to proceed with Qutenza treatments targeting diabetic peripheral neuropathy.

## 2024-02-15 NOTE — PROGRESS NOTES
Subjective   Patient ID: Michele Maya is a 89 y.o. male who presents for finger pain and neuropathic pain    HPI     89-year-old male with history of osteoarthritis of the MCP, PIP and DIP joints of his right middle finger.  Patient also has chronic diabetic neuropathy bilateral feet.  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  Options are limited and the patient has chronic kidney disease and will need to be careful with medication.  Low-dose opiates may be a reasonable option however he is on concomitant benzodiazepines at night for sleep.  He wishes to avoid low-dose opiates at this time.  He continues to benefit from clonazepam which helps him sleep.  He has previously failed multiple neuromodulators including a recent trial of duloxetine.  He has tried multiple over-the-counter ointments with limited relief.  Patient was scheduled for right PIP joint injection at his last visit.  Patient presents at today's office visit with chronic pain to right middle finger as well as neuropathy bilateral feet.  Patient describes pain as burning, tingling and aching.  Pain is constant and limits his ability to  and to use his right hand effectively.  Neuropathic pain is most bothersome at night and interrupts his sleep.  Unfortunately patient did not get relief with recent steroid injection to right PIP joint.  Patient and wife do not want to restart gabapentin.  They have not had success with Voltaren gel.      Pt is here for injection follow up. Pt has finger pain and bilateral foot neuropathy.    Other medications: denies     1/26/23 Right third PIP injection-0% relief    OA 7/24/23  OARRS:  No data recorded  I have personally reviewed the OARRS report for Michele Maya. I have considered the risks of abuse, dependence, addiction and diversion    Is the patient  prescribed a combination of a benzodiazepine and opioid?  No    Last Urine Drug Screen / ordered today: No  No results found for this or any previous visit (from the past 8760 hour(s)).  N/A    Controlled Substance Agreement:  Date of the Last Agreement:       Monitoring and compliance:    ORT: NA    PDUQ: NA    Office Agreement:      Review of Systems    ROS:   General: No fevers, chills, weight loss  Skin: Negative for lesions  Eyes: No acute vision changes  Ears: No vertigo  Nose, mouth, throat: No difficulty swallowing or speaking  Respiratory: No cough, shortness of breath, cyanosis  Cardiovascular: Negative for chest pain syncope or palpitation  Gastrointestinal: No constipation, nausea, vomiting  Neurological: Negative for headache, positive for: paresthesia   Psychological: Negative for severe or debilitating anxiety, depression. Negative memory loss  Musculoskeletal: Positive for arthralgia and pain  Endocrine: Negative for weight gain, appetite changes, excessive sweating  Allergy/immune: Negative    All 13 systems were reviewed and are within normal levels except as noted or in the history of present illness.  Positive or pertinent negative responses are noted or were in the history of present illness. As noted, the patient denies significant or impairing weakness in the bilateral upper and lower extremities, medication induced constipation, and bowel or bladder incontinence.     Current Outpatient Medications:     atorvastatin (Lipitor) 20 mg tablet, Take 1 tablet (20 mg) by mouth once daily., Disp: 90 tablet, Rfl: 1    blood sugar diagnostic (OneTouch Ultra Test) strip, Inject 1 strip as directed 2 times a day., Disp: 90 strip, Rfl: 2    budesonide-glycopyr-formoterol (Breztri Aerosphere) 160-9-4.8 mcg/actuation HFA aerosol inhaler, Breztri Aerosphere 160-9-4.8 MCG/ACT Inhalation Aerosol  Refills: 0      Start : 31-Oct-2022  Active 5.9 GM Inhaler, Disp: , Rfl:     budesonide/glycopyr/formoterol (BREZTRI  AEROSPHERE INHL), Inhale., Disp: , Rfl:     clonazePAM (KlonoPIN) 0.5 mg tablet, TAKE 1.5 TABLET at Bedtime, Disp: 45 tablet, Rfl: 2    cyanocobalamin (Vitamin B-12) 1,000 mcg tablet, Take 1 tablet (1,000 mcg) by mouth once daily., Disp: , Rfl:     finasteride (Proscar) 5 mg tablet, Take 1 tablet (5 mg) by mouth once daily. Do not crush, chew, or split. (Patient not taking: Reported on 2/7/2024), Disp: 30 tablet, Rfl: 2    FreeStyle glucose monitoring kit, 1 each if needed., Disp: , Rfl:     furosemide (Lasix) 20 mg tablet, Take 2 tablets (40 mg) by mouth once daily., Disp: 90 tablet, Rfl: 2    glipiZIDE XL (Glucotrol XL) 2.5 mg 24 hr tablet, Take 2 tablets (5 mg) by mouth once daily., Disp: 60 tablet, Rfl: 11    hydrocortisone-iodoquinoL (Dermazene) 1-1 % cream cream, APPLY TOPICALLY TWICE DAILY AS NEEDED TO GROIN AREA, Disp: , Rfl:     ketoconazole (NIZOral) 2 % cream, APPLY TWICE DAILY TO GROIN, Disp: , Rfl:     lancets (OneTouch UltraSoft Lancets) misc, test twice a day, Disp: 90 each, Rfl: 2    lisinopril 5 mg tablet, Take 1 tablet (5 mg) by mouth once daily. As directed, Disp: 90 tablet, Rfl: 1    metoprolol succinate XL (Toprol-XL) 25 mg 24 hr tablet, Take 1 tablet (25 mg) by mouth once daily. Do not crush or chew., Disp: 90 tablet, Rfl: 3    mv-min/FA/vit K/lutein/zeaxant (PRESERVISION AREDS 2 PLUS MV ORAL), Take by mouth., Disp: , Rfl:     omeprazole (PriLOSEC) 40 mg DR capsule, Take 1 capsule (40 mg) by mouth once daily in the morning. Take before meals., Disp: 30 capsule, Rfl: 11    OXcarbazepine (Trileptal) 150 mg tablet, Take 150 mg every morning and 300 mg every evening, Disp: 270 tablet, Rfl: 1    pentoxifylline (Trental) 400 mg ER tablet, Take 1 tablet (400 mg) by mouth 3 times a day with meals. Do not crush, chew, or split. (Patient not taking: Reported on 2/7/2024), Disp: 90 tablet, Rfl: 11    psyllium (MetamuciL) 0.4 gram capsule, as directed Orally, Disp: , Rfl:     tamsulosin (Flomax) 0.4 mg 24  hr capsule, Take 1 capsule (0.4 mg) by mouth once daily. Daily before bed (Patient not taking: Reported on 2/7/2024), Disp: 90 capsule, Rfl: 3    vitamin B complex/folic acid (B COMPLEX 100 ORAL), as directed Orally, Disp: , Rfl:     warfarin (Coumadin) 5 mg tablet, Take 1.5 tablets (7.5 mg) by mouth once daily in the evening., Disp: 180 tablet, Rfl: 2     Past Medical History:   Diagnosis Date    Encounter for screening for malignant neoplasm of prostate 06/15/2016    Prostate cancer screening    Long term (current) use of anticoagulants 06/15/2016    Anticoagulated on Coumadin    Personal history of other diseases of the circulatory system     History of atrial fibrillation    Personal history of other diseases of the respiratory system 11/01/2019    History of acute sinusitis    Personal history of other endocrine, nutritional and metabolic disease     History of diabetes mellitus    Personal history of transient ischemic attack (TIA), and cerebral infarction without residual deficits     History of transient cerebral ischemia    Unspecified atrial fibrillation (CMS/HCC) 10/31/2022    Atrial fibrillation        Past Surgical History:   Procedure Laterality Date    CARDIAC PACEMAKER PLACEMENT  06/08/2017    Pacemaker Placement    CARDIAC PACEMAKER PLACEMENT  06/08/2017    Pacemaker Placement    CARPAL TUNNEL RELEASE  02/15/2016    Neuroplasty Decompression Median Nerve At Carpal Tunnel    CATARACT EXTRACTION  02/15/2016    Cataract Surgery    FOOT SURGERY  06/15/2016    Foot Surgery    HAND SURGERY  02/15/2016    Hand Surgery                                                                                                                                                          OTHER SURGICAL HISTORY  06/08/2017    Cardio-Defib Pulse Gen Venous Insertion Of Electrode For Ventricular Pacing    OTHER SURGICAL HISTORY  06/08/2017    Cardio-Defib Pulse Gen Venous Insertion Of Electrode For Ventricular Pacing    OTHER  SURGICAL HISTORY  06/08/2017    Cardio-Defib Pulse Gen Venous Insertion Of Electrode For Ventricular Pacing    TOTAL KNEE ARTHROPLASTY  05/26/2016    Knee Replacement        Family History   Problem Relation Name Age of Onset    Heart failure Mother      Diabetes Mother          mellitus    Colon cancer Father      Diabetes Sister          mellitus    Coronary artery disease Brother          Allergies   Allergen Reactions    Pentoxifylline Rash and Other     Burning blisters    Other Unknown    Amiodarone Diarrhea    Baclofen Unknown    Carvedilol Other     fatigue    Cilostazol Unknown    Flecainide Unknown    Quinidine Unknown    Simvastatin Other     Extreme fatigue        Objective     Visit Vitals  Smoking Status Former        Physical Exam    PE:  General: Well-developed, well-nourished, no acute distress. The patient demonstrates no pain behavior, symptom magnification or overt drug-seeking behavior.  Eye: Pupils appropriate for room lighting  Neck/thyroid: No obvious goiter or enlargement of neck noted  Respiratory exam: Normal respiratory effort, unlabored respiration. No accessory muscle use noted  Cardiac exam: Bilateral radial pulses intact  Abdominal: Nondistended  Spine, lumbar: Unable to assess gait or ambulation.  Patient arrives in a wheelchair.  No Tenderness to paraspinous musculature is noted.    MSK: Swelling, tenderness and joint deformity noted to right middle finger.  Painful with any range of motion.  Neurologic exam: Muscle strength is antigravity in all 4 extremities.  Diminished sensation bilateral lower extremities from mid calf to feet distally.  Psychiatric exam: Judgment and insight normal, affect normal, speech is fluent, affect appropriate, demonstrating no signs of hypersomnolence, sedation, or confusion          Assessment/Plan   Problem List Items Addressed This Visit             ICD-10-CM    Chronic painful diabetic neuropathy (CMS/HCC) E11.40    Diabetic peripheral neuropathy  (CMS/[facility]) E11.42    Arthritis - Primary M19.90     89-year-old male with history of osteoarthritis of the MCP, PIP and DIP joint of his right middle finger.  Patient also has chronic neuropathy bilateral lower extremities and restless leg syndrome.  Previously tried duloxetine for neuropathic component of pain.  Patient discontinued this medication secondary to feeling it was ineffective.  He has failed multiple neuromodulators and over-the-counter ointments.  Unfortunately the patient's medical conditions limit the addition of many neuropathic medications.  Patient has a history of CKD and is followed closely by nephrology.  He does not want to retry gabapentin orally.  Unfortunately the patient did not receive relief with recent corticosteroid injection for right PIP joint.  Discussed low-dose opiate therapy and patient not interested at this time.   Patient also continues on a low-dose of clonazepam at bedtime to help with sleep which has been very effective.  Discussed the possibility of Qutenza treatments targeting diabetic peripheral neuropathy most bothersome to the patient's feet.  Provided written information for the patient and his wife to review.  Added NSAID compounding cream which the patient can utilize for polyarticular pain as well as neuropathic pain to lower extremities.  The patient and his wife will follow-up with our office as needed.  They may contact our office if they would like to proceed with Qutenza treatments targeting diabetic peripheral neuropathy.

## 2024-02-20 ENCOUNTER — ANTICOAGULATION - WARFARIN VISIT (OUTPATIENT)
Dept: PRIMARY CARE | Facility: CLINIC | Age: 89
End: 2024-02-20
Payer: MEDICARE

## 2024-02-27 ENCOUNTER — ANTICOAGULATION - WARFARIN VISIT (OUTPATIENT)
Dept: PRIMARY CARE | Facility: CLINIC | Age: 89
End: 2024-02-27
Payer: MEDICARE

## 2024-02-27 NOTE — PROGRESS NOTES
per Charanjit patient is to continue current dose and recheck in a week, left detailed vm on patients phone

## 2024-03-05 ENCOUNTER — ANTICOAGULATION - WARFARIN VISIT (OUTPATIENT)
Dept: PRIMARY CARE | Facility: CLINIC | Age: 89
End: 2024-03-05
Payer: MEDICARE

## 2024-03-07 ENCOUNTER — TELEPHONE (OUTPATIENT)
Dept: PRIMARY CARE | Facility: CLINIC | Age: 89
End: 2024-03-07
Payer: MEDICARE

## 2024-03-07 DIAGNOSIS — K21.9 GASTROESOPHAGEAL REFLUX DISEASE WITHOUT ESOPHAGITIS: ICD-10-CM

## 2024-03-07 RX ORDER — OMEPRAZOLE 40 MG/1
40 CAPSULE, DELAYED RELEASE ORAL
Qty: 30 CAPSULE | Refills: 11 | Status: SHIPPED | OUTPATIENT
Start: 2024-03-07 | End: 2025-03-07

## 2024-03-12 ENCOUNTER — ANTICOAGULATION - WARFARIN VISIT (OUTPATIENT)
Dept: PRIMARY CARE | Facility: CLINIC | Age: 89
End: 2024-03-12
Payer: MEDICARE

## 2024-03-19 ENCOUNTER — ANTICOAGULATION - WARFARIN VISIT (OUTPATIENT)
Dept: PRIMARY CARE | Facility: CLINIC | Age: 89
End: 2024-03-19
Payer: MEDICARE

## 2024-03-19 ENCOUNTER — HOSPITAL ENCOUNTER (OUTPATIENT)
Dept: CARDIOLOGY | Facility: HOSPITAL | Age: 89
Discharge: HOME | End: 2024-03-19
Payer: MEDICARE

## 2024-03-19 DIAGNOSIS — Z95.0 PRESENCE OF CARDIAC PACEMAKER: ICD-10-CM

## 2024-03-19 DIAGNOSIS — I44.2 ATRIOVENTRICULAR BLOCK, COMPLETE (MULTI): ICD-10-CM

## 2024-03-19 PROCEDURE — 93296 REM INTERROG EVL PM/IDS: CPT

## 2024-03-19 NOTE — PROGRESS NOTES
per praful patient is to double dose today then continue current the rest of the week and recheck next week, patients wife notified

## 2024-03-21 ENCOUNTER — OFFICE VISIT (OUTPATIENT)
Dept: VASCULAR SURGERY | Facility: CLINIC | Age: 89
End: 2024-03-21
Payer: MEDICARE

## 2024-03-21 VITALS
SYSTOLIC BLOOD PRESSURE: 126 MMHG | WEIGHT: 180 LBS | DIASTOLIC BLOOD PRESSURE: 70 MMHG | HEART RATE: 86 BPM | BODY MASS INDEX: 25.1 KG/M2

## 2024-03-21 DIAGNOSIS — I73.9 PAD (PERIPHERAL ARTERY DISEASE) (CMS-HCC): Primary | ICD-10-CM

## 2024-03-21 PROCEDURE — 1036F TOBACCO NON-USER: CPT | Performed by: SURGERY

## 2024-03-21 PROCEDURE — 3074F SYST BP LT 130 MM HG: CPT | Performed by: SURGERY

## 2024-03-21 PROCEDURE — 1159F MED LIST DOCD IN RCRD: CPT | Performed by: SURGERY

## 2024-03-21 PROCEDURE — 3078F DIAST BP <80 MM HG: CPT | Performed by: SURGERY

## 2024-03-21 PROCEDURE — 99213 OFFICE O/P EST LOW 20 MIN: CPT | Performed by: SURGERY

## 2024-03-21 PROCEDURE — 1160F RVW MEDS BY RX/DR IN RCRD: CPT | Performed by: SURGERY

## 2024-03-21 PROCEDURE — 1157F ADVNC CARE PLAN IN RCRD: CPT | Performed by: SURGERY

## 2024-03-21 NOTE — PROGRESS NOTES
Subjective   Patient ID: Michele Maya is a 89 y.o. male who presents for Follow-up (3 mo ).  HPI  Patient follow-up secondary to peripheral arterial disease  Still having short distance claudication  With prolonged standing he does have some calf tightness and achiness  No active ulcer sores  Feet stable no active discoloration noted.    Review of Systems  Review of Systems    Constitutional:  no generalized malaise overall feels well, energy levels intact, no complaints specifically noted  HEENT:  No blurry vision, no visual aides noted, no hearing loss no ear ache no nose bleeds noted, no dysphagia, no congestion otherwise no pertinent positives noted  Cardiovascular:  no palpitations, chest pain or heaviness noted, no leg swelling, no numbness or tingling in the lower extremity noted  Respiratory:  no shortness of breath, no productive cough noted, no conversation dyspnea or difficulty breathing  Gastrointestinal:  no abdominal pain, no nausea or vomiting, appetite intact, no bowel irregularities noted  Genitourinary:   no urinary incontinence, frequency or urgency issues noted, no hematuria or burning sensation issues  Musculoskeletal:  No muscle aches or pains, no joint discomfort noted, no back pain noted otherwise feels well  Skin: no ulcerations, skin color issues or wounds upper or lower extremities  Neurologic: no dizziness, no hemiplegia, no hemiparesis, no obvious visual deficits noted  Psychiatric: no depression, no memory loss noted, no suicidal ideation  Endocrine: no weight loss or gain, no temperature concerns hot or cold intolerance  Hemogolotic/Lymphatic: no bruising, excessive bleeding, no swelling in the groins or neck noted    Objective   Physical Exam  Physical exam    Constitutional: alert and in no acute distress verbal  Eyes: No erythema swelling or discharge noted  Neck: supple, symmetric, trachea midline, no masses noted  Cardiovascular: Carotid pulses 2+, no obvious bruit, no  Jugular distension noted, no thrill, heart regular rate, lower extremity vascular exam intact, cap refill <2 sec  Pulmonary:  Bilateral breath sounds intact, clear with rales rhonchi or wheeze  Abdomen: soft non tender, no pulsatile masses noted, no rebound rigidity or guarding noted  Skin: intact warm no abnormal turgor  Psychiatric: alert without any obvious cognitive issues, oriented to person, place, and time    Assessment/Plan   Peripheral arterial disease  Multisegment vascular occlusive disease based on noninvasive vascular testing however nonocclusive vessels noted  Patient's renal function actually got worse over the last 3 months  We did discuss possibility of renal failure if contrast was utilized  Made possible recommendations to get CO2 angiography  Will see him back in 3 months and he wants to consider this as an outpatient.         Richard Burleson DO 03/21/24 2:35 PM

## 2024-03-27 ENCOUNTER — ANTICOAGULATION - WARFARIN VISIT (OUTPATIENT)
Dept: PRIMARY CARE | Facility: CLINIC | Age: 89
End: 2024-03-27
Payer: MEDICARE

## 2024-03-27 NOTE — PROGRESS NOTES
per praful patient is to double dose today continue current tomorrow and recheck next week, patients wife notified

## 2024-04-03 ENCOUNTER — ANTICOAGULATION - WARFARIN VISIT (OUTPATIENT)
Dept: PRIMARY CARE | Facility: CLINIC | Age: 89
End: 2024-04-03
Payer: MEDICARE

## 2024-04-08 ENCOUNTER — TELEPHONE (OUTPATIENT)
Dept: PAIN MEDICINE | Facility: HOSPITAL | Age: 89
End: 2024-04-08
Payer: MEDICARE

## 2024-04-08 ENCOUNTER — TELEPHONE (OUTPATIENT)
Dept: CARDIOLOGY | Facility: CLINIC | Age: 89
End: 2024-04-08
Payer: MEDICARE

## 2024-04-08 DIAGNOSIS — I50.42 CHRONIC COMBINED SYSTOLIC AND DIASTOLIC HEART FAILURE, NYHA CLASS 2 (MULTI): Primary | ICD-10-CM

## 2024-04-08 NOTE — TELEPHONE ENCOUNTER
4/8/24  1014  Called and spoke to wife of patient; informed of pushing up appt and for patient to have lab work done before appt.    Wife verbalized understanding.      Task sent to Alexandra for scheduling.    ----- Message from Rod Milner DO sent at 4/8/2024  9:43 AM EDT -----  Regarding: RE: MedicationInformation.  Yes, patient also needs to follow low-sodium heart healthy diet.  Have patient check BMP/BNP/magnesium a day prior to appointment.  ----- Message -----  From: Ta Dickerson RN  Sent: 4/8/2024   9:33 AM EDT  To: Rod Milner DO  Subject: FW: MedicationInformation.                       I called his wife and spoke to her. I don't see any meds he is on that would make his swelling worse.  His swelling is now above his ankles. No focal tenderness or warmth and is bilateral. He isn't due to be seen until aug.  Would you like him to be seen earlier?  ----- Message -----  From: Dina Carty  Sent: 4/8/2024   9:04 AM EDT  To: Ta Dickerson, RN  Subject: MedicationInformation.                            Patients wife said farhad' feet are swelling and she thinks it could be a side effect of one of his medications. Can you call her at [phone]. Thank You.

## 2024-04-08 NOTE — TELEPHONE ENCOUNTER
4/8/24  Reviewed patient's chart before calling wife of patient.   Called and spoke with wife of patient.  Wife reports swelling is above his ankles now which is has not been before.  Wife denies any focal tenderness, redness or warmth, that he does elevate his LE but it does not seem to help with swelling.      Informed wife of patient nurse would check into having August appt with Dr. Milner moved up.    Message sent to Dr. Milner about patient; waiting for response.       ----- Message from Dina Carty sent at 4/8/2024  9:02 AM EDT -----  Regarding: MedicationInformation.   Patients wife said farhad' feet are swelling and she thinks it could be a side effect of one of his medications. Can you call her at [phone]. Thank You.

## 2024-04-08 NOTE — TELEPHONE ENCOUNTER
Called pt's wife back and answered her questions regarding Qutenza.  She sts they will consider this and call back.  Shayla Pleitez RN

## 2024-04-09 ENCOUNTER — ANTICOAGULATION - WARFARIN VISIT (OUTPATIENT)
Dept: PRIMARY CARE | Facility: CLINIC | Age: 89
End: 2024-04-09
Payer: MEDICARE

## 2024-04-09 NOTE — PROGRESS NOTES
per Charanjit patient is to double dose today continue current dose tomorrow then recheck in a week, patients wife notified

## 2024-04-16 ENCOUNTER — ANTICOAGULATION - WARFARIN VISIT (OUTPATIENT)
Dept: PRIMARY CARE | Facility: CLINIC | Age: 89
End: 2024-04-16
Payer: MEDICARE

## 2024-04-20 ENCOUNTER — LAB (OUTPATIENT)
Dept: LAB | Facility: LAB | Age: 89
End: 2024-04-20
Payer: MEDICARE

## 2024-04-20 DIAGNOSIS — I50.42 CHRONIC COMBINED SYSTOLIC AND DIASTOLIC HEART FAILURE, NYHA CLASS 2 (MULTI): ICD-10-CM

## 2024-04-20 LAB
ANION GAP SERPL CALC-SCNC: 15 MMOL/L (ref 10–20)
BNP SERPL-MCNC: 76 PG/ML (ref 0–99)
BUN SERPL-MCNC: 50 MG/DL (ref 6–23)
CALCIUM SERPL-MCNC: 9.3 MG/DL (ref 8.6–10.3)
CHLORIDE SERPL-SCNC: 105 MMOL/L (ref 98–107)
CO2 SERPL-SCNC: 26 MMOL/L (ref 21–32)
CREAT SERPL-MCNC: 1.88 MG/DL (ref 0.5–1.3)
EGFRCR SERPLBLD CKD-EPI 2021: 34 ML/MIN/1.73M*2
GLUCOSE SERPL-MCNC: 161 MG/DL (ref 74–99)
MAGNESIUM SERPL-MCNC: 1.86 MG/DL (ref 1.6–2.4)
POTASSIUM SERPL-SCNC: 4.5 MMOL/L (ref 3.5–5.3)
SODIUM SERPL-SCNC: 141 MMOL/L (ref 136–145)

## 2024-04-20 PROCEDURE — 83880 ASSAY OF NATRIURETIC PEPTIDE: CPT

## 2024-04-20 PROCEDURE — 83735 ASSAY OF MAGNESIUM: CPT

## 2024-04-20 PROCEDURE — 80048 BASIC METABOLIC PNL TOTAL CA: CPT

## 2024-04-20 PROCEDURE — 36415 COLL VENOUS BLD VENIPUNCTURE: CPT

## 2024-04-21 PROBLEM — I50.30 (HFPEF) HEART FAILURE WITH PRESERVED EJECTION FRACTION (MULTI): Status: ACTIVE | Noted: 2024-04-21

## 2024-04-21 NOTE — PROGRESS NOTES
[facility] V[facility] Cardiology    Patient Name: Michele Maya  Patient : 1933    Scribe Attestation  By signing my name below, I, Carolyn Mcnamara  , Sheltonibquin   attest that this documentation has been prepared under the direction and in the presence of Rod Milner DO.      Reason for visit:  This is a 89-year-old male here for follow-up regarding persistent atrial fibrillation/pacemaker, anticoagulation with warfarin, HFpEF/history of cardiomyopathy, coronary artery disease as seen on cardiac catheterization done in 2019, hypertension, dyslipidemia, diabetes mellitus, and CKD.    HPI:  This is a 89-year-old male here for follow-up regarding persistent atrial fibrillation/pacemaker, anticoagulation with warfarin, HFpEF/history of cardiomyopathy, coronary artery disease as seen on cardiac catheterization done in 2019, hypertension, dyslipidemia, diabetes mellitus, and CKD.  The patient was last evaluated by me on 2024.  At that visit I increased atorvastatin to 20 mg daily, ordered blood work including BMP/CBC/magnesium to be drawn in 6 months, and asked the patient to follow-up in 6 months and sooner if necessary.  Patient was subsequently seen by Vascular Surgery in 2024 at which time CO2 angiography was recommended for his PAD.  Patient subsequently called the cardiology nursing line on 2024 where he reported worsening lower extremity edema.  Patient was subsequently scheduled a sooner follow-up appointment and blood work including BMP/BNP/magnesium were ordered to be done prior to the appointment. BMP done on 24 showed normal serum sodium and potassium with a serum creatinine of 1.88. Serum magnesium was 1.86 and BNP was 76.  ECG done today showed ventricularly paced rhythm with a heart rate of 67 bpm. The patient reports worsening lower extremity edema but denies any associated chest pain, worsening shortness of breath or palpitations. He  states that he takes all of his medications as prescribed. During my exam, he was resting comfortably on the exam table.            Assessment/Plan:   1. Persistent atrial fibrillation/pacemaker  The patient has a history of atrial fibrillation and underwent AV node ablation and pacemaker implant done in 1996. Patient subsequently developed cardiomyopathy and had an ICD implanted but as his LV function improved it was transitioned to a CRT-D device.  The patient is on warfarin for thromboembolism prophylaxis and is being managed by his PCP.  ECG done today showed ventricularly paced rhythm with a heart rate of 67 bpm.   He denies chest pain, palpitations or lightheadedness.   Recent lab works as noted in the HPI.  Lab works will be done in 1 week.  He should continue to follow with the device clinic.  Follow up in 1 month and sooner if necessary.      2. Anticoagulation with warfarin  The patient is on warfarin for persistent atrial fibrillation and is being managed by his PCP.  Recent lab works as noted in the HPI.   Lab works will be done in 1 week.    3. HFpEF/History of cardiomyopathy  The patient has HFpEF/history of cardiomyopathy which improved after placement of CRT-D device.  Echocardiogram done in October 2021 showed normal left ventricular systolic function, normal right ventricular systolic function, no significant valve abnormalities.   He does have 1+ pitting bilateral lower extremity edema on exam today.  I will discontinue furosemide and start torsemide 40 mg daily for 3 days then reduce to 20 mg daily.  He should continue his other cardiac medications.  Recent lab works as noted in the HPI.   Lab works will be done in 1 week.  I discussed with him the importance of following a low-sodium heart healthy diet, wearing compression stockings and elevating legs when seated.   Follow up in 1 month and sooner if necessary.      4. Coronary artery disease  The patient has a history of moderate coronary  artery disease as seen on cardiac catheterization done in December 2019.  ECG done today showed ventricularly paced rhythm with a heart rate of 67 bpm.   He denies anginal chest discomfort.   Blood pressure appears controlled on exam today.  He should continue his current antihypertensive medications.  Echocardiogram done in October 2021 showed normal left ventricular systolic function, normal right ventricular systolic function, no significant valve abnormalities.   Recent lab works as noted in the HPI.   Lipid panel done in January 2024 showed an LDL cholesterol of 94 and triglycerides of 124 while on atorvastatin 10 mg daily.   He is currently on atorvastatin to 20 mg daily.   Lab works will be done in 1 week.  Please see lifestyle recommendations below.  Follow up in 1 month and sooner if necessary.      5. Hypertension  The patient has a history of hypertension which appears controlled on exam today.  He should continue his current antihypertensive medications.      6. Dyslipidemia  Lipid panel done in January 2024 showed an LDL cholesterol of 94 and triglycerides of 124 while on atorvastatin 10 mg daily.   He is currently on atorvastatin to 20 mg daily.   Please see lifestyle recommendations below.     7. Diabetes mellitus  Stable, medication management per PCP.     8. CKD   Serum creatinine done on 4/20/24 was 1.88 consistent with known CKD.  Management as per PCP.        Orders:  Discontinue furosemide and start torsemide 40 mg daily for 3 days then reduce to 20 mg daily,   BMP/BNP/magnesium in 1 week  Follow-up in 1 month.    Lifestyle Recommendations  I recommend a whole-food plant-based diet, an eating pattern that encourages the consumption of unrefined plant foods (such as fruits, vegetables, tubers, whole grains, legumes, nuts and seeds) and discourages meats, dairy products, eggs and processed foods.     The AHA/ACC recommends that the patient consume a dietary pattern that emphasizes intake of  vegetables, fruits, and whole grains; includes low-fat dairy products, poultry, fish, legumes, non-tropical vegetable oils, and nuts; and limits intake of sodium, sweets, sugar-sweetened beverages, and red meats.  Adapt this dietary pattern to appropriate calorie requirements (a 500-750 kcal/day deficit to loose weight), personal and cultural food preferences, and nutrition therapy for other medical conditions (including diabetes).  Achieve this pattern by following plans such as the [place] Mediterranean, DASH dietary pattern, or AHA diet.     Engage in 2 hours and 30 minutes per week of moderate-intensity physical activity, or 1 hour and 15 minutes (75 minutes) per week of vigorous-intensity aerobic physical activity, or an equivalent combination of moderate and vigorous-intensity aerobic physical activity. Aerobic activity should be performed in episodes of at least 10 minutes preferably spread throughout the week.     Adhering to a heart healthy diet, regular exercise habits, avoidance of tobacco products, and maintenance of a healthy weight are crucial components of their heart disease risk reduction.     Any positive review of systems not specifically addressed in the office visit today should be evaluated and treated by the patients primary care physician or in an emergency department if necessary     Patient was notified that results from ordered tests will be called to the patient if it changes current management; it will otherwise be discussed at a future appointment and available on [facility].     Thank you for allowing me to participate in the care of this patient.        This document was generated using the assistance of voice recognition software. If there are any errors of spelling, grammar, syntax, or meaning; please feel free to contact me directly for clarification.    Past Medical History:  He has a past medical history of Encounter for screening for malignant neoplasm of prostate (06/15/2016),  Long term (current) use of anticoagulants (06/15/2016), Personal history of other diseases of the circulatory system, Personal history of other diseases of the respiratory system (11/01/2019), Personal history of other endocrine, nutritional and metabolic disease, Personal history of transient ischemic attack (TIA), and cerebral infarction without residual deficits, and Unspecified atrial fibrillation (Multi) (10/31/2022).    Past Surgical History:  He has a past surgical history that includes Carpal tunnel release (02/15/2016); Cataract extraction (02/15/2016); Hand surgery (02/15/2016); Total knee arthroplasty (05/26/2016); Foot surgery (06/15/2016); Cardiac pacemaker placement (06/08/2017); Cardiac pacemaker placement (06/08/2017); Other surgical history (06/08/2017); Other surgical history (06/08/2017); and Other surgical history (06/08/2017).      Social History:  He reports that he has quit smoking. His smoking use included cigarettes. He has been exposed to tobacco smoke. He has never used smokeless tobacco. He reports that he does not drink alcohol and does not use drugs.    Family History:  Family History   Problem Relation Name Age of Onset    Heart failure Mother      Diabetes Mother          mellitus    Colon cancer Father      Diabetes Sister          mellitus    Coronary artery disease Brother          Allergies:  Pentoxifylline, Other, Amiodarone, Baclofen, Carvedilol, Cilostazol, Flecainide, Quinidine, and Simvastatin    Outpatient Medications:  Current Outpatient Medications   Medication Instructions    atorvastatin (LIPITOR) 20 mg, oral, Daily    blood sugar diagnostic (OneTouch Ultra Test) strip 1 strip, injection, 2 times daily    budesonide-glycopyr-formoterol (Breztri Aerosphere) 160-9-4.8 mcg/actuation HFA aerosol inhaler Breztri Aerosphere 160-9-4.8 MCG/ACT Inhalation Aerosol   Refills: 0        Start : 31-Oct-2022   Active  5.9 GM Inhaler    clonazePAM (KlonoPIN) 0.5 mg tablet TAKE 1.5  "TABLET at Bedtime    finasteride (PROSCAR) 5 mg, oral, Daily, Do not crush, chew, or split.    FreeStyle glucose monitoring kit 1 each, miscellaneous, As needed    furosemide (LASIX) 40 mg, oral, Daily    glipiZIDE XL (GLUCOTROL XL) 5 mg, oral, Daily    hydrocortisone-iodoquinoL (Dermazene) 1-1 % cream cream APPLY TOPICALLY TWICE DAILY AS NEEDED TO GROIN AREA    ketoconazole (NIZOral) 2 % cream APPLY TWICE DAILY TO GROIN    lancets (OneTouch UltraSoft Lancets) misc test twice a day    lisinopril 5 mg, oral, Daily, As directed    metoprolol succinate XL (TOPROL-XL) 25 mg, oral, Daily, Do not crush or chew.    mv-min/FA/vit K/lutein/zeaxant (PRESERVISION AREDS 2 PLUS MV ORAL) oral    omeprazole (PRILOSEC) 40 mg, oral, Daily before breakfast    OXcarbazepine (Trileptal) 150 mg tablet Take 150 mg every morning and 300 mg every evening    tamsulosin (FLOMAX) 0.4 mg, oral, Daily, Daily before bed    vitamin B complex/folic acid (B COMPLEX 100 ORAL) as directed Orally    warfarin (COUMADIN) 7.5 mg, oral, Every evening        ROS:  A 14 point review of systems was done and is negative other than as stated in HPI    Vitals:      11/28/2023     1:11 PM 1/3/2024    11:39 AM 1/26/2024    11:38 AM 1/26/2024    12:12 PM 2/7/2024     3:59 PM 2/15/2024    12:22 PM 3/21/2024     1:11 PM   Vitals   Systolic 145 113 117 98 140 109 126   Diastolic 81 64 57 86 68 64 70   Heart Rate 67 73 62 63 67 68 86   Temp   36.6 °C (97.9 °F)       Resp   16 16  16    Height (in)     1.803 m (5' 11\") 1.803 m (5' 11\")    Weight (lb) 183    180 180 180   BMI 25.52 kg/m2    25.1 kg/m2 25.1 kg/m2 25.1 kg/m2   BSA (m2) 2.04 m2    2.02 m2 2.02 m2 2.02 m2        Physical Exam:   Constitutional: Cooperative, in no acute distress, alert, appears stated age.  Skin: Skin color, texture, turgor normal. No rashes or lesions.  Head: Normocephalic. No masses, lesions, tenderness or abnormalities  Eyes: Extraocular movements are grossly intact.  Mouth and throat: " Mucous membranes moist  Neck: Neck supple, no carotid bruits, no JVD  Respiratory: Lungs clear to auscultation, no wheezing or rhonchi, no use of accessory muscles  Chest wall: PPM, normal excursion with respiration  Cardiovascular: Regular rhythm without murmur  Gastrointestinal: Abdomen soft, nontender. Bowel sounds normal.  Musculoskeletal: Strength equal in upper extremities  Extremities:  1+ pitting edema  Neurologic: Sensation grossly intact, alert and oriented x3    Intake/Output:   No intake/output data recorded.    Outpatient Medications  Current Outpatient Medications on File Prior to Visit   Medication Sig Dispense Refill    atorvastatin (Lipitor) 20 mg tablet Take 1 tablet (20 mg) by mouth once daily. 90 tablet 1    blood sugar diagnostic (OneTouch Ultra Test) strip Inject 1 strip as directed 2 times a day. 90 strip 2    budesonide-glycopyr-formoterol (Breztri Aerosphere) 160-9-4.8 mcg/actuation HFA aerosol inhaler Breztri Aerosphere 160-9-4.8 MCG/ACT Inhalation Aerosol   Refills: 0        Start : 31-Oct-2022   Active  5.9 GM Inhaler      clonazePAM (KlonoPIN) 0.5 mg tablet TAKE 1.5 TABLET at Bedtime 45 tablet 2    finasteride (Proscar) 5 mg tablet Take 1 tablet (5 mg) by mouth once daily. Do not crush, chew, or split. 30 tablet 2    FreeStyle glucose monitoring kit 1 each if needed.      furosemide (Lasix) 20 mg tablet Take 2 tablets (40 mg) by mouth once daily. 90 tablet 2    glipiZIDE XL (Glucotrol XL) 2.5 mg 24 hr tablet Take 2 tablets (5 mg) by mouth once daily. 60 tablet 11    hydrocortisone-iodoquinoL (Dermazene) 1-1 % cream cream APPLY TOPICALLY TWICE DAILY AS NEEDED TO GROIN AREA      ketoconazole (NIZOral) 2 % cream APPLY TWICE DAILY TO GROIN      lancets (OneTouch UltraSoft Lancets) misc test twice a day 90 each 2    lisinopril 5 mg tablet Take 1 tablet (5 mg) by mouth once daily. As directed 90 tablet 1    metoprolol succinate XL (Toprol-XL) 25 mg 24 hr tablet Take 1 tablet (25 mg) by mouth  once daily. Do not crush or chew. 90 tablet 3    mv-min/FA/vit K/lutein/zeaxant (PRESERVISION AREDS 2 PLUS MV ORAL) Take by mouth.      omeprazole (PriLOSEC) 40 mg DR capsule Take 1 capsule (40 mg) by mouth once daily in the morning. Take before meals. 30 capsule 11    OXcarbazepine (Trileptal) 150 mg tablet Take 150 mg every morning and 300 mg every evening 270 tablet 1    tamsulosin (Flomax) 0.4 mg 24 hr capsule Take 1 capsule (0.4 mg) by mouth once daily. Daily before bed 90 capsule 3    vitamin B complex/folic acid (B COMPLEX 100 ORAL) as directed Orally      warfarin (Coumadin) 5 mg tablet Take 1.5 tablets (7.5 mg) by mouth once daily in the evening. 180 tablet 2     No current facility-administered medications on file prior to visit.       Labs: (past 26 weeks)  Recent Results (from the past 4368 hour(s))   Protime-INR    Collection Time: 10/24/23 12:00 AM   Result Value Ref Range    Protime External  seconds    INR External 2.80    Basic Metabolic Panel    Collection Time: 10/26/23 10:46 AM   Result Value Ref Range    Glucose 161 (H) 74 - 99 mg/dL    Sodium 139 136 - 145 mmol/L    Potassium 4.2 3.5 - 5.3 mmol/L    Chloride 104 98 - 107 mmol/L    Bicarbonate 24 21 - 32 mmol/L    Anion Gap 15 10 - 20 mmol/L    Urea Nitrogen 39 (H) 6 - 23 mg/dL    Creatinine 1.51 (H) 0.50 - 1.30 mg/dL    eGFR 44 (L) >60 mL/min/1.73m*2    Calcium 9.0 8.6 - 10.3 mg/dL   Protime-INR    Collection Time: 10/31/23 12:00 AM   Result Value Ref Range    Protime External  seconds    INR External 2.00    Protime-INR    Collection Time: 11/21/23 12:00 AM   Result Value Ref Range    Protime External  seconds    INR External 1.70    Protime-INR    Collection Time: 11/28/23 12:00 AM   Result Value Ref Range    Protime External  seconds    INR External 1.90    Protime-INR    Collection Time: 12/05/23 12:00 AM   Result Value Ref Range    Protime External  seconds    INR External 1.90    Protime-INR    Collection Time: 12/13/23 12:00 AM   Result  Value Ref Range    Protime External  seconds    INR External 1.80    Protime-INR    Collection Time: 12/19/23 12:00 AM   Result Value Ref Range    Protime External  seconds    INR External 2.30    Protime-INR    Collection Time: 01/02/24 12:00 AM   Result Value Ref Range    Protime External  seconds    INR External 2.70    Protime-INR    Collection Time: 01/16/24 12:00 AM   Result Value Ref Range    Protime External  seconds    INR External 1.70    Protime-INR    Collection Time: 01/30/24 12:00 AM   Result Value Ref Range    Protime External  seconds    INR External 2.10    B-Type Natriuretic Peptide    Collection Time: 01/31/24  9:30 AM   Result Value Ref Range    BNP 52 0 - 99 pg/mL   CBC    Collection Time: 01/31/24  9:30 AM   Result Value Ref Range    WBC 8.3 4.4 - 11.3 x10*3/uL    nRBC 0.0 0.0 - 0.0 /100 WBCs    RBC 4.44 (L) 4.50 - 5.90 x10*6/uL    Hemoglobin 14.3 13.5 - 17.5 g/dL    Hematocrit 43.9 41.0 - 52.0 %    MCV 99 80 - 100 fL    MCH 32.2 26.0 - 34.0 pg    MCHC 32.6 32.0 - 36.0 g/dL    RDW 13.3 11.5 - 14.5 %    Platelets 194 150 - 450 x10*3/uL   Comprehensive Metabolic Panel    Collection Time: 01/31/24  9:30 AM   Result Value Ref Range    Glucose 159 (H) 74 - 99 mg/dL    Sodium 140 136 - 145 mmol/L    Potassium 4.8 3.5 - 5.3 mmol/L    Chloride 104 98 - 107 mmol/L    Bicarbonate 27 21 - 32 mmol/L    Anion Gap 14 10 - 20 mmol/L    Urea Nitrogen 50 (H) 6 - 23 mg/dL    Creatinine 2.05 (H) 0.50 - 1.30 mg/dL    eGFR 30 (L) >60 mL/min/1.73m*2    Calcium 9.3 8.6 - 10.3 mg/dL    Albumin 4.2 3.4 - 5.0 g/dL    Alkaline Phosphatase 71 33 - 136 U/L    Total Protein 7.2 6.4 - 8.2 g/dL    AST 22 9 - 39 U/L    Bilirubin, Total 0.5 0.0 - 1.2 mg/dL    ALT 24 10 - 52 U/L   Lipid Panel    Collection Time: 01/31/24  9:30 AM   Result Value Ref Range    Cholesterol 152 0 - 199 mg/dL    HDL-Cholesterol 33.5 mg/dL    Cholesterol/HDL Ratio 4.5     LDL Calculated 94 <=99 mg/dL    VLDL 25 0 - 40 mg/dL    Triglycerides 124 0 - 149  mg/dL    Non HDL Cholesterol 119 0 - 149 mg/dL   Magnesium    Collection Time: 01/31/24  9:30 AM   Result Value Ref Range    Magnesium 1.93 1.60 - 2.40 mg/dL   Protime-INR    Collection Time: 02/06/24 12:00 AM   Result Value Ref Range    Protime External  seconds    INR External 1.80    Protime-INR    Collection Time: 02/13/24 12:00 AM   Result Value Ref Range    Protime External  seconds    INR External 2.30    Protime-INR    Collection Time: 02/20/24 12:00 AM   Result Value Ref Range    Protime External  seconds    INR External 2.30    Protime-INR    Collection Time: 02/27/24 12:00 AM   Result Value Ref Range    Protime External  seconds    INR External 2.30    Protime-INR    Collection Time: 03/05/24 12:00 AM   Result Value Ref Range    Protime External  seconds    INR External 2.10    Protime-INR    Collection Time: 03/12/24 12:00 AM   Result Value Ref Range    Protime External  seconds    INR External 2.10    Protime-INR    Collection Time: 03/19/24 12:00 AM   Result Value Ref Range    Protime External  seconds    INR External 1.80    Protime-INR    Collection Time: 03/26/24 12:00 AM   Result Value Ref Range    Protime External  seconds    INR External 1.90    Protime-INR    Collection Time: 04/02/24 12:00 AM   Result Value Ref Range    Protime External  seconds    INR External 2.40    Protime-INR    Collection Time: 04/09/24 12:00 AM   Result Value Ref Range    Protime External  seconds    INR External 1.80    Protime-INR    Collection Time: 04/16/24 12:00 AM   Result Value Ref Range    Protime External  seconds    INR External 2.00    Basic metabolic panel    Collection Time: 04/20/24  8:56 AM   Result Value Ref Range    Glucose 161 (H) 74 - 99 mg/dL    Sodium 141 136 - 145 mmol/L    Potassium 4.5 3.5 - 5.3 mmol/L    Chloride 105 98 - 107 mmol/L    Bicarbonate 26 21 - 32 mmol/L    Anion Gap 15 10 - 20 mmol/L    Urea Nitrogen 50 (H) 6 - 23 mg/dL    Creatinine 1.88 (H) 0.50 - 1.30 mg/dL    eGFR 34 (L) >60  mL/min/1.73m*2    Calcium 9.3 8.6 - 10.3 mg/dL   B-Type Natriuretic Peptide    Collection Time: 04/20/24  8:56 AM   Result Value Ref Range    BNP 76 0 - 99 pg/mL   Magnesium    Collection Time: 04/20/24  8:56 AM   Result Value Ref Range    Magnesium 1.86 1.60 - 2.40 mg/dL       ECG  Encounter Date: 02/07/24   ECG 12 Lead    Narrative    Ventricularly paced rhythm heart rate 67 bpm.       Echocardiogram  No results found for this or any previous visit from the past 1095 days.      CV Studies:  EKG:  Encounter Date: 02/07/24   ECG 12 Lead    Narrative    Ventricularly paced rhythm heart rate 67 bpm.     Echocardiogram:   [place]ocardi[place] [address]  Phone [phone] Fax [phone]    TRANSTHORACIC ECHOCARDIOGRAM REPORT      Patient Name:     LESIA PEÑA Reading Physician:   [id number] Sudeep Blackwood MD  Study Date:       10/29/2021         Referring Physician: [id number] MELENDEZ  MRN/PID:          [id number]           PCP:  Accession/Order#: [id number]       Department Location: [facility] Echo Lab  YOB: 1933          Fellow:  Gender:           M                  Nurse:  Admit Date:       10/29/2021         Sonographer:         Raven Elias RDCS  Admission Status: Outpatient         Additional Staff:  Height:           180.34 cm          CC Report to:  Weight:           79.83 kg           Study Type:          Echocardiogram  BSA:              2.00 m2  Blood Pressure: 166 /102 mmHg    Diagnosis/ICD: R06.02-Shortness of breath  Indication:    Dyspnea on Exertion  Procedure/CPT: Echo Complete w Full [id number]    Patient History:  Pacer/Defib:       Permanent pacemaker  Pertinent History: Dyspnea.    Study Detail: The following Echo studies were performed: 2D, M-Mode, Doppler and  color flow. Technically challenging study due to prominent lung  artifact.      PHYSICIAN INTERPRETATION:  Left Ventricle: The left ventricular  systolic function is normal. There are no regional wall motion abnormalities. The left ventricular cavity size is normal. There is mild left ventricular hypertrophy. Abnormal (paradoxical) septal motion, consistent with RV pacemaker. Spectral Doppler shows an impaired relaxation pattern of left ventricular diastolic filling.  Left Atrium: The left atrium is normal in size.  Right Ventricle: The right ventricle is normal in size. There is normal right ventricular global systolic function. A device is visualized in the right ventricle.  Right Atrium: The right atrium is normal in size.  Aortic Valve: The aortic valve appears structurally normal. There is no evidence of aortic valve regurgitation. The peak instantaneous gradient of the aortic valve is 11.7 mmHg. The mean gradient of the aortic valve is 6.0 mmHg.  Mitral Valve: The mitral valve is normal in structure. There is no evidence of mitral valve regurgitation.  Tricuspid Valve: The tricuspid valve is structurally normal. There is trace tricuspid regurgitation.  Pulmonic Valve: The pulmonic valve is structurally normal. There is no indication of pulmonic valve regurgitation.  Pericardium: There is no pericardial effusion noted.  Aorta: The aortic root is normal.      CONCLUSIONS:  1. The left ventricular systolic function is normal.  2. Abnormal septal motion consistent with RV pacemaker.  3. Spectral Doppler shows an impaired relaxation pattern of left ventricular diastolic filling.    QUANTITATIVE DATA SUMMARY:  2D MEASUREMENTS:  Normal Ranges:  Ao Root d:     3.40 cm    (2.0-3.7cm)  IVSd:          1.25 cm    (0.6-1.1cm)  LVPWd:         1.33 cm    (0.6-1.1cm)  LVIDd:         4.47 cm    (3.9-5.9cm)  LVIDs:         3.56 cm  LV Mass Index: 109.1 g/m2  LV % FS        20.4 %    LA VOLUME:  Normal Ranges:  LA Vol A4C:        51.1 ml    (22+/-6mL/m2)  LA Vol A2C:        56.7 ml  LA Vol BP:         53.8 ml  LA Vol Index A4C:  25.6ml/m2  LA Vol Index A2C:  28.4  ml/m2  LA Vol Index BP:   27.0 ml/m2  LA Area A4C:       19.0 cm2  LA Area A2C:       20.0 cm2  LA Major Axis A4C: 6.0 cm  LA Major Axis A2C: 6.0 cm  LA Volume Index:   27.0 ml/m2    RA VOLUME BY A/L METHOD:  Normal Ranges:  RA Area A4C: 17.0 cm2    M-MODE MEASUREMENTS:  Normal Ranges:  Ao Root: 3.30 cm (2.0-3.7cm)  AoV Exc: 1.90 cm (1.5-2.5cm)  LAs:     2.80 cm (2.7-4.0cm)    AORTA MEASUREMENTS:  Normal Ranges:  AoV Exc:   1.90 cm (1.5-2.5cm)  Asc Ao, d: 3.30 cm (2.1-3.4cm)    LV SYSTOLIC FUNCTION BY 2D PLANIMETRY (MOD):  Normal Ranges:  EF-A4C View: 61.7 % (>55%)  EF-A2C View: 62.5 %  EF-Biplane:  61.1 %    LV DIASTOLIC FUNCTION:  Normal Ranges:  MV Peak E:    0.81 m/s   (0.7-1.2 m/s)  MV Peak A:    0.44 m/s   (0.42-0.7 m/s)  E/A Ratio:    1.86       (1.0-2.2)  MV e'         0.10 m/s   (>8.0)  MV lateral e' 0.10 m/s  MV medial e'  0.09 m/s  MV A Dur:     95.00 msec  E/e' Ratio:   8.57       (<8.0)  LV IVRT:      100 msec   (<100ms)    MITRAL VALVE:  Normal Ranges:  MV DT: 135 msec (150-240msec)    AORTIC VALVE:  Normal Ranges:  AoV Vmax:                1.71 m/s  (<1.7m/s)  AoV Peak P.7 mmHg (<20mmHg)  AoV Mean P.0 mmHg  (1.7-11.5mmHg)  LVOT Max King:            0.84 m/s  (<1.1m/s)  AoV VTI:                 33.00 cm  (18-25cm)  LVOT VTI:                17.80 cm  LVOT Diameter:           2.00 cm   (1.8-2.4cm)  AoV Area, VTI:           1.69 cm2  (2.5-5.5cm2)  AoV Area,Vmax:           1.54 cm2  (2.5-4.5cm2)  AoV Area, planim:        2.23 cm2  (2.5-4.5cm2)  AoV Dimensionless Index: 0.54    RIGHT VENTRICLE:  RV 1   3.5 cm  RV 2   3.1 cm  RV 3   6.0 cm  TAPSE: 16.1 mm  RV s'  0.13 m/s    TRICUSPID VALVE/RVSP:  Normal Ranges:  Peak TR Velocity: 2.70 m/s  RV Syst Pressure: 32.2 mmHg (< 30mmHg)  IVC Diam:         1.45 cm      [id number] Sudeep Blackwood MD  Electronically signed on 10/30/2021 at 10:29:19 AM         Final     Stress Testing IMGRESULT(SPH0090:1:1825):   NM CARDIAC STRESS REST (MYOCARDIAL  PERFUSION MIBI) 10/29/2021    Narrative  Addendum Begins  MRN: [id number]  Patient Name: LESIA PEÑA    ADDENDUM:  Please disregard the prior report as it was signed in error.    STUDY:  CARDIAC STRESS/REST INJECTION; CARDIAC STRESS/REST (MYOCARDIAL  PERFUSION/MIBI); PART 2 STRESS OR REST (NO CHARGE); 10/29/2021 10:36  am    INDICATION:  SOBOE  R06.02: SOBOE (shortness of breath on exertion).    COMPARISON:  None.    ACCESSION NUMBER(S):  [id number]; [id number]; [id number]    ORDERING CLINICIAN:  GRISELDA MELENDEZ    TECHNIQUE:  DIVISION OF NUCLEAR MEDICINE  PHARMACOLOGIC STRESS MYOCARDIAL PERFUSION SCAN, ONE DAY PROTOCOL    The patient received an intravenous dose of 11.6 mCi of Tc-99m  tetrofosmin and resting emission tomographic (SPECT) images of the  myocardium were acquired. The patient then received an intravenous  infusion of 0.4mg regadenoson (Lexiscan) followed by an additional  dose of 35.4 mCi of Tc-99m tetrofosmin. Stress phase SPECT images of  the myocardium were then acquired. These included ECG-gated images to  assess and quantify ventricular function.    FINDINGS:  Stress and rest images both demonstrate a normal distribution of  perfusion throughout all LV segments with no sign of ischemia.    ECG-gated images demonstrate normal LV size and myocardial  contractility with an LV ejection fraction of  63 % (normal above 50  percent).    Impression  1. Normal stress myocardial perfusion imaging in response to  pharmacologic stress.  2. Well-maintained left ventricular function.    Addendum Ends  MRN: [id number]  Patient Name: LESIA PEÑA    STUDY:  CARDIAC STRESS/REST INJECTION; PART 2 STRESS OR REST (NO CHARGE);  CARDIAC STRESS/REST (MYOCARDIAL PERFUSION/MIBI);  10/29/2021 10:36 am    INDICATION:  SOBOE  R06.02: SOBOE (shortness of breath on exertion).    COMPARISON:  None.    ACCESSION NUMBER(S):  [id number]; [id number]; [id number]    ORDERING CLINICIAN:  GRISELDA MELENDEZ    TECHNIQUE:  DIVISION OF NUCLEAR  MEDICINE  PHARMACOLOGIC STRESS MYOCARDIAL PERFUSION SCAN, ONE DAY PROTOCOL    The patient received an intravenous dose of  11.4 mCi of Tc-99m  tetrofosmin and resting emission tomographic (SPECT) images of the  myocardium were acquired. The patient then received an intravenous  infusion of 0.4mg regadenoson (Lexiscan) followed by an additional  dose of  34.2 mCi of Tc-99m tetrofosmin. Stress phase SPECT images of  the myocardium were then acquired. These included ECG-gated images to  assess and quantify ventricular function.    FINDINGS:  Stress and rest images both demonstrate a normal distribution of  perfusion throughout all LV segments with no sign of ischemia.    ECG-gated images demonstrate normal LV size and myocardial  contractility with an LV ejection fraction of   86 % (normal above 50  percent).    IMPRESSION:  1. Normal stress myocardial perfusion imaging in response to  pharmacologic stress.  2. Well-maintained left ventricular function.    Cardiac Catheterization:   Adult Cath     [facility], Cath Lab  [address] [place] [address]  Phone [phone] Fax [phone]    Cardiovascular Catheterization Report    Patient Name:     LESIA PEÑA Performing Physician: [id number] Blackwood MD  Study Date:       12/9/2019        Verifying Physician:  [id number] Blackwood MD  MRN/PID:          [id number]         Cardiologist:  Accession/Order#: [id number]        Referring Physician:  [id number] Thor Baker MD  YOB: 1933        Referring Physician:  Gender:           M                Referring Physician:      Study: Left and Right Heart Catheterization      Indications:  LESIA PEÑA is a 86 year old male who presents with hypertension and dyslipidemia. Suspected coronary artery disease, cardiomyopathy and left ventricular dysfunction, with a chest pain assessment of atypical angina. Study performed as an elective cath procedure.    Medical History:  Stress test  performed: No. CTA performed: No. [facility]. LVEF Assessed: Yes.    Procedure Description:  After infiltration with 2% Lidocaine, the right radial artery was cannulated with a modified Seldinger technique. Subsequently a 6 Danish sheath was placed in the right radial artery. After infiltration of local anesthetic, the Right Brachial vein was cannulated with a micropuncture technique. A 5 Danish sheath was placed in the vein. Selective coronary catheterization was performed using a 6 Fr catheter(s) exchanged over a guide wire to cannulate the coronary arteries.  Additional catheter(s) used to visualize the coronary arteries were: Jacqi. A balloon tipped catheter was advanced through the right heart to record pressures. Cardiac output was calculated via the Reggie method. After completion of the procedure, the arterial sheath was pulled and a TR Band Radial Compression Device was utilized to obtain patent hemostasis.    Coronary Angiography:  The coronary circulation is right dominant.    Left Main Coronary Artery:  The left main coronary artery is a normal caliber vessel. The left main arises normally from the left coronary sinus of Valsalva and bifurcates into the LAD and circumflex coronary arteries. The left main coronary artery showed no significant disease or stenosis greater than 30%.    Left Anterior Descending Coronary Artery Distribution:  The left anterior descending coronary artery is a normal caliber vessel. The LAD arises normally from the left main coronary artery. The LAD demonstrated mild atherosclerotic disease.    Circumflex Coronary Artery Distribution:  The circumflex coronary artery is a normal caliber vessel. The circumflex arises normally from the left main coronary artery and terminates in the AV groove. The circumflex revealed moderate atherosclerotic disease. The proximal to mid circumflex coronary artery showed 50% stenosis. This lesion was eccentric.    Right Heart  Catheterization:  A balloon tipped catheter was advanced through the right heart to record pressures. Cardiac output was calculated via the Reggie method. Elevated left sided filling pressures with normal cardiac output. Elevated ventricular filling pressure. Cardiac output is normal. Preserved cardiac output at rest. No pulmonary vascular resistance.    Right Coronary Artery Distribution:    The right coronary artery is a normal caliber vessel. The RCA arises normally from the right sinus of Valsalva. The RCA showed no significant disease or stenosis greater than 30%.    Coronary Interventions:  Angiography reveals a 50% stenosis of the proximal to mid circumflex and rFR - 1. Percutaneous coronary intervention was performed within the proximal to mid circumflex and rFR - 1.    Coronary Lesion Summary:  Vessel       Stenosis   Vessel Segment  Circumflex 50% stenosis proximal to mid          Complications:  No in-lab complications observed.    Cardiac Cath Transition of Care Summary:  Post Procedure Diagnosis: Single vessel disease.  Blood Loss:               Estimated blood loss during the procedure was 0 mls.  Specimens Removed:        Number of specimen(s) removed: none.      Recommendations:  Maximize medical therapy.    ____________________________________________________________________________________  CONCLUSIONS:  1. Moderate non-obstructive coronary artery disease.  2. Elevated LV filling pressures.    ____________________________________________________________________________________  CPT Codes:  Right Heart Cath, O2/Cardiac output (RHC)-57732; Coronary Angiography S&I (RH)([facility])-49921; Moderate Sedation Services initial 15 minutes patient >5 years-10845; Moderate Sedation Services 1st additional 15 minutes patient >5 years-24583; FFR, initial Vessel (PCI)-50391    ICD 10 Codes:  I20.9-Angina pectoris, unspecified Class III; I50.42-Chronic combined systolic (congestive) and diastolic (congestive) heart  failure    [id number] Sudeep Blackwood MD  Performing Physician  Electronically signed by [id number] Sudeep Blackwood MD on 12/9/2019 at 3:02:59 PM      cc Report to: [id number] Thor Baker MD           Final   No results found for this or any previous visit from the past 3650 days.     Cardiac Scoring: No results found for this or any previous visit from the past 1825 days.    AAA : No results found for this or any previous visit from the past 1825 days.    OTHER: No results found for this or any previous visit from the past 1825 days.    LAST IMAGING RESULTS  ECG 12 Lead  Ventricularly paced rhythm heart rate 67 bpm.    Problem List Items Addressed This Visit       ASHD (arteriosclerotic heart disease)    Relevant Medications    torsemide (Demadex) 20 mg tablet    Other Relevant Orders    ECG 12 Lead (Completed)    Basic Metabolic Panel    B-Type Natriuretic Peptide    Magnesium    Follow Up In Cardiology    Cardiomyopathy (Multi)    Relevant Medications    torsemide (Demadex) 20 mg tablet    Other Relevant Orders    ECG 12 Lead (Completed)    Basic Metabolic Panel    B-Type Natriuretic Peptide    Magnesium    Follow Up In Cardiology    Dyslipidemia    Relevant Medications    torsemide (Demadex) 20 mg tablet    Other Relevant Orders    ECG 12 Lead (Completed)    Basic Metabolic Panel    B-Type Natriuretic Peptide    Magnesium    Follow Up In Cardiology    Persistent atrial fibrillation with RVR (Multi) - Primary    Relevant Medications    torsemide (Demadex) 20 mg tablet    Other Relevant Orders    ECG 12 Lead (Completed)    Basic Metabolic Panel    B-Type Natriuretic Peptide    Magnesium    Follow Up In Cardiology    Hyperlipidemia    Relevant Medications    torsemide (Demadex) 20 mg tablet    Other Relevant Orders    ECG 12 Lead (Completed)    Basic Metabolic Panel    B-Type Natriuretic Peptide    Magnesium    Follow Up In Cardiology    Primary hypertension    Relevant Medications    torsemide (Demadex) 20 mg tablet    Other Relevant  Orders    ECG 12 Lead (Completed)    Basic Metabolic Panel    B-Type Natriuretic Peptide    Magnesium    Follow Up In Cardiology    Type 2 diabetes mellitus without complications (Multi)    Relevant Medications    torsemide (Demadex) 20 mg tablet    Other Relevant Orders    ECG 12 Lead (Completed)    Basic Metabolic Panel    B-Type Natriuretic Peptide    Magnesium    Follow Up In Cardiology    Warfarin anticoagulation    Relevant Medications    torsemide (Demadex) 20 mg tablet    Other Relevant Orders    ECG 12 Lead (Completed)    Basic Metabolic Panel    B-Type Natriuretic Peptide    Magnesium    Follow Up In Cardiology    CKD (chronic kidney disease)    Relevant Medications    torsemide (Demadex) 20 mg tablet    Other Relevant Orders    ECG 12 Lead (Completed)    Basic Metabolic Panel    B-Type Natriuretic Peptide    Magnesium    Follow Up In Cardiology    (HFpEF) heart failure with preserved ejection fraction (Multi)    Relevant Medications    torsemide (Demadex) 20 mg tablet    Other Relevant Orders    ECG 12 Lead (Completed)    Basic Metabolic Panel    B-Type Natriuretic Peptide    Magnesium    Follow Up In Cardiology           Rod Milner DO, FACC, FACOI

## 2024-04-22 ENCOUNTER — OFFICE VISIT (OUTPATIENT)
Dept: CARDIOLOGY | Facility: CLINIC | Age: 89
End: 2024-04-22
Payer: MEDICARE

## 2024-04-22 VITALS
SYSTOLIC BLOOD PRESSURE: 140 MMHG | HEIGHT: 71 IN | HEART RATE: 67 BPM | BODY MASS INDEX: 26.88 KG/M2 | WEIGHT: 192 LBS | DIASTOLIC BLOOD PRESSURE: 80 MMHG

## 2024-04-22 DIAGNOSIS — Z79.01 WARFARIN ANTICOAGULATION: ICD-10-CM

## 2024-04-22 DIAGNOSIS — I42.9 CARDIOMYOPATHY, UNSPECIFIED TYPE (MULTI): ICD-10-CM

## 2024-04-22 DIAGNOSIS — E78.00 PURE HYPERCHOLESTEROLEMIA: ICD-10-CM

## 2024-04-22 DIAGNOSIS — E11.9 TYPE 2 DIABETES MELLITUS WITHOUT COMPLICATION, WITHOUT LONG-TERM CURRENT USE OF INSULIN (MULTI): ICD-10-CM

## 2024-04-22 DIAGNOSIS — N18.9 CHRONIC KIDNEY DISEASE, UNSPECIFIED CKD STAGE: ICD-10-CM

## 2024-04-22 DIAGNOSIS — I50.32 CHRONIC HEART FAILURE WITH PRESERVED EJECTION FRACTION (MULTI): ICD-10-CM

## 2024-04-22 DIAGNOSIS — E78.5 DYSLIPIDEMIA: ICD-10-CM

## 2024-04-22 DIAGNOSIS — I48.19 PERSISTENT ATRIAL FIBRILLATION WITH RVR (MULTI): Primary | ICD-10-CM

## 2024-04-22 DIAGNOSIS — I25.10 ASHD (ARTERIOSCLEROTIC HEART DISEASE): ICD-10-CM

## 2024-04-22 DIAGNOSIS — I10 PRIMARY HYPERTENSION: ICD-10-CM

## 2024-04-22 PROCEDURE — 1160F RVW MEDS BY RX/DR IN RCRD: CPT | Performed by: INTERNAL MEDICINE

## 2024-04-22 PROCEDURE — 3077F SYST BP >= 140 MM HG: CPT | Performed by: INTERNAL MEDICINE

## 2024-04-22 PROCEDURE — 1159F MED LIST DOCD IN RCRD: CPT | Performed by: INTERNAL MEDICINE

## 2024-04-22 PROCEDURE — 3079F DIAST BP 80-89 MM HG: CPT | Performed by: INTERNAL MEDICINE

## 2024-04-22 PROCEDURE — 93000 ELECTROCARDIOGRAM COMPLETE: CPT | Performed by: INTERNAL MEDICINE

## 2024-04-22 PROCEDURE — 99214 OFFICE O/P EST MOD 30 MIN: CPT | Performed by: INTERNAL MEDICINE

## 2024-04-22 PROCEDURE — 1157F ADVNC CARE PLAN IN RCRD: CPT | Performed by: INTERNAL MEDICINE

## 2024-04-22 PROCEDURE — 1036F TOBACCO NON-USER: CPT | Performed by: INTERNAL MEDICINE

## 2024-04-22 RX ORDER — TORSEMIDE 20 MG/1
20 TABLET ORAL DAILY
Qty: 30 TABLET | Refills: 3 | Status: SHIPPED | OUTPATIENT
Start: 2024-04-22 | End: 2025-04-22

## 2024-04-23 ENCOUNTER — ANTICOAGULATION - WARFARIN VISIT (OUTPATIENT)
Dept: PRIMARY CARE | Facility: CLINIC | Age: 89
End: 2024-04-23

## 2024-04-23 NOTE — PROGRESS NOTES
per praful patient is to double dose today and tomorrow then resume current dose and recheck in a week, patients wife notified

## 2024-04-29 ENCOUNTER — TELEPHONE (OUTPATIENT)
Dept: UROLOGY | Facility: CLINIC | Age: 89
End: 2024-04-29
Payer: MEDICARE

## 2024-04-29 ENCOUNTER — TELEPHONE (OUTPATIENT)
Dept: PRIMARY CARE | Facility: CLINIC | Age: 89
End: 2024-04-29
Payer: MEDICARE

## 2024-04-29 DIAGNOSIS — R32 URINARY INCONTINENCE, UNSPECIFIED TYPE: ICD-10-CM

## 2024-04-29 DIAGNOSIS — E11.00 TYPE 2 DIABETES MELLITUS WITH HYPEROSMOLARITY WITHOUT COMA, WITHOUT LONG-TERM CURRENT USE OF INSULIN (MULTI): ICD-10-CM

## 2024-04-29 DIAGNOSIS — N40.1 BENIGN PROSTATIC HYPERPLASIA WITH LOWER URINARY TRACT SYMPTOMS, SYMPTOM DETAILS UNSPECIFIED: ICD-10-CM

## 2024-04-29 RX ORDER — GLIPIZIDE 2.5 MG/1
5 TABLET, EXTENDED RELEASE ORAL DAILY
Qty: 60 TABLET | Refills: 11 | Status: SHIPPED | OUTPATIENT
Start: 2024-04-29 | End: 2025-04-29

## 2024-04-29 RX ORDER — FINASTERIDE 5 MG/1
5 TABLET, FILM COATED ORAL DAILY
Qty: 30 TABLET | Refills: 2 | Status: SHIPPED | OUTPATIENT
Start: 2024-04-29 | End: 2025-04-29

## 2024-04-29 NOTE — TELEPHONE ENCOUNTER
Refill on his Finasteride to be sent to [facility] please  Thank you

## 2024-04-29 NOTE — TELEPHONE ENCOUNTER
Left message on patient's voicemail to return call, his script was sent over to pharmacy by Dr. Lara

## 2024-04-30 ENCOUNTER — LAB (OUTPATIENT)
Dept: LAB | Facility: LAB | Age: 89
End: 2024-04-30
Payer: MEDICARE

## 2024-04-30 ENCOUNTER — ANTICOAGULATION - WARFARIN VISIT (OUTPATIENT)
Dept: PRIMARY CARE | Facility: CLINIC | Age: 89
End: 2024-04-30

## 2024-04-30 DIAGNOSIS — Z79.01 WARFARIN ANTICOAGULATION: ICD-10-CM

## 2024-04-30 DIAGNOSIS — I42.9 CARDIOMYOPATHY, UNSPECIFIED TYPE (MULTI): ICD-10-CM

## 2024-04-30 DIAGNOSIS — E78.00 PURE HYPERCHOLESTEROLEMIA: ICD-10-CM

## 2024-04-30 DIAGNOSIS — E11.9 TYPE 2 DIABETES MELLITUS WITHOUT COMPLICATION, WITHOUT LONG-TERM CURRENT USE OF INSULIN (MULTI): ICD-10-CM

## 2024-04-30 DIAGNOSIS — I10 PRIMARY HYPERTENSION: ICD-10-CM

## 2024-04-30 DIAGNOSIS — I25.10 ASHD (ARTERIOSCLEROTIC HEART DISEASE): ICD-10-CM

## 2024-04-30 DIAGNOSIS — I48.19 PERSISTENT ATRIAL FIBRILLATION WITH RVR (MULTI): ICD-10-CM

## 2024-04-30 DIAGNOSIS — E78.5 DYSLIPIDEMIA: ICD-10-CM

## 2024-04-30 DIAGNOSIS — I48.91 ATRIAL FIBRILLATION, UNSPECIFIED TYPE (MULTI): ICD-10-CM

## 2024-04-30 DIAGNOSIS — N18.9 CHRONIC KIDNEY DISEASE, UNSPECIFIED CKD STAGE: ICD-10-CM

## 2024-04-30 DIAGNOSIS — E11.42 DIABETIC PERIPHERAL NEUROPATHY (MULTI): ICD-10-CM

## 2024-04-30 DIAGNOSIS — I50.32 CHRONIC HEART FAILURE WITH PRESERVED EJECTION FRACTION (MULTI): ICD-10-CM

## 2024-04-30 LAB
ANION GAP SERPL CALC-SCNC: 13 MMOL/L (ref 10–20)
BNP SERPL-MCNC: 98 PG/ML (ref 0–99)
BUN SERPL-MCNC: 44 MG/DL (ref 6–23)
CALCIUM SERPL-MCNC: 9.1 MG/DL (ref 8.6–10.3)
CHLORIDE SERPL-SCNC: 105 MMOL/L (ref 98–107)
CO2 SERPL-SCNC: 27 MMOL/L (ref 21–32)
CREAT SERPL-MCNC: 1.71 MG/DL (ref 0.5–1.3)
EGFRCR SERPLBLD CKD-EPI 2021: 38 ML/MIN/1.73M*2
FERRITIN SERPL-MCNC: 187 NG/ML (ref 20–300)
GLUCOSE SERPL-MCNC: 156 MG/DL (ref 74–99)
INR IN PPP BY COAGULATION ASSAY EXTERNAL: 2.3
MAGNESIUM SERPL-MCNC: 1.79 MG/DL (ref 1.6–2.4)
POTASSIUM SERPL-SCNC: 4.5 MMOL/L (ref 3.5–5.3)
PROTHROMBIN TIME (PT) IN PPP BY COAGULATION ASSAY EXTERNAL: NORMAL SECONDS
SODIUM SERPL-SCNC: 140 MMOL/L (ref 136–145)
VIT B12 SERPL-MCNC: 634 PG/ML (ref 211–911)

## 2024-04-30 PROCEDURE — 83880 ASSAY OF NATRIURETIC PEPTIDE: CPT

## 2024-04-30 PROCEDURE — 82728 ASSAY OF FERRITIN: CPT

## 2024-04-30 PROCEDURE — 82607 VITAMIN B-12: CPT

## 2024-04-30 PROCEDURE — 36415 COLL VENOUS BLD VENIPUNCTURE: CPT

## 2024-04-30 PROCEDURE — 80048 BASIC METABOLIC PNL TOTAL CA: CPT

## 2024-04-30 PROCEDURE — 83735 ASSAY OF MAGNESIUM: CPT

## 2024-05-01 ENCOUNTER — TELEPHONE (OUTPATIENT)
Dept: CARDIOLOGY | Facility: CLINIC | Age: 89
End: 2024-05-01
Payer: MEDICARE

## 2024-05-01 NOTE — TELEPHONE ENCOUNTER
5/1/24  1529  Called results to wife of patient with wife verbalizing understanding.      ----- Message from Rod Milner DO sent at 4/30/2024  1:18 PM EDT -----  Serum creatinine stable at 1.71.  Continue current care.

## 2024-05-02 ENCOUNTER — TELEPHONE (OUTPATIENT)
Dept: NEUROLOGY | Facility: HOSPITAL | Age: 89
End: 2024-05-02

## 2024-05-02 ENCOUNTER — OFFICE VISIT (OUTPATIENT)
Dept: PRIMARY CARE | Facility: CLINIC | Age: 89
End: 2024-05-02
Payer: MEDICARE

## 2024-05-02 VITALS
OXYGEN SATURATION: 98 % | SYSTOLIC BLOOD PRESSURE: 118 MMHG | BODY MASS INDEX: 26.32 KG/M2 | HEART RATE: 68 BPM | WEIGHT: 188 LBS | RESPIRATION RATE: 16 BRPM | DIASTOLIC BLOOD PRESSURE: 74 MMHG | HEIGHT: 71 IN

## 2024-05-02 DIAGNOSIS — G25.81 RESTLESS LEGS SYNDROME: ICD-10-CM

## 2024-05-02 DIAGNOSIS — N18.32 STAGE 3B CHRONIC KIDNEY DISEASE (MULTI): ICD-10-CM

## 2024-05-02 DIAGNOSIS — I73.9 INTERMITTENT CLAUDICATION (CMS-HCC): ICD-10-CM

## 2024-05-02 DIAGNOSIS — E11.9 TYPE 2 DIABETES MELLITUS WITHOUT COMPLICATION, WITHOUT LONG-TERM CURRENT USE OF INSULIN (MULTI): ICD-10-CM

## 2024-05-02 DIAGNOSIS — I48.91 ATRIAL FIBRILLATION, UNSPECIFIED TYPE (MULTI): ICD-10-CM

## 2024-05-02 DIAGNOSIS — I50.32 CHRONIC HEART FAILURE WITH PRESERVED EJECTION FRACTION (MULTI): ICD-10-CM

## 2024-05-02 DIAGNOSIS — I74.09 AORTOILIAC OCCLUSIVE DISEASE (MULTI): ICD-10-CM

## 2024-05-02 DIAGNOSIS — J44.9 CHRONIC OBSTRUCTIVE PULMONARY DISEASE, UNSPECIFIED COPD TYPE (MULTI): ICD-10-CM

## 2024-05-02 DIAGNOSIS — N40.1 BENIGN PROSTATIC HYPERPLASIA WITH LOWER URINARY TRACT SYMPTOMS, SYMPTOM DETAILS UNSPECIFIED: ICD-10-CM

## 2024-05-02 DIAGNOSIS — K21.9 GASTROESOPHAGEAL REFLUX DISEASE WITHOUT ESOPHAGITIS: ICD-10-CM

## 2024-05-02 DIAGNOSIS — E11.42 DIABETIC POLYNEUROPATHY ASSOCIATED WITH TYPE 2 DIABETES MELLITUS (MULTI): ICD-10-CM

## 2024-05-02 DIAGNOSIS — G47.33 OBSTRUCTIVE SLEEP APNEA SYNDROME: ICD-10-CM

## 2024-05-02 DIAGNOSIS — Z00.00 MEDICARE ANNUAL WELLNESS VISIT, SUBSEQUENT: Primary | ICD-10-CM

## 2024-05-02 DIAGNOSIS — I50.42 CHRONIC COMBINED SYSTOLIC AND DIASTOLIC HEART FAILURE, NYHA CLASS 2 (MULTI): ICD-10-CM

## 2024-05-02 PROBLEM — L97.509 CHRONIC FOOT ULCER (MULTI): Status: RESOLVED | Noted: 2023-11-01 | Resolved: 2024-05-02

## 2024-05-02 PROBLEM — E53.8 COBALAMIN DEFICIENCY: Status: RESOLVED | Noted: 2023-11-01 | Resolved: 2024-05-02

## 2024-05-02 PROBLEM — R05.2 SUBACUTE COUGH: Status: ACTIVE | Noted: 2023-04-11

## 2024-05-02 PROBLEM — E11.621 DIABETIC FOOT ULCER (MULTI): Status: RESOLVED | Noted: 2023-11-01 | Resolved: 2024-05-02

## 2024-05-02 PROBLEM — Z86.73 HISTORY OF TRANSIENT ISCHEMIC ATTACK: Status: ACTIVE | Noted: 2024-05-02

## 2024-05-02 PROBLEM — E53.8 COBALAMIN DEFICIENCY: Status: ACTIVE | Noted: 2023-11-01

## 2024-05-02 PROBLEM — Z85.820 HISTORY OF MALIGNANT MELANOMA OF SKIN: Status: ACTIVE | Noted: 2024-05-02

## 2024-05-02 PROBLEM — N40.0 BENIGN PROSTATIC HYPERPLASIA: Status: RESOLVED | Noted: 2023-02-24 | Resolved: 2024-05-02

## 2024-05-02 PROBLEM — N18.9 CHRONIC KIDNEY DISEASE: Status: RESOLVED | Noted: 2023-07-24 | Resolved: 2024-05-02

## 2024-05-02 PROBLEM — S83.90XA SPRAIN OF KNEE: Status: ACTIVE | Noted: 2023-06-01

## 2024-05-02 PROBLEM — Z86.39 HISTORY OF DIABETES MELLITUS: Status: ACTIVE | Noted: 2024-05-02

## 2024-05-02 PROBLEM — Z86.39 HISTORY OF DIABETES MELLITUS: Status: RESOLVED | Noted: 2024-05-02 | Resolved: 2024-05-02

## 2024-05-02 PROBLEM — Z86.79 HISTORY OF ATRIAL FIBRILLATION: Status: ACTIVE | Noted: 2024-05-02

## 2024-05-02 PROBLEM — M25.512 PAIN OF LEFT SHOULDER REGION: Status: ACTIVE | Noted: 2023-11-01

## 2024-05-02 PROBLEM — I25.10 ARTERIOSCLEROSIS OF CORONARY ARTERY: Status: ACTIVE | Noted: 2022-10-21

## 2024-05-02 PROBLEM — E11.40 TYPE 2 DIABETES MELLITUS WITH DIABETIC NEUROPATHY, UNSPECIFIED (MULTI): Status: RESOLVED | Noted: 2023-11-01 | Resolved: 2024-05-02

## 2024-05-02 PROBLEM — N18.9 CKD (CHRONIC KIDNEY DISEASE): Status: RESOLVED | Noted: 2024-02-07 | Resolved: 2024-05-02

## 2024-05-02 PROBLEM — N18.9 CHRONIC KIDNEY DISEASE: Status: ACTIVE | Noted: 2023-07-24

## 2024-05-02 PROBLEM — N28.9 RENAL INSUFFICIENCY: Status: RESOLVED | Noted: 2023-02-24 | Resolved: 2024-05-02

## 2024-05-02 PROBLEM — L97.509 CHRONIC FOOT ULCER (MULTI): Status: ACTIVE | Noted: 2023-11-01

## 2024-05-02 PROBLEM — R05.9 COUGH, UNSPECIFIED: Status: ACTIVE | Noted: 2022-09-22

## 2024-05-02 PROBLEM — L97.509 DIABETIC FOOT ULCER (MULTI): Status: RESOLVED | Noted: 2023-11-01 | Resolved: 2024-05-02

## 2024-05-02 PROBLEM — R40.0 DAYTIME SOMNOLENCE: Status: ACTIVE | Noted: 2024-05-02

## 2024-05-02 PROBLEM — R09.02 HYPOXIA: Status: ACTIVE | Noted: 2024-05-02

## 2024-05-02 PROBLEM — N40.0 BENIGN PROSTATIC HYPERPLASIA: Status: ACTIVE | Noted: 2023-02-24

## 2024-05-02 PROBLEM — H90.3 SENSORINEURAL HEARING LOSS (SNHL) OF BOTH EARS: Status: ACTIVE | Noted: 2023-11-01

## 2024-05-02 PROBLEM — L97.501: Status: RESOLVED | Noted: 2023-11-01 | Resolved: 2024-05-02

## 2024-05-02 PROBLEM — E11.40 DIABETIC NEUROPATHY (MULTI): Status: RESOLVED | Noted: 2023-11-01 | Resolved: 2024-05-02

## 2024-05-02 PROBLEM — G90.09 IDIOPATHIC PERIPHERAL AUTONOMIC NEUROPATHY: Status: ACTIVE | Noted: 2023-11-01

## 2024-05-02 PROCEDURE — 3078F DIAST BP <80 MM HG: CPT | Performed by: NURSE PRACTITIONER

## 2024-05-02 PROCEDURE — 1036F TOBACCO NON-USER: CPT | Performed by: NURSE PRACTITIONER

## 2024-05-02 PROCEDURE — 1159F MED LIST DOCD IN RCRD: CPT | Performed by: NURSE PRACTITIONER

## 2024-05-02 PROCEDURE — 1170F FXNL STATUS ASSESSED: CPT | Performed by: NURSE PRACTITIONER

## 2024-05-02 PROCEDURE — 1157F ADVNC CARE PLAN IN RCRD: CPT | Performed by: NURSE PRACTITIONER

## 2024-05-02 PROCEDURE — 3074F SYST BP LT 130 MM HG: CPT | Performed by: NURSE PRACTITIONER

## 2024-05-02 PROCEDURE — 99214 OFFICE O/P EST MOD 30 MIN: CPT | Performed by: NURSE PRACTITIONER

## 2024-05-02 PROCEDURE — G0439 PPPS, SUBSEQ VISIT: HCPCS | Performed by: NURSE PRACTITIONER

## 2024-05-02 PROCEDURE — 1160F RVW MEDS BY RX/DR IN RCRD: CPT | Performed by: NURSE PRACTITIONER

## 2024-05-02 RX ORDER — CLONAZEPAM 0.5 MG/1
TABLET ORAL
Qty: 45 TABLET | Refills: 1 | Status: SHIPPED | OUTPATIENT
Start: 2024-05-02

## 2024-05-02 ASSESSMENT — ACTIVITIES OF DAILY LIVING (ADL)
MANAGING_FINANCES: INDEPENDENT
BATHING: INDEPENDENT
DOING_HOUSEWORK: INDEPENDENT
GROCERY_SHOPPING: INDEPENDENT
TAKING_MEDICATION: INDEPENDENT
DRESSING: INDEPENDENT

## 2024-05-02 ASSESSMENT — ANXIETY QUESTIONNAIRES
3. WORRYING TOO MUCH ABOUT DIFFERENT THINGS: NOT AT ALL
2. NOT BEING ABLE TO STOP OR CONTROL WORRYING: NOT AT ALL
5. BEING SO RESTLESS THAT IT IS HARD TO SIT STILL: NOT AT ALL
6. BECOMING EASILY ANNOYED OR IRRITABLE: NOT AT ALL
IF YOU CHECKED OFF ANY PROBLEMS ON THIS QUESTIONNAIRE, HOW DIFFICULT HAVE THESE PROBLEMS MADE IT FOR YOU TO DO YOUR WORK, TAKE CARE OF THINGS AT HOME, OR GET ALONG WITH OTHER PEOPLE: NOT DIFFICULT AT ALL
4. TROUBLE RELAXING: NOT AT ALL
GAD7 TOTAL SCORE: 0
7. FEELING AFRAID AS IF SOMETHING AWFUL MIGHT HAPPEN: NOT AT ALL
1. FEELING NERVOUS, ANXIOUS, OR ON EDGE: NOT AT ALL

## 2024-05-02 ASSESSMENT — PATIENT HEALTH QUESTIONNAIRE - PHQ9
1. LITTLE INTEREST OR PLEASURE IN DOING THINGS: NOT AT ALL
SUM OF ALL RESPONSES TO PHQ9 QUESTIONS 1 AND 2: 0
2. FEELING DOWN, DEPRESSED OR HOPELESS: NOT AT ALL

## 2024-05-02 ASSESSMENT — ENCOUNTER SYMPTOMS
OCCASIONAL FEELINGS OF UNSTEADINESS: 0
DEPRESSION: 0
LOSS OF SENSATION IN FEET: 0

## 2024-05-02 NOTE — TELEPHONE ENCOUNTER
RX REFILL  CLONAZEPAM (KLONOPIN) 0.5 MG   1.5 AT BEDTIME   [facility]

## 2024-05-02 NOTE — PROGRESS NOTES
"Subjective   Reason for Visit: Michele Maya is an 89 y.o. male here for a Medicare Wellness visit.     Past Medical, Surgical, and Family History reviewed and updated in chart.    Reviewed all medications by prescribing practitioner or clinical pharmacist (such as prescriptions, OTCs, herbal therapies and supplements) and documented in the medical record.    Patient is in a wheelchair accompanied by his wife and also following up for lab results review and management of multiple chronic diseases.  His lab results are stable.  Advises he takes his medications as prescribed with no side effect noted.  Reports he is doing great besides aches and pains secondary to aging.  Denies acute medical complaint.        Patient Care Team:  EUGENE Blue-CNP as PCP - General (Family Medicine)  Milton Heath MD as PCP - [employer] PCP     Review of Systems   All other systems reviewed and are negative.      Objective   Vitals:  Resp 16   Ht 1.803 m (5' 11\")   Wt 85.3 kg (188 lb)   BMI 26.22 kg/m²       Physical Exam  Constitutional:       Appearance: Normal appearance.   HENT:      Head: Normocephalic and atraumatic.      Right Ear: Tympanic membrane, ear canal and external ear normal.      Left Ear: Tympanic membrane, ear canal and external ear normal.      Nose: Nose normal.      Mouth/Throat:      Mouth: Mucous membranes are moist.   Eyes:      Extraocular Movements: Extraocular movements intact.      Conjunctiva/sclera: Conjunctivae normal.      Pupils: Pupils are equal, round, and reactive to light.   Cardiovascular:      Rate and Rhythm: Normal rate and regular rhythm.      Pulses: Normal pulses.      Heart sounds: Normal heart sounds.   Pulmonary:      Effort: Pulmonary effort is normal.      Breath sounds: Normal breath sounds.   Abdominal:      General: Bowel sounds are normal.      Palpations: Abdomen is soft.   Musculoskeletal:      Cervical back: Neck supple.   Skin:     General: " Skin is warm and dry.   Neurological:      General: No focal deficit present.      Mental Status: He is alert and oriented to person, place, and time.   Psychiatric:         Mood and Affect: Mood normal.         Behavior: Behavior normal.         Thought Content: Thought content normal.         Judgment: Judgment normal.         Assessment/Plan   Problem List Items Addressed This Visit    None  Visit Diagnoses       Routine general medical examination at health care facility    -  Primary

## 2024-05-02 NOTE — TELEPHONE ENCOUNTER
----- Message from Willian Hernandez MD sent at 5/1/2024  9:55 AM EDT -----  5/1/24 B12/ferritin levels ok.  Will have staff let pt know. Continue meds as is. See him back as scheduled.  Dr hernandez

## 2024-05-03 NOTE — PATIENT INSTRUCTIONS
Continue taking all current medication as prescribed.  Completed labs a few days prior to 6 months follow-up.

## 2024-05-07 ENCOUNTER — ANTICOAGULATION - WARFARIN VISIT (OUTPATIENT)
Dept: PRIMARY CARE | Facility: CLINIC | Age: 89
End: 2024-05-07
Payer: MEDICARE

## 2024-05-14 ENCOUNTER — ANTICOAGULATION - WARFARIN VISIT (OUTPATIENT)
Dept: PRIMARY CARE | Facility: CLINIC | Age: 89
End: 2024-05-14
Payer: MEDICARE

## 2024-05-14 NOTE — PROGRESS NOTES
per Charanjit patient is to double dose on sundays only continue current the rest of the week, patients wife notified

## 2024-05-20 ENCOUNTER — APPOINTMENT (OUTPATIENT)
Dept: CARDIOLOGY | Facility: CLINIC | Age: 89
End: 2024-05-20
Payer: MEDICARE

## 2024-05-21 ENCOUNTER — ANTICOAGULATION - WARFARIN VISIT (OUTPATIENT)
Dept: PRIMARY CARE | Facility: CLINIC | Age: 89
End: 2024-05-21
Payer: MEDICARE

## 2024-05-21 NOTE — PROGRESS NOTES
per Dr. Batista patient is to continue current dose and recheck in a week, patients wife notified

## 2024-05-28 ENCOUNTER — ANTICOAGULATION - WARFARIN VISIT (OUTPATIENT)
Dept: PRIMARY CARE | Facility: CLINIC | Age: 89
End: 2024-05-28
Payer: MEDICARE

## 2024-05-29 PROBLEM — Z86.79 HISTORY OF CARDIOMYOPATHY: Status: ACTIVE | Noted: 2023-02-24

## 2024-05-29 NOTE — PROGRESS NOTES
[facility] Heart and Vascular Cardiology    Patient Name: Michele Maya  Patient : 1933      Scribe Attestation  By signing my name below, I, Ceci Ridley   attest that this documentation has been prepared under the direction and in the presence of Rod Milner DO.      Reason for visit:  This is a 89-year-old male here for follow-up regarding persistent atrial fibrillation/pacemaker, anticoagulation with warfarin, HFpEF with history of cardiomyopathy, moderate coronary artery disease as seen on cardiac catheterization done in 2019, hypertension, dyslipidemia, diabetes mellitus, and CKD.     HPI:  This is a 89-year-old male here for follow-up regarding persistent atrial fibrillation/pacemaker, anticoagulation with warfarin, HFpEF with history of cardiomyopathy, moderate coronary artery disease as seen on cardiac catheterization done in 2019, hypertension, dyslipidemia, diabetes mellitus, and CKD.  Patient was last evaluated by me in 2024 at which time he was noted to be in decompensated heart failure.  I discontinued furosemide and started torsemide 40 mg daily for 3 days then asked that he reduce to 20 mg daily, ordered blood work including BMP/BNP/magnesium to be drawn in 1 week, and asked the patient to follow-up in 1 month.  BMP done 2024 showed normal serum sodium and potassium with a serum creatinine of 1.71, serum magnesium was 1.79, BNP was 98. ECG done today showed sinus rhythm with A-V dissociation and ventricular pacing with a heart rate of 66 bpm. The patient reports that he has been feeling generally well from the cardiac standpoint. He denies any new chest pain, shortness of breath, palpitations and lightheadedness but reports lower extremity edema. He states that he urinates frequently with adequate amounts after taking torsemide. He states that he takes all of his medications as prescribed. During my exam, he was resting comfortably  on the exam table.             Assessment/Plan:   1. Persistent atrial fibrillation/pacemaker  The patient has a history of atrial fibrillation and underwent AV node ablation and pacemaker implant done in 1996. Patient subsequently developed cardiomyopathy and had an ICD implanted but as his LV function improved it was transitioned to a CRT-D device.  The patient is on warfarin for thromboembolism prophylaxis, which should be continued.  Warfarin dosing and INR monitoring is being managed by his PCP.  ECG done today showed sinus rhythm with A-V dissociation and ventricular pacing with a heart rate of 66 bpm.     He denies chest pain, palpitations or lightheadedness.   Recent lab works as noted in the HPI.  Echocardiogram was ordered as noted below.   Lab works as noted below will be done in 3 months prior to his next visit.   He should continue to follow with the device clinic.  Follow up in 3 months and sooner if necessary.      2. Anticoagulation with warfarin  The patient is on anticoagulation with warfarin for persistent atrial fibrillation.  Warfarin dosing and INR monitoring is being managed by his PCP.  Recent lab works as noted in the HPI.   Lab works as noted below will be done in 3 months prior to his next visit.      3. HFpEF/History of cardiomyopathy  The patient has HFpEF with history of cardiomyopathy which improved after placement of CRT-D device.  Echocardiogram done in October 2021 showed normal left ventricular systolic function, normal right ventricular systolic function, no significant valve abnormalities.   He does have 1+ pitting bilateral lower extremity edema extending to the mid calf.   Suggested adding SGLT2 inhibitors to help with edema. He expressed understanding to the risks and benefits and would like to try empagliflozin.  I will start him on empagliflozin 10 mg daily.   He should continue his current cardiac medications.  Recent lab works as noted in the HPI.   Echocardiogram was  ordered as noted below.  Lab works as noted below will be done in 3 months prior to his next visit.   I discussed with him the importance of following a low-sodium heart healthy diet, wearing compression stockings and elevating legs when seated.   Follow up in 3 months and sooner if necessary.      4. Coronary artery disease  The patient has a history of moderate coronary artery disease as seen on cardiac catheterization done in December 2019.  ECG done today showed sinus rhythm with A-V dissociation and ventricular pacing with a heart rate of 66 bpm.    He denies anginal chest discomfort.   Blood pressure appears controlled on exam today.  He should continue his current antihypertensive medications.  Echocardiogram done in October 2021 showed normal left ventricular systolic function, normal right ventricular systolic function, no significant valve abnormalities.   Recent lab works as noted in the HPI.   Lipid panel done in January 2024 showed an LDL cholesterol of 94 and triglycerides of 124 while on atorvastatin 10 mg daily.   He is currently on atorvastatin to 20 mg daily.   Echocardiogram was ordered as noted below.  Lab works as noted below will be done in 3 months prior to his next visit.   Please see lifestyle recommendations below.  Follow up in 3 months and sooner if necessary.      5. Hypertension  The patient has a history of hypertension which appears controlled on exam today.  He should continue his current antihypertensive medications.      6. Dyslipidemia  Lipid panel done in January 2024 showed an LDL cholesterol of 94 and triglycerides of 124 while on atorvastatin 10 mg daily.   He is currently on atorvastatin to 20 mg daily.   Please see lifestyle recommendations below.     7. Diabetes mellitus  Stable, medication management per PCP.     8. CKD   Serum creatinine done on 4/30/24 was 1.71 consistent with known CKD.  Management as per PCP.       Orders:   Start empagliflozin 10 mg daily.    Echocardiogram,   BMP/BNP/magnesium in 3 months,   Follow-up in 3 months.    Lifestyle Recommendations  I recommend a whole-food plant-based diet, an eating pattern that encourages the consumption of unrefined plant foods (such as fruits, vegetables, tubers, whole grains, legumes, nuts and seeds) and discourages meats, dairy products, eggs and processed foods.     The AHA/ACC recommends that the patient consume a dietary pattern that emphasizes intake of vegetables, fruits, and whole grains; includes low-fat dairy products, poultry, fish, legumes, non-tropical vegetable oils, and nuts; and limits intake of sodium, sweets, sugar-sweetened beverages, and red meats.  Adapt this dietary pattern to appropriate calorie requirements (a 500-750 kcal/day deficit to loose weight), personal and cultural food preferences, and nutrition therapy for other medical conditions (including diabetes).  Achieve this pattern by following plans such as the [place] Mediterranean, DASH dietary pattern, or AHA diet.     Engage in 2 hours and 30 minutes per week of moderate-intensity physical activity, or 1 hour and 15 minutes (75 minutes) per week of vigorous-intensity aerobic physical activity, or an equivalent combination of moderate and vigorous-intensity aerobic physical activity. Aerobic activity should be performed in episodes of at least 10 minutes preferably spread throughout the week.     Adhering to a heart healthy diet, regular exercise habits, avoidance of tobacco products, and maintenance of a healthy weight are crucial components of their heart disease risk reduction.     Any positive review of systems not specifically addressed in the office visit today should be evaluated and treated by the patients primary care physician or in an emergency department if necessary     Patient was notified that results from ordered tests will be called to the patient if it changes current management; it will otherwise be discussed at a future  appointment and available on  [employer].     Thank you for allowing me to participate in the care of this patient.        This document was generated using the assistance of voice recognition software. If there are any errors of spelling, grammar, syntax, or meaning; please feel free to contact me directly for clarification.    Past Medical History:  He has a past medical history of Encounter for screening for malignant neoplasm of prostate (06/15/2016), Long term (current) use of anticoagulants (06/15/2016), Personal history of other diseases of the circulatory system, Personal history of other diseases of the respiratory system (11/01/2019), Personal history of other endocrine, nutritional and metabolic disease, Personal history of transient ischemic attack (TIA), and cerebral infarction without residual deficits, and Unspecified atrial fibrillation (Multi) (10/31/2022).    Past Surgical History:  He has a past surgical history that includes Carpal tunnel release (02/15/2016); Cataract extraction (02/15/2016); Hand surgery (02/15/2016); Total knee arthroplasty (05/26/2016); Foot surgery (06/15/2016); Cardiac pacemaker placement (06/08/2017); Cardiac pacemaker placement (06/08/2017); Other surgical history (06/08/2017); Other surgical history (06/08/2017); and Other surgical history (06/08/2017).      Social History:  He reports that he has quit smoking. His smoking use included cigarettes. He has been exposed to tobacco smoke. He has never used smokeless tobacco. He reports that he does not drink alcohol and does not use drugs.    Family History:  Family History   Problem Relation Name Age of Onset    Heart failure Mother      Diabetes Mother          mellitus    Colon cancer Father      Diabetes Sister          mellitus    Coronary artery disease Brother          Allergies:  Pentoxifylline, Other, Amiodarone, Baclofen, Carvedilol, Cilostazol, Flecainide, Quinidine, and Simvastatin    Outpatient  Medications:  Current Outpatient Medications   Medication Instructions    atorvastatin (LIPITOR) 20 mg, oral, Daily    blood sugar diagnostic (OneTouch Ultra Test) strip 1 strip, injection, 2 times daily    budesonide-glycopyr-formoterol (Breztri Aerosphere) 160-9-4.8 mcg/actuation HFA aerosol inhaler Breztri Aerosphere 160-9-4.8 MCG/ACT Inhalation Aerosol   Refills: 0        Start : 31-Oct-2022   Active  5.9 GM Inhaler    clonazePAM (KlonoPIN) 0.5 mg tablet TAKE 1.5 TABLET at Bedtime    finasteride (PROSCAR) 5 mg, oral, Daily, Do not crush, chew, or split.    FreeStyle glucose monitoring kit 1 each, miscellaneous, As needed    glipiZIDE XL (GLUCOTROL XL) 5 mg, oral, Daily    hydrocortisone-iodoquinoL (Dermazene) 1-1 % cream cream APPLY TOPICALLY TWICE DAILY AS NEEDED TO GROIN AREA    ketoconazole (NIZOral) 2 % cream APPLY TWICE DAILY TO GROIN    lancets (OneTouch UltraSoft Lancets) misc test twice a day    lisinopril 5 mg, oral, Daily, As directed    metoprolol succinate XL (TOPROL-XL) 25 mg, oral, Daily, Do not crush or chew.    mv-min/FA/vit K/lutein/zeaxant (PRESERVISION AREDS 2 PLUS MV ORAL) oral    omeprazole (PRILOSEC) 40 mg, oral, Daily before breakfast    OXcarbazepine (Trileptal) 150 mg tablet Take 150 mg every morning and 300 mg every evening    tamsulosin (FLOMAX) 0.4 mg, oral, Daily, Daily before bed    torsemide (DEMADEX) 20 mg, oral, Daily    vitamin B complex/folic acid (B COMPLEX 100 ORAL) as directed Orally    warfarin (COUMADIN) 7.5 mg, oral, Every evening        ROS:  A 14 point review of systems was done and is negative other than as stated in HPI    Vitals:      1/26/2024    11:38 AM 1/26/2024    12:12 PM 2/7/2024     3:59 PM 2/15/2024    12:22 PM 3/21/2024     1:11 PM 4/22/2024     3:39 PM 5/2/2024    10:58 AM   Vitals   Systolic 117 98 140 109 126 140 118   Diastolic 57 86 68 64 70 80 74   Heart Rate 62 63 67 68 86 67 68   Temp 36.6 °C (97.9 °F)         Resp 16 16  16   16   Height (in)    "1.803 m (5' 11\") 1.803 m (5' 11\")  1.803 m (5' 11\") 1.803 m (5' 11\")   Weight (lb)   180 180 180 192 188   BMI   25.1 kg/m2 25.1 kg/m2 25.1 kg/m2 26.78 kg/m2 26.22 kg/m2   BSA (m2)   2.02 m2 2.02 m2 2.02 m2 2.09 m2 2.07 m2        Physical Exam:   Constitutional: Cooperative, in no acute distress, alert, appears stated age.  Skin: Skin color, texture, turgor normal. No rashes or lesions.  Head: Normocephalic. No masses, lesions, tenderness or abnormalities  Eyes: Extraocular movements are grossly intact.  Mouth and throat: Mucous membranes moist  Neck: Neck supple, no carotid bruits, no JVD  Respiratory: Lungs clear to auscultation, no wheezing or rhonchi, no use of accessory muscles  Chest wall: PPM, normal excursion with respiration  Cardiovascular: Regular rhythm without murmur  Gastrointestinal: Abdomen soft, nontender. Bowel sounds normal.  Musculoskeletal: Strength equal in upper extremities  Extremities:  1+ pitting edema extending to the mid calf.  Neurologic: Sensation grossly intact, alert and oriented x3    Intake/Output:   No intake/output data recorded.    Outpatient Medications  Current Outpatient Medications on File Prior to Visit   Medication Sig Dispense Refill    atorvastatin (Lipitor) 20 mg tablet Take 1 tablet (20 mg) by mouth once daily. 90 tablet 1    blood sugar diagnostic (OneTouch Ultra Test) strip Inject 1 strip as directed 2 times a day. 90 strip 2    budesonide-glycopyr-formoterol (Breztri Aerosphere) 160-9-4.8 mcg/actuation HFA aerosol inhaler Breztri Aerosphere 160-9-4.8 MCG/ACT Inhalation Aerosol   Refills: 0        Start : 31-Oct-2022   Active  5.9 GM Inhaler      clonazePAM (KlonoPIN) 0.5 mg tablet TAKE 1.5 TABLET at Bedtime 45 tablet 1    finasteride (Proscar) 5 mg tablet Take 1 tablet (5 mg) by mouth once daily. Do not crush, chew, or split. 30 tablet 2    FreeStyle glucose monitoring kit 1 each if needed.      glipiZIDE XL (Glucotrol XL) 2.5 mg 24 hr tablet Take 2 tablets (5 mg) by " mouth once daily. 60 tablet 11    hydrocortisone-iodoquinoL (Dermazene) 1-1 % cream cream APPLY TOPICALLY TWICE DAILY AS NEEDED TO GROIN AREA      ketoconazole (NIZOral) 2 % cream APPLY TWICE DAILY TO GROIN      lancets (OneTouch UltraSoft Lancets) misc test twice a day 90 each 2    lisinopril 5 mg tablet Take 1 tablet (5 mg) by mouth once daily. As directed 90 tablet 1    metoprolol succinate XL (Toprol-XL) 25 mg 24 hr tablet Take 1 tablet (25 mg) by mouth once daily. Do not crush or chew. 90 tablet 3    mv-min/FA/vit K/lutein/zeaxant (PRESERVISION AREDS 2 PLUS MV ORAL) Take by mouth.      omeprazole (PriLOSEC) 40 mg DR capsule Take 1 capsule (40 mg) by mouth once daily in the morning. Take before meals. 30 capsule 11    OXcarbazepine (Trileptal) 150 mg tablet Take 150 mg every morning and 300 mg every evening 270 tablet 1    tamsulosin (Flomax) 0.4 mg 24 hr capsule Take 1 capsule (0.4 mg) by mouth once daily. Daily before bed 90 capsule 3    torsemide (Demadex) 20 mg tablet Take 1 tablet (20 mg) by mouth once daily. 30 tablet 3    vitamin B complex/folic acid (B COMPLEX 100 ORAL) as directed Orally      warfarin (Coumadin) 5 mg tablet Take 1.5 tablets (7.5 mg) by mouth once daily in the evening. 180 tablet 2     No current facility-administered medications on file prior to visit.       Labs: (past 26 weeks)  Recent Results (from the past 4368 hour(s))   Protime-INR    Collection Time: 12/05/23 12:00 AM   Result Value Ref Range    Protime External  seconds    INR External 1.90    Protime-INR    Collection Time: 12/13/23 12:00 AM   Result Value Ref Range    Protime External  seconds    INR External 1.80    Protime-INR    Collection Time: 12/19/23 12:00 AM   Result Value Ref Range    Protime External  seconds    INR External 2.30    Protime-INR    Collection Time: 01/02/24 12:00 AM   Result Value Ref Range    Protime External  seconds    INR External 2.70    Protime-INR    Collection Time: 01/16/24 12:00 AM   Result  Value Ref Range    Protime External  seconds    INR External 1.70    Protime-INR    Collection Time: 01/30/24 12:00 AM   Result Value Ref Range    Protime External  seconds    INR External 2.10    B-Type Natriuretic Peptide    Collection Time: 01/31/24  9:30 AM   Result Value Ref Range    BNP 52 0 - 99 pg/mL   CBC    Collection Time: 01/31/24  9:30 AM   Result Value Ref Range    WBC 8.3 4.4 - 11.3 x10*3/uL    nRBC 0.0 0.0 - 0.0 /100 WBCs    RBC 4.44 (L) 4.50 - 5.90 x10*6/uL    Hemoglobin 14.3 13.5 - 17.5 g/dL    Hematocrit 43.9 41.0 - 52.0 %    MCV 99 80 - 100 fL    MCH 32.2 26.0 - 34.0 pg    MCHC 32.6 32.0 - 36.0 g/dL    RDW 13.3 11.5 - 14.5 %    Platelets 194 150 - 450 x10*3/uL   Comprehensive Metabolic Panel    Collection Time: 01/31/24  9:30 AM   Result Value Ref Range    Glucose 159 (H) 74 - 99 mg/dL    Sodium 140 136 - 145 mmol/L    Potassium 4.8 3.5 - 5.3 mmol/L    Chloride 104 98 - 107 mmol/L    Bicarbonate 27 21 - 32 mmol/L    Anion Gap 14 10 - 20 mmol/L    Urea Nitrogen 50 (H) 6 - 23 mg/dL    Creatinine 2.05 (H) 0.50 - 1.30 mg/dL    eGFR 30 (L) >60 mL/min/1.73m*2    Calcium 9.3 8.6 - 10.3 mg/dL    Albumin 4.2 3.4 - 5.0 g/dL    Alkaline Phosphatase 71 33 - 136 U/L    Total Protein 7.2 6.4 - 8.2 g/dL    AST 22 9 - 39 U/L    Bilirubin, Total 0.5 0.0 - 1.2 mg/dL    ALT 24 10 - 52 U/L   Lipid Panel    Collection Time: 01/31/24  9:30 AM   Result Value Ref Range    Cholesterol 152 0 - 199 mg/dL    HDL-Cholesterol 33.5 mg/dL    Cholesterol/HDL Ratio 4.5     LDL Calculated 94 <=99 mg/dL    VLDL 25 0 - 40 mg/dL    Triglycerides 124 0 - 149 mg/dL    Non HDL Cholesterol 119 0 - 149 mg/dL   Magnesium    Collection Time: 01/31/24  9:30 AM   Result Value Ref Range    Magnesium 1.93 1.60 - 2.40 mg/dL   Protime-INR    Collection Time: 02/06/24 12:00 AM   Result Value Ref Range    Protime External  seconds    INR External 1.80    Protime-INR    Collection Time: 02/13/24 12:00 AM   Result Value Ref Range    Protime External   seconds    INR External 2.30    Protime-INR    Collection Time: 02/20/24 12:00 AM   Result Value Ref Range    Protime External  seconds    INR External 2.30    Protime-INR    Collection Time: 02/27/24 12:00 AM   Result Value Ref Range    Protime External  seconds    INR External 2.30    Protime-INR    Collection Time: 03/05/24 12:00 AM   Result Value Ref Range    Protime External  seconds    INR External 2.10    Protime-INR    Collection Time: 03/12/24 12:00 AM   Result Value Ref Range    Protime External  seconds    INR External 2.10    Protime-INR    Collection Time: 03/19/24 12:00 AM   Result Value Ref Range    Protime External  seconds    INR External 1.80    Protime-INR    Collection Time: 03/26/24 12:00 AM   Result Value Ref Range    Protime External  seconds    INR External 1.90    Protime-INR    Collection Time: 04/02/24 12:00 AM   Result Value Ref Range    Protime External  seconds    INR External 2.40    Protime-INR    Collection Time: 04/09/24 12:00 AM   Result Value Ref Range    Protime External  seconds    INR External 1.80    Protime-INR    Collection Time: 04/16/24 12:00 AM   Result Value Ref Range    Protime External  seconds    INR External 2.00    Basic metabolic panel    Collection Time: 04/20/24  8:56 AM   Result Value Ref Range    Glucose 161 (H) 74 - 99 mg/dL    Sodium 141 136 - 145 mmol/L    Potassium 4.5 3.5 - 5.3 mmol/L    Chloride 105 98 - 107 mmol/L    Bicarbonate 26 21 - 32 mmol/L    Anion Gap 15 10 - 20 mmol/L    Urea Nitrogen 50 (H) 6 - 23 mg/dL    Creatinine 1.88 (H) 0.50 - 1.30 mg/dL    eGFR 34 (L) >60 mL/min/1.73m*2    Calcium 9.3 8.6 - 10.3 mg/dL   B-Type Natriuretic Peptide    Collection Time: 04/20/24  8:56 AM   Result Value Ref Range    BNP 76 0 - 99 pg/mL   Magnesium    Collection Time: 04/20/24  8:56 AM   Result Value Ref Range    Magnesium 1.86 1.60 - 2.40 mg/dL   Protime-INR    Collection Time: 04/23/24 12:00 AM   Result Value Ref Range    Protime External  seconds    INR  External 1.80    Protime-INR    Collection Time: 04/30/24 12:00 AM   Result Value Ref Range    Protime External  seconds    INR External 2.30    Vitamin B12    Collection Time: 04/30/24 10:52 AM   Result Value Ref Range    Vitamin B12 634 211 - 911 pg/mL   Ferritin    Collection Time: 04/30/24 10:52 AM   Result Value Ref Range    Ferritin 187 20 - 300 ng/mL   Basic Metabolic Panel    Collection Time: 04/30/24 10:52 AM   Result Value Ref Range    Glucose 156 (H) 74 - 99 mg/dL    Sodium 140 136 - 145 mmol/L    Potassium 4.5 3.5 - 5.3 mmol/L    Chloride 105 98 - 107 mmol/L    Bicarbonate 27 21 - 32 mmol/L    Anion Gap 13 10 - 20 mmol/L    Urea Nitrogen 44 (H) 6 - 23 mg/dL    Creatinine 1.71 (H) 0.50 - 1.30 mg/dL    eGFR 38 (L) >60 mL/min/1.73m*2    Calcium 9.1 8.6 - 10.3 mg/dL   B-Type Natriuretic Peptide    Collection Time: 04/30/24 10:52 AM   Result Value Ref Range    BNP 98 0 - 99 pg/mL   Magnesium    Collection Time: 04/30/24 10:52 AM   Result Value Ref Range    Magnesium 1.79 1.60 - 2.40 mg/dL   Protime-INR    Collection Time: 05/07/24 12:00 AM   Result Value Ref Range    Protime External  seconds    INR External 2.20    Protime-INR    Collection Time: 05/14/24 12:00 AM   Result Value Ref Range    Protime External  seconds    INR External 1.90    Protime-INR    Collection Time: 05/21/24 12:00 AM   Result Value Ref Range    Protime External  seconds    INR External 2.40    Protime-INR    Collection Time: 05/28/24 12:00 AM   Result Value Ref Range    Protime External  seconds    INR External 2.50        ECG  Encounter Date: 04/22/24   ECG 12 Lead    Narrative    Ventricularly paced rhythm with heart rate of 67 bpm.       Echocardiogram  No results found for this or any previous visit from the past 1095 days.      CV Studies:  EKG:  Encounter Date: 04/22/24   ECG 12 Lead    Narrative    Ventricularly paced rhythm with heart rate of 67 bpm.     Echocardiogram:   Echocardiogram     Narrative  [facility]  [place] [id number]  Phone [phone] Fax [phone]    TRANSTHORACIC ECHOCARDIOGRAM REPORT      Patient Name:     LESIA CARNEY NILAMKY Reading Physician:   [id number] Sudeep Blackwood MD  Study Date:       10/29/2021         Referring Physician: [id number] MELENDEZ  MRN/PID:          [id number]           PCP:  Accession/Order#: [id number]       [place] [facility] [facility]  YOB: 1933          Fellow:  Gender:           M                  Nurse:  Admit Date:       10/29/2021         Sonographer:         Raven Elias [facility]  Admission Status: Outpatient         Additional Staff:  Height:           180.34 cm          CC Report to:  Weight:           79.83 kg           Study Type:          Echocardiogram  BSA:              2.00 m2  Blood Pressure: 166 /102 mmHg    Diagnosis/ICD: R06.02-Shortness of breath  Indication:    Dyspnea on Exertion  Procedure/CPT: Echo Complete w Full [id number]    Patient History:  Pacer/Defib:       Permanent pacemaker  Pertinent History: Dyspnea.    Study Detail: The following Echo studies were performed: 2D, M-Mode, Doppler and  color flow. Technically challenging study due to prominent lung  artifact.      PHYSICIAN INTERPRETATION:  Left Ventricle: The left ventricular systolic function is normal. There are no regional wall motion abnormalities. The left ventricular cavity size is normal. There is mild left ventricular hypertrophy. Abnormal (paradoxical) septal motion, consistent with RV pacemaker. Spectral Doppler shows an impaired relaxation pattern of left ventricular diastolic filling.  Left Atrium: The left atrium is normal in size.  Right Ventricle: The right ventricle is normal in size. There is normal right ventricular global systolic function. A device is visualized in the right ventricle.  Right Atrium: The right atrium is normal in size.  Aortic Valve: The aortic valve appears structurally normal. There is no evidence  of aortic valve regurgitation. The peak instantaneous gradient of the aortic valve is 11.7 mmHg. The mean gradient of the aortic valve is 6.0 mmHg.  Mitral Valve: The mitral valve is normal in structure. There is no evidence of mitral valve regurgitation.  Tricuspid Valve: The tricuspid valve is structurally normal. There is trace tricuspid regurgitation.  Pulmonic Valve: The pulmonic valve is structurally normal. There is no indication of pulmonic valve regurgitation.  Pericardium: There is no pericardial effusion noted.  Aorta: The aortic root is normal.      CONCLUSIONS:  1. The left ventricular systolic function is normal.  2. Abnormal septal motion consistent with RV pacemaker.  3. Spectral Doppler shows an impaired relaxation pattern of left ventricular diastolic filling.    QUANTITATIVE DATA SUMMARY:  2D MEASUREMENTS:  Normal Ranges:  Ao Root d:     3.40 cm    (2.0-3.7cm)  IVSd:          1.25 cm    (0.6-1.1cm)  LVPWd:         1.33 cm    (0.6-1.1cm)  LVIDd:         4.47 cm    (3.9-5.9cm)  LVIDs:         3.56 cm  LV Mass Index: 109.1 g/m2  LV % FS        20.4 %    LA VOLUME:  Normal Ranges:  LA Vol A4C:        51.1 ml    (22+/-6mL/m2)  LA Vol A2C:        56.7 ml  LA Vol BP:         53.8 ml  LA Vol Index A4C:  25.6ml/m2  LA Vol Index A2C:  28.4 ml/m2  LA Vol Index BP:   27.0 ml/m2  LA Area A4C:       19.0 cm2  LA Area A2C:       20.0 cm2  LA Major Axis A4C: 6.0 cm  LA Major Axis A2C: 6.0 cm  LA Volume Index:   27.0 ml/m2    RA VOLUME BY A/L METHOD:  Normal Ranges:  RA Area A4C: 17.0 cm2    M-MODE MEASUREMENTS:  Normal Ranges:  Ao Root: 3.30 cm (2.0-3.7cm)  AoV Exc: 1.90 cm (1.5-2.5cm)  LAs:     2.80 cm (2.7-4.0cm)    AORTA MEASUREMENTS:  Normal Ranges:  AoV Exc:   1.90 cm (1.5-2.5cm)  Asc Ao, d: 3.30 cm (2.1-3.4cm)    LV SYSTOLIC FUNCTION BY 2D PLANIMETRY (MOD):  Normal Ranges:  EF-A4C View: 61.7 % (>55%)  EF-A2C View: 62.5 %  EF-Biplane:  61.1 %    LV DIASTOLIC FUNCTION:  Normal Ranges:  MV Peak E:    0.81 m/s    (0.7-1.2 m/s)  MV Peak A:    0.44 m/s   (0.42-0.7 m/s)  E/A Ratio:    1.86       (1.0-2.2)  MV e'         0.10 m/s   (>8.0)  MV lateral e' 0.10 m/s  MV medial e'  0.09 m/s  MV A Dur:     95.00 msec  E/e' Ratio:   8.57       (<8.0)  LV IVRT:      100 msec   (<100ms)    MITRAL VALVE:  Normal Ranges:  MV DT: 135 msec (150-240msec)    AORTIC VALVE:  Normal Ranges:  AoV Vmax:                1.71 m/s  (<1.7m/s)  AoV Peak P.7 mmHg (<20mmHg)  AoV Mean P.0 mmHg  (1.7-11.5mmHg)  LVOT Max King:            0.84 m/s  (<1.1m/s)  AoV VTI:                 33.00 cm  (18-25cm)  LVOT VTI:                17.80 cm  LVOT Diameter:           2.00 cm   (1.8-2.4cm)  AoV Area, VTI:           1.69 cm2  (2.5-5.5cm2)  AoV Area,Vmax:           1.54 cm2  (2.5-4.5cm2)  AoV Area, planim:        2.23 cm2  (2.5-4.5cm2)  AoV Dimensionless Index: 0.54    RIGHT VENTRICLE:  RV 1   3.5 cm  RV 2   3.1 cm  RV 3   6.0 cm  TAPSE: 16.1 mm  RV s'  0.13 m/s    TRICUSPID VALVE/RVSP:  Normal Ranges:  Peak TR Velocity: 2.70 m/s  RV Syst Pressure: 32.2 mmHg (< 30mmHg)  IVC Diam:         1.45 cm      [id number] Sudeep Blackwood MD  Electronically signed on 10/30/2021 at 10:29:19 AM         Final     Stress Testing IMGRESULT([id number]:1:1825):   NM CARDIAC STRESS REST (MYOCARDIAL PERFUSION MIBI) 10/29/2021    Narrative  Addendum Begins  MRN: [id number]  Patient Name: LESIA PEÑA    ADDENDUM:  Please disregard the prior report as it was signed in error.    STUDY:  CARDIAC STRESS/REST INJECTION; CARDIAC STRESS/REST (MYOCARDIAL  PERFUSION/MIBI); PART 2 STRESS OR REST (NO CHARGE); 10/29/2021 10:36  am    INDICATION:  SOBOE  R06.02: SOBOE (shortness of breath on exertion).    COMPARISON:  None.    ACCESSION NUMBER(S):  [id number]; [id number]; [id number]    ORDERING CLINICIAN:  GRISELDA MELENDEZ    TECHNIQUE:  DIVISION OF NUCLEAR MEDICINE  PHARMACOLOGIC STRESS MYOCARDIAL PERFUSION SCAN, ONE DAY PROTOCOL    The patient received an intravenous dose of 11.6 mCi of  Tc-99m  tetrofosmin and resting emission tomographic (SPECT) images of the  myocardium were acquired. The patient then received an intravenous  infusion of 0.4mg regadenoson (Lexiscan) followed by an additional  dose of 35.4 mCi of Tc-99m tetrofosmin. Stress phase SPECT images of  the myocardium were then acquired. These included ECG-gated images to  assess and quantify ventricular function.    FINDINGS:  Stress and rest images both demonstrate a normal distribution of  perfusion throughout all LV segments with no sign of ischemia.    ECG-gated images demonstrate normal LV size and myocardial  contractility with an LV ejection fraction of  63 % (normal above 50  percent).    Impression  1. Normal stress myocardial perfusion imaging in response to  pharmacologic stress.  2. Well-maintained left ventricular function.    Addendum Ends  MRN: [id number]  Patient Name: LESIA PEÑA    STUDY:  CARDIAC STRESS/REST INJECTION; PART 2 STRESS OR REST (NO CHARGE);  CARDIAC STRESS/REST (MYOCARDIAL PERFUSION/MIBI);  10/29/2021 10:36 am    INDICATION:  SOBOE  R06.02: SOBOE (shortness of breath on exertion).    COMPARISON:  None.    ACCESSION NUMBER(S):  [id number]; [id number]; [id number]    ORDERING CLINICIAN:  GRISELDA MELENDEZ    TECHNIQUE:  DIVISION OF NUCLEAR MEDICINE  PHARMACOLOGIC STRESS MYOCARDIAL PERFUSION SCAN, ONE DAY PROTOCOL    The patient received an intravenous dose of  11.4 mCi of Tc-99m  tetrofosmin and resting emission tomographic (SPECT) images of the  myocardium were acquired. The patient then received an intravenous  infusion of 0.4mg regadenoson (Lexiscan) followed by an additional  dose of  34.2 mCi of Tc-99m tetrofosmin. Stress phase SPECT images of  the myocardium were then acquired. These included ECG-gated images to  assess and quantify ventricular function.    FINDINGS:  Stress and rest images both demonstrate a normal distribution of  perfusion throughout all LV segments with no sign of ischemia.    ECG-gated  images demonstrate normal LV size and myocardial  contractility with an LV ejection fraction of   86 % (normal above 50  percent).    IMPRESSION:  1. Normal stress myocardial perfusion imaging in response to  pharmacologic stress.  2. Well-maintained left ventricular function.    Cardiac Catheterization:   Adult Cath     [facility], Cath Lab  [address] [place] [address]  Phone [phone] Fax [phone]    Cardiovascular Catheterization Report    Patient Name:     LESIA PEÑA Performing Physician: [id number] Blackwood MD  Study Date:       12/9/2019        Verifying Physician:  Cristel Blackwood MD  MRN/PID:          [id number]         Cardiologist:  Accession/Order#: [id number]        Referring Physician:  [id number] Thor Baker MD  YOB: 1933        Referring Physician:  Gender:           M                Referring Physician:      Study: Left and Right Heart Catheterization      Indications:  LESIA PEÑA is a 86 year old male who presents with hypertension and dyslipidemia. Suspected coronary artery disease, cardiomyopathy and left ventricular dysfunction, with a chest pain assessment of atypical angina. Study performed as an elective cath procedure.    Medical History:  Stress test performed: No. CTA performed: No. Agatston accessed: No. LVEF Assessed: Yes.    Procedure Description:  After infiltration with 2% Lidocaine, the right radial artery was cannulated with a modified Seldinger technique. Subsequently a 6 Monegasque sheath was placed in the right radial artery. After infiltration of local anesthetic, the Right Brachial vein was cannulated with a micropuncture technique. A 5 Monegasque sheath was placed in the vein. Selective coronary catheterization was performed using a 6 Fr catheter(s) exchanged over a guide wire to cannulate the coronary arteries.  Additional catheter(s) used to visualize the coronary arteries were: Jacqi. A balloon tipped catheter was  advanced through the right heart to record pressures. Cardiac output was calculated via the Reggie method. After completion of the procedure, the arterial sheath was pulled and a TR Band Radial Compression Device was utilized to obtain patent hemostasis.    Coronary Angiography:  The coronary circulation is right dominant.    Left Main Coronary Artery:  The left main coronary artery is a normal caliber vessel. The left main arises normally from the left coronary sinus of Valsalva and bifurcates into the LAD and circumflex coronary arteries. The left main coronary artery showed no significant disease or stenosis greater than 30%.    Left Anterior Descending Coronary Artery Distribution:  The left anterior descending coronary artery is a normal caliber vessel. The LAD arises normally from the left main coronary artery. The LAD demonstrated mild atherosclerotic disease.    Circumflex Coronary Artery Distribution:  The circumflex coronary artery is a normal caliber vessel. The circumflex arises normally from the left main coronary artery and terminates in the AV groove. The circumflex revealed moderate atherosclerotic disease. The proximal to mid circumflex coronary artery showed 50% stenosis. This lesion was eccentric.    Right Heart Catheterization:  A balloon tipped catheter was advanced through the right heart to record pressures. Cardiac output was calculated via the Reggie method. Elevated left sided filling pressures with normal cardiac output. Elevated ventricular filling pressure. Cardiac output is normal. Preserved cardiac output at rest. No pulmonary vascular resistance.    Right Coronary Artery Distribution:    The right coronary artery is a normal caliber vessel. The RCA arises normally from the right sinus of Valsalva. The RCA showed no significant disease or stenosis greater than 30%.    Coronary Interventions:  Angiography reveals a 50% stenosis of the proximal to mid circumflex and rFR - 1. Percutaneous  coronary intervention was performed within the proximal to mid circumflex and rFR - 1.    Coronary Lesion Summary:  Vessel       Stenosis   Vessel Segment  Circumflex 50% stenosis proximal to mid        Complications:  No in-lab complications observed.    Cardiac Cath Transition of Care Summary:  Post Procedure Diagnosis: Single vessel disease.  Blood Loss:               Estimated blood loss during the procedure was 0 mls.  Specimens Removed:        Number of specimen(s) removed: none.      Recommendations:  Maximize medical therapy.    ____________________________________________________________________________________  CONCLUSIONS:  1. Moderate non-obstructive coronary artery disease.  2. Elevated LV filling pressures.    ____________________________________________________________________________________  CPT Codes:  Right Heart Cath, O2/Cardiac output (RHC)-57589; Coronary Angiography S&I (RHC)([facility])-68726; Moderate Sedation Services initial 15 minutes patient >5 years-17687; Moderate Sedation Services 1st additional 15 minutes patient >5 years-58723; FFR, initial Vessel (PCI)-14965    ICD 10 Codes:  I20.9-Angina pectoris, unspecified Class III; I50.42-Chronic combined systolic (congestive) and diastolic (congestive) heart failure    [id number] Sudeep Blackwood MD  Performing Physician  Electronically signed by [id number] Sudeep Blackwood MD on 12/9/2019 at 3:02:59 PM      cc Report to: [id number] Thor Baker MD           Final   No results found for this or any previous visit from the past 3650 days.     Cardiac Scoring: No results found for this or any previous visit from the past 1825 days.    AAA : No results found for this or any previous visit from the past 1825 days.    OTHER: No results found for this or any previous visit from the past 1825 days.    LAST IMAGING RESULTS  ECG 12 Lead  Ventricularly paced rhythm with heart rate of 67 bpm.      Problem List Items Addressed This Visit       ASHD (arteriosclerotic heart disease)     History of cardiomyopathy    Diabetes mellitus, type 2 (Multi)    Persistent atrial fibrillation with RVR (Multi) - Primary    Hyperlipidemia    Primary hypertension    Stage 3b chronic kidney disease (Multi)    Presence of cardiac pacemaker    Warfarin anticoagulation    (HFpEF) heart failure with preserved ejection fraction (Multi)            Rod Milner DO, CAROLC, FACOI

## 2024-06-03 ENCOUNTER — OFFICE VISIT (OUTPATIENT)
Dept: CARDIOLOGY | Facility: CLINIC | Age: 89
End: 2024-06-03
Payer: MEDICARE

## 2024-06-03 VITALS
HEIGHT: 71 IN | DIASTOLIC BLOOD PRESSURE: 60 MMHG | HEART RATE: 66 BPM | BODY MASS INDEX: 25.62 KG/M2 | WEIGHT: 183 LBS | SYSTOLIC BLOOD PRESSURE: 118 MMHG

## 2024-06-03 DIAGNOSIS — E78.00 PURE HYPERCHOLESTEROLEMIA: ICD-10-CM

## 2024-06-03 DIAGNOSIS — Z86.79 HISTORY OF CARDIOMYOPATHY: ICD-10-CM

## 2024-06-03 DIAGNOSIS — I48.19 PERSISTENT ATRIAL FIBRILLATION WITH RVR (MULTI): Primary | ICD-10-CM

## 2024-06-03 DIAGNOSIS — Z79.01 WARFARIN ANTICOAGULATION: ICD-10-CM

## 2024-06-03 DIAGNOSIS — I42.9 CARDIOMYOPATHY, UNSPECIFIED TYPE (MULTI): ICD-10-CM

## 2024-06-03 DIAGNOSIS — I25.10 ASHD (ARTERIOSCLEROTIC HEART DISEASE): ICD-10-CM

## 2024-06-03 DIAGNOSIS — N18.32 STAGE 3B CHRONIC KIDNEY DISEASE (MULTI): ICD-10-CM

## 2024-06-03 DIAGNOSIS — Z95.0 PRESENCE OF CARDIAC PACEMAKER: ICD-10-CM

## 2024-06-03 DIAGNOSIS — E11.00 TYPE 2 DIABETES MELLITUS WITH HYPEROSMOLARITY WITHOUT COMA, WITHOUT LONG-TERM CURRENT USE OF INSULIN (MULTI): ICD-10-CM

## 2024-06-03 DIAGNOSIS — N18.9 CHRONIC KIDNEY DISEASE, UNSPECIFIED CKD STAGE: ICD-10-CM

## 2024-06-03 DIAGNOSIS — E78.5 DYSLIPIDEMIA: ICD-10-CM

## 2024-06-03 DIAGNOSIS — E11.9 TYPE 2 DIABETES MELLITUS WITHOUT COMPLICATION, WITHOUT LONG-TERM CURRENT USE OF INSULIN (MULTI): ICD-10-CM

## 2024-06-03 DIAGNOSIS — I10 PRIMARY HYPERTENSION: ICD-10-CM

## 2024-06-03 DIAGNOSIS — I50.32 CHRONIC HEART FAILURE WITH PRESERVED EJECTION FRACTION (MULTI): ICD-10-CM

## 2024-06-03 PROCEDURE — 3074F SYST BP LT 130 MM HG: CPT | Performed by: INTERNAL MEDICINE

## 2024-06-03 PROCEDURE — 93000 ELECTROCARDIOGRAM COMPLETE: CPT | Performed by: INTERNAL MEDICINE

## 2024-06-03 PROCEDURE — 99214 OFFICE O/P EST MOD 30 MIN: CPT | Performed by: INTERNAL MEDICINE

## 2024-06-03 PROCEDURE — 1036F TOBACCO NON-USER: CPT | Performed by: INTERNAL MEDICINE

## 2024-06-03 PROCEDURE — 1157F ADVNC CARE PLAN IN RCRD: CPT | Performed by: INTERNAL MEDICINE

## 2024-06-03 PROCEDURE — 3078F DIAST BP <80 MM HG: CPT | Performed by: INTERNAL MEDICINE

## 2024-06-03 PROCEDURE — 1159F MED LIST DOCD IN RCRD: CPT | Performed by: INTERNAL MEDICINE

## 2024-06-04 ENCOUNTER — ANTICOAGULATION - WARFARIN VISIT (OUTPATIENT)
Dept: PRIMARY CARE | Facility: CLINIC | Age: 89
End: 2024-06-04
Payer: MEDICARE

## 2024-06-04 NOTE — PROGRESS NOTES
per Charanjit patient is to continue the current dose and recheck in a week, patients wife notified

## 2024-06-05 ENCOUNTER — LAB (OUTPATIENT)
Dept: LAB | Facility: LAB | Age: 89
End: 2024-06-05
Payer: MEDICARE

## 2024-06-05 DIAGNOSIS — N18.32 CHRONIC KIDNEY DISEASE, STAGE 3B (MULTI): ICD-10-CM

## 2024-06-05 DIAGNOSIS — N18.32 CHRONIC KIDNEY DISEASE, STAGE 3B (MULTI): Primary | ICD-10-CM

## 2024-06-05 LAB
ANION GAP SERPL CALC-SCNC: 14 MMOL/L (ref 10–20)
APPEARANCE UR: CLEAR
BILIRUB UR STRIP.AUTO-MCNC: NEGATIVE MG/DL
BUN SERPL-MCNC: 54 MG/DL (ref 6–23)
CALCIUM SERPL-MCNC: 9.2 MG/DL (ref 8.6–10.3)
CHLORIDE SERPL-SCNC: 102 MMOL/L (ref 98–107)
CO2 SERPL-SCNC: 27 MMOL/L (ref 21–32)
COLOR UR: NORMAL
CREAT SERPL-MCNC: 1.95 MG/DL (ref 0.5–1.3)
CREAT UR-MCNC: 57 MG/DL (ref 20–370)
EGFRCR SERPLBLD CKD-EPI 2021: 32 ML/MIN/1.73M*2
GLUCOSE SERPL-MCNC: 157 MG/DL (ref 74–99)
GLUCOSE UR STRIP.AUTO-MCNC: NORMAL MG/DL
KETONES UR STRIP.AUTO-MCNC: NEGATIVE MG/DL
LEUKOCYTE ESTERASE UR QL STRIP.AUTO: NEGATIVE
NITRITE UR QL STRIP.AUTO: NEGATIVE
PH UR STRIP.AUTO: 5 [PH]
POTASSIUM SERPL-SCNC: 4.6 MMOL/L (ref 3.5–5.3)
PROT UR STRIP.AUTO-MCNC: NEGATIVE MG/DL
PROT UR-ACNC: 4 MG/DL (ref 5–25)
PROT/CREAT UR: 0.07 MG/MG CREAT (ref 0–0.17)
RBC # UR STRIP.AUTO: NEGATIVE /UL
SODIUM SERPL-SCNC: 138 MMOL/L (ref 136–145)
SP GR UR STRIP.AUTO: 1.01
UROBILINOGEN UR STRIP.AUTO-MCNC: NORMAL MG/DL

## 2024-06-05 PROCEDURE — 36415 COLL VENOUS BLD VENIPUNCTURE: CPT

## 2024-06-05 PROCEDURE — 84156 ASSAY OF PROTEIN URINE: CPT

## 2024-06-05 PROCEDURE — 82570 ASSAY OF URINE CREATININE: CPT

## 2024-06-05 PROCEDURE — 80048 BASIC METABOLIC PNL TOTAL CA: CPT

## 2024-06-05 PROCEDURE — 81003 URINALYSIS AUTO W/O SCOPE: CPT

## 2024-06-11 ENCOUNTER — ANTICOAGULATION - WARFARIN VISIT (OUTPATIENT)
Dept: PRIMARY CARE | Facility: CLINIC | Age: 89
End: 2024-06-11
Payer: MEDICARE

## 2024-06-17 ENCOUNTER — TELEPHONE (OUTPATIENT)
Dept: NEUROLOGY | Facility: HOSPITAL | Age: 89
End: 2024-06-17
Payer: MEDICARE

## 2024-06-17 DIAGNOSIS — G25.81 RESTLESS LEGS SYNDROME: ICD-10-CM

## 2024-06-17 RX ORDER — CLONAZEPAM 0.5 MG/1
TABLET ORAL
Qty: 45 TABLET | Refills: 0 | Status: SHIPPED | OUTPATIENT
Start: 2024-06-17

## 2024-06-17 NOTE — TELEPHONE ENCOUNTER
RX Refill   Clonazepam (Klonopin) 0.5 mg   Take 1.5 mg at bedtime daily   [facility]   Thank You

## 2024-06-18 ENCOUNTER — ANTICOAGULATION - WARFARIN VISIT (OUTPATIENT)
Dept: PRIMARY CARE | Facility: CLINIC | Age: 89
End: 2024-06-18
Payer: MEDICARE

## 2024-06-18 ENCOUNTER — HOSPITAL ENCOUNTER (OUTPATIENT)
Dept: CARDIOLOGY | Facility: HOSPITAL | Age: 89
Discharge: HOME | End: 2024-06-18
Payer: MEDICARE

## 2024-06-18 DIAGNOSIS — I44.2 ATRIOVENTRICULAR BLOCK, COMPLETE (MULTI): ICD-10-CM

## 2024-06-18 DIAGNOSIS — Z95.0 PRESENCE OF CARDIAC PACEMAKER: Primary | ICD-10-CM

## 2024-06-18 DIAGNOSIS — Z95.0 PRESENCE OF CARDIAC PACEMAKER: ICD-10-CM

## 2024-06-18 PROCEDURE — 93296 REM INTERROG EVL PM/IDS: CPT

## 2024-06-19 ENCOUNTER — TELEPHONE (OUTPATIENT)
Dept: CARDIOLOGY | Facility: CLINIC | Age: 89
End: 2024-06-19
Payer: MEDICARE

## 2024-06-19 NOTE — TELEPHONE ENCOUNTER
6/19/24  1619  Noted.  Patient with a K+ WNL with last BMP.       ----- Message from Shwetha Mann RN sent at 6/18/2024  2:44 PM EDT -----  Regarding: BP- orange- recurrent NSVT per remote  1 NSVT @161 bpm, 3 sec (occurred 4.6.24 @07:12), EGM- probable NSVT 13 beats.  Unable to contact patient at this time (LMOM for return call).   Thx.

## 2024-06-21 ENCOUNTER — HOSPITAL ENCOUNTER (OUTPATIENT)
Dept: CARDIOLOGY | Facility: HOSPITAL | Age: 89
Discharge: HOME | End: 2024-06-21
Payer: MEDICARE

## 2024-06-21 DIAGNOSIS — Z79.01 WARFARIN ANTICOAGULATION: ICD-10-CM

## 2024-06-21 DIAGNOSIS — E78.5 DYSLIPIDEMIA: ICD-10-CM

## 2024-06-21 DIAGNOSIS — N18.9 CHRONIC KIDNEY DISEASE, UNSPECIFIED CKD STAGE: ICD-10-CM

## 2024-06-21 DIAGNOSIS — I10 PRIMARY HYPERTENSION: ICD-10-CM

## 2024-06-21 DIAGNOSIS — I42.9 CARDIOMYOPATHY, UNSPECIFIED TYPE (MULTI): ICD-10-CM

## 2024-06-21 DIAGNOSIS — N18.32 STAGE 3B CHRONIC KIDNEY DISEASE (MULTI): ICD-10-CM

## 2024-06-21 DIAGNOSIS — I25.10 ASHD (ARTERIOSCLEROTIC HEART DISEASE): ICD-10-CM

## 2024-06-21 DIAGNOSIS — E11.9 TYPE 2 DIABETES MELLITUS WITHOUT COMPLICATION, WITHOUT LONG-TERM CURRENT USE OF INSULIN (MULTI): ICD-10-CM

## 2024-06-21 DIAGNOSIS — E11.00 TYPE 2 DIABETES MELLITUS WITH HYPEROSMOLARITY WITHOUT COMA, WITHOUT LONG-TERM CURRENT USE OF INSULIN (MULTI): ICD-10-CM

## 2024-06-21 DIAGNOSIS — Z86.79 HISTORY OF CARDIOMYOPATHY: ICD-10-CM

## 2024-06-21 DIAGNOSIS — I50.32 CHRONIC HEART FAILURE WITH PRESERVED EJECTION FRACTION (MULTI): ICD-10-CM

## 2024-06-21 DIAGNOSIS — I48.19 PERSISTENT ATRIAL FIBRILLATION WITH RVR (MULTI): ICD-10-CM

## 2024-06-21 DIAGNOSIS — E78.00 PURE HYPERCHOLESTEROLEMIA: ICD-10-CM

## 2024-06-21 DIAGNOSIS — Z95.0 PRESENCE OF CARDIAC PACEMAKER: ICD-10-CM

## 2024-06-21 PROCEDURE — 93306 TTE W/DOPPLER COMPLETE: CPT | Performed by: INTERNAL MEDICINE

## 2024-06-21 PROCEDURE — 93306 TTE W/DOPPLER COMPLETE: CPT

## 2024-06-22 LAB
AORTIC VALVE MEAN GRADIENT: 5 MMHG
AORTIC VALVE PEAK VELOCITY: 1.66 M/S
AV PEAK GRADIENT: 11 MMHG
AVA (PEAK VEL): 2.14 CM2
AVA (VTI): 2.12 CM2
EJECTION FRACTION APICAL 4 CHAMBER: 62.4
LEFT ATRIUM VOLUME AREA LENGTH INDEX BSA: 13.8 ML/M2
LEFT VENTRICLE INTERNAL DIMENSION DIASTOLE: 4.97 CM (ref 3.5–6)
LEFT VENTRICULAR OUTFLOW TRACT DIAMETER: 1.97 CM
LV EJECTION FRACTION BIPLANE: 62 %
RIGHT VENTRICLE FREE WALL PEAK S': 15 CM/S
RIGHT VENTRICLE PEAK SYSTOLIC PRESSURE: 46.2 MMHG
TRICUSPID ANNULAR PLANE SYSTOLIC EXCURSION: 2.9 CM

## 2024-06-24 ENCOUNTER — TELEPHONE (OUTPATIENT)
Dept: CARDIOLOGY | Facility: CLINIC | Age: 89
End: 2024-06-24
Payer: MEDICARE

## 2024-06-24 NOTE — TELEPHONE ENCOUNTER
Echo results were reviewed by Dr. Milner and called to the patient. Patient OK to follow up in September as scheduled or sooner if necessary.

## 2024-06-25 ENCOUNTER — ANTICOAGULATION - WARFARIN VISIT (OUTPATIENT)
Dept: PRIMARY CARE | Facility: CLINIC | Age: 89
End: 2024-06-25
Payer: MEDICARE

## 2024-06-25 ENCOUNTER — TELEPHONE (OUTPATIENT)
Dept: CARDIOLOGY | Facility: CLINIC | Age: 89
End: 2024-06-25
Payer: MEDICARE

## 2024-06-25 NOTE — TELEPHONE ENCOUNTER
6/25/24  1017  Called results to wife of patient with wife verbalizing understanding.      ----- Message from Rod Milner DO sent at 6/23/2024  9:04 AM EDT -----  Normal left ventricular systolic function with an ejection fraction of 62%, mild aortic valve stenosis with a mean pressure gradient of 5.0 mmHg and a dimensionless index is 0.69.

## 2024-06-27 ENCOUNTER — APPOINTMENT (OUTPATIENT)
Dept: VASCULAR SURGERY | Facility: CLINIC | Age: 89
End: 2024-06-27
Payer: MEDICARE

## 2024-06-27 VITALS
BODY MASS INDEX: 25.1 KG/M2 | SYSTOLIC BLOOD PRESSURE: 101 MMHG | DIASTOLIC BLOOD PRESSURE: 61 MMHG | WEIGHT: 180 LBS | HEART RATE: 61 BPM

## 2024-06-27 DIAGNOSIS — I73.9 PAD (PERIPHERAL ARTERY DISEASE) (CMS-HCC): Primary | ICD-10-CM

## 2024-06-27 PROCEDURE — 99213 OFFICE O/P EST LOW 20 MIN: CPT | Performed by: SURGERY

## 2024-06-27 PROCEDURE — 3074F SYST BP LT 130 MM HG: CPT | Performed by: SURGERY

## 2024-06-27 PROCEDURE — 1157F ADVNC CARE PLAN IN RCRD: CPT | Performed by: SURGERY

## 2024-06-27 PROCEDURE — 3078F DIAST BP <80 MM HG: CPT | Performed by: SURGERY

## 2024-06-27 PROCEDURE — 1036F TOBACCO NON-USER: CPT | Performed by: SURGERY

## 2024-06-27 NOTE — PROGRESS NOTES
Subjective   Patient ID: Michele Maya is a 89 y.o. male who presents for Follow-up (3 mo PAD ).  HPI  Patient is status quo  No leg pain or discomfort at rest however difficult to walk in general  No active ulcer sores noted both lower extremities  No other specific complaints concerns or questions in the office today.    Review of Systems  Review of Systems    Constitutional:  no generalized malaise overall feels well, energy levels intact, no complaints specifically noted  HEENT:  No blurry vision, no visual aides noted, no hearing loss no ear ache no nose bleeds noted, no dysphagia, no congestion otherwise no pertinent positives noted  Cardiovascular:  no palpitations, chest pain or heaviness noted, no leg swelling, no numbness or tingling in the lower extremity noted  Respiratory:  no shortness of breath, no productive cough noted, no conversation dyspnea or difficulty breathing  Gastrointestinal:  no abdominal pain, no nausea or vomiting, appetite intact, no bowel irregularities noted  Genitourinary:   no urinary incontinence, frequency or urgency issues noted, no hematuria or burning sensation issues  Musculoskeletal:  No muscle aches or pains, no joint discomfort noted, no back pain noted otherwise feels well  Skin: no ulcerations, skin color issues or wounds upper or lower extremities  Neurologic: no dizziness, no hemiplegia, no hemiparesis, no obvious visual deficits noted  Psychiatric: no depression, no memory loss noted, no suicidal ideation  Endocrine: no weight loss or gain, no temperature concerns hot or cold intolerance  Hemogolotic/Lymphatic: no bruising, excessive bleeding, no swelling in the groins or neck noted    Objective   Physical Exam  Physical exam    Constitutional: alert and in no acute distress verbal  Eyes: No erythema swelling or discharge noted  Neck: supple, symmetric, trachea midline, no masses noted  Cardiovascular: Carotid pulses 2+, no obvious bruit, no Jugular distension  noted, no thrill, heart regular rate, lower extremity vascular exam intact, cap refill <2 sec  Pulmonary:  Bilateral breath sounds intact, clear with rales rhonchi or wheeze  Abdomen: soft non tender, no pulsatile masses noted, no rebound rigidity or guarding noted  Skin: intact warm no abnormal turgor  Psychiatric: alert without any obvious cognitive issues, oriented to person, place, and time    Assessment/Plan   Peripheral chill disease/stable  Continue activity ambulation is much as possible  No acute clinical changes as of now  Follow-up in 6 months  Call with question concerns or any worsening symptoms.         Richard Burleson DO 06/27/24 3:04 PM

## 2024-07-02 ENCOUNTER — TELEPHONE (OUTPATIENT)
Dept: PRIMARY CARE | Facility: CLINIC | Age: 89
End: 2024-07-02
Payer: MEDICARE

## 2024-07-02 ENCOUNTER — ANTICOAGULATION - WARFARIN VISIT (OUTPATIENT)
Dept: PRIMARY CARE | Facility: CLINIC | Age: 89
End: 2024-07-02
Payer: MEDICARE

## 2024-07-02 DIAGNOSIS — E11.9 TYPE 2 DIABETES MELLITUS WITHOUT COMPLICATION, WITHOUT LONG-TERM CURRENT USE OF INSULIN (MULTI): ICD-10-CM

## 2024-07-02 RX ORDER — BLOOD SUGAR DIAGNOSTIC
1 STRIP MISCELLANEOUS 2 TIMES DAILY
Qty: 90 STRIP | Refills: 3 | Status: SHIPPED | OUTPATIENT
Start: 2024-07-02

## 2024-07-02 NOTE — TELEPHONE ENCOUNTER
One touch ultra test strips.    [facility]     LOV: 5/2/24  NOV: 11/6/24

## 2024-07-03 ENCOUNTER — OFFICE VISIT (OUTPATIENT)
Dept: NEUROLOGY | Facility: HOSPITAL | Age: 89
End: 2024-07-03
Payer: MEDICARE

## 2024-07-03 VITALS
DIASTOLIC BLOOD PRESSURE: 68 MMHG | WEIGHT: 185 LBS | HEART RATE: 64 BPM | SYSTOLIC BLOOD PRESSURE: 119 MMHG | BODY MASS INDEX: 25.8 KG/M2

## 2024-07-03 DIAGNOSIS — E11.42 DIABETIC PERIPHERAL NEUROPATHY (MULTI): ICD-10-CM

## 2024-07-03 DIAGNOSIS — G25.81 RESTLESS LEGS SYNDROME: Primary | ICD-10-CM

## 2024-07-03 PROCEDURE — 3078F DIAST BP <80 MM HG: CPT | Performed by: PSYCHIATRY & NEUROLOGY

## 2024-07-03 PROCEDURE — 3074F SYST BP LT 130 MM HG: CPT | Performed by: PSYCHIATRY & NEUROLOGY

## 2024-07-03 PROCEDURE — 1160F RVW MEDS BY RX/DR IN RCRD: CPT | Performed by: PSYCHIATRY & NEUROLOGY

## 2024-07-03 PROCEDURE — 99214 OFFICE O/P EST MOD 30 MIN: CPT | Performed by: PSYCHIATRY & NEUROLOGY

## 2024-07-03 PROCEDURE — 1157F ADVNC CARE PLAN IN RCRD: CPT | Performed by: PSYCHIATRY & NEUROLOGY

## 2024-07-03 PROCEDURE — 1159F MED LIST DOCD IN RCRD: CPT | Performed by: PSYCHIATRY & NEUROLOGY

## 2024-07-03 PROCEDURE — 1036F TOBACCO NON-USER: CPT | Performed by: PSYCHIATRY & NEUROLOGY

## 2024-07-03 PROCEDURE — 1126F AMNT PAIN NOTED NONE PRSNT: CPT | Performed by: PSYCHIATRY & NEUROLOGY

## 2024-07-03 RX ORDER — CLONAZEPAM 0.5 MG/1
TABLET ORAL
Qty: 45 TABLET | Refills: 2 | Status: SHIPPED | OUTPATIENT
Start: 2024-07-03

## 2024-07-03 RX ORDER — GABAPENTIN 300 MG/1
300 CAPSULE ORAL NIGHTLY
Qty: 30 CAPSULE | Refills: 2 | Status: SHIPPED | OUTPATIENT
Start: 2024-07-03 | End: 2024-10-01

## 2024-07-03 RX ORDER — OXCARBAZEPINE 150 MG/1
TABLET, FILM COATED ORAL
Qty: 270 TABLET | Refills: 1 | Status: SHIPPED | OUTPATIENT
Start: 2024-07-03

## 2024-07-03 ASSESSMENT — ENCOUNTER SYMPTOMS
DEPRESSION: 0
OCCASIONAL FEELINGS OF UNSTEADINESS: 1
LOSS OF SENSATION IN FEET: 1

## 2024-07-03 ASSESSMENT — PAIN SCALES - GENERAL: PAINLEVEL: 0-NO PAIN

## 2024-07-03 NOTE — PATIENT INSTRUCTIONS
Continue /300--erx'd  Continue clonazepam 0.5mg 1.5 tabs at bedtime--erx'd  Add gabapentin 300mg at bedtime--warned about poss SE including risk of sedation and falls--take at 7pm. Advised to call office in ~1 mo, if no SE and if wanting to increase gabapentin, can try increasing (renally dosed)  Discussed need to improve diabetes control    Rtc 6 mo, sooner if needed

## 2024-07-03 NOTE — PROGRESS NOTES
"Follow-up visit    Visit type: Follow-up    PCP: Charanjit Mujica, APRN-CNP.    Subjective     Michele Maya is a 89 y.o. year old male here for follow-up. Last seen 1/3/24.     Patient is accompanied by: spouse.       HPI    I first saw him 4/24/19 for neuropathy. Also has RLS. Prev saw Dr Estrada, then saw Dr Fan. Has had RLS since about 70 years old, legs move at night, has to rearrange feet, prevents him from falling asleep. Legs move as well during sleep. Once asleep, stays asleep. Was on clonazepam 3mg daily. On clonazepam 1mg qhs now, lowered by Dr Fan. Having some trouble falling asleep. Per wife, nighttime movements same even on lower dose of clonazepam. Ropinirole 0.25mg tried caused leg cramps. Pramipexole 0.125mg caused lightheadedness. Has \"very bad neuropathy\" in feet, presumed to be diabetic in origin. Having trouble in calves, muscles are tight in morning. NCT done? States has tried \"everything for neuropathy\". When asked to specify, tried gabapentin, pregabalin, and amitriptyline in past, didn't help. Tried duloxetine didn't help. Has tried \"seizure medications\". Has had back pain. s/p epidural injection and \"didn't work\". On warfarin for afib. CPK/ferritin wnl. Venlafaxine didn't help. Baclofen tried, had behavioral changes, irritable, swearing. Alpha lipoic acid tried didn't help. Horizant never tried due to expense. On B12. I tried to lower clonazepam to 0.25mg qhs, but pt had difficulty and dose increased back to 0.5mg qhs. Horizant 300mg once daily tried thru cash-pay program as well, had more leg movements. Was to continue clonazepam 0.5mg qhs. He was tested for YAYA by PCP and was ordered CPAP. Didn't tolerate. I offered to follow-up for CPAP but pt didn't want to--pt refused YAYA treatment. s/p epidural blocks in the past. Discussed Inspire therapy, not interested. EMG/NCT confirmed neuropathy--pt has tried and failed symptomatic neuropathy treatments in the past and " "didn't want anymore treatment. Had seen vascular surgery, suspected vascular claudication, high risk for contrast-induced nephropathy and instrumentation. He is on clonazepam 0.75mg qhs. Previously discussed about use of oxcarbazepine for neuropathic help, prescribed, but evidently did not try. B12 level wnl. He sees vascular surgery for what was thought to be vascular claudication. I restarted trial of OXC, and last visit recommended increase dose incrementally up to 300mg bid and continue clonazepam 0.5mg 1.5 tabs at bedtime.    Comes in today for follow-up. On wheelchair.    Seems about the same as prior. He still wishes there are more options for him.     I don't have Hba1c numbers, but they tell me [facility] checks it, he is at about 8, and that \"8 is good for older people\".    6/2024 Sodium ok. Crea 1.95.      Patient Active Problem List   Diagnosis    Aortoiliac occlusive disease (Multi)    ASHD (arteriosclerotic heart disease)    Atrial fibrillation (Multi)    BPH (benign prostatic hyperplasia)    Cardiac defibrillator in place    History of cardiomyopathy    Chronic combined systolic and diastolic heart failure, NYHA class 2 (Multi)    Chronic insomnia    Chronic painful diabetic neuropathy (Multi)    Claudication, intermittent (CMS-HCC)    Diabetes mellitus, type 2 (Multi)    Dyslipidemia    YAYA (obstructive sleep apnea)    Persistent atrial fibrillation with RVR (Multi)    PVD (peripheral vascular disease) (CMS-HCC)    Restless legs syndrome    SOBOE (shortness of breath on exertion)    Arthritis    Urinary incontinence    Acquired deformity of toe    Callus of toe    Diabetic polyneuropathy associated with type 2 diabetes mellitus (Multi)    History of malignant neoplasm    Hyperlipidemia    Sensorineural hearing loss, bilateral    Mixed conductive and sensorineural hearing loss of both ears    Onychomycosis    Other idiopathic peripheral autonomic neuropathy    Pain in left shoulder    Shoulder pain    Primary " hypertension    Stage 3b chronic kidney disease (Multi)    Tinea    Vitamin B12 deficiency    Dilated cardiomyopathy (Multi)    Chronic constipation    Neck pain    Chronic atrial fibrillation (Multi)    Presence of cardiac pacemaker    Peripheral vascular disease, unspecified (CMS-formerly [facility])    Medicare annual wellness visit, subsequent    Flu vaccine need    Advance directive in chart    Warfarin anticoagulation    (HFpEF) heart failure with preserved ejection fraction (Multi)    Daytime somnolence    History of atrial fibrillation    History of transient ischemic attack    Sprain of knee    Gastroesophageal reflux disease    Arteriosclerosis of coronary artery    Chronic combined systolic and diastolic heart failure (Multi)    Intermittent claudication (CMS-HCC)    Chronic obstructive pulmonary disease (Multi)    Cough, unspecified    Subacute cough    Sensorineural hearing loss (SNHL) of both ears    Obstructive sleep apnea syndrome    Idiopathic peripheral autonomic neuropathy    Pain of left shoulder region    Hypoxia    History of malignant melanoma of skin       Allergies   Allergen Reactions    Pentoxifylline Rash and Other     Burning blisters    Other Unknown    Amiodarone Diarrhea    Baclofen Unknown    Carvedilol Other     fatigue    Cilostazol Unknown    Flecainide Unknown    Quinidine Unknown    Simvastatin Other     Extreme fatigue       Current Outpatient Medications:     atorvastatin (Lipitor) 20 mg tablet, Take 1 tablet (20 mg) by mouth once daily., Disp: 90 tablet, Rfl: 1    blood sugar diagnostic (OneTouch Ultra Test) strip, Inject 1 strip as directed 2 times a day., Disp: 90 strip, Rfl: 3    budesonide-glycopyr-formoterol (Breztri Aerosphere) 160-9-4.8 mcg/actuation HFA aerosol inhaler, Breztri Aerosphere 160-9-4.8 MCG/ACT Inhalation Aerosol  Refills: 0      Start : 31-Oct-2022  Active 5.9 GM Inhaler, Disp: , Rfl:     clonazePAM (KlonoPIN) 0.5 mg tablet, TAKE 1.5 TABLET at Bedtime, Disp: 45 tablet,  Rfl: 0    empagliflozin (Jardiance) 10 mg, Take 1 tablet (10 mg) by mouth once daily., Disp: 30 tablet, Rfl: 11    finasteride (Proscar) 5 mg tablet, Take 1 tablet (5 mg) by mouth once daily. Do not crush, chew, or split., Disp: 30 tablet, Rfl: 2    FreeStyle glucose monitoring kit, 1 each if needed., Disp: , Rfl:     glipiZIDE XL (Glucotrol XL) 2.5 mg 24 hr tablet, Take 2 tablets (5 mg) by mouth once daily., Disp: 60 tablet, Rfl: 11    lancets (OneTouch UltraSoft Lancets) misc, test twice a day, Disp: 90 each, Rfl: 2    lisinopril 5 mg tablet, Take 1 tablet (5 mg) by mouth once daily. As directed, Disp: 90 tablet, Rfl: 1    metoprolol succinate XL (Toprol-XL) 25 mg 24 hr tablet, Take 1 tablet (25 mg) by mouth once daily. Do not crush or chew., Disp: 90 tablet, Rfl: 3    mv-min/FA/vit K/lutein/zeaxant (PRESERVISION AREDS 2 PLUS MV ORAL), Take by mouth., Disp: , Rfl:     omeprazole (PriLOSEC) 40 mg DR capsule, Take 1 capsule (40 mg) by mouth once daily in the morning. Take before meals., Disp: 30 capsule, Rfl: 11    OXcarbazepine (Trileptal) 150 mg tablet, Take 150 mg every morning and 300 mg every evening, Disp: 270 tablet, Rfl: 1    tamsulosin (Flomax) 0.4 mg 24 hr capsule, Take 1 capsule (0.4 mg) by mouth once daily. Daily before bed, Disp: 90 capsule, Rfl: 3    torsemide (Demadex) 20 mg tablet, Take 1 tablet (20 mg) by mouth once daily., Disp: 30 tablet, Rfl: 3    vitamin B complex/folic acid (B COMPLEX 100 ORAL), as directed Orally, Disp: , Rfl:     warfarin (Coumadin) 5 mg tablet, Take 1.5 tablets (7.5 mg) by mouth once daily in the evening., Disp: 180 tablet, Rfl: 2     Objective     /68   Pulse 64        Awake, alert, oriented, in no distress  Well-nourished, on wheelchair    Mental status exam as above, conversant   Full EOMs intact, no nystagmus, no ptosis   No facial droop   Hearing grossly intact   No dysarthria    Motor strength is at least antigravity on all extremities  I did not have him stand  or walk    Lab Results   Component Value Date    WBC 8.3 01/31/2024    HGB 14.3 01/31/2024    HCT 43.9 01/31/2024    MCV 99 01/31/2024     01/31/2024     Lab Results   Component Value Date    GLUCOSE 157 (H) 06/05/2024    CALCIUM 9.2 06/05/2024     06/05/2024    K 4.6 06/05/2024    CO2 27 06/05/2024     06/05/2024    BUN 54 (H) 06/05/2024    CREATININE 1.95 (H) 06/05/2024     Lab Results   Component Value Date    HGBA1C 7.9 07/19/2021      Lab Results   Component Value Date    [id number] 634 04/30/2024       Assessment/Plan     1. Vascular claudication  2. Peripheral neuropathy, likely diabetic, as stated  Has pacemaker  No records  On /300 (300mg bid wasn't more helpful), helping, continue--erx'd  s/p multiple failed symptomatic neuropathic treatment in past    3. RLS  4. PLMs  Ferritin/CPK wnl  Likely secondary RLS  PSG showed severe PLMs--pt has already tried and failed multiple meds including creams  On clonazepam 0.75mg qhs, continue--erx'd today    Discussed about poss adding GBP, poss SE discussed. His symptoms start as early as 7pm while awake, and is worse when is lying down to sleep. He is willing to try adding GBP.    5. YAYA  YAYA, mild with sleep-related hypoxia  CPAP titration study showed hypoxemia improved with PAP therapy  On CPAP but couldn't tolerate (even at low pressures)--pt has stopped using and adamant on not using  Other options, including oral appliance (thru dental evaluation) and/or Inspire upper airway stimulation surgery previously discussed  Not discussed today    I have personally reviewed the [place] report for this patient. This report is in the electronic medical record. I have considered the risks of abuse, dependence, addiction and diversion. I believe that it is clinically appropriate for this patient to be prescribed this medication.       Plans:  Continue /300--erx'd  Continue clonazepam 0.5mg 1.5 tabs at bedtime--erx'd  Add gabapentin 300mg at  bedtime--warned about poss SE including risk of sedation and falls--take at 7pm. Advised to call office in ~1 mo, if no SE and if wanting to increase gabapentin, can try increasing (renally dosed)  Discussed need to improve diabetes control    Rtc 6 mo, sooner if needed    All questions were answered.  Pt knows how to contact my office in case pt has any questions or concerns.    Willian Castellanos MD

## 2024-07-09 ENCOUNTER — TELEPHONE (OUTPATIENT)
Dept: PRIMARY CARE | Facility: CLINIC | Age: 89
End: 2024-07-09
Payer: MEDICARE

## 2024-07-09 ENCOUNTER — ANTICOAGULATION - WARFARIN VISIT (OUTPATIENT)
Dept: PRIMARY CARE | Facility: CLINIC | Age: 89
End: 2024-07-09
Payer: MEDICARE

## 2024-07-09 LAB
INR IN PPP BY COAGULATION ASSAY EXTERNAL: 2.5 (ref 2–3)
PROTHROMBIN TIME (PT) IN PPP BY COAGULATION ASSAY EXTERNAL: NORMAL SECONDS

## 2024-07-09 NOTE — PROGRESS NOTES
PTINR within normal limits attempted call to patient unable to leave voicemail MD's VO to continue current dosing of coumadin and Recheck in 1 week.   Will continue to try

## 2024-07-16 ENCOUNTER — ANTICOAGULATION - WARFARIN VISIT (OUTPATIENT)
Dept: PRIMARY CARE | Facility: CLINIC | Age: 89
End: 2024-07-16
Payer: MEDICARE

## 2024-07-16 LAB
INR IN PPP BY COAGULATION ASSAY EXTERNAL: 2.6
PROTHROMBIN TIME (PT) IN PPP BY COAGULATION ASSAY EXTERNAL: NORMAL

## 2024-07-16 NOTE — PROGRESS NOTES
per Charanjit, patient to continue current dose and recheck in a week, was not able to notify patients wife

## 2024-07-17 DIAGNOSIS — I10 PRIMARY HYPERTENSION: ICD-10-CM

## 2024-07-18 RX ORDER — LISINOPRIL 5 MG/1
5 TABLET ORAL DAILY
Qty: 90 TABLET | Refills: 0 | Status: SHIPPED | OUTPATIENT
Start: 2024-07-18

## 2024-07-23 ENCOUNTER — ANTICOAGULATION - WARFARIN VISIT (OUTPATIENT)
Dept: PRIMARY CARE | Facility: CLINIC | Age: 89
End: 2024-07-23
Payer: MEDICARE

## 2024-07-23 LAB
INR IN PPP BY COAGULATION ASSAY EXTERNAL: 2.9
PROTHROMBIN TIME (PT) IN PPP BY COAGULATION ASSAY EXTERNAL: NORMAL

## 2024-07-23 NOTE — PROGRESS NOTES
Per Charanjit, patient is to continue current dose and recheck in a week, patients wife notified.

## 2024-07-30 ENCOUNTER — ANTICOAGULATION - WARFARIN VISIT (OUTPATIENT)
Dept: PRIMARY CARE | Facility: CLINIC | Age: 89
End: 2024-07-30
Payer: MEDICARE

## 2024-07-30 LAB
INR IN PPP BY COAGULATION ASSAY EXTERNAL: 3.7
PROTHROMBIN TIME (PT) IN PPP BY COAGULATION ASSAY EXTERNAL: NORMAL

## 2024-07-30 NOTE — PROGRESS NOTES
per Charanjit patient is to hold medication 7-30-24 and 7-31-24 then resume current dose 8-1-24 and recheck in a week, patients wife notified

## 2024-08-05 DIAGNOSIS — I25.10 ASHD (ARTERIOSCLEROTIC HEART DISEASE): ICD-10-CM

## 2024-08-05 RX ORDER — ATORVASTATIN CALCIUM 20 MG/1
20 TABLET, FILM COATED ORAL DAILY
Qty: 90 TABLET | Refills: 1 | Status: SHIPPED | OUTPATIENT
Start: 2024-08-05

## 2024-08-06 ENCOUNTER — ANTICOAGULATION - WARFARIN VISIT (OUTPATIENT)
Dept: PRIMARY CARE | Facility: CLINIC | Age: 89
End: 2024-08-06
Payer: MEDICARE

## 2024-08-06 LAB
INR IN PPP BY COAGULATION ASSAY EXTERNAL: 2.3
PROTHROMBIN TIME (PT) IN PPP BY COAGULATION ASSAY EXTERNAL: NORMAL

## 2024-08-12 ENCOUNTER — APPOINTMENT (OUTPATIENT)
Dept: CARDIOLOGY | Facility: CLINIC | Age: 89
End: 2024-08-12
Payer: MEDICARE

## 2024-08-13 LAB
INR IN PPP BY COAGULATION ASSAY EXTERNAL: 2.8
PROTHROMBIN TIME (PT) IN PPP BY COAGULATION ASSAY EXTERNAL: NORMAL

## 2024-08-14 DIAGNOSIS — N18.9 CHRONIC KIDNEY DISEASE, UNSPECIFIED CKD STAGE: ICD-10-CM

## 2024-08-14 DIAGNOSIS — I50.32 CHRONIC HEART FAILURE WITH PRESERVED EJECTION FRACTION (MULTI): ICD-10-CM

## 2024-08-14 DIAGNOSIS — I48.19 PERSISTENT ATRIAL FIBRILLATION WITH RVR (MULTI): ICD-10-CM

## 2024-08-14 DIAGNOSIS — E78.5 DYSLIPIDEMIA: ICD-10-CM

## 2024-08-14 DIAGNOSIS — E11.9 TYPE 2 DIABETES MELLITUS WITHOUT COMPLICATION, WITHOUT LONG-TERM CURRENT USE OF INSULIN (MULTI): ICD-10-CM

## 2024-08-14 DIAGNOSIS — I10 PRIMARY HYPERTENSION: ICD-10-CM

## 2024-08-14 DIAGNOSIS — E78.00 PURE HYPERCHOLESTEROLEMIA: ICD-10-CM

## 2024-08-14 DIAGNOSIS — I25.10 ASHD (ARTERIOSCLEROTIC HEART DISEASE): ICD-10-CM

## 2024-08-14 DIAGNOSIS — Z79.01 WARFARIN ANTICOAGULATION: ICD-10-CM

## 2024-08-14 DIAGNOSIS — I42.9 CARDIOMYOPATHY, UNSPECIFIED TYPE (MULTI): ICD-10-CM

## 2024-08-14 RX ORDER — TORSEMIDE 20 MG/1
20 TABLET ORAL DAILY
Qty: 30 TABLET | Refills: 3 | Status: SHIPPED | OUTPATIENT
Start: 2024-08-14 | End: 2025-08-14

## 2024-08-15 ENCOUNTER — ANTICOAGULATION - WARFARIN VISIT (OUTPATIENT)
Dept: PRIMARY CARE | Facility: CLINIC | Age: 89
End: 2024-08-15
Payer: MEDICARE

## 2024-08-20 ENCOUNTER — ANTICOAGULATION - WARFARIN VISIT (OUTPATIENT)
Dept: PRIMARY CARE | Facility: CLINIC | Age: 89
End: 2024-08-20
Payer: MEDICARE

## 2024-08-20 LAB
INR IN PPP BY COAGULATION ASSAY EXTERNAL: 2.9 (ref 2–3)
PROTHROMBIN TIME (PT) IN PPP BY COAGULATION ASSAY EXTERNAL: NORMAL

## 2024-08-27 ENCOUNTER — TELEPHONE (OUTPATIENT)
Dept: PRIMARY CARE | Facility: CLINIC | Age: 89
End: 2024-08-27
Payer: MEDICARE

## 2024-09-01 NOTE — PROGRESS NOTES
[facility] Heart and Vascular Cardiology    Patient Name: Michele Maya  Patient : 1933      Scribe Attestation  By signing my name below, I, Ceci Ridley   attest that this documentation has been prepared under the direction and in the presence of Rod Milner DO.      Reason for visit:  This is a 89-year-old male here for follow-up regarding persistent atrial fibrillation/pacemaker who underwent AV node ablation and pacemaker implant in  but subsequently developed cardiomyopathy and had an ICD implanted subsequently transition to CRT-D device, anticoagulation with warfarin, HFpEF/history of cardiomyopathy, moderate coronary artery disease as seen on cardiac catheterization done in , hypertension, dyslipidemia, diabetes mellitus, and CKD.     HPI:  This is a 89-year-old male here for follow-up regarding persistent atrial fibrillation/pacemaker who underwent AV node ablation and pacemaker implant in  but subsequently developed cardiomyopathy and had an ICD implanted subsequently transition to CRT-D device, anticoagulation with warfarin, HFpEF/history of cardiomyopathy, moderate coronary artery disease as seen on cardiac catheterization done in , hypertension, dyslipidemia, diabetes mellitus, and CKD.  The patient was last evaluated by me in 2024.  At that visit I ordered an echocardiogram, started the patient on empagliflozin 10 mg daily, ordered blood work including BMP/BNP/magnesium to be drawn in 3 months, and asked the patient to follow-up in 3 months.  BMP done 2024 showed normal serum sodium and potassium with a serum creatinine of 1.95 consistent with known CKD.  Echocardiogram done 2024 showed normal left ventricular systolic function with an ejection fraction of 62%, mild aortic valve stenosis with a mean pressure gradient of 5.0 mmHg and a dimensionless index of 0.69. ECG done today showed ventricular paced rhythm with a heart rate of 64  bpm.  The patient's relative reports that the patient's lower extremity edema had been unchanged since the last visit. He denies any new chest pain, shortness of breath, palpitations and lightheadedness. He states that he takes all of his medications as prescribed. During my exam, he was resting comfortably on the exam table.            Assessment/Plan:   1. Persistent atrial fibrillation/pacemaker  The patient has a history of persistent atrial fibrillation/pacemaker who underwent AV node ablation and pacemaker implant in 1996 but subsequently developed cardiomyopathy and had an ICD implanted subsequently transition to CRT-D device.  He is on warfarin for thromboembolism prophylaxis, which should be continued.  Warfarin dosing and INR monitoring is being managed by his PCP.  ECG done today showed ventricular paced rhythm with a heart rate of 64 bpm.    He denies chest pain, palpitations or lightheadedness.   Echocardiogram done 6/21/2024 showed normal left ventricular systolic function with an ejection fraction of 62%, mild aortic valve stenosis with a mean pressure gradient of 5.0 mmHg and a dimensionless index of 0.69.   Recent lab works as noted in the HPI.  Lab works will be done today and again in 6 months prior to the next visit.   He should continue to follow with the device clinic.  Follow up in 6 months and sooner if necessary.      2. Anticoagulation with warfarin  The patient is on anticoagulation with warfarin for persistent atrial fibrillation.  Warfarin dosing and INR monitoring is being managed by his PCP.  Recent lab works as noted in the HPI.   Lab works will be done today and again in 6 months prior to the next visit.      3. HFpEF/History of cardiomyopathy  The patient has HFpEF with history of cardiomyopathy which improved after placement of CRT-D device.  Echocardiogram done 6/21/2024 showed normal left ventricular systolic function with an ejection fraction of 62%, mild aortic valve stenosis  with a mean pressure gradient of 5.0 mmHg and a dimensionless index of 0.69.   He does have 1+ pitting bilateral lower extremity edema on exam today.  He should continue his current cardiac medications.  Discussed that his cardiac medications will be adjusted based on the result of his blood work.  Recent lab works as noted in the HPI.   Lab works will be done today and again in 6 months prior to the next visit.   I discussed with him the importance of following a low-sodium heart healthy diet, wearing compression stockings and elevating legs when seated.   Follow up in 6 months and sooner if necessary.      4. Coronary artery disease  The patient has a history of moderate coronary artery disease as seen on cardiac catheterization done in December 2019.  ECG done today showed ventricular paced rhythm with a heart rate of 64 bpm.    He denies anginal chest discomfort.   Blood pressure appears controlled on exam today.  He should continue his current antihypertensive medications.  Echocardiogram done 6/21/2024 showed normal left ventricular systolic function with an ejection fraction of 62%, mild aortic valve stenosis with a mean pressure gradient of 5.0 mmHg and a dimensionless index of 0.69.   Lipid panel done in January 2024 showed an LDL cholesterol of 94 and triglycerides of 124 while on atorvastatin 10 mg daily.   He is currently on atorvastatin to 20 mg daily.   Lab works will be done today and again in 6 months prior to the next visit.    Please see lifestyle recommendations below.  Follow up in 6 months and sooner if necessary.      5. Hypertension  The patient has a history of hypertension which appears controlled on exam today.  He should continue his current antihypertensive medications and monitor his blood pressure at home.      6. Dyslipidemia  Lipid panel done in January 2024 showed an LDL cholesterol of 94 and triglycerides of 124 while on atorvastatin 10 mg daily.   He is currently on atorvastatin to  20 mg daily.   Lipid panel will be updated in 6 months.  Please see lifestyle recommendations below.     7. Diabetes mellitus  Stable, medication management per PCP.     8. CKD   Serum creatinine done in June 2024 was 1.95 consistent with known CKD.  Management as per PCP.         Orders:   BMP/BNP/magnesium   CMP/lipid/magnesium/CBC/BNP in 6 months,   Follow-up in 6 months.    Lifestyle Recommendations  I recommend a whole-food plant-based diet, an eating pattern that encourages the consumption of unrefined plant foods (such as fruits, vegetables, tubers, whole grains, legumes, nuts and seeds) and discourages meats, dairy products, eggs and processed foods.     The AHA/ACC recommends that the patient consume a dietary pattern that emphasizes intake of vegetables, fruits, and whole grains; includes low-fat dairy products, poultry, fish, legumes, non-tropical vegetable oils, and nuts; and limits intake of sodium, sweets, sugar-sweetened beverages, and red meats.  Adapt this dietary pattern to appropriate calorie requirements (a 500-750 kcal/day deficit to loose weight), personal and cultural food preferences, and nutrition therapy for other medical conditions (including diabetes).  Achieve this pattern by following plans such as the [place] Mediterranean, DASH dietary pattern, or AHA diet.     Engage in 2 hours and 30 minutes per week of moderate-intensity physical activity, or 1 hour and 15 minutes (75 minutes) per week of vigorous-intensity aerobic physical activity, or an equivalent combination of moderate and vigorous-intensity aerobic physical activity. Aerobic activity should be performed in episodes of at least 10 minutes preferably spread throughout the week.     Adhering to a heart healthy diet, regular exercise habits, avoidance of tobacco products, and maintenance of a healthy weight are crucial components of their heart disease risk reduction.     Any positive review of systems not specifically addressed in  the office visit today should be evaluated and treated by the patients primary care physician or in an emergency department if necessary     Patient was notified that results from ordered tests will be called to the patient if it changes current management; it will otherwise be discussed at a future appointment and available on [facility].     Thank you for allowing me to participate in the care of this patient.        This document was generated using the assistance of voice recognition software. If there are any errors of spelling, grammar, syntax, or meaning; please feel free to contact me directly for clarification.    Past Medical History:  He has a past medical history of Encounter for screening for malignant neoplasm of prostate (06/15/2016), Long term (current) use of anticoagulants (06/15/2016), Personal history of other diseases of the circulatory system, Personal history of other diseases of the respiratory system (11/01/2019), Personal history of other endocrine, nutritional and metabolic disease, Personal history of transient ischemic attack (TIA), and cerebral infarction without residual deficits, and Unspecified atrial fibrillation (Multi) (10/31/2022).    Past Surgical History:  He has a past surgical history that includes Carpal tunnel release (02/15/2016); Cataract extraction (02/15/2016); Hand surgery (02/15/2016); Total knee arthroplasty (05/26/2016); Foot surgery (06/15/2016); Cardiac pacemaker placement (06/08/2017); Cardiac pacemaker placement (06/08/2017); Other surgical history (06/08/2017); Other surgical history (06/08/2017); and Other surgical history (06/08/2017).      Social History:  He reports that he has quit smoking. His smoking use included cigarettes. He has been exposed to tobacco smoke. He has never used smokeless tobacco. He reports that he does not drink alcohol and does not use drugs.    Family History:  Family History   Problem Relation Name Age of Onset    Heart failure  Mother      Diabetes Mother          mellitus    Colon cancer Father      Diabetes Sister          mellitus    Coronary artery disease Brother          Allergies:  Pentoxifylline, Other, Amiodarone, Baclofen, Carvedilol, Cilostazol, Flecainide, Quinidine, and Simvastatin    Outpatient Medications:  Current Outpatient Medications   Medication Instructions    atorvastatin (LIPITOR) 20 mg, oral, Daily    blood sugar diagnostic (OneTouch Ultra Test) strip 1 strip, injection, 2 times daily    budesonide-glycopyr-formoterol (Breztri Aerosphere) 160-9-4.8 mcg/actuation HFA aerosol inhaler Breztri Aerosphere 160-9-4.8 MCG/ACT Inhalation Aerosol   Refills: 0        Start : 31-Oct-2022   Active  5.9 GM Inhaler    clonazePAM (KlonoPIN) 0.5 mg tablet TAKE 1.5 TABLET at Bedtime    empagliflozin (JARDIANCE) 10 mg, oral, Daily    finasteride (PROSCAR) 5 mg, oral, Daily, Do not crush, chew, or split.    FreeStyle glucose monitoring kit 1 each, miscellaneous, As needed    gabapentin (NEURONTIN) 300 mg, oral, Nightly    glipiZIDE XL (GLUCOTROL XL) 5 mg, oral, Daily    lancets (OneTouch UltraSoft Lancets) misc test twice a day    lisinopril 5 mg, oral, Daily, As directed    metoprolol succinate XL (TOPROL-XL) 25 mg, oral, Daily, Do not crush or chew.    mv-min/FA/vit K/lutein/zeaxant (PRESERVISION AREDS 2 PLUS MV ORAL) oral    omeprazole (PRILOSEC) 40 mg, oral, Daily before breakfast    OXcarbazepine (Trileptal) 150 mg tablet Take 150 mg every morning and 300 mg every evening    tamsulosin (FLOMAX) 0.4 mg, oral, Daily, Daily before bed    torsemide (DEMADEX) 20 mg, oral, Daily    vitamin B complex/folic acid (B COMPLEX 100 ORAL) as directed Orally    warfarin (COUMADIN) 7.5 mg, oral, Every evening        ROS:  A 14 point review of systems was done and is negative other than as stated in HPI    Vitals:      2/15/2024    12:22 PM 3/21/2024     1:11 PM 4/22/2024     3:39 PM 5/2/2024    10:58 AM 6/3/2024     1:08 PM 6/27/2024     2:12 PM  "7/3/2024    12:56 PM   Vitals   Systolic 109 126 140 118 118 101 119   Diastolic 64 70 80 74 60 61 68   Heart Rate 68 86 67 68 66 61 64   Resp 16   16      Height (in) 1.803 m (5' 11\")  1.803 m (5' 11\") 1.803 m (5' 11\") 1.803 m (5' 11\")     Weight (lb) 180 180 192 188 183 180 185   BMI 25.1 kg/m2 25.1 kg/m2 26.78 kg/m2 26.22 kg/m2 25.52 kg/m2 25.1 kg/m2 25.8 kg/m2   BSA (m2) 2.02 m2 2.02 m2 2.09 m2 2.07 m2 2.04 m2 2.02 m2 2.05 m2        Physical Exam:   Constitutional: Cooperative, in no acute distress, alert, appears stated age.  Skin: Skin color, texture, turgor normal. No rashes or lesions.  Head: Normocephalic. No masses, lesions, tenderness or abnormalities  Eyes: Extraocular movements are grossly intact.  Mouth and throat: Mucous membranes moist  Neck: Neck supple, no carotid bruits, no JVD  Respiratory: Lungs clear to auscultation, no wheezing or rhonchi, no use of accessory muscles  Chest wall: PPM, normal excursion with respiration  Cardiovascular: Regular rhythm without murmur  Gastrointestinal: Abdomen soft, nontender. Bowel sounds normal.  Musculoskeletal: Strength equal in upper extremities  Extremities:  1+ pitting edema extending to the mid calf.  Neurologic: Sensation grossly intact, alert and oriented x3    Intake/Output:   No intake/output data recorded.    Outpatient Medications  Current Outpatient Medications on File Prior to Visit   Medication Sig Dispense Refill    atorvastatin (Lipitor) 20 mg tablet Take 1 tablet (20 mg) by mouth once daily. 90 tablet 1    blood sugar diagnostic (OneTouch Ultra Test) strip Inject 1 strip as directed 2 times a day. 90 strip 3    budesonide-glycopyr-formoterol (Breztri Aerosphere) 160-9-4.8 mcg/actuation HFA aerosol inhaler Breztri Aerosphere 160-9-4.8 MCG/ACT Inhalation Aerosol   Refills: 0        Start : 31-Oct-2022   Active  5.9 GM Inhaler      clonazePAM (KlonoPIN) 0.5 mg tablet TAKE 1.5 TABLET at Bedtime 45 tablet 2    empagliflozin (Jardiance) 10 mg Take 1 " tablet (10 mg) by mouth once daily. 30 tablet 11    finasteride (Proscar) 5 mg tablet Take 1 tablet (5 mg) by mouth once daily. Do not crush, chew, or split. 30 tablet 2    FreeStyle glucose monitoring kit 1 each if needed.      gabapentin (Neurontin) 300 mg capsule Take 1 capsule (300 mg) by mouth once daily at bedtime. 30 capsule 2    glipiZIDE XL (Glucotrol XL) 2.5 mg 24 hr tablet Take 2 tablets (5 mg) by mouth once daily. 60 tablet 11    lancets (OneTouch UltraSoft Lancets) misc test twice a day 90 each 2    lisinopril 5 mg tablet Take 1 tablet (5 mg) by mouth once daily. As directed 90 tablet 0    metoprolol succinate XL (Toprol-XL) 25 mg 24 hr tablet Take 1 tablet (25 mg) by mouth once daily. Do not crush or chew. 90 tablet 3    mv-min/FA/vit K/lutein/zeaxant (PRESERVISION AREDS 2 PLUS MV ORAL) Take by mouth.      omeprazole (PriLOSEC) 40 mg DR capsule Take 1 capsule (40 mg) by mouth once daily in the morning. Take before meals. 30 capsule 11    OXcarbazepine (Trileptal) 150 mg tablet Take 150 mg every morning and 300 mg every evening 270 tablet 1    tamsulosin (Flomax) 0.4 mg 24 hr capsule Take 1 capsule (0.4 mg) by mouth once daily. Daily before bed 90 capsule 3    torsemide (Demadex) 20 mg tablet Take 1 tablet (20 mg) by mouth once daily. 30 tablet 3    vitamin B complex/folic acid (B COMPLEX 100 ORAL) as directed Orally      warfarin (Coumadin) 5 mg tablet Take 1.5 tablets (7.5 mg) by mouth once daily in the evening. 180 tablet 2     No current facility-administered medications on file prior to visit.       Labs: (past 26 weeks)  Recent Results (from the past 4368 hour(s))   Protime-INR    Collection Time: 03/05/24 12:00 AM   Result Value Ref Range    Protime External  seconds    INR External 2.10    Protime-INR    Collection Time: 03/12/24 12:00 AM   Result Value Ref Range    Protime External  seconds    INR External 2.10    Protime-INR    Collection Time: 03/19/24 12:00 AM   Result Value Ref Range     Protime External  seconds    INR External 1.80    Protime-INR    Collection Time: 03/26/24 12:00 AM   Result Value Ref Range    Protime External  seconds    INR External 1.90    Protime-INR    Collection Time: 04/02/24 12:00 AM   Result Value Ref Range    Protime External  seconds    INR External 2.40    Protime-INR    Collection Time: 04/09/24 12:00 AM   Result Value Ref Range    Protime External  seconds    INR External 1.80    Protime-INR    Collection Time: 04/16/24 12:00 AM   Result Value Ref Range    Protime External  seconds    INR External 2.00    Basic metabolic panel    Collection Time: 04/20/24  8:56 AM   Result Value Ref Range    Glucose 161 (H) 74 - 99 mg/dL    Sodium 141 136 - 145 mmol/L    Potassium 4.5 3.5 - 5.3 mmol/L    Chloride 105 98 - 107 mmol/L    Bicarbonate 26 21 - 32 mmol/L    Anion Gap 15 10 - 20 mmol/L    Urea Nitrogen 50 (H) 6 - 23 mg/dL    Creatinine 1.88 (H) 0.50 - 1.30 mg/dL    eGFR 34 (L) >60 mL/min/1.73m*2    Calcium 9.3 8.6 - 10.3 mg/dL   B-Type Natriuretic Peptide    Collection Time: 04/20/24  8:56 AM   Result Value Ref Range    BNP 76 0 - 99 pg/mL   Magnesium    Collection Time: 04/20/24  8:56 AM   Result Value Ref Range    Magnesium 1.86 1.60 - 2.40 mg/dL   Protime-INR    Collection Time: 04/23/24 12:00 AM   Result Value Ref Range    Protime External  seconds    INR External 1.80    Protime-INR    Collection Time: 04/30/24 12:00 AM   Result Value Ref Range    Protime External  seconds    INR External 2.30    Vitamin B12    Collection Time: 04/30/24 10:52 AM   Result Value Ref Range    Vitamin B12 634 211 - 911 pg/mL   Ferritin    Collection Time: 04/30/24 10:52 AM   Result Value Ref Range    Ferritin 187 20 - 300 ng/mL   Basic Metabolic Panel    Collection Time: 04/30/24 10:52 AM   Result Value Ref Range    Glucose 156 (H) 74 - 99 mg/dL    Sodium 140 136 - 145 mmol/L    Potassium 4.5 3.5 - 5.3 mmol/L    Chloride 105 98 - 107 mmol/L    Bicarbonate 27 21 - 32 mmol/L    Anion Gap 13  10 - 20 mmol/L    Urea Nitrogen 44 (H) 6 - 23 mg/dL    Creatinine 1.71 (H) 0.50 - 1.30 mg/dL    eGFR 38 (L) >60 mL/min/1.73m*2    Calcium 9.1 8.6 - 10.3 mg/dL   B-Type Natriuretic Peptide    Collection Time: 04/30/24 10:52 AM   Result Value Ref Range    BNP 98 0 - 99 pg/mL   Magnesium    Collection Time: 04/30/24 10:52 AM   Result Value Ref Range    Magnesium 1.79 1.60 - 2.40 mg/dL   Protime-INR    Collection Time: 05/07/24 12:00 AM   Result Value Ref Range    Protime External  seconds    INR External 2.20    Protime-INR    Collection Time: 05/14/24 12:00 AM   Result Value Ref Range    Protime External  seconds    INR External 1.90    Protime-INR    Collection Time: 05/21/24 12:00 AM   Result Value Ref Range    Protime External  seconds    INR External 2.40    Protime-INR    Collection Time: 05/28/24 12:00 AM   Result Value Ref Range    Protime External  seconds    INR External 2.50    Protime-INR    Collection Time: 06/04/24 12:00 AM   Result Value Ref Range    Protime External  seconds    INR External 2.40    Basic Metabolic Panel    Collection Time: 06/05/24  9:57 AM   Result Value Ref Range    Glucose 157 (H) 74 - 99 mg/dL    Sodium 138 136 - 145 mmol/L    Potassium 4.6 3.5 - 5.3 mmol/L    Chloride 102 98 - 107 mmol/L    Bicarbonate 27 21 - 32 mmol/L    Anion Gap 14 10 - 20 mmol/L    Urea Nitrogen 54 (H) 6 - 23 mg/dL    Creatinine 1.95 (H) 0.50 - 1.30 mg/dL    eGFR 32 (L) >60 mL/min/1.73m*2    Calcium 9.2 8.6 - 10.3 mg/dL   Protein, Urine Random    Collection Time: 06/05/24  9:57 AM   Result Value Ref Range    Total Protein, Urine Random 4 (L) 5 - 25 mg/dL    Creatinine, Urine Random 57.0 20.0 - 370.0 mg/dL    T. Protein/Creatinine Ratio 0.07 0.00 - 0.17 mg/mg Creat   Urinalysis with Reflex Microscopic    Collection Time: 06/05/24  9:57 AM   Result Value Ref Range    Color, Urine Light-Yellow Light-Yellow, Yellow, Dark-Yellow    Appearance, Urine Clear Clear    Specific Gravity, Urine 1.011 1.005 - 1.035    pH,  Urine 5.0 5.0, 5.5, 6.0, 6.5, 7.0, 7.5, 8.0    Protein, Urine NEGATIVE NEGATIVE, 10 (TRACE), 20 (TRACE) mg/dL    Glucose, Urine Normal Normal mg/dL    Blood, Urine NEGATIVE NEGATIVE    Ketones, Urine NEGATIVE NEGATIVE mg/dL    Bilirubin, Urine NEGATIVE NEGATIVE    Urobilinogen, Urine Normal Normal mg/dL    Nitrite, Urine NEGATIVE NEGATIVE    Leukocyte Esterase, Urine NEGATIVE NEGATIVE   Protime-INR    Collection Time: 06/11/24 12:00 AM   Result Value Ref Range    Protime External  seconds    INR External 2.30    Protime-INR    Collection Time: 06/18/24 12:00 AM   Result Value Ref Range    Protime External  seconds    INR External 2.40    Transthoracic Echo (TTE) Complete    Collection Time: 06/21/24  2:10 PM   Result Value Ref Range    LVOT diam 1.97 cm    LV Biplane EF 62 %    Tricuspid annular plane systolic excursion 2.9 cm    AV mn grad 5.0 mmHg    LA vol index A/L 13.8 ml/m2    AV pk jonatan 1.66 m/s    RV free wall pk S' 15.00 cm/s    RVSP 46.2 mmHg    LVIDd 4.97 cm    Aortic Valve Area by Continuity of VTI 2.12 cm2    Aortic Valve Area by Continuity of Peak Velocity 2.14 cm2    AV pk grad 11.0 mmHg    LV A4C EF 62.4    Protime-INR    Collection Time: 06/25/24 12:00 AM   Result Value Ref Range    Protime External  seconds    INR External 2.80    Protime-INR    Collection Time: 07/02/24 12:00 AM   Result Value Ref Range    Protime External  seconds    INR External 2.10    Protime-INR    Collection Time: 07/09/24 12:00 AM   Result Value Ref Range    Protime External  seconds    INR External 2.50 2 - 3   Protime-INR    Collection Time: 07/16/24 12:00 AM   Result Value Ref Range    Protime External      INR External 2.60    Protime-INR    Collection Time: 07/23/24 12:00 AM   Result Value Ref Range    Protime External      INR External 2.90    Protime-INR    Collection Time: 07/30/24 12:00 AM   Result Value Ref Range    Protime External      INR External 3.70    Protime-INR    Collection Time: 08/06/24 12:00 AM    Result Value Ref Range    Protime External      INR External 2.30    Protime-INR    Collection Time: 08/13/24 12:00 AM   Result Value Ref Range    Protime External      INR External 2.80    Protime-INR    Collection Time: 08/20/24 12:00 AM   Result Value Ref Range    Protime External      INR External 2.90 2.00 - 3.00       ECG  Encounter Date: 06/03/24   ECG 12 Lead    Narrative    Sinus rhythm with A-V dissociation and ventricular pacing.       Echocardiogram  No results found for this or any previous visit from the past 1095 days.      CV Studies:  EKG:  Encounter Date: 06/03/24   ECG 12 Lead    Narrative    Sinus rhythm with A-V dissociation and ventricular pacing.     Echocardiogram:   [place] [address]  Phone [phone] Fax [phone]    TRANSTHORACIC ECHOCARDIOGRAM REPORT      Patient Name:     LESIA CARNEY MARIANA Reading Physician:   [id number] Sudeep Blackwood MD  Study Date:       10/29/2021         Referring Physician: [id number] MELENDEZ  MRN/PID:          [id number]           PCP:  Accession/Order#: [id number]       Department Location: [facility] Echo Lab  YOB: 1933          Fellow:  Gender:           M                  Nurse:  Admit Date:       10/29/2021         Sonographer:         Raven Elias RDCS  Admission Status: Outpatient         Additional Staff:  Height:           180.34 cm          CC Report to:  Weight:           79.83 kg           Study Type:          Echocardiogram  BSA:              2.00 m2  Blood Pressure: 166 /102 mmHg    Diagnosis/ICD: R06.02-Shortness of breath  Indication:    Dyspnea on Exertion  Procedure/CPT: Echo Complete w Full [id number]    Patient History:  Pacer/Defib:       Permanent pacemaker  Pertinent History: Dyspnea.    Study Detail: The following Echo studies were performed: 2D, M-Mode, Doppler and  color flow. Technically challenging study due to prominent  lung  artifact.      PHYSICIAN INTERPRETATION:  Left Ventricle: The left ventricular systolic function is normal. There are no regional wall motion abnormalities. The left ventricular cavity size is normal. There is mild left ventricular hypertrophy. Abnormal (paradoxical) septal motion, consistent with RV pacemaker. Spectral Doppler shows an impaired relaxation pattern of left ventricular diastolic filling.  Left Atrium: The left atrium is normal in size.  Right Ventricle: The right ventricle is normal in size. There is normal right ventricular global systolic function. A device is visualized in the right ventricle.  Right Atrium: The right atrium is normal in size.  Aortic Valve: The aortic valve appears structurally normal. There is no evidence of aortic valve regurgitation. The peak instantaneous gradient of the aortic valve is 11.7 mmHg. The mean gradient of the aortic valve is 6.0 mmHg.  Mitral Valve: The mitral valve is normal in structure. There is no evidence of mitral valve regurgitation.  Tricuspid Valve: The tricuspid valve is structurally normal. There is trace tricuspid regurgitation.  Pulmonic Valve: The pulmonic valve is structurally normal. There is no indication of pulmonic valve regurgitation.  Pericardium: There is no pericardial effusion noted.  Aorta: The aortic root is normal.      CONCLUSIONS:  1. The left ventricular systolic function is normal.  2. Abnormal septal motion consistent with RV pacemaker.  3. Spectral Doppler shows an impaired relaxation pattern of left ventricular diastolic filling.    QUANTITATIVE DATA SUMMARY:  2D MEASUREMENTS:  Normal Ranges:  Ao Root d:     3.40 cm    (2.0-3.7cm)  IVSd:          1.25 cm    (0.6-1.1cm)  LVPWd:         1.33 cm    (0.6-1.1cm)  LVIDd:         4.47 cm    (3.9-5.9cm)  LVIDs:         3.56 cm  LV Mass Index: 109.1 g/m2  LV % FS        20.4 %    LA VOLUME:  Normal Ranges:  LA Vol A4C:        51.1 ml    (22+/-6mL/m2)  LA Vol A2C:        56.7 ml  LA  Vol BP:         53.8 ml  LA Vol Index A4C:  25.6ml/m2  LA Vol Index A2C:  28.4 ml/m2  LA Vol Index BP:   27.0 ml/m2  LA Area A4C:       19.0 cm2  LA Area A2C:       20.0 cm2  LA Major Axis A4C: 6.0 cm  LA Major Axis A2C: 6.0 cm  LA Volume Index:   27.0 ml/m2    RA VOLUME BY A/L METHOD:  Normal Ranges:  RA Area A4C: 17.0 cm2    M-MODE MEASUREMENTS:  Normal Ranges:  Ao Root: 3.30 cm (2.0-3.7cm)  AoV Exc: 1.90 cm (1.5-2.5cm)  LAs:     2.80 cm (2.7-4.0cm)    AORTA MEASUREMENTS:  Normal Ranges:  AoV Exc:   1.90 cm (1.5-2.5cm)  Asc Ao, d: 3.30 cm (2.1-3.4cm)    LV SYSTOLIC FUNCTION BY 2D PLANIMETRY (MOD):  Normal Ranges:  EF-A4C View: 61.7 % (>55%)  EF-A2C View: 62.5 %  EF-Biplane:  61.1 %    LV DIASTOLIC FUNCTION:  Normal Ranges:  MV Peak E:    0.81 m/s   (0.7-1.2 m/s)  MV Peak A:    0.44 m/s   (0.42-0.7 m/s)  E/A Ratio:    1.86       (1.0-2.2)  MV e'         0.10 m/s   (>8.0)  MV lateral e' 0.10 m/s  MV medial e'  0.09 m/s  MV A Dur:     95.00 msec  E/e' Ratio:   8.57       (<8.0)  LV IVRT:      100 msec   (<100ms)    MITRAL VALVE:  Normal Ranges:  MV DT: 135 msec (150-240msec)    AORTIC VALVE:  Normal Ranges:  AoV Vmax:                1.71 m/s  (<1.7m/s)  AoV Peak P.7 mmHg (<20mmHg)  AoV Mean P.0 mmHg  (1.7-11.5mmHg)  LVOT Max King:            0.84 m/s  (<1.1m/s)  AoV VTI:                 33.00 cm  (18-25cm)  LVOT VTI:                17.80 cm  LVOT Diameter:           2.00 cm   (1.8-2.4cm)  AoV Area, VTI:           1.69 cm2  (2.5-5.5cm2)  AoV Area,Vmax:           1.54 cm2  (2.5-4.5cm2)  AoV Area, planim:        2.23 cm2  (2.5-4.5cm2)  AoV Dimensionless Index: 0.54    RIGHT VENTRICLE:  RV 1   3.5 cm  RV 2   3.1 cm  RV 3   6.0 cm  TAPSE: 16.1 mm  RV s'  0.13 m/s    TRICUSPID VALVE/RVSP:  Normal Ranges:  Peak TR Velocity: 2.70 m/s  RV Syst Pressure: 32.2 mmHg (< 30mmHg)  IVC Diam:         1.45 cm      [id number] Sudeep Blackwood MD  Electronically signed on 10/30/2021 at 10:29:19 AM         Final      Stress Testing [facility]([id number]):   NM CARDIAC STRESS REST (MYOCARDIAL PERFUSION MIBI) 10/29/2021    Narrative  Addendum Begins  MRN: [id number]  Patient Name: LESIA PEÑA    ADDENDUM:  Please disregard the prior report as it was signed in error.    STUDY:  CARDIAC STRESS/REST INJECTION; CARDIAC STRESS/REST (MYOCARDIAL  PERFUSION/MIBI); PART 2 STRESS OR REST (NO CHARGE); 10/29/2021 10:36  am    INDICATION:  SOBOE  R06.02: SOBOE (shortness of breath on exertion).    COMPARISON:  None.    ACCESSION NUMBER(S):  [id number]; [id number]; [id number]    ORDERING CLINICIAN:  GRISELDA MELENDEZ    TECHNIQUE:  DIVISION OF NUCLEAR MEDICINE  PHARMACOLOGIC STRESS MYOCARDIAL PERFUSION SCAN, ONE DAY PROTOCOL    The patient received an intravenous dose of 11.6 mCi of Tc-99m  tetrofosmin and resting emission tomographic (SPECT) images of the  myocardium were acquired. The patient then received an intravenous  infusion of 0.4mg regadenoson (Lexiscan) followed by an additional  dose of 35.4 mCi of Tc-99m tetrofosmin. Stress phase SPECT images of  the myocardium were then acquired. These included ECG-gated images to  assess and quantify ventricular function.    FINDINGS:  Stress and rest images both demonstrate a normal distribution of  perfusion throughout all LV segments with no sign of ischemia.    ECG-gated images demonstrate normal LV size and myocardial  contractility with an LV ejection fraction of  63 % (normal above 50  percent).    Impression  1. Normal stress myocardial perfusion imaging in response to  pharmacologic stress.  2. Well-maintained left ventricular function.    Addendum Ends  MRN: [id number]  Patient Name: LESIA PEÑA    STUDY:  CARDIAC STRESS/REST INJECTION; PART 2 STRESS OR REST (NO CHARGE);  CARDIAC STRESS/REST (MYOCARDIAL PERFUSION/MIBI);  10/29/2021 10:36 am    INDICATION:  SOBOE  R06.02: SOBOE (shortness of breath on exertion).    COMPARISON:  None.    ACCESSION NUMBER(S):  [id number]; [id number];  [id number]    ORDERING CLINICIAN:  GRISELDA MELENDEZ    TECHNIQUE:  DIVISION OF NUCLEAR MEDICINE  PHARMACOLOGIC STRESS MYOCARDIAL PERFUSION SCAN, ONE DAY PROTOCOL    The patient received an intravenous dose of  11.4 mCi of Tc-99m  tetrofosmin and resting emission tomographic (SPECT) images of the  myocardium were acquired. The patient then received an intravenous  infusion of 0.4mg regadenoson (Lexiscan) followed by an additional  dose of  34.2 mCi of Tc-99m tetrofosmin. Stress phase SPECT images of  the myocardium were then acquired. These included ECG-gated images to  assess and quantify ventricular function.    FINDINGS:  Stress and rest images both demonstrate a normal distribution of  perfusion throughout all LV segments with no sign of ischemia.    ECG-gated images demonstrate normal LV size and myocardial  contractility with an LV ejection fraction of   86 % (normal above 50  percent).    IMPRESSION:  1. Normal stress myocardial perfusion imaging in response to  pharmacologic stress.  2. Well-maintained left ventricular function.    Cardiac Catheterization:   Adult Cath     [facility], Cath Lab  [address] [place] [address]  Phone [phone] Fax [phone]    Cardiovascular Catheterization Report    Patient Name:     LESIA PEÑA Performing Physician: [id number] Sudeep Blackwood MD  Study Date:       12/9/2019        Verifying Physician:  [id number] Blackwood MD  MRN/PID:          [id number]         Cardiologist:  Accession/Order#: [id number]        Referring Physician:  [id number] Thor Baker MD  YOB: 1933        Referring Physician:  Gender:           M                Referring Physician:      Study: Left and Right Heart Catheterization      Indications:  LESIA PEÑA is a 86 year old male who presents with hypertension and dyslipidemia. Suspected coronary artery disease, cardiomyopathy and left ventricular dysfunction, with a chest pain assessment of atypical angina.  Study performed as an elective cath procedure.    Medical History:  Stress test performed: No. CTA performed: No. [facility] accessed: No. LVEF Assessed: Yes.    Procedure Description:  After infiltration with 2% Lidocaine, the right radial artery was cannulated with a modified Seldinger technique. Subsequently a 6 Serbian sheath was placed in the right radial artery. After infiltration of local anesthetic, the Right Brachial vein was cannulated with a micropuncture technique. A 5 Serbian sheath was placed in the vein. Selective coronary catheterization was performed using a 6 Fr catheter(s) exchanged over a guide wire to cannulate the coronary arteries.  Additional catheter(s) used to visualize the coronary arteries were: Jacqi. A balloon tipped catheter was advanced through the right heart to record pressures. Cardiac output was calculated via the Reggie method. After completion of the procedure, the arterial sheath was pulled and a TR Band Radial Compression Device was utilized to obtain patent hemostasis.    Coronary Angiography:  The coronary circulation is right dominant.    Left Main Coronary Artery:  The left main coronary artery is a normal caliber vessel. The left main arises normally from the left coronary sinus of Valsalva and bifurcates into the LAD and circumflex coronary arteries. The left main coronary artery showed no significant disease or stenosis greater than 30%.    Left Anterior Descending Coronary Artery Distribution:  The left anterior descending coronary artery is a normal caliber vessel. The LAD arises normally from the left main coronary artery. The LAD demonstrated mild atherosclerotic disease.    Circumflex Coronary Artery Distribution:  The circumflex coronary artery is a normal caliber vessel. The circumflex arises normally from the left main coronary artery and terminates in the AV groove. The circumflex revealed moderate atherosclerotic disease. The proximal to mid circumflex coronary  artery showed 50% stenosis. This lesion was eccentric.    Right Heart Catheterization:  A balloon tipped catheter was advanced through the right heart to record pressures. Cardiac output was calculated via the Reggie method. Elevated left sided filling pressures with normal cardiac output. Elevated ventricular filling pressure. Cardiac output is normal. Preserved cardiac output at rest. No pulmonary vascular resistance.    Right Coronary Artery Distribution:    The right coronary artery is a normal caliber vessel. The RCA arises normally from the right sinus of Valsalva. The RCA showed no significant disease or stenosis greater than 30%.    Coronary Interventions:  Angiography reveals a 50% stenosis of the proximal to mid circumflex and rFR - 1. Percutaneous coronary intervention was performed within the proximal to mid circumflex and rFR - 1.    Coronary Lesion Summary:  Vessel       Stenosis   Vessel Segment  Circumflex 50% stenosis proximal to mid          Complications:  No in-lab complications observed.    Cardiac Cath Transition of Care Summary:  Post Procedure Diagnosis: Single vessel disease.  Blood Loss:               Estimated blood loss during the procedure was 0 mls.  Specimens Removed:        Number of specimen(s) removed: none.      Recommendations:  Maximize medical therapy.    ____________________________________________________________________________________  CONCLUSIONS:  1. Moderate non-obstructive coronary artery disease.  2. Elevated LV filling pressures.    ____________________________________________________________________________________  CPT Codes:  Right Heart Cath, O2/Cardiac output (RHC)-28169; Coronary Angiography S&I ([facility])([facility])-01249; Moderate Sedation Services initial 15 minutes patient >5 years-56465; Moderate Sedation Services 1st additional 15 minutes patient >5 years-71581; FFR, initial Vessel (PCI)-53220    ICD 10 Codes:  I20.9-Angina pectoris, unspecified Class III;  I50.42-Chronic combined systolic (congestive) and diastolic (congestive) heart failure    [id number] Sudeep Blackwood MD  Performing Physician  Electronically signed by [id number] Sudeep Blackwood MD on 12/9/2019 at 3:02:59 PM      cc Report to: [id number] hTor Baker MD           Final   No results found for this or any previous visit from the past 3650 days.     Cardiac Scoring: No results found for this or any previous visit from the past 1825 days.    AAA : No results found for this or any previous visit from the past 1825 days.    OTHER: No results found for this or any previous visit from the past 1825 days.    LAST IMAGING RESULTS  Transthoracic Echo (TTE) Complete                [place] [id number]       Phone [phone] Fax [phone]    TRANSTHORACIC ECHOCARDIOGRAM REPORT    Patient Name:      LESIA ANGELIKA Mc Physician:    [id number] Anusha Lin MD  Study Date:        6/21/2024            Ordering Provider:    [id number] GRISELDA MELENDEZ  MRN/PID:           [id number]             Fellow:  Accession#:        [id number]         Nurse:  Date of Birth/Age: 6/27/1933 / 89 years Sonographer:          Ruth Martines RDCS  Gender:            M                    Additional Staff:     Irma Triplett                                                                Student  Height:            180.34 cm            Admit Date:           6/21/2024  Weight:            83.91 kg             Admission Status:     Outpatient  BSA / BMI:         2.04 m2 / 25.80      Department Location:  [facility] Echo                     kg/m2                                      Lab  Blood Pressure: 118 /60 mmHg    Study Type:    TRANSTHORACIC ECHO (TTE) COMPLETE  Diagnosis/ICD: Other persistent AFib-I48.19; Chronic diastolic (congestive)                 heart failure (CHF)-I50.32  Indication:    Congestive Heart Failure  CPT Codes:     Echo  Complete w Full Doppler-98871    Patient History:  Pertinent History: HTN, Hyperlipidemia, CAD, CHF and A-Fib.    Study Detail: The following Echo studies were performed: 2D, M-Mode, Doppler and                color flow. Technically challenging study due to poor acoustic                windows and prominent lung artifact. Patient has a pacemaker.       PHYSICIAN INTERPRETATION:  Left Ventricle: The left ventricular systolic function is normal, with a Elam's biplane calculated ejection fraction of 62%. There are no regional wall motion abnormalities. The left ventricular cavity size is normal. The left ventricular septal wall thickness is normal. There is normal left ventricular posterior wall thickness. Left ventricular diastolic filling was indeterminate. There the left ventricular filling pressure was not assessed.  Left Atrium: The left atrium is normal in size.  Right Ventricle: The right ventricle is normal in size. NWV. NOT WELL VISUALIZED OR MEASURED.  Right Atrium: The right atrium is normal in size.  Aortic Valve: The aortic valve is trileaflet. There is mild aortic valve thickening. There is evidence of mild aortic valve stenosis.  The aortic valve dimensionless index is 0.69. There is no evidence of aortic valve regurgitation. The peak instantaneous gradient of the aortic valve is 11.0 mmHg. The mean gradient of the aortic valve is 5.0 mmHg.  Mitral Valve: The mitral valve is normal in structure. There is mild mitral annular calcification. There is no evidence of mitral valve regurgitation.  Tricuspid Valve: The tricuspid valve is structurally normal. There is mild tricuspid regurgitation. The Doppler estimated RVSP is moderately elevated at 46.2 mmHg. NO SUBCOSTALS IVC.  Pulmonic Valve: The pulmonic valve is structurally normal. There is trace pulmonic valve regurgitation.  Pericardium: There is no pericardial effusion noted.  Aorta: The aortic root is abnormal. There is mild dilatation of the  ascending aorta.       CONCLUSIONS:   1. The left ventricular systolic function is normal, with a Elam's biplane calculated ejection fraction of 62%.   2. Left ventricular diastolic filling was indeterminate.   3. Moderately elevated right ventricular systolic pressure.   4. Mild aortic valve stenosis.    QUANTITATIVE DATA SUMMARY:  2D MEASUREMENTS:                           Normal Ranges:  LAs:           3.59 cm   (2.7-4.0cm)  IVSd:          1.12 cm   (0.6-1.1cm)  LVPWd:         1.02 cm   (0.6-1.1cm)  LVIDd:         4.97 cm   (3.9-5.9cm)  LVIDs:         3.50 cm  LV Mass Index: 97.2 g/m2  LV % FS        29.5 %    LA VOLUME:                                Normal Ranges:  LA Vol A4C:        23.9 ml    (22+/-6mL/m2)  LA Vol A2C:        31.7 ml  LA Vol BP:         28.2 ml  LA Vol Index A4C:  11.7ml/m2  LA Vol Index A2C:  15.5 ml/m2  LA Vol Index BP:   13.8 ml/m2  LA Area A4C:       10.6 cm2  LA Area A2C:       11.9 cm2  LA Major Axis A4C: 4.0 cm  LA Major Axis A2C: 3.8 cm  LA Volume Index:   13.6 ml/m2  LA Vol A4C:        21.8 ml  LA Vol A2C:        28.7 ml    RA VOLUME BY A/L METHOD:                       Normal Ranges:  RA Area A4C: 8.1 cm2    AORTA MEASUREMENTS:                       Normal Ranges:  Ao Sinus, d: 3.10 cm (2.1-3.5cm)  Asc Ao, d:   3.70 cm (2.1-3.4cm)    LV SYSTOLIC FUNCTION BY 2D PLANIMETRY (MOD):                       Normal Ranges:  EF-A4C View:    62 % (>=55%)  EF-A2C View:    61 %  EF-Biplane:     62 %  LV EF Reported: 62 %    LV DIASTOLIC FUNCTION:                          Normal Ranges:  MV e'         0.116 m/s (>8.0)  MV lateral e' 0.13 m/s  MV medial e'  0.11 m/s    AORTIC VALVE:                                     Normal Ranges:  AoV Vmax:                1.66 m/s  (<=1.7m/s)  AoV Peak P.0 mmHg (<20mmHg)  AoV Mean P.0 mmHg  (1.7-11.5mmHg)  LVOT Max King:            1.16 m/s  (<=1.1m/s)  AoV VTI:                 33.94 cm  (18-25cm)  LVOT VTI:                 23.58 cm  LVOT Diameter:           1.97 cm   (1.8-2.4cm)  AoV Area, VTI:           2.12 cm2  (2.5-5.5cm2)  AoV Area,Vmax:           2.14 cm2  (2.5-4.5cm2)  AoV Dimensionless Index: 0.69       RIGHT VENTRICLE:  TAPSE: 29.2 mm  RV s'  0.15 m/s    TRICUSPID VALVE/RVSP:                              Normal Ranges:  Peak TR Velocity: 3.29 m/s  RV Syst Pressure: 46.2 mmHg (< 30mmHg)    AORTA:  Asc Ao Diam 3.68 cm       [id number] Anusha Lin MD  Electronically signed on 6/22/2024 at 1:50:18 PM       ** Final **    Problem List Items Addressed This Visit       ASHD (arteriosclerotic heart disease)    Cardiac defibrillator in place    History of cardiomyopathy    Diabetes mellitus, type 2 (Multi)    Persistent atrial fibrillation with RVR (Multi) - Primary    Hyperlipidemia    Primary hypertension    Warfarin anticoagulation    CKD (chronic kidney disease)    (HFpEF) heart failure with preserved ejection fraction (Multi)          Rod Milner DO, FACC, FACOI   no

## 2024-09-03 LAB
INR IN PPP BY COAGULATION ASSAY EXTERNAL: 2.9 (ref 2–3)
INR IN PPP BY COAGULATION ASSAY EXTERNAL: 3.6
PROTHROMBIN TIME (PT) IN PPP BY COAGULATION ASSAY EXTERNAL: NORMAL
PROTHROMBIN TIME (PT) IN PPP BY COAGULATION ASSAY EXTERNAL: NORMAL

## 2024-09-05 ENCOUNTER — ANTICOAGULATION - WARFARIN VISIT (OUTPATIENT)
Dept: PRIMARY CARE | Facility: CLINIC | Age: 89
End: 2024-09-05
Payer: MEDICARE

## 2024-09-05 ENCOUNTER — TELEPHONE (OUTPATIENT)
Dept: PRIMARY CARE | Facility: CLINIC | Age: 89
End: 2024-09-05
Payer: MEDICARE

## 2024-09-05 NOTE — TELEPHONE ENCOUNTER
Calling about INR , she was concerned that she didn't hear anything yet because it was a little high

## 2024-09-05 NOTE — PROGRESS NOTES
Per Charanjit patient is to hold current dose for 2 days then resume and recheck in a week, patients wife notified

## 2024-09-06 ENCOUNTER — APPOINTMENT (OUTPATIENT)
Dept: CARDIOLOGY | Facility: CLINIC | Age: 89
End: 2024-09-06
Payer: MEDICARE

## 2024-09-06 VITALS
DIASTOLIC BLOOD PRESSURE: 68 MMHG | HEIGHT: 71 IN | BODY MASS INDEX: 25.9 KG/M2 | SYSTOLIC BLOOD PRESSURE: 122 MMHG | WEIGHT: 185 LBS | HEART RATE: 64 BPM

## 2024-09-06 DIAGNOSIS — Z95.0 PRESENCE OF CARDIAC PACEMAKER: ICD-10-CM

## 2024-09-06 DIAGNOSIS — E11.00 TYPE 2 DIABETES MELLITUS WITH HYPEROSMOLARITY WITHOUT COMA, WITHOUT LONG-TERM CURRENT USE OF INSULIN (MULTI): ICD-10-CM

## 2024-09-06 DIAGNOSIS — E78.5 DYSLIPIDEMIA: ICD-10-CM

## 2024-09-06 DIAGNOSIS — I25.10 ASHD (ARTERIOSCLEROTIC HEART DISEASE): ICD-10-CM

## 2024-09-06 DIAGNOSIS — I50.32 CHRONIC HEART FAILURE WITH PRESERVED EJECTION FRACTION (MULTI): ICD-10-CM

## 2024-09-06 DIAGNOSIS — E11.9 TYPE 2 DIABETES MELLITUS WITHOUT COMPLICATION, WITHOUT LONG-TERM CURRENT USE OF INSULIN (MULTI): ICD-10-CM

## 2024-09-06 DIAGNOSIS — N18.9 CHRONIC KIDNEY DISEASE, UNSPECIFIED CKD STAGE: ICD-10-CM

## 2024-09-06 DIAGNOSIS — Z86.79 HISTORY OF CARDIOMYOPATHY: ICD-10-CM

## 2024-09-06 DIAGNOSIS — I48.19 PERSISTENT ATRIAL FIBRILLATION WITH RVR (MULTI): Primary | ICD-10-CM

## 2024-09-06 DIAGNOSIS — N18.32 STAGE 3B CHRONIC KIDNEY DISEASE (MULTI): ICD-10-CM

## 2024-09-06 DIAGNOSIS — I42.9 CARDIOMYOPATHY, UNSPECIFIED TYPE (MULTI): ICD-10-CM

## 2024-09-06 DIAGNOSIS — I10 PRIMARY HYPERTENSION: ICD-10-CM

## 2024-09-06 DIAGNOSIS — Z95.810 CARDIAC DEFIBRILLATOR IN PLACE: ICD-10-CM

## 2024-09-06 DIAGNOSIS — E78.00 PURE HYPERCHOLESTEROLEMIA: ICD-10-CM

## 2024-09-06 DIAGNOSIS — Z79.01 WARFARIN ANTICOAGULATION: ICD-10-CM

## 2024-09-06 PROCEDURE — 3078F DIAST BP <80 MM HG: CPT | Performed by: INTERNAL MEDICINE

## 2024-09-06 PROCEDURE — 93005 ELECTROCARDIOGRAM TRACING: CPT | Performed by: INTERNAL MEDICINE

## 2024-09-06 PROCEDURE — 1159F MED LIST DOCD IN RCRD: CPT | Performed by: INTERNAL MEDICINE

## 2024-09-06 PROCEDURE — 1036F TOBACCO NON-USER: CPT | Performed by: INTERNAL MEDICINE

## 2024-09-06 PROCEDURE — 1157F ADVNC CARE PLAN IN RCRD: CPT | Performed by: INTERNAL MEDICINE

## 2024-09-06 PROCEDURE — 3074F SYST BP LT 130 MM HG: CPT | Performed by: INTERNAL MEDICINE

## 2024-09-06 PROCEDURE — 99214 OFFICE O/P EST MOD 30 MIN: CPT | Performed by: INTERNAL MEDICINE

## 2024-09-06 PROCEDURE — 93010 ELECTROCARDIOGRAM REPORT: CPT | Performed by: INTERNAL MEDICINE

## 2024-09-10 ENCOUNTER — TELEPHONE (OUTPATIENT)
Dept: PRIMARY CARE | Facility: CLINIC | Age: 89
End: 2024-09-10
Payer: MEDICARE

## 2024-09-10 LAB
INR IN PPP BY COAGULATION ASSAY EXTERNAL: 3.6
PROTHROMBIN TIME (PT) IN PPP BY COAGULATION ASSAY EXTERNAL: NORMAL

## 2024-09-10 NOTE — TELEPHONE ENCOUNTER
----- Message from Charanjit Mujica sent at 9/10/2024  4:08 PM EDT -----  Patient's INR is elevated at 3.6.  Hold warfarin for 2 days then resume normal dose.  Recheck in a week.  ----- Message -----  From: Interface, Onbase - Scan In  Sent: 9/10/2024  10:37 AM EDT  To: Charanjit Mujica, EUGENE-CNP

## 2024-09-17 ENCOUNTER — ANTICOAGULATION - WARFARIN VISIT (OUTPATIENT)
Dept: PRIMARY CARE | Facility: CLINIC | Age: 89
End: 2024-09-17
Payer: MEDICARE

## 2024-09-17 ENCOUNTER — HOSPITAL ENCOUNTER (OUTPATIENT)
Dept: CARDIOLOGY | Facility: HOSPITAL | Age: 89
Discharge: HOME | End: 2024-09-17
Payer: MEDICARE

## 2024-09-17 DIAGNOSIS — I44.2 ATRIOVENTRICULAR BLOCK, COMPLETE (MULTI): ICD-10-CM

## 2024-09-17 DIAGNOSIS — Z95.0 PRESENCE OF CARDIAC PACEMAKER: ICD-10-CM

## 2024-09-17 DIAGNOSIS — I48.19 PERSISTENT ATRIAL FIBRILLATION WITH RVR (MULTI): ICD-10-CM

## 2024-09-17 DIAGNOSIS — Z95.0 PRESENCE OF CARDIAC PACEMAKER: Primary | ICD-10-CM

## 2024-09-17 LAB
INR IN PPP BY COAGULATION ASSAY EXTERNAL: 2.8 (ref 2–3)
PROTHROMBIN TIME (PT) IN PPP BY COAGULATION ASSAY EXTERNAL: NORMAL

## 2024-09-17 PROCEDURE — 93296 REM INTERROG EVL PM/IDS: CPT

## 2024-09-17 NOTE — PROGRESS NOTES
Teddy Donohue PTINR was 2.8 CNP reviewed and gave verbal orders to continue current dosing and recheck INR in 1 week, Patient notifiede, verbalized understanding

## 2024-09-18 ENCOUNTER — HOSPITAL ENCOUNTER (EMERGENCY)
Facility: HOSPITAL | Age: 89
Discharge: HOME | End: 2024-09-18
Attending: STUDENT IN AN ORGANIZED HEALTH CARE EDUCATION/TRAINING PROGRAM
Payer: MEDICARE

## 2024-09-18 VITALS
RESPIRATION RATE: 18 BRPM | HEART RATE: 63 BPM | TEMPERATURE: 98.2 F | WEIGHT: 180 LBS | HEIGHT: 71 IN | OXYGEN SATURATION: 98 % | SYSTOLIC BLOOD PRESSURE: 145 MMHG | DIASTOLIC BLOOD PRESSURE: 76 MMHG | BODY MASS INDEX: 25.2 KG/M2

## 2024-09-18 DIAGNOSIS — R33.9 URINARY RETENTION: Primary | ICD-10-CM

## 2024-09-18 LAB
APPEARANCE UR: CLEAR
BILIRUB UR STRIP.AUTO-MCNC: NEGATIVE MG/DL
COLOR UR: ABNORMAL
GLUCOSE UR STRIP.AUTO-MCNC: ABNORMAL MG/DL
KETONES UR STRIP.AUTO-MCNC: NEGATIVE MG/DL
LEUKOCYTE ESTERASE UR QL STRIP.AUTO: NEGATIVE
NITRITE UR QL STRIP.AUTO: NEGATIVE
PH UR STRIP.AUTO: 5 [PH]
PROT UR STRIP.AUTO-MCNC: NEGATIVE MG/DL
RBC # UR STRIP.AUTO: NEGATIVE /UL
SP GR UR STRIP.AUTO: 1.01
UROBILINOGEN UR STRIP.AUTO-MCNC: NORMAL MG/DL

## 2024-09-18 PROCEDURE — 81003 URINALYSIS AUTO W/O SCOPE: CPT | Performed by: NURSE PRACTITIONER

## 2024-09-18 PROCEDURE — 99283 EMERGENCY DEPT VISIT LOW MDM: CPT

## 2024-09-18 ASSESSMENT — LIFESTYLE VARIABLES
EVER HAD A DRINK FIRST THING IN THE MORNING TO STEADY YOUR NERVES TO GET RID OF A HANGOVER: NO
TOTAL SCORE: 0
EVER FELT BAD OR GUILTY ABOUT YOUR DRINKING: NO
HAVE PEOPLE ANNOYED YOU BY CRITICIZING YOUR DRINKING: NO
HAVE YOU EVER FELT YOU SHOULD CUT DOWN ON YOUR DRINKING: NO

## 2024-09-18 ASSESSMENT — COLUMBIA-SUICIDE SEVERITY RATING SCALE - C-SSRS
6. HAVE YOU EVER DONE ANYTHING, STARTED TO DO ANYTHING, OR PREPARED TO DO ANYTHING TO END YOUR LIFE?: NO
2. HAVE YOU ACTUALLY HAD ANY THOUGHTS OF KILLING YOURSELF?: NO
1. IN THE PAST MONTH, HAVE YOU WISHED YOU WERE DEAD OR WISHED YOU COULD GO TO SLEEP AND NOT WAKE UP?: NO

## 2024-09-18 ASSESSMENT — PAIN - FUNCTIONAL ASSESSMENT: PAIN_FUNCTIONAL_ASSESSMENT: 0-10

## 2024-09-18 ASSESSMENT — PAIN SCALES - GENERAL: PAINLEVEL_OUTOF10: 0 - NO PAIN

## 2024-09-18 NOTE — ED PROVIDER NOTES
Chief Complaint   Patient presents with    Urinary Retention       HPI       89 year old male presents to the Emergency Department today complaining of decreased urination over the past 24 hours. Notes that he has only been able to dribble small amounts and feels as though he cannot empty his bladder. Denies any associated fever, chills, headache, neck pain, chest pain, shortness of breath, abdominal pain, nausea, vomiting, diarrhea, constipation, or other urinary symptoms.       History provided by:  Patient             Patient History   Past Medical History:   Diagnosis Date    Encounter for screening for malignant neoplasm of prostate 06/15/2016    Prostate cancer screening    Long term (current) use of anticoagulants 06/15/2016    Anticoagulated on Coumadin    Personal history of other diseases of the circulatory system     History of atrial fibrillation    Personal history of other diseases of the respiratory system 11/01/2019    History of acute sinusitis    Personal history of other endocrine, nutritional and metabolic disease     History of diabetes mellitus    Personal history of transient ischemic attack (TIA), and cerebral infarction without residual deficits     History of transient cerebral ischemia    Unspecified atrial fibrillation (Multi) 10/31/2022    Atrial fibrillation     Past Surgical History:   Procedure Laterality Date    CARDIAC PACEMAKER PLACEMENT  06/08/2017    Pacemaker Placement    CARDIAC PACEMAKER PLACEMENT  06/08/2017    Pacemaker Placement    CARPAL TUNNEL RELEASE  02/15/2016    Neuroplasty Decompression Median Nerve At Carpal Tunnel    CATARACT EXTRACTION  02/15/2016    Cataract Surgery    FOOT SURGERY  06/15/2016    Foot Surgery    HAND SURGERY  02/15/2016    Hand Surgery                                                                                                                                                          OTHER SURGICAL HISTORY  06/08/2017    Cardio-Defib Pulse Gen  Venous Insertion Of Electrode For Ventricular Pacing    OTHER SURGICAL HISTORY  06/08/2017    Cardio-Defib Pulse Gen Venous Insertion Of Electrode For Ventricular Pacing    OTHER SURGICAL HISTORY  06/08/2017    Cardio-Defib Pulse Gen Venous Insertion Of Electrode For Ventricular Pacing    TOTAL KNEE ARTHROPLASTY  05/26/2016    Knee Replacement     Family History   Problem Relation Name Age of Onset    Heart failure Mother      Diabetes Mother          mellitus    Colon cancer Father      Diabetes Sister          mellitus    Coronary artery disease Brother       Social History     Tobacco Use    Smoking status: Former     Types: Cigarettes     Passive exposure: Past    Smokeless tobacco: Never   Vaping Use    Vaping status: Never Used   Substance Use Topics    Alcohol use: Never    Drug use: Never           Physical Exam  Constitutional:       Appearance: Normal appearance.   HENT:      Head: Normocephalic.      Right Ear: Ear canal and external ear normal.      Left Ear: External ear normal.      Nose: Nose normal.      Mouth/Throat:      Mouth: Mucous membranes are moist.      Pharynx: Oropharynx is clear. No oropharyngeal exudate or posterior oropharyngeal erythema.   Eyes:      Conjunctiva/sclera: Conjunctivae normal.      Pupils: Pupils are equal, round, and reactive to light.   Cardiovascular:      Rate and Rhythm: Normal rate and regular rhythm.      Pulses:           Radial pulses are 3+ on the right side and 3+ on the left side.        Dorsalis pedis pulses are 3+ on the right side and 3+ on the left side.      Heart sounds: Normal heart sounds. No murmur heard.     No friction rub. No gallop.   Pulmonary:      Effort: Pulmonary effort is normal. No respiratory distress.      Breath sounds: Normal breath sounds. No wheezing, rhonchi or rales.   Abdominal:      General: Abdomen is flat. Bowel sounds are normal.      Palpations: Abdomen is soft.      Tenderness: There is no abdominal tenderness. There is no  right CVA tenderness, left CVA tenderness, guarding or rebound. Negative signs include Mace's sign and McBurney's sign.   Musculoskeletal:         General: No swelling or deformity.      Cervical back: Full passive range of motion without pain.      Right lower leg: No edema.      Left lower leg: No edema.   Lymphadenopathy:      Cervical: No cervical adenopathy.   Skin:     Capillary Refill: Capillary refill takes less than 2 seconds.      Coloration: Skin is not jaundiced.      Findings: No rash.   Neurological:      General: No focal deficit present.      Mental Status: He is alert and oriented to person, place, and time. Mental status is at baseline.      Gait: Gait is intact.   Psychiatric:         Mood and Affect: Mood normal.         Behavior: Behavior is cooperative.         Labs Reviewed   URINALYSIS WITH REFLEX CULTURE AND MICROSCOPIC - Abnormal       Result Value    Color, Urine Light-Yellow      Appearance, Urine Clear      Specific Gravity, Urine 1.009      pH, Urine 5.0      Protein, Urine NEGATIVE      Glucose, Urine 500 (3+) (*)     Blood, Urine NEGATIVE      Ketones, Urine NEGATIVE      Bilirubin, Urine NEGATIVE      Urobilinogen, Urine Normal      Nitrite, Urine NEGATIVE      Leukocyte Esterase, Urine NEGATIVE     URINALYSIS WITH REFLEX CULTURE AND MICROSCOPIC    Narrative:     The following orders were created for panel order Urinalysis with Reflex Culture and Microscopic.  Procedure                               Abnormality         Status                     ---------                               -----------         ------                     Urinalysis with Reflex C...[[id number]]  Abnormal            Final result               Extra Urine Gray Tube[[id number]]                            In process                   Please view results for these tests on the individual orders.   EXTRA URINE GRAY TUBE       No orders to display            ED Course & MDM            Medical Decision  Making  Patient was seen and evaluated by Dr. Biggs. Urinalysis shows no signs of infection.            Your medication list        CONTINUE taking these medications        Instructions Last Dose Given Next Dose Due   Blood glucose monitoring meter kit kit           lancets misc  Commonly known as: OneTouch UltraSoft Lancets      test twice a day              ASK your doctor about these medications        Instructions Last Dose Given Next Dose Due   atorvastatin 20 mg tablet  Commonly known as: Lipitor      Take 1 tablet (20 mg) by mouth once daily.       B COMPLEX 100 ORAL           Breztri Aerosphere 160-9-4.8 mcg/actuation HFA aerosol inhaler  Generic drug: budesonide-glycopyr-formoterol           clonazePAM 0.5 mg tablet  Commonly known as: KlonoPIN      TAKE 1.5 TABLET at Bedtime       empagliflozin 10 mg  Commonly known as: Jardiance      Take 1 tablet (10 mg) by mouth once daily.       finasteride 5 mg tablet  Commonly known as: Proscar      Take 1 tablet (5 mg) by mouth once daily. Do not crush, chew, or split.       gabapentin 300 mg capsule  Commonly known as: Neurontin      Take 1 capsule (300 mg) by mouth once daily at bedtime.       glipiZIDE XL 2.5 mg 24 hr tablet  Commonly known as: Glucotrol XL      Take 2 tablets (5 mg) by mouth once daily.       lisinopril 5 mg tablet      Take 1 tablet (5 mg) by mouth once daily. As directed       metoprolol succinate XL 25 mg 24 hr tablet  Commonly known as: Toprol-XL      Take 1 tablet (25 mg) by mouth once daily. Do not crush or chew.       omeprazole 40 mg DR capsule  Commonly known as: PriLOSEC      Take 1 capsule (40 mg) by mouth once daily in the morning. Take before meals.       OneTouch Ultra Test strip  Generic drug: blood sugar diagnostic      Inject 1 strip as directed 2 times a day.       OXcarbazepine 150 mg tablet  Commonly known as: Trileptal      Take 150 mg every morning and 300 mg every evening       PRESERVISION AREDS 2 PLUS MV ORAL            tamsulosin 0.4 mg 24 hr capsule  Commonly known as: Flomax      Take 1 capsule (0.4 mg) by mouth once daily. Daily before bed       torsemide 20 mg tablet  Commonly known as: Demadex      Take 1 tablet (20 mg) by mouth once daily.       warfarin 5 mg tablet  Commonly known as: Coumadin      Take as directed. If you are unsure how to take this medication, talk to your nurse or doctor.  Original instructions: Take 1.5 tablets (7.5 mg) by mouth once daily in the evening.                  Procedure  Procedures     Jeffrey Hayes, EUGENE-CNP  09/18/24 [id number]

## 2024-09-19 ENCOUNTER — LAB (OUTPATIENT)
Dept: LAB | Facility: LAB | Age: 89
End: 2024-09-19
Payer: MEDICARE

## 2024-09-19 DIAGNOSIS — N18.32 STAGE 3B CHRONIC KIDNEY DISEASE (MULTI): ICD-10-CM

## 2024-09-19 DIAGNOSIS — Z95.0 PRESENCE OF CARDIAC PACEMAKER: ICD-10-CM

## 2024-09-19 DIAGNOSIS — I48.19 PERSISTENT ATRIAL FIBRILLATION WITH RVR (MULTI): ICD-10-CM

## 2024-09-19 DIAGNOSIS — I25.10 ASHD (ARTERIOSCLEROTIC HEART DISEASE): ICD-10-CM

## 2024-09-19 DIAGNOSIS — N18.9 CHRONIC KIDNEY DISEASE, UNSPECIFIED CKD STAGE: ICD-10-CM

## 2024-09-19 DIAGNOSIS — Z86.79 HISTORY OF CARDIOMYOPATHY: ICD-10-CM

## 2024-09-19 DIAGNOSIS — Z79.01 WARFARIN ANTICOAGULATION: ICD-10-CM

## 2024-09-19 DIAGNOSIS — I50.32 CHRONIC HEART FAILURE WITH PRESERVED EJECTION FRACTION: ICD-10-CM

## 2024-09-19 DIAGNOSIS — E78.00 PURE HYPERCHOLESTEROLEMIA: ICD-10-CM

## 2024-09-19 DIAGNOSIS — I42.9 CARDIOMYOPATHY, UNSPECIFIED TYPE (MULTI): ICD-10-CM

## 2024-09-19 DIAGNOSIS — E11.9 TYPE 2 DIABETES MELLITUS WITHOUT COMPLICATION, WITHOUT LONG-TERM CURRENT USE OF INSULIN (MULTI): ICD-10-CM

## 2024-09-19 DIAGNOSIS — I10 PRIMARY HYPERTENSION: ICD-10-CM

## 2024-09-19 DIAGNOSIS — E78.5 DYSLIPIDEMIA: ICD-10-CM

## 2024-09-19 DIAGNOSIS — Z95.810 CARDIAC DEFIBRILLATOR IN PLACE: ICD-10-CM

## 2024-09-19 DIAGNOSIS — E11.00 TYPE 2 DIABETES MELLITUS WITH HYPEROSMOLARITY WITHOUT COMA, WITHOUT LONG-TERM CURRENT USE OF INSULIN (MULTI): ICD-10-CM

## 2024-09-19 LAB
ANION GAP SERPL CALC-SCNC: 15 MMOL/L (ref 10–20)
BNP SERPL-MCNC: 123 PG/ML (ref 0–99)
BUN SERPL-MCNC: 65 MG/DL (ref 6–23)
CALCIUM SERPL-MCNC: 8.4 MG/DL (ref 8.6–10.3)
CHLORIDE SERPL-SCNC: 104 MMOL/L (ref 98–107)
CO2 SERPL-SCNC: 23 MMOL/L (ref 21–32)
CREAT SERPL-MCNC: 2.45 MG/DL (ref 0.5–1.3)
EGFRCR SERPLBLD CKD-EPI 2021: 24 ML/MIN/1.73M*2
GLUCOSE SERPL-MCNC: 119 MG/DL (ref 74–99)
HOLD SPECIMEN: NORMAL
MAGNESIUM SERPL-MCNC: 2.11 MG/DL (ref 1.6–2.4)
POTASSIUM SERPL-SCNC: 4.9 MMOL/L (ref 3.5–5.3)
SODIUM SERPL-SCNC: 137 MMOL/L (ref 136–145)

## 2024-09-19 PROCEDURE — 36415 COLL VENOUS BLD VENIPUNCTURE: CPT

## 2024-09-19 PROCEDURE — 80048 BASIC METABOLIC PNL TOTAL CA: CPT

## 2024-09-19 PROCEDURE — 83880 ASSAY OF NATRIURETIC PEPTIDE: CPT

## 2024-09-19 PROCEDURE — 83735 ASSAY OF MAGNESIUM: CPT

## 2024-09-24 ENCOUNTER — ANTICOAGULATION - WARFARIN VISIT (OUTPATIENT)
Dept: PRIMARY CARE | Facility: CLINIC | Age: 89
End: 2024-09-24
Payer: MEDICARE

## 2024-09-24 LAB
INR IN PPP BY COAGULATION ASSAY EXTERNAL: 2.6 (ref 2–3)
PROTHROMBIN TIME (PT) IN PPP BY COAGULATION ASSAY EXTERNAL: NORMAL

## 2024-09-25 ENCOUNTER — TELEPHONE (OUTPATIENT)
Dept: NEUROLOGY | Facility: HOSPITAL | Age: 89
End: 2024-09-25
Payer: MEDICARE

## 2024-09-25 DIAGNOSIS — E11.42 DIABETIC PERIPHERAL NEUROPATHY (MULTI): ICD-10-CM

## 2024-09-25 DIAGNOSIS — G25.81 RESTLESS LEGS SYNDROME: ICD-10-CM

## 2024-09-25 RX ORDER — GABAPENTIN 300 MG/1
300 CAPSULE ORAL NIGHTLY
Qty: 30 CAPSULE | Refills: 2 | Status: SHIPPED | OUTPATIENT
Start: 2024-09-25 | End: 2024-12-24

## 2024-09-25 NOTE — TELEPHONE ENCOUNTER
Rx Refill   Gabapentin(Neurontin) 300 mg capsule   1 Capsule by mouth daily at bedtime  [facility]   Thank You

## 2024-09-27 ENCOUNTER — TELEPHONE (OUTPATIENT)
Dept: NEUROLOGY | Facility: HOSPITAL | Age: 89
End: 2024-09-27
Payer: MEDICARE

## 2024-09-27 NOTE — TELEPHONE ENCOUNTER
The patient's wife called in. He had been started on Gabapentin 300 mg and it worked for 3 to 4 weeks then he went back his baseline for pain. She wanted you to know and wasn't sure if you would want to schedule an appointment before his January follow up.     Thank you,

## 2024-10-01 ENCOUNTER — ANTICOAGULATION - WARFARIN VISIT (OUTPATIENT)
Dept: PRIMARY CARE | Facility: CLINIC | Age: 89
End: 2024-10-01
Payer: MEDICARE

## 2024-10-01 LAB
INR IN PPP BY COAGULATION ASSAY EXTERNAL: 2.5 (ref 2–3)
PROTHROMBIN TIME (PT) IN PPP BY COAGULATION ASSAY EXTERNAL: NORMAL

## 2024-10-03 ENCOUNTER — APPOINTMENT (OUTPATIENT)
Dept: UROLOGY | Facility: CLINIC | Age: 89
End: 2024-10-03
Payer: MEDICARE

## 2024-10-03 VITALS
SYSTOLIC BLOOD PRESSURE: 120 MMHG | HEIGHT: 71 IN | WEIGHT: 185 LBS | BODY MASS INDEX: 25.9 KG/M2 | DIASTOLIC BLOOD PRESSURE: 65 MMHG | HEART RATE: 85 BPM

## 2024-10-03 DIAGNOSIS — N40.1 BENIGN PROSTATIC HYPERPLASIA WITH LOWER URINARY TRACT SYMPTOMS, SYMPTOM DETAILS UNSPECIFIED: Primary | ICD-10-CM

## 2024-10-03 DIAGNOSIS — R33.9 URINARY RETENTION: ICD-10-CM

## 2024-10-03 PROCEDURE — 1036F TOBACCO NON-USER: CPT | Performed by: UROLOGY

## 2024-10-03 PROCEDURE — 3074F SYST BP LT 130 MM HG: CPT | Performed by: UROLOGY

## 2024-10-03 PROCEDURE — 99214 OFFICE O/P EST MOD 30 MIN: CPT | Performed by: UROLOGY

## 2024-10-03 PROCEDURE — 1159F MED LIST DOCD IN RCRD: CPT | Performed by: UROLOGY

## 2024-10-03 PROCEDURE — 1157F ADVNC CARE PLAN IN RCRD: CPT | Performed by: UROLOGY

## 2024-10-03 PROCEDURE — 3078F DIAST BP <80 MM HG: CPT | Performed by: UROLOGY

## 2024-10-03 NOTE — PROGRESS NOTES
10/03/2024  Not voiding well, leaking and frequency    Patient has no nausea, no vomiting, no fever.    PVR: 376    We discussed benign prostate hypertrophy dysuria, incontinence  We Discussed medical management versus PVP greenlight laser prostatectomy versus Short catheter indwelling  All the questions were answered, the patient expressed understanding and agreed to the plan.    Impression  Right ureter calculus  Right flank pain  BPH  Neurogenic bladder  Nocturia  Frequency urgency     Plan  Short catheter indwelling  Monthly change catheter  Possible PVP greenlight laser prostatectomy in the future    Chief Complaint   Patient presents with    Benign Prostatic Hypertrophy     Patient is here today for yearly follow up bph.        Physical Exam     TODAYS LAB RESULTS:  No UA today    ASSESSMENT&PLAN:      IMPRESSIONS:          02/01/2024  Complaining oxybutynin and Flomax did not work well     Still complaining weak stream and frequency 20 times a day     We discussed benign prostate hypertrophy, continue alpha-blocker and oxybutynin  We discussed Proscar trial  We discussed the Short catheter option, surgery options etc.  All the questions were answered, the patient expressed understanding and agreed to the plan.     Impression  Right ureter calculus  Right flank pain  BPH  Neurogenic bladder  Nocturia  Frequency urgency     Plan  Continue Flomax 0.4 mg daily   Proscar 5 mg daily x 30-day trial refill x 2  Conservative management for stone  Increase liquid intake  Possible surgical intervention  Call if pain worsens   Appointment 3 m             Chief Complaint   Patient presents with    Urinary Incontinence       Patient still having a weak urinary stream and takes longer to void. He states Flomax is not helping          Physical Exam      TODAYS LAB RESULTS:     No urine sample       mL     ASSESSMENT&PLAN:        IMPRESSIONS:        10/02/2023  Patient here for urinary incontinence.      Increased  "urinary incontinence during the day and at night. Weaker urinary stream. Urinary frequency 1-2x per hour.       No results found for: \"PSA\"      -Donaldo Serenity      POC Specific Gravity, Urine  1.005 - 1.035 1.020   POC PH, Urine  No Reference Range Established PH 5.0   POC Protein, Urine  NEGATIVE, 30 (1+) mg/dl NEGATIVE   POC Glucose, Urine  NEGATIVE mg/dl NEGATIVE   POC Blood, Urine  NEGATIVE NEGATIVE   POC Ketones, Urine  NEGATIVE mg/dl NEGATIVE   POC Bilirubin, Urine  NEGATIVE NEGATIVE   POC Urobilinogen, Urine  0.2, 1.0 EU/DL 0.2   Poc Nitrate, Urine  NEGATIVE NEGATIVE   POC Leukocytes, Urine  NEGATIVE NEGATIVE      Complaint dysuria, incontinence     Patient has no nausea, no vomiting, no fever.     PVR:20 ml     JAY: Deferred     We discussed benign prostate hypertrophy  We discussed bladder empty well  All the questions were answered, the patient expressed understanding and agreed to the plan.     Impression  Right ureter calculus  Right flank pain  BPH  Neurogenic bladder  Nocturia  Frequency urgency     Plan  Flomax 0.4 mg daily x30-day trial  Conservative management for stone  Increase liquid intake  Possible surgical intervention  Call if pain worsens   Appointment 1 year                 10/04/21:      Patient here for urinary incontinence.      Increased urinary incontinence during the day and at night. Weaker urinary stream. Urinary frequency 1-2x per hour.       No results found for: \"PSA\"      -Donaldo Serenity      POC Specific Gravity, Urine  1.005 - 1.035 1.020   POC PH, Urine  No Reference Range Established PH 5.0   POC Protein, Urine  NEGATIVE, 30 (1+) mg/dl NEGATIVE   POC Glucose, Urine  NEGATIVE mg/dl NEGATIVE   POC Blood, Urine  NEGATIVE NEGATIVE   POC Ketones, Urine  NEGATIVE mg/dl NEGATIVE   POC Bilirubin, Urine  NEGATIVE NEGATIVE   POC Urobilinogen, Urine  0.2, 1.0 EU/DL 0.2   Poc Nitrate, Urine  NEGATIVE NEGATIVE   POC Leukocytes, Urine  NEGATIVE NEGATIVE         10/04/21:   Patient here " today following up after 10/02/21 ER visit for right flank pain, nausea, vomiting.   He was prescribed Cipro 500mg BID, Flomax 0.4mg daily and Percocet 5-325mg. He has some pain today, 2/10, intermittent. Pain started 10/01/21. First time for kidney stone.     Detrol LA does not seem to be helping. He has urinary hesitancy.      Patient has no fever      CT abd/pelvis done 10/2/21--IMPRESSION:  Enlarged heart. Transvenous pacemaker.  3 mm obstructing calculus in the distal right ureter.   Right hydronephrosis.     IO UA (automated w/o microscopy)           [id number] 01:22PM           Yinka Lara     Test Name       Result     Flag        Reference  IO Glucose - Urine         Negative                  IO Bilirubin       Negative                  IO Ketones      Negative                  IO Specific Gravity         1.015                       IO Blood          (++)moderate - 40                                IO pH               5.0                           IO Protein, Urine            (++)100                   IO Urobilinogen                                              Normal (0.2-1.0 mg/dl)  IO Nitrite, Urine              Negative                  IO Leukocytes Negative                  IO Glucose - Urine         Negative                  IO Bilirubin       Negative                  IO Ketones      Negative                  IO Specific Gravity         1.015                       IO Blood          (++)moderate - 40                                IO pH               5.0                           IO Protein, Urine            (++)100                   IO Urobilinogen                                              Normal (0.2-1.0 mg/dl)  IO Nitrite, Urine              Negative                  IO Leukocytes Negative     We discussed benign prostatic hyperplasia with mild voiding symptoms, continue Flomax 0.4mg daily , discontinue Detrol LA   We discussed right ureter stone, right flank pain, recent ER visit, conservative  management, increase liquid intake  All questions were answered, the patient expressed understanding and agreed to the plan.                                                                     Impression  Right ureter calculus  Right flank pain  BPH  Neurogenic bladder  Nocturia  Frequency urgency     Plan  Continue Flomax 0.4 mg daily  DC Detrol LA  Conservative management for stone  Increase liquid intake  Possible surgical intervention  Call if pain worsens   Appointment 1 week              9/17/21:   Patient is here today for cystoscopy for bph and dysuria.      Cystoscopy     Findings: Mild bulbar urethral stricture, mild enlarged prostate, trabeculated bladder, no stone no tumor     Pain evaluation: 0/10 before, 2/10 after      Prostate ultrasound: 18 cc prostate     We discussed intractable dysuria, Flomax and oxybutynin did not work  We discussed cystoscopy and ultrasound, both unremarkable  All the questions were answered, the patient expressed understanding and agreed to the plan.     Impression  BPH  Neurogenic bladder  Nocturia  Frequency urgency     Plan  Conservative management  No surgery for now  Continue Flomax 0.4 mg daily  DC oxybutynin  Detrol LA 4 mg daily x30 days trial call for refill  Appointment in 6 months           06/07/2021  Oxybutynin did not work, still complaining dysuria     Patient has no nausea, no vomiting, no fever.     PVR: 106 cc     We discussed intractable dysuria, Flomax and oxybutynin did not work  We discussed cystoscopy and ultrasound  All the questions were answered, the patient expressed understanding and agreed to the plan.     Impression  BPH  Neurogenic bladder  Nocturia  Frequency urgency     Plan  Continue Flomax 0.4 mg daily  DC oxybutynin 5 mg twice a day  Cystoscopy and prostate ultrasound.     I spent 25 minutes with this patient. Greater than 50% of this time was spent in counseling and/or coordination of care        05/03/2021  87 gentleman presents urgency  frequency and nocturia x3, for several years, on Flomax for 3 months     Patient has no nausea, no vomiting, no fever.     Exam: Uncircumcised, normal testes left spermatocele 3 cm     JAY: 1+, no nodule     IO Ultrasound, measurement post-void resid urine and/or bl cap; no imag         [id number] 02:21PM         Yinka Lara     Test Name       Result     Flag        Reference  IO Ultrasound, measurement post void resid urine and/or bl cap; non-imag       33 ml/min                                                                         We discussed benign prostate hypertrophy with moderate voiding symptoms  We discussed neurogenic bladder frequency urgency  All the questions were answered, the patient expressed understanding and agreed to the plan.     Impression  BPH  Neurogenic bladder  Nocturia  Frequency urgency     Plan  Continue Flomax 0.4 mg daily  Add oxybutynin 5 mg twice a day x 30 days  Appointment in 1 month

## 2024-10-08 ENCOUNTER — ANTICOAGULATION - WARFARIN VISIT (OUTPATIENT)
Dept: PRIMARY CARE | Facility: CLINIC | Age: 89
End: 2024-10-08

## 2024-10-08 ENCOUNTER — OFFICE VISIT (OUTPATIENT)
Dept: NEUROLOGY | Facility: HOSPITAL | Age: 89
End: 2024-10-08
Payer: MEDICARE

## 2024-10-08 VITALS
WEIGHT: 182 LBS | HEART RATE: 60 BPM | DIASTOLIC BLOOD PRESSURE: 64 MMHG | SYSTOLIC BLOOD PRESSURE: 109 MMHG | BODY MASS INDEX: 25.38 KG/M2

## 2024-10-08 DIAGNOSIS — G25.81 RESTLESS LEGS SYNDROME: ICD-10-CM

## 2024-10-08 DIAGNOSIS — E11.42 DIABETIC PERIPHERAL NEUROPATHY (MULTI): ICD-10-CM

## 2024-10-08 PROCEDURE — 3074F SYST BP LT 130 MM HG: CPT | Performed by: PSYCHIATRY & NEUROLOGY

## 2024-10-08 PROCEDURE — 1159F MED LIST DOCD IN RCRD: CPT | Performed by: PSYCHIATRY & NEUROLOGY

## 2024-10-08 PROCEDURE — 1126F AMNT PAIN NOTED NONE PRSNT: CPT | Performed by: PSYCHIATRY & NEUROLOGY

## 2024-10-08 PROCEDURE — 99214 OFFICE O/P EST MOD 30 MIN: CPT | Performed by: PSYCHIATRY & NEUROLOGY

## 2024-10-08 PROCEDURE — 1157F ADVNC CARE PLAN IN RCRD: CPT | Performed by: PSYCHIATRY & NEUROLOGY

## 2024-10-08 PROCEDURE — G2211 COMPLEX E/M VISIT ADD ON: HCPCS | Performed by: PSYCHIATRY & NEUROLOGY

## 2024-10-08 PROCEDURE — 1160F RVW MEDS BY RX/DR IN RCRD: CPT | Performed by: PSYCHIATRY & NEUROLOGY

## 2024-10-08 PROCEDURE — 3078F DIAST BP <80 MM HG: CPT | Performed by: PSYCHIATRY & NEUROLOGY

## 2024-10-08 RX ORDER — CLONAZEPAM 0.5 MG/1
TABLET ORAL
Qty: 45 TABLET | Refills: 2 | Status: SHIPPED | OUTPATIENT
Start: 2024-10-22

## 2024-10-08 RX ORDER — OXCARBAZEPINE 150 MG/1
TABLET, FILM COATED ORAL
Qty: 270 TABLET | Refills: 1 | Status: SHIPPED | OUTPATIENT
Start: 2024-10-08

## 2024-10-08 RX ORDER — GABAPENTIN 100 MG/1
CAPSULE ORAL
Qty: 60 CAPSULE | Refills: 0 | Status: SHIPPED | OUTPATIENT
Start: 2024-10-08

## 2024-10-08 RX ORDER — GABAPENTIN 300 MG/1
300 CAPSULE ORAL NIGHTLY
Qty: 30 CAPSULE | Refills: 2 | Status: SHIPPED | OUTPATIENT
Start: 2024-10-08 | End: 2025-01-06

## 2024-10-08 ASSESSMENT — PAIN SCALES - GENERAL: PAINLEVEL: 0-NO PAIN

## 2024-10-08 ASSESSMENT — ENCOUNTER SYMPTOMS
LOSS OF SENSATION IN FEET: 1
DEPRESSION: 0
OCCASIONAL FEELINGS OF UNSTEADINESS: 0

## 2024-10-08 NOTE — PROGRESS NOTES
"Follow-up visit    Visit type: Follow-up    PCP: Charanjit Mujica, APRN-CNP.    Subjective     Michele Maya is a 89 y.o. year old male here for follow-up. Last seen 7/3/24.     Patient is accompanied by: spouse.       Restless Legs    I first saw him 4/24/19 for neuropathy. Also has RLS. Prev saw Dr Estrada, then saw Dr Fan. Has had RLS since about 70 years old, legs move at night, has to rearrange feet, prevents him from falling asleep. Legs move as well during sleep. Once asleep, stays asleep. Was on clonazepam 3mg daily. On clonazepam 1mg qhs now, lowered by Dr Fan. Having some trouble falling asleep. Per wife, nighttime movements same even on lower dose of clonazepam. Ropinirole 0.25mg tried caused leg cramps. Pramipexole 0.125mg caused lightheadedness. Has \"very bad neuropathy\" in feet, presumed to be diabetic in origin. Having trouble in calves, muscles are tight in morning. NCT done? States has tried \"everything for neuropathy\". When asked to specify, tried gabapentin, pregabalin, and amitriptyline in past, didn't help. Tried duloxetine didn't help. Has tried \"seizure medications\". Has had back pain. s/p epidural injection and \"didn't work\". On warfarin for afib. CPK/ferritin wnl. Venlafaxine didn't help. Baclofen tried, had behavioral changes, irritable, swearing. Alpha lipoic acid tried didn't help. Horizant never tried due to expense. On B12. I tried to lower clonazepam to 0.25mg qhs, but pt had difficulty and dose increased back to 0.5mg qhs. Horizant 300mg once daily tried thru cash-pay program as well, had more leg movements. Was to continue clonazepam 0.5mg qhs. He was tested for YAYA by PCP and was ordered CPAP. Didn't tolerate. I offered to follow-up for CPAP but pt didn't want to--pt refused YAYA treatment. s/p epidural blocks in the past. Discussed Inspire therapy, not interested. EMG/NCT confirmed neuropathy--pt has tried and failed symptomatic neuropathy treatments in the " past and didn't want anymore treatment. Had seen vascular surgery, suspected vascular claudication, high risk for contrast-induced nephropathy and instrumentation. He is on clonazepam 0.75mg qhs. Previously discussed about use of oxcarbazepine for neuropathic help, prescribed, but evidently did not try. B12 level wnl. He sees vascular surgery for what was thought to be vascular claudication. I restarted trial of OXC, and last visit recommended increase dose incrementally up to 300mg bid and continue clonazepam 0.5mg 1.5 tabs at bedtime. During last visit, was on /300, GBP restarted at 300mg at bedtime.    Comes in today for follow-up. On wheelchair.    States he was doing good on GBP 300mg at bedtime x3-4 weeks, then it stopped helping.  Crea last was 2.45. Pt wants to try going up on dose of GBP see if can help more.      Patient Active Problem List   Diagnosis    Aortoiliac occlusive disease (Multi)    ASHD (arteriosclerotic heart disease)    Atrial fibrillation (Multi)    BPH (benign prostatic hyperplasia)    Cardiac defibrillator in place    History of cardiomyopathy    Chronic combined systolic and diastolic heart failure, NYHA class 2    Chronic insomnia    Chronic painful diabetic neuropathy (Multi)    Claudication, intermittent (CMS-HCC)    Diabetes mellitus, type 2 (Multi)    Dyslipidemia    Diabetic peripheral neuropathy (Multi)    YAYA (obstructive sleep apnea)    Persistent atrial fibrillation with RVR (Multi)    PVD (peripheral vascular disease) (CMS-HCC)    Restless legs syndrome    SOBOE (shortness of breath on exertion)    Arthritis    Urinary incontinence    Acquired deformity of toe    Callus of toe    Diabetic polyneuropathy associated with type 2 diabetes mellitus (Multi)    History of malignant neoplasm    Hyperlipidemia    Sensorineural hearing loss, bilateral    Mixed conductive and sensorineural hearing loss of both ears    Onychomycosis    Other idiopathic peripheral autonomic  neuropathy    Pain in left shoulder    Shoulder pain    Primary hypertension    Stage 3b chronic kidney disease (Multi)    Tinea    Vitamin B12 deficiency    Dilated cardiomyopathy (Multi)    Chronic constipation    Neck pain    Chronic atrial fibrillation (Multi)    Presence of cardiac pacemaker    Peripheral vascular disease, unspecified (CMS-HCC)    Medicare annual wellness visit, subsequent    Flu vaccine need    Advance directive in chart    Warfarin anticoagulation    CKD (chronic kidney disease)    (HFpEF) heart failure with preserved ejection fraction    Daytime somnolence    History of atrial fibrillation    History of transient ischemic attack    Sprain of knee    Gastroesophageal reflux disease    Arteriosclerosis of coronary artery    Chronic combined systolic and diastolic heart failure    Intermittent claudication (CMS-HCC)    Chronic obstructive pulmonary disease (Multi)    Cough, unspecified    Subacute cough    Sensorineural hearing loss (SNHL) of both ears    Obstructive sleep apnea syndrome    Idiopathic peripheral autonomic neuropathy    Pain of left shoulder region    Hypoxia    History of malignant melanoma of skin       Allergies   Allergen Reactions    Pentoxifylline Rash and Other     Burning blisters    Other Unknown    Amiodarone Diarrhea    Baclofen Unknown    Carvedilol Other     fatigue    Cilostazol Unknown    Flecainide Unknown    Quinidine Unknown    Simvastatin Other     Extreme fatigue       Current Outpatient Medications:     atorvastatin (Lipitor) 20 mg tablet, Take 1 tablet (20 mg) by mouth once daily., Disp: 90 tablet, Rfl: 1    blood sugar diagnostic (OneTouch Ultra Test) strip, Inject 1 strip as directed 2 times a day., Disp: 90 strip, Rfl: 3    budesonide-glycopyr-formoterol (Breztri Aerosphere) 160-9-4.8 mcg/actuation HFA aerosol inhaler, Breztri Aerosphere 160-9-4.8 MCG/ACT Inhalation Aerosol  Refills: 0      Start : 31-Oct-2022  Active 5.9 GM Inhaler, Disp: , Rfl:      clonazePAM (KlonoPIN) 0.5 mg tablet, TAKE 1.5 TABLET at Bedtime, Disp: 45 tablet, Rfl: 2    empagliflozin (Jardiance) 10 mg, Take 1 tablet (10 mg) by mouth once daily., Disp: 30 tablet, Rfl: 11    finasteride (Proscar) 5 mg tablet, Take 1 tablet (5 mg) by mouth once daily. Do not crush, chew, or split., Disp: 30 tablet, Rfl: 2    FreeStyle glucose monitoring kit, 1 each if needed., Disp: , Rfl:     gabapentin (Neurontin) 300 mg capsule, Take 1 capsule (300 mg) by mouth once daily at bedtime., Disp: 30 capsule, Rfl: 2    glipiZIDE XL (Glucotrol XL) 2.5 mg 24 hr tablet, Take 2 tablets (5 mg) by mouth once daily., Disp: 60 tablet, Rfl: 11    lancets (OneTouch UltraSoft Lancets) misc, test twice a day, Disp: 90 each, Rfl: 2    lisinopril 5 mg tablet, Take 1 tablet (5 mg) by mouth once daily. As directed, Disp: 90 tablet, Rfl: 0    metoprolol succinate XL (Toprol-XL) 25 mg 24 hr tablet, Take 1 tablet (25 mg) by mouth once daily. Do not crush or chew., Disp: 90 tablet, Rfl: 3    mv-min/FA/vit K/lutein/zeaxant (PRESERVISION AREDS 2 PLUS MV ORAL), Take by mouth., Disp: , Rfl:     omeprazole (PriLOSEC) 40 mg DR capsule, Take 1 capsule (40 mg) by mouth once daily in the morning. Take before meals., Disp: 30 capsule, Rfl: 11    OXcarbazepine (Trileptal) 150 mg tablet, Take 150 mg every morning and 300 mg every evening, Disp: 270 tablet, Rfl: 1    tamsulosin (Flomax) 0.4 mg 24 hr capsule, Take 1 capsule (0.4 mg) by mouth once daily. Daily before bed, Disp: 90 capsule, Rfl: 3    torsemide (Demadex) 20 mg tablet, Take 1 tablet (20 mg) by mouth once daily., Disp: 30 tablet, Rfl: 3    vitamin B complex/folic acid (B COMPLEX 100 ORAL), as directed Orally, Disp: , Rfl:     warfarin (Coumadin) 5 mg tablet, Take 1.5 tablets (7.5 mg) by mouth once daily in the evening., Disp: 180 tablet, Rfl: 2     Objective     /64   Pulse 60   Wt 82.6 kg (182 lb)   BMI 25.38 kg/m²        Awake, alert, oriented, in no distress  Well-nourished,  on wheelchair    Mental status exam as above, conversant   Full EOMs intact, no nystagmus, no ptosis   No facial droop   Hearing grossly intact   No dysarthria    Motor strength is at least antigravity on all extremities  I did not have him stand or walk    Lab Results   Component Value Date    WBC 8.3 01/31/2024    HGB 14.3 01/31/2024    HCT 43.9 01/31/2024    MCV 99 01/31/2024     01/31/2024     Lab Results   Component Value Date    GLUCOSE 119 (H) 09/19/2024    CALCIUM 8.4 (L) 09/19/2024     09/19/2024    K 4.9 09/19/2024    CO2 23 09/19/2024     09/19/2024    BUN 65 (H) 09/19/2024    CREATININE 2.45 (H) 09/19/2024     Lab Results   Component Value Date    HGBA1C 7.9 07/19/2021      Lab Results   Component Value Date    [id number] 634 04/30/2024       Assessment/Plan     1. Vascular claudication  2. Peripheral neuropathy, likely diabetic, as stated  Has pacemaker  No records  On /300 (300mg bid wasn't more helpful), continue--erx'd  s/p multiple failed symptomatic neuropathic treatment in past    On GBP 300mg at bedtime--helping some, then stopped helping--pt/spouse wants to try going up on dose    Discussed, given kidney function, I don't recommend going beyond 600mg per day on GBP. Poss SE rediscussed and he/spouse aware.    3. RLS  4. PLMs  Ferritin/CPK wnl  Likely secondary RLS  PSG showed severe PLMs--pt has already tried and failed multiple meds including creams  On clonazepam 0.75mg qhs, continue--erx'd today    5. YAYA  YAYA, mild with sleep-related hypoxia  CPAP titration study showed hypoxemia improved with PAP therapy  On CPAP but couldn't tolerate (even at low pressures)--pt has stopped using and adamant on not using  Other options, including oral appliance (thru dental evaluation) and/or Inspire upper airway stimulation surgery previously discussed  Not discussed today      Plans:  Continue /300--erx'd  Continue clonazepam 0.5mg take 1.5 tabs at bedtime--erx'd  Increase  gabapentin to 400mg at bedtime (300+100) x at least 2 weeks, then if needed can go up to 500mg at bedtime (300+100+100)--erx'd    Keep 1/2025 appointment for now    All questions were answered.  Pt knows how to contact my office in case pt has any questions or concerns.    Willian Castellanos MD

## 2024-10-08 NOTE — PATIENT INSTRUCTIONS
Continue /300--erx'd  Continue clonazepam 0.5mg take 1.5 tabs at bedtime--erx'd  Increase gabapentin to 400mg at bedtime (300+100) x at least 2 weeks, then if needed can go up to 500mg at bedtime (300+100+100)--erx'd    Keep 1/2025 appointment for now

## 2024-10-14 DIAGNOSIS — I10 PRIMARY HYPERTENSION: ICD-10-CM

## 2024-10-14 RX ORDER — LISINOPRIL 5 MG/1
5 TABLET ORAL DAILY
Qty: 90 TABLET | Refills: 1 | Status: SHIPPED | OUTPATIENT
Start: 2024-10-14

## 2024-10-15 ENCOUNTER — ANTICOAGULATION - WARFARIN VISIT (OUTPATIENT)
Dept: PRIMARY CARE | Facility: CLINIC | Age: 89
End: 2024-10-15
Payer: MEDICARE

## 2024-10-15 LAB
INR IN PPP BY COAGULATION ASSAY EXTERNAL: 2.7 (ref 2–3)
PROTHROMBIN TIME (PT) IN PPP BY COAGULATION ASSAY EXTERNAL: NORMAL

## 2024-10-18 ENCOUNTER — HOSPITAL ENCOUNTER (INPATIENT)
Facility: HOSPITAL | Age: 89
LOS: 1 days | Discharge: HOME | DRG: 699 | End: 2024-10-20
Attending: EMERGENCY MEDICINE | Admitting: INTERNAL MEDICINE
Payer: MEDICARE

## 2024-10-18 ENCOUNTER — APPOINTMENT (OUTPATIENT)
Dept: RADIOLOGY | Facility: HOSPITAL | Age: 89
DRG: 699 | End: 2024-10-18
Payer: MEDICARE

## 2024-10-18 ENCOUNTER — APPOINTMENT (OUTPATIENT)
Dept: CARDIOLOGY | Facility: HOSPITAL | Age: 89
DRG: 699 | End: 2024-10-18
Payer: MEDICARE

## 2024-10-18 DIAGNOSIS — R09.02 HYPOXIA: ICD-10-CM
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
## 2024-10-18 DIAGNOSIS — I48.91 ATRIAL FIBRILLATION, UNSPECIFIED TYPE (MULTI): ICD-10-CM

## 2024-10-18 DIAGNOSIS — N39.0 URINARY TRACT INFECTION WITHOUT HEMATURIA, SITE UNSPECIFIED: ICD-10-CM

## 2024-10-18 LAB
ALBUMIN SERPL BCP-MCNC: 4.2 G/DL (ref 3.4–5)
ALP SERPL-CCNC: 79 U/L (ref 33–136)
ALT SERPL W P-5'-P-CCNC: 17 U/L (ref 10–52)
ANION GAP BLDV CALCULATED.4IONS-SCNC: 12 MMOL/L (ref 10–25)
ANION GAP SERPL CALC-SCNC: 16 MMOL/L (ref 10–20)
APPEARANCE UR: CLEAR
AST SERPL W P-5'-P-CCNC: 19 U/L (ref 9–39)
BASE EXCESS BLDV CALC-SCNC: 0.9 MMOL/L (ref -2–3)
BASOPHILS # BLD AUTO: 0.04 X10*3/UL (ref 0–0.1)
BASOPHILS NFR BLD AUTO: 0.3 %
BILIRUB SERPL-MCNC: 0.7 MG/DL (ref 0–1.2)
BILIRUB UR STRIP.AUTO-MCNC: NEGATIVE MG/DL
BNP SERPL-MCNC: 166 PG/ML (ref 0–99)
BODY TEMPERATURE: 37 DEGREES CELSIUS
BUN SERPL-MCNC: 61 MG/DL (ref 6–23)
CA-I BLDV-SCNC: 1.15 MMOL/L (ref 1.1–1.33)
CALCIUM SERPL-MCNC: 8.9 MG/DL (ref 8.6–10.3)
CARDIAC TROPONIN I PNL SERPL HS: 35 NG/L (ref 0–20)
CARDIAC TROPONIN I PNL SERPL HS: 35 NG/L (ref 0–20)
CHLORIDE BLDV-SCNC: 100 MMOL/L (ref 98–107)
CHLORIDE SERPL-SCNC: 103 MMOL/L (ref 98–107)
CO2 SERPL-SCNC: 24 MMOL/L (ref 21–32)
COLOR UR: COLORLESS
CREAT SERPL-MCNC: 2.62 MG/DL (ref 0.5–1.3)
EGFRCR SERPLBLD CKD-EPI 2021: 22 ML/MIN/1.73M*2
EOSINOPHIL # BLD AUTO: 0.06 X10*3/UL (ref 0–0.4)
EOSINOPHIL NFR BLD AUTO: 0.4 %
ERYTHROCYTE [DISTWIDTH] IN BLOOD BY AUTOMATED COUNT: 14 % (ref 11.5–14.5)
FLUAV RNA RESP QL NAA+PROBE: NOT DETECTED
FLUBV RNA RESP QL NAA+PROBE: NOT DETECTED
GLUCOSE BLDV-MCNC: 135 MG/DL (ref 74–99)
GLUCOSE SERPL-MCNC: 131 MG/DL (ref 74–99)
GLUCOSE UR STRIP.AUTO-MCNC: ABNORMAL MG/DL
HCO3 BLDV-SCNC: 26 MMOL/L (ref 22–26)
HCT VFR BLD AUTO: 41.9 % (ref 41–52)
HCT VFR BLD EST: 47 % (ref 41–52)
HGB BLD-MCNC: 13.8 G/DL (ref 13.5–17.5)
HGB BLDV-MCNC: 15.5 G/DL (ref 13.5–17.5)
HYALINE CASTS #/AREA URNS AUTO: ABNORMAL /LPF
IMM GRANULOCYTES # BLD AUTO: 0.09 X10*3/UL (ref 0–0.5)
IMM GRANULOCYTES NFR BLD AUTO: 0.6 % (ref 0–0.9)
INHALED O2 CONCENTRATION: 21 %
INR PPP: 2.2 (ref 0.9–1.1)
KETONES UR STRIP.AUTO-MCNC: NEGATIVE MG/DL
LACTATE BLDV-SCNC: 1.9 MMOL/L (ref 0.4–2)
LEUKOCYTE ESTERASE UR QL STRIP.AUTO: ABNORMAL
LYMPHOCYTES # BLD AUTO: 1.15 X10*3/UL (ref 0.8–3)
LYMPHOCYTES NFR BLD AUTO: 7.9 %
MAGNESIUM SERPL-MCNC: 2.01 MG/DL (ref 1.6–2.4)
MCH RBC QN AUTO: 32.3 PG (ref 26–34)
MCHC RBC AUTO-ENTMCNC: 32.9 G/DL (ref 32–36)
MCV RBC AUTO: 98 FL (ref 80–100)
MONOCYTES # BLD AUTO: 1.34 X10*3/UL (ref 0.05–0.8)
MONOCYTES NFR BLD AUTO: 9.2 %
MUCOUS THREADS #/AREA URNS AUTO: ABNORMAL /LPF
NEUTROPHILS # BLD AUTO: 11.88 X10*3/UL (ref 1.6–5.5)
NEUTROPHILS NFR BLD AUTO: 81.6 %
NITRITE UR QL STRIP.AUTO: NEGATIVE
NRBC BLD-RTO: 0 /100 WBCS (ref 0–0)
OXYHGB MFR BLDV: 31.1 % (ref 45–75)
PCO2 BLDV: 42 MM HG (ref 41–51)
PH BLDV: 7.4 PH (ref 7.33–7.43)
PH UR STRIP.AUTO: 5 [PH]
PLATELET # BLD AUTO: 206 X10*3/UL (ref 150–450)
PO2 BLDV: 23 MM HG (ref 35–45)
POTASSIUM BLDV-SCNC: 4.6 MMOL/L (ref 3.5–5.3)
POTASSIUM SERPL-SCNC: 4.6 MMOL/L (ref 3.5–5.3)
PROT SERPL-MCNC: 7.3 G/DL (ref 6.4–8.2)
PROT UR STRIP.AUTO-MCNC: NEGATIVE MG/DL
PROTHROMBIN TIME: 25 SECONDS (ref 9.8–12.8)
RBC # BLD AUTO: 4.27 X10*6/UL (ref 4.5–5.9)
RBC # UR STRIP.AUTO: ABNORMAL /UL
RBC #/AREA URNS AUTO: ABNORMAL /HPF
SAO2 % BLDV: 32 % (ref 45–75)
SARS-COV-2 RNA RESP QL NAA+PROBE: NOT DETECTED
SODIUM BLDV-SCNC: 133 MMOL/L (ref 136–145)
SODIUM SERPL-SCNC: 138 MMOL/L (ref 136–145)
SP GR UR STRIP.AUTO: 1.01
UROBILINOGEN UR STRIP.AUTO-MCNC: NORMAL MG/DL
WBC # BLD AUTO: 14.6 X10*3/UL (ref 4.4–11.3)
WBC #/AREA URNS AUTO: ABNORMAL /HPF

## 2024-10-18 PROCEDURE — 99285 EMERGENCY DEPT VISIT HI MDM: CPT | Mod: 25

## 2024-10-18 PROCEDURE — 93005 ELECTROCARDIOGRAM TRACING: CPT

## 2024-10-18 PROCEDURE — 85025 COMPLETE CBC W/AUTO DIFF WBC: CPT | Performed by: EMERGENCY MEDICINE

## 2024-10-18 PROCEDURE — 99223 1ST HOSP IP/OBS HIGH 75: CPT | Performed by: NURSE PRACTITIONER

## 2024-10-18 PROCEDURE — 72125 CT NECK SPINE W/O DYE: CPT

## 2024-10-18 PROCEDURE — 83880 ASSAY OF NATRIURETIC PEPTIDE: CPT | Performed by: EMERGENCY MEDICINE

## 2024-10-18 PROCEDURE — 87636 SARSCOV2 & INF A&B AMP PRB: CPT | Performed by: EMERGENCY MEDICINE

## 2024-10-18 PROCEDURE — 96365 THER/PROPH/DIAG IV INF INIT: CPT

## 2024-10-18 PROCEDURE — 2500000002 HC RX 250 W HCPCS SELF ADMINISTERED DRUGS (ALT 637 FOR MEDICARE OP, ALT 636 FOR OP/ED): Performed by: NURSE PRACTITIONER

## 2024-10-18 PROCEDURE — 70450 CT HEAD/BRAIN W/O DYE: CPT

## 2024-10-18 PROCEDURE — 36415 COLL VENOUS BLD VENIPUNCTURE: CPT | Performed by: EMERGENCY MEDICINE

## 2024-10-18 PROCEDURE — 81001 URINALYSIS AUTO W/SCOPE: CPT | Performed by: EMERGENCY MEDICINE

## 2024-10-18 PROCEDURE — 87086 URINE CULTURE/COLONY COUNT: CPT | Mod: PORLAB | Performed by: EMERGENCY MEDICINE

## 2024-10-18 PROCEDURE — 94640 AIRWAY INHALATION TREATMENT: CPT

## 2024-10-18 PROCEDURE — 84484 ASSAY OF TROPONIN QUANT: CPT | Performed by: EMERGENCY MEDICINE

## 2024-10-18 PROCEDURE — 74176 CT ABD & PELVIS W/O CONTRAST: CPT

## 2024-10-18 PROCEDURE — 87040 BLOOD CULTURE FOR BACTERIA: CPT | Mod: PORLAB | Performed by: EMERGENCY MEDICINE

## 2024-10-18 PROCEDURE — 2500000004 HC RX 250 GENERAL PHARMACY W/ HCPCS (ALT 636 FOR OP/ED): Performed by: EMERGENCY MEDICINE

## 2024-10-18 PROCEDURE — 71045 X-RAY EXAM CHEST 1 VIEW: CPT

## 2024-10-18 PROCEDURE — 85610 PROTHROMBIN TIME: CPT | Performed by: EMERGENCY MEDICINE

## 2024-10-18 PROCEDURE — 83735 ASSAY OF MAGNESIUM: CPT | Performed by: EMERGENCY MEDICINE

## 2024-10-18 PROCEDURE — 82435 ASSAY OF BLOOD CHLORIDE: CPT | Performed by: EMERGENCY MEDICINE

## 2024-10-18 RX ORDER — IPRATROPIUM BROMIDE AND ALBUTEROL SULFATE 2.5; .5 MG/3ML; MG/3ML
3 SOLUTION RESPIRATORY (INHALATION)
Status: DISCONTINUED | OUTPATIENT
Start: 2024-10-18 | End: 2024-10-19

## 2024-10-18 RX ORDER — CEFTRIAXONE 2 G/50ML
2 INJECTION, SOLUTION INTRAVENOUS ONCE
Status: COMPLETED | OUTPATIENT
Start: 2024-10-18 | End: 2024-10-18

## 2024-10-18 RX ADMIN — IPRATROPIUM BROMIDE AND ALBUTEROL SULFATE 3 ML: 2.5; .5 SOLUTION RESPIRATORY (INHALATION) at 18:35

## 2024-10-18 RX ADMIN — CEFTRIAXONE SODIUM 2 G: 2 INJECTION, SOLUTION INTRAVENOUS at 16:23

## 2024-10-18 SDOH — SOCIAL STABILITY: SOCIAL INSECURITY: HAVE YOU HAD THOUGHTS OF HARMING ANYONE ELSE?: NO

## 2024-10-18 SDOH — SOCIAL STABILITY: SOCIAL INSECURITY: ABUSE: ADULT

## 2024-10-18 SDOH — SOCIAL STABILITY: SOCIAL INSECURITY: WERE YOU ABLE TO COMPLETE ALL THE BEHAVIORAL HEALTH SCREENINGS?: YES

## 2024-10-18 ASSESSMENT — LIFESTYLE VARIABLES
SKIP TO QUESTIONS 9-10: 1
HAVE YOU EVER FELT YOU SHOULD CUT DOWN ON YOUR DRINKING: NO
EVER FELT BAD OR GUILTY ABOUT YOUR DRINKING: NO
HOW OFTEN DO YOU HAVE 6 OR MORE DRINKS ON ONE OCCASION: NEVER
HOW MANY STANDARD DRINKS CONTAINING ALCOHOL DO YOU HAVE ON A TYPICAL DAY: PATIENT DOES NOT DRINK
TOTAL SCORE: 0
AUDIT-C TOTAL SCORE: 0
AUDIT-C TOTAL SCORE: 0
HAVE PEOPLE ANNOYED YOU BY CRITICIZING YOUR DRINKING: NO
HOW OFTEN DO YOU HAVE A DRINK CONTAINING ALCOHOL: NEVER
EVER HAD A DRINK FIRST THING IN THE MORNING TO STEADY YOUR NERVES TO GET RID OF A HANGOVER: NO

## 2024-10-18 ASSESSMENT — PAIN SCALES - GENERAL
PAINLEVEL_OUTOF10: 0 - NO PAIN
PAINLEVEL_OUTOF10: 3
PAINLEVEL_OUTOF10: 0 - NO PAIN
PAINLEVEL_OUTOF10: 0 - NO PAIN

## 2024-10-18 ASSESSMENT — ENCOUNTER SYMPTOMS
EYE ITCHING: 0
APPETITE CHANGE: 0
WEAKNESS: 1
EYE DISCHARGE: 0
POLYPHAGIA: 0
HEMATURIA: 0
DYSPHORIC MOOD: 0
MYALGIAS: 0
CONFUSION: 0
SEIZURES: 0
APNEA: 0
NUMBNESS: 0
FEVER: 0
HEADACHES: 0
PALPITATIONS: 0
NERVOUS/ANXIOUS: 0
PHOTOPHOBIA: 0
NECK PAIN: 0
SINUS PAIN: 0
ABDOMINAL DISTENTION: 0
LIGHT-HEADEDNESS: 0
UNEXPECTED WEIGHT CHANGE: 0
CONSTIPATION: 0
FATIGUE: 0
SHORTNESS OF BREATH: 0
SORE THROAT: 0
VOMITING: 0
FREQUENCY: 0
DIFFICULTY URINATING: 1
NAUSEA: 0
VOICE CHANGE: 0
WOUND: 0
WHEEZING: 0
BACK PAIN: 0
TROUBLE SWALLOWING: 0
SPEECH DIFFICULTY: 0
BLOOD IN STOOL: 0
DIARRHEA: 0
POLYDIPSIA: 0
ADENOPATHY: 0
NECK STIFFNESS: 0
CHOKING: 0
ARTHRALGIAS: 0
EYE PAIN: 0
ABDOMINAL PAIN: 0
DYSURIA: 0
HALLUCINATIONS: 0
DIZZINESS: 0
CHEST TIGHTNESS: 0
COLOR CHANGE: 0
BRUISES/BLEEDS EASILY: 0
SLEEP DISTURBANCE: 0
CHILLS: 1
FLANK PAIN: 0
TREMORS: 0
COUGH: 0

## 2024-10-18 ASSESSMENT — PATIENT HEALTH QUESTIONNAIRE - PHQ9
1. LITTLE INTEREST OR PLEASURE IN DOING THINGS: NOT AT ALL
SUM OF ALL RESPONSES TO PHQ9 QUESTIONS 1 & 2: 0
2. FEELING DOWN, DEPRESSED OR HOPELESS: NOT AT ALL

## 2024-10-18 ASSESSMENT — COGNITIVE AND FUNCTIONAL STATUS - GENERAL
MOBILITY SCORE: 24
DAILY ACTIVITIY SCORE: 24
PATIENT BASELINE BEDBOUND: NO

## 2024-10-18 ASSESSMENT — ACTIVITIES OF DAILY LIVING (ADL)
WALKS IN HOME: INDEPENDENT
FEEDING YOURSELF: INDEPENDENT
JUDGMENT_ADEQUATE_SAFELY_COMPLETE_DAILY_ACTIVITIES: YES
TOILETING: INDEPENDENT
BATHING: INDEPENDENT
GROOMING: INDEPENDENT
HEARING - LEFT EAR: HEARING AID
PATIENT'S MEMORY ADEQUATE TO SAFELY COMPLETE DAILY ACTIVITIES?: YES
ADEQUATE_TO_COMPLETE_ADL: YES
DRESSING YOURSELF: INDEPENDENT
HEARING - RIGHT EAR: HEARING AID

## 2024-10-18 ASSESSMENT — PAIN - FUNCTIONAL ASSESSMENT: PAIN_FUNCTIONAL_ASSESSMENT: 0-10

## 2024-10-18 NOTE — ED NOTES
Pt arrives to ED via ambulance  from home with c/o generalized weakness and fall today.    Code Status:  No Order    HPI     Chief Complaint   Patient presents with    Weakness, Gen     C/O generalized weakness and fall today.    Fall    Diarrhea       /56 (BP Location: Left arm, Patient Position: Sitting)   Pulse 64   Temp 37 °C (98.6 °F) (Tympanic)   Resp 18   Wt 83.9 kg (184 lb 15.5 oz)   SpO2 95%     [place] Coma Scale Score: 15      LDA:   Peripheral IV 10/18/24 20 G Right Antecubital (Active)   Placement Date/Time: 10/18/24 1230   Placed by External Staff?: EMS  Hand Hygiene Completed: Yes  Size (Gauge): 20 G  Orientation: Right  Location: Antecubital  Site Prep: Chlorhexidine    Number of days: 0       Urethral Catheter Non-latex 16 Fr. (Active)   Placement Date/Time: 10/11/24 1000   Placed by External Staff?: Other (Comment)  Catheter Type: Non-latex  Tube Size (Fr.): 16 Fr.  Catheter Balloon Size: 10 mL   Number of days: 7        BACKGROUND  Past Medical History:   Diagnosis Date    Encounter for screening for malignant neoplasm of prostate 06/15/2016    Prostate cancer screening    Long term (current) use of anticoagulants 06/15/2016    Anticoagulated on Coumadin    Personal history of other diseases of the circulatory system     History of atrial fibrillation    Personal history of other diseases of the respiratory system 11/01/2019    History of acute sinusitis    Personal history of other endocrine, nutritional and metabolic disease     History of diabetes mellitus    Personal history of transient ischemic attack (TIA), and cerebral infarction without residual deficits     History of transient cerebral ischemia    Unspecified atrial fibrillation (Multi) 10/31/2022    Atrial fibrillation     Past Surgical History:   Procedure Laterality Date    CARDIAC PACEMAKER PLACEMENT  06/08/2017    Pacemaker Placement    CARDIAC PACEMAKER PLACEMENT  06/08/2017    Pacemaker Placement    CARPAL TUNNEL  RELEASE  02/15/2016    Neuroplasty Decompression Median Nerve At Carpal Tunnel    CATARACT EXTRACTION  02/15/2016    Cataract Surgery    FOOT SURGERY  06/15/2016    Foot Surgery    HAND SURGERY  02/15/2016    Hand Surgery                                                                                                                                                          OTHER SURGICAL HISTORY  06/08/2017    Cardio-Defib Pulse Gen Venous Insertion Of Electrode For Ventricular Pacing    OTHER SURGICAL HISTORY  06/08/2017    Cardio-Defib Pulse Gen Venous Insertion Of Electrode For Ventricular Pacing    OTHER SURGICAL HISTORY  06/08/2017    Cardio-Defib Pulse Gen Venous Insertion Of Electrode For Ventricular Pacing    TOTAL KNEE ARTHROPLASTY  05/26/2016    Knee Replacement     No current facility-administered medications on file prior to encounter.     Current Outpatient Medications on File Prior to Encounter   Medication Sig Dispense Refill    atorvastatin (Lipitor) 20 mg tablet Take 1 tablet (20 mg) by mouth once daily. 90 tablet 1    blood sugar diagnostic (OneTouch Ultra Test) strip Inject 1 strip as directed 2 times a day. 90 strip 3    budesonide-glycopyr-formoterol (Breztri Aerosphere) 160-9-4.8 mcg/actuation HFA aerosol inhaler Breztri Aerosphere 160-9-4.8 MCG/ACT Inhalation Aerosol   Refills: 0        Start : 31-Oct-2022   Active  5.9 GM Inhaler      budesonide/glycopyr/formoterol (BREZTRI AEROSPHERE INHL) Inhale.      [START ON 10/22/2024] clonazePAM (KlonoPIN) 0.5 mg tablet TAKE 1.5 TABLET at Bedtime Do not fill before October 22, 2024. 45 tablet 2    empagliflozin (Jardiance) 10 mg Take 1 tablet (10 mg) by mouth once daily. 30 tablet 11    finasteride (Proscar) 5 mg tablet Take 1 tablet (5 mg) by mouth once daily. Do not crush, chew, or split. (Patient not taking: Reported on 10/8/2024) 30 tablet 2    FreeStyle glucose monitoring kit 1 each if needed.      gabapentin (Neurontin) 100 mg capsule Take 200mg  (together with 300mg capsule) to make up to 500mg at bedtime 60 capsule 0    gabapentin (Neurontin) 300 mg capsule Take 1 capsule (300 mg) by mouth once daily at bedtime. 30 capsule 2    glipiZIDE XL (Glucotrol XL) 2.5 mg 24 hr tablet Take 2 tablets (5 mg) by mouth once daily. 60 tablet 11    lancets (OneTouch UltraSoft Lancets) misc test twice a day 90 each 2    lisinopril 5 mg tablet Take 1 tablet (5 mg) by mouth once daily. As directed 90 tablet 1    metoprolol succinate XL (Toprol-XL) 25 mg 24 hr tablet Take 1 tablet (25 mg) by mouth once daily. Do not crush or chew. 90 tablet 3    mv-min/FA/vit K/lutein/zeaxant (PRESERVISION AREDS 2 PLUS MV ORAL) Take by mouth.      omeprazole (PriLOSEC) 40 mg DR capsule Take 1 capsule (40 mg) by mouth once daily in the morning. Take before meals. 30 capsule 11    OXcarbazepine (Trileptal) 150 mg tablet Take 150 mg every morning and 300 mg every evening 270 tablet 1    tamsulosin (Flomax) 0.4 mg 24 hr capsule Take 1 capsule (0.4 mg) by mouth once daily. Daily before bed (Patient not taking: Reported on 10/8/2024) 90 capsule 3    torsemide (Demadex) 20 mg tablet Take 1 tablet (20 mg) by mouth once daily. 30 tablet 3    vitamin B complex/folic acid (B COMPLEX 100 ORAL) as directed Orally      warfarin (Coumadin) 5 mg tablet Take 1.5 tablets (7.5 mg) by mouth once daily in the evening. 180 tablet 2    [DISCONTINUED] lisinopril 5 mg tablet Take 1 tablet (5 mg) by mouth once daily. As directed 90 tablet 0        ASSESSMENT  ED Course as of 10/18/24 1750   Fri Oct 18, 2024   1301 Patient twelve-lead EKG interpreted by myself shows ventricularly paced rhythm, rate of 60, left axis deviation, appears not significantly changed unchanged when compared to prior EKG obtained in October 25, 2019. [WJ]   1341 POCT Lactate, Venous: 1.9 [WJ]   1342 Wbc 14.6 - no clear source, maybe from diarrhea. Will obtain CT [WJ]      ED Course User Index  [WJ] Greg Guerrero,          Diagnoses as of  10/18/24 1750   Fall, initial encounter   Urinary tract infection without hematuria, site unspecified   Hypoxia       Medications Currently Running:        Medications Given:  ED Medication Administration from 10/18/2024 1238 to 10/18/2024 1750         Date/Time Order Dose Route Action Action by     10/18/2024 1623 EDT cefTRIAXone (Rocephin) 2 g in dextrose (iso) IV 50 mL 2 g intravenous New Bag LOVE Palomino     10/18/2024 1646 EDT cefTRIAXone (Rocephin) 2 g in dextrose (iso) IV 50 mL 0 g intravenous Stopped LOVE Palomino                 RESULTS    Imaging:  CT head wo IV contrast   Final Result   No depressed skull fracture. No acute intracranial bleed or focal   mass effect.        Moderate volume loss.        Mild chronic white matter ischemic disease in the deep   periventricular regions.        MACRO:   None        Signed by: Silver Peraza 10/18/2024 3:41 PM   Dictation workstation:   [id number]      CT cervical spine wo IV contrast   Final Result   Cervical spine DJD as described. No CT evidence of cervical spine   fracture in this exam.        MACRO:   None        Signed by: Silver Peraza 10/18/2024 3:44 PM   Dictation workstation:   [id number]      CT chest abdomen pelvis wo IV contrast   Final Result   CHEST:   Stable compression fractures at T10 and L1. DJD in the thoracic spine   as described.        Stable findings of prior granulomatous disease in the right lower   lobe. Scant scarring versus atelectasis at the posterior lung bases.   Linear scarring at the right lung base. Grouping of 3 subcentimeter   pleural-based nodules at the lateral left lung base, most likely   sequela of remote infection or inflammation.        Cardiac pacemaker on the right, unchanged. Stable cardiomegaly.   Stable prominent three-vessel left-sided coronary artery   calcifications.                  ABDOMEN/PELVIS:   Arthritic changes in the lumbosacral spine and pelvis as described.   No CT evidence of fracture of the lumbar  spine or pelvis in this exam.        2 nonobstructing lower pole left renal stones. No hydronephrosis.        Grossly stable mild fusiform aneurysmal dilatation of the infrarenal   abdominal aorta.        Short catheter in an otherwise empty urinary bladder.        Findings of prior granulomatous disease as described.        MACRO:   None        Signed by: Silver Peraza 10/18/2024 4:03 PM   Dictation workstation:   [id number]      XR chest 1 view   Final Result   No acute cardiopulmonary process is evident.        MACRO:   None        Signed by: Chaitanya Borges 10/18/2024 1:27 PM   Dictation workstation:   [id number]         }  Labs ::99  Abnormal Labs Reviewed   CBC WITH AUTO DIFFERENTIAL - Abnormal; Notable for the following components:       Result Value    WBC 14.6 (*)     RBC 4.27 (*)     Neutrophils Absolute 11.88 (*)     Monocytes Absolute 1.34 (*)     All other components within normal limits   PROTIME-INR - Abnormal; Notable for the following components:    Protime 25.0 (*)     INR 2.2 (*)     All other components within normal limits   COMPREHENSIVE METABOLIC PANEL - Abnormal; Notable for the following components:    Glucose 131 (*)     Urea Nitrogen 61 (*)     Creatinine 2.62 (*)     eGFR 22 (*)     All other components within normal limits   B-TYPE NATRIURETIC PEPTIDE - Abnormal; Notable for the following components:     (*)     All other components within normal limits    Narrative:        <100 pg/mL - Heart failure unlikely                  100-299 pg/mL - Intermediate probability of acute heart                                  failure exacerbation. Correlate with clinical                                  context and patient history.                    >=300 pg/mL - Heart Failure likely. Correlate with clinical                                  context and patient history.                                    BNP testing is performed using different testing methodology at [facility]  than at other system hospitals. Direct result comparisons should only be made within the same method.                      URINALYSIS WITH REFLEX CULTURE AND MICROSCOPIC - Abnormal; Notable for the following components:    Color, Urine Colorless (*)     Glucose, Urine 200 (2+) (*)     Blood, Urine 0.03 (TRACE) (*)     Leukocyte Esterase, Urine 25 Symone/µL (*)     All other components within normal limits   SERIAL TROPONIN-INITIAL - Abnormal; Notable for the following components:    Troponin I, High Sensitivity 35 (*)     All other components within normal limits    Narrative:     Less than 99th percentile of normal range cutoff-                  Female and children under 18 years old <14 ng/L; Male <21 ng/L: Negative                  Repeat testing should be performed if clinically indicated.                                     Female and children under 18 years old 14-50 ng/L; Male 21-50 ng/L:                  Consistent with possible cardiac damage and possible increased clinical                   risk. Serial measurements may help to assess extent of myocardial damage.                                     >50 ng/L: Consistent with cardiac damage, increased clinical risk and                  myocardial infarction. Serial measurements may help assess extent of                   myocardial damage.                                      NOTE: Children less than 1 year old may have higher baseline troponin                   levels and results should be interpreted in conjunction with the overall                   clinical context.                                     NOTE: Troponin I testing is performed using a different                   testing methodology at [facility] than at other                   [facility]. Direct result comparisons should only                   be made within the same method.   BLOOD GAS VENOUS FULL PANEL - Abnormal; Notable for the following components:    POCT pO2, Venous 23  (*)     POCT SO2, Venous 32 (*)     POCT Oxy Hemoglobin, Venous 31.1 (*)     POCT Sodium, Venous 133 (*)     POCT Glucose, Venous 135 (*)     All other components within normal limits   SERIAL TROPONIN, 1 HOUR - Abnormal; Notable for the following components:    Troponin I, High Sensitivity 35 (*)     All other components within normal limits    Narrative:     Less than 99th percentile of normal range cutoff-                  Female and children under 18 years old <14 ng/L; Male <21 ng/L: Negative                  Repeat testing should be performed if clinically indicated.                                     Female and children under 18 years old 14-50 ng/L; Male 21-50 ng/L:                  Consistent with possible cardiac damage and possible increased clinical                   risk. Serial measurements may help to assess extent of myocardial damage.                                     >50 ng/L: Consistent with cardiac damage, increased clinical risk and                  myocardial infarction. Serial measurements may help assess extent of                   myocardial damage.                                      NOTE: Children less than 1 year old may have higher baseline troponin                   levels and results should be interpreted in conjunction with the overall                   clinical context.                                     NOTE: Troponin I testing is performed using a different                   testing methodology at [facility] than at other                   [facility]. Direct result comparisons should only                   be made within the same method.   MICROSCOPIC ONLY, URINE - Abnormal; Notable for the following components:    Hyaline Casts, Urine 2+ (*)     All other components within normal limits                 Nimco Palomino RN  10/18/24 0481

## 2024-10-18 NOTE — ED PROVIDER NOTES
HPI   Chief Complaint   Patient presents with    Weakness, Gen     C/O generalized weakness and fall today.    Fall    Diarrhea       HPI    HISTORY OF PRESENT ILLNESS:  Patient is a [age]-year-old male who presents to the emergency department for generalized weakness.  States that starting yesterday, he began to feel sick.  He had 1 episode of large diarrhea.  Did not had any blood or melena.  No abdominal pain reported with this.  Patient today was feeling more weak despite eating and drinking and taking his medications.  He was walking with his walker and due to the weakness, had to lower himself slowly to the ground.  Did not hit his head.  States that as he was try to get himself up, he started have neck pain and called EMS.  Patient was brought here.    Past Medical History: Restless leg syndrome, vascular claudication, peripheral neuropathy, obstructive sleep apnea, atrial fibrillation, pacemaker, on warfarin, heart failure with preserved ejection fraction, cardiomyopathy, CAD, hypertension, hyperlipidemia, diabetes, CKD   Past Surgical History: Cardiac catheterization, pacemaker    __________________________________________________________  PHYSICAL EXAM:    Appearance: Alert, oriented , cooperative   Skin: Intact,  dry skin, no lesions, rash, petechiae or purpura.   Eyes: PERRLA, EOMs intact,  Conjunctiva pink with no redness or exudates.    HENT: Normocephalic, atraumatic. Nares patent   Neck: Supple. Trachea at midline.   Pulmonary: Lung sounds are clear bilaterally.  There is no rales, rhonchi, or wheezing.  Cardiac: Regular rate and rhythm, no rubs, murmurs, or gallops. No JVD,   Abdomen: Abdomen is soft, nontender, and nondistended.  No palpable organomegaly.  No rebound or guarding.  No CVA tenderness. Nonsurgical abdomen  Genitourinary: Exam deferred.  Musculoskeletal: +2 bilateral lower extremity edema.  No  pain, cyanosis, or deformity in extremities. Pulses full and equal.   Neurological:  Cranial  nerves are grossly intact, grossly normal sensation, no weakness, no focal findings identified.    __________________________________________________________  MEDICAL DECISION MAKING:    Patient presented to the emergency department for generalized weakness.  No obvious focal deficits seen.  The patient did have 1 episode diarrhea was having progressive weakness.  Was otherwise eating drinking normally.  Labs and CT head imaging was ordered.  Note that the Patient did manage to lower himself to the ground but was unable to get up.  States that he did not strike his head but in the process getting up, he started to have neck pain.    Laboratory results that show a white count of 14.6 which is nonspecific.  Urine that was obtained from the Short catheter showed mild leukocyte esterase which was not terribly concerning.  Given the white count, I did order a chest abdomen pelvis for further evaluation.  Patient did have chronic kidney disease which was slightly higher than his baseline.  CT imaging was ultimately negative for any acute fracture to, dislocation, signs of injury.  Patient was started on antibiotics for possible UTI.  During ED course, patient did desaturate to 80% was placed on oxygen.  Case discussed with IMS for admission for further evaluation of patient's generalized weakness and hypoxia.    External Records Reviewed: I reviewed recent and relevant outside records including: Cardiology note from September 6, 2024    Greg Guerrero  Emergency Medicine    Patient History   Past Medical History:   Diagnosis Date    Encounter for screening for malignant neoplasm of prostate 06/15/2016    Prostate cancer screening    Long term (current) use of anticoagulants 06/15/2016    Anticoagulated on Coumadin    Personal history of other diseases of the circulatory system     History of atrial fibrillation    Personal history of other diseases of the respiratory system 11/01/2019    History of acute sinusitis     Personal history of other endocrine, nutritional and metabolic disease     History of diabetes mellitus    Personal history of transient ischemic attack (TIA), and cerebral infarction without residual deficits     History of transient cerebral ischemia    Unspecified atrial fibrillation (Multi) 10/31/2022    Atrial fibrillation     Past Surgical History:   Procedure Laterality Date    CARDIAC PACEMAKER PLACEMENT  06/08/2017    Pacemaker Placement    CARDIAC PACEMAKER PLACEMENT  06/08/2017    Pacemaker Placement    CARPAL TUNNEL RELEASE  02/15/2016    Neuroplasty Decompression Median Nerve At Carpal Tunnel    CATARACT EXTRACTION  02/15/2016    Cataract Surgery    FOOT SURGERY  06/15/2016    Foot Surgery    HAND SURGERY  02/15/2016    Hand Surgery                                                                                                                                                          OTHER SURGICAL HISTORY  06/08/2017    Cardio-Defib Pulse Gen Venous Insertion Of Electrode For Ventricular Pacing    OTHER SURGICAL HISTORY  06/08/2017    Cardio-Defib Pulse Gen Venous Insertion Of Electrode For Ventricular Pacing    OTHER SURGICAL HISTORY  06/08/2017    Cardio-Defib Pulse Gen Venous Insertion Of Electrode For Ventricular Pacing    TOTAL KNEE ARTHROPLASTY  05/26/2016    Knee Replacement     Family History   Problem Relation Name Age of Onset    Heart failure Mother      Diabetes Mother          mellitus    Colon cancer Father      Diabetes Sister          mellitus    Coronary artery disease Brother       Social History     Tobacco Use    Smoking status: Former     Types: Cigarettes     Passive exposure: Past    Smokeless tobacco: Never   Vaping Use    Vaping status: Never Used   Substance Use Topics    Alcohol use: Never    Drug use: Never       Physical Exam   ED Triage Vitals [10/18/24 1239]   Temperature Heart Rate Respirations BP   37 °C (98.6 °F) 68 20 136/68      Pulse Ox Temp Source Heart Rate Source  Patient Position   96 % Tympanic Monitor Sitting      BP Location FiO2 (%)     Left arm --       Physical Exam      ED Course & MDM   ED Course as of 10/18/24 1836   Fri Oct 18, 2024   1301 Patient twelve-lead EKG interpreted by myself shows ventricularly paced rhythm, rate of 60, left axis deviation, appears not significantly changed unchanged when compared to prior EKG obtained in October 25, 2019. [WJ]   1341 POCT Lactate, Venous: 1.9 [WJ]   1342 Wbc 14.6 - no clear source, maybe from diarrhea. Will obtain CT [WJ]      ED Course User Index  [WJ] Greg Guerrero DO         Diagnoses as of 10/18/24 1836   Fall, initial encounter   Urinary tract infection without hematuria, site unspecified   Hypoxia                 No data recorded                                 Medical Decision Making      Procedure  Procedures     Greg Guerrero DO  10/18/24 1910

## 2024-10-18 NOTE — ED TRIAGE NOTES
Pt to ER via ambulance with c/o generalized weakness and fall today after legs gave out on him. Pt alert and oriented x 4, follows commands appropriately.

## 2024-10-18 NOTE — H&P
History Obtained From: Patient    History Of Present Illness:  Michele Maya is a 89 y.o. male with PMHx s/f restless leg syndrome benign prostatic hypertrophy cardiomyopathy coronary artery disease COPD neurogenic bladder peripheral neuropathy atrial fibrillation peripheral arterial disease type 2 diabetes presenting with weakness.  Patient states he did have some chills last night had attempted to ambulate with his walker his legs gave out on him he felt somewhat weak today and came in to hospital for further evaluation.  The patient does have a history of enlarged prostate has had neurogenic bladder followed by urology Short catheter was placed for now with plans for eventual greenlight laser prostatectomy possibly.  Denies any coughing or purulent sputum production no abdominal pain no diarrhea no new rashes.  He does have some baseline ambulatory dysfunction due to peripheral neuropathy peripheral arterial disease with intermittent claudication as well as restless leg syndrome in the evening he uses a walker typically and for extended trips he will use his power wheelchair.  Eitel signs on presentation show temperature 98.6 heart rate 68 respiratory rate 20 blood pressure 136/68 SpO2 96% on room air.  Chemistry panel shows sodium 138 potassium 4.6 chloride 103 CO2 24 BUN 61 creatinine 2.62 glucose 131 magnesium 2.01 BN peptide 166 troponin 30 5 repeat troponin 35 INR 2.2 CBC showing leukocytosis with white blood cell count 14.6 hemoglobin 13.8 hematocrit 41.9 platelets 206.  Influenza AMB are negative COVID-19 negative urine analysis is positive for glucose 25 leukocyte esterase no nitrites 1-5 WBCs 1-2 RBCs per high-powered field 2+ hyaline casts CT of the head was done showing a small left sinus retention cyst no depressed skull fracture or acute intracranial bleed or focal mass effect mild chronic white matter ischemic disease in the deep periventricular regions was noted.  CT of the neck showing  cervical spine DJD no spinal fracture.  A CT scan of the chest abdomen and pelvis showing stable compression fractures at T10 L1 and DJD there is granulomatous disease pleural nodules thought to be inflammatory in nature no pulmonary edema focal infiltrate or effusion noted in the abdomen and pelvis 2 nonobstructing lower pole left renal stones were noted without hydronephrosis patient was started on ceftriaxone and referred for admission.           ED Course:  ED Course as of 10/18/24 1758   Fri Oct 18, 2024   1301 Patient twelve-lead EKG interpreted by myself shows ventricularly paced rhythm, rate of 60, left axis deviation, appears not significantly changed unchanged when compared to prior EKG obtained in October 25, 2019. [WJ]   1341 POCT Lactate, Venous: 1.9 [WJ]   1342 Wbc 14.6 - no clear source, maybe from diarrhea. Will obtain CT [WJ]      ED Course User Index  [WJ] Greg Guerrero DO         Diagnoses as of 10/18/24 1758   Fall, initial encounter   Urinary tract infection without hematuria, site unspecified   Hypoxia     Relevant Results  Results for orders placed or performed during the hospital encounter of 10/18/24 (from the past 24 hours)   ECG 12 Lead   Result Value Ref Range    Ventricular Rate 60 BPM    Atrial Rate 0 BPM    HI Interval 45 ms    QRS Duration 130 ms    QT Interval 440 ms    QTC Calculation(Bazett) 440 ms    R Axis 259 degrees    T Axis 93 degrees    QRS Count 10 beats    Q Onset 251 ms    T Offset 471 ms    QTC Fredericia 440 ms   CBC and Auto Differential   Result Value Ref Range    WBC 14.6 (H) 4.4 - 11.3 x10*3/uL    nRBC 0.0 0.0 - 0.0 /100 WBCs    RBC 4.27 (L) 4.50 - 5.90 x10*6/uL    Hemoglobin 13.8 13.5 - 17.5 g/dL    Hematocrit 41.9 41.0 - 52.0 %    MCV 98 80 - 100 fL    MCH 32.3 26.0 - 34.0 pg    MCHC 32.9 32.0 - 36.0 g/dL    RDW 14.0 11.5 - 14.5 %    Platelets 206 150 - 450 x10*3/uL    Neutrophils % 81.6 40.0 - 80.0 %    Immature Granulocytes %, Automated 0.6 0.0 - 0.9 %     Lymphocytes % 7.9 13.0 - 44.0 %    Monocytes % 9.2 2.0 - 10.0 %    Eosinophils % 0.4 0.0 - 6.0 %    Basophils % 0.3 0.0 - 2.0 %    Neutrophils Absolute 11.88 (H) 1.60 - 5.50 x10*3/uL    Immature Granulocytes Absolute, Automated 0.09 0.00 - 0.50 x10*3/uL    Lymphocytes Absolute 1.15 0.80 - 3.00 x10*3/uL    Monocytes Absolute 1.34 (H) 0.05 - 0.80 x10*3/uL    Eosinophils Absolute 0.06 0.00 - 0.40 x10*3/uL    Basophils Absolute 0.04 0.00 - 0.10 x10*3/uL   Protime-INR   Result Value Ref Range    Protime 25.0 (H) 9.8 - 12.8 seconds    INR 2.2 (H) 0.9 - 1.1   Comprehensive Metabolic Panel   Result Value Ref Range    Glucose 131 (H) 74 - 99 mg/dL    Sodium 138 136 - 145 mmol/L    Potassium 4.6 3.5 - 5.3 mmol/L    Chloride 103 98 - 107 mmol/L    Bicarbonate 24 21 - 32 mmol/L    Anion Gap 16 10 - 20 mmol/L    Urea Nitrogen 61 (H) 6 - 23 mg/dL    Creatinine 2.62 (H) 0.50 - 1.30 mg/dL    eGFR 22 (L) >60 mL/min/1.73m*2    Calcium 8.9 8.6 - 10.3 mg/dL    Albumin 4.2 3.4 - 5.0 g/dL    Alkaline Phosphatase 79 33 - 136 U/L    Total Protein 7.3 6.4 - 8.2 g/dL    AST 19 9 - 39 U/L    Bilirubin, Total 0.7 0.0 - 1.2 mg/dL    ALT 17 10 - 52 U/L   Magnesium   Result Value Ref Range    Magnesium 2.01 1.60 - 2.40 mg/dL   B-Type Natriuretic Peptide   Result Value Ref Range     (H) 0 - 99 pg/mL   Troponin I, High Sensitivity, Initial   Result Value Ref Range    Troponin I, High Sensitivity 35 (H) 0 - 20 ng/L   BLOOD GAS VENOUS FULL PANEL   Result Value Ref Range    POCT pH, Venous 7.40 7.33 - 7.43 pH    POCT pCO2, Venous 42 41 - 51 mm Hg    POCT pO2, Venous 23 (L) 35 - 45 mm Hg    POCT SO2, Venous 32 (L) 45 - 75 %    POCT Oxy Hemoglobin, Venous 31.1 (L) 45.0 - 75.0 %    POCT Hematocrit Calculated, Venous 47.0 41.0 - 52.0 %    POCT Sodium, Venous 133 (L) 136 - 145 mmol/L    POCT Potassium, Venous 4.6 3.5 - 5.3 mmol/L    POCT Chloride, Venous 100 98 - 107 mmol/L    POCT Ionized Calicum, Venous 1.15 1.10 - 1.33 mmol/L    POCT Glucose,  Venous 135 (H) 74 - 99 mg/dL    POCT Lactate, Venous 1.9 0.4 - 2.0 mmol/L    POCT Base Excess, Venous 0.9 -2.0 - 3.0 mmol/L    POCT HCO3 Calculated, Venous 26.0 22.0 - 26.0 mmol/L    POCT Hemoglobin, Venous 15.5 13.5 - 17.5 g/dL    POCT Anion Gap, Venous 12.0 10.0 - 25.0 mmol/L    Patient Temperature 37.0 degrees Celsius    FiO2 21 %   Urinalysis with Reflex Culture and Microscopic   Result Value Ref Range    Color, Urine Colorless (N) Light-Yellow, Yellow, Dark-Yellow    Appearance, Urine Clear Clear    Specific Gravity, Urine 1.007 1.005 - 1.035    pH, Urine 5.0 5.0, 5.5, 6.0, 6.5, 7.0, 7.5, 8.0    Protein, Urine NEGATIVE NEGATIVE, 10 (TRACE), 20 (TRACE) mg/dL    Glucose, Urine 200 (2+) (A) Normal mg/dL    Blood, Urine 0.03 (TRACE) (A) NEGATIVE    Ketones, Urine NEGATIVE NEGATIVE mg/dL    Bilirubin, Urine NEGATIVE NEGATIVE    Urobilinogen, Urine Normal Normal mg/dL    Nitrite, Urine NEGATIVE NEGATIVE    Leukocyte Esterase, Urine 25 Symone/µL (A) NEGATIVE   Microscopic Only, Urine   Result Value Ref Range    WBC, Urine 1-5 1-5, NONE /HPF    RBC, Urine 1-2 NONE, 1-2, 3-5 /HPF    Mucus, Urine FEW Reference range not established. /LPF    Hyaline Casts, Urine 2+ (A) NONE /LPF   Influenza A, and B PCR   Result Value Ref Range    Flu A Result Not Detected Not Detected    Flu B Result Not Detected Not Detected   Sars-CoV-2 PCR   Result Value Ref Range    Coronavirus 2019, PCR Not Detected Not Detected   Troponin, High Sensitivity, 1 Hour   Result Value Ref Range    Troponin I, High Sensitivity 35 (H) 0 - 20 ng/L     *Note: Due to a large number of results and/or encounters for the requested time period, some results have not been displayed. A complete set of results can be found in Results Review.      CT chest abdomen pelvis wo IV contrast    Result Date: 10/18/2024  Interpreted By:  Silver Peraza, STUDY: CT CHEST ABDOMEN PELVIS WO CONTRAST;  10/18/2024 3:13 pm   INDICATION: 89 y/o   M with  Signs/Symptoms:fall, weakness,  diarhea, sepsis.     LIMITATIONS: Evaluation of solid organs and vessels is limited without the use of IV contrast enhancement..   ACCESSION NUMBER(S): [id number]   ORDERING CLINICIAN: MOMO SAUCEDA   TECHNIQUE: Noncontrast images were obtained from the thoracic inlet down through the symphysis pubis. Sagittal and coronal reconstruction images were generated. Mediastinal, lung, bone, and liver windows were reviewed.   COMPARISON: CT scan chest from 11/18/2013 and CT scan abdomen and pelvis from 10/02/2021.   FINDINGS: CHEST FINDINGS:   CHEST WALL/BASE OF THE NECK: The chest wall was grossly intact. Stable right-sided cardiac pacemaker. No thyromegaly or thyroid mass.   LUNGS/ PLEURA/ AND TRACHEA: Multiple calcified granulomas in the right lower lobe. 3 stable subcentimeter pleural-based nodules at the lateral left lung base on axial images 208-214. Scant scarring versus atelectasis at the posterior lung bases. Stable linear scarring at the right lung base. No mass or pneumonia in either lung. No pleural effusion. No pneumothorax. The trachea was grossly intact.   MEDIASTINUM/SELIN: No suspicious mediastinal, hilar, or axillary mass or adenopathy. Mild stable cardiomegaly. Stable advanced three-vessel left-sided coronary artery calcifications. No pericardial effusion. Mild-to-moderate mural calcifications in the thoracic aorta. There was no aneurysmal dilatation of the thoracic aorta.   BONES: No destructive lytic or blastic bone lesion. Mild-to-moderate loss of height at T10 from inferior endplate compression fracture with mild anterior wedging, unchanged. Mild loss of height at L1 due to mild superior endplate compression fracture with anterior wedging, unchanged. No new thoracic spine compression fracture. The sternum is intact.  There is mild-to-moderate disc space narrowing and endplate osteophytosis throughout the thoracic spine. No acute rib fracture on either side. Each scapula was intact.          ABDOMEN/PELVIS FINDINGS:   LIVER: No hepatomegaly. Liver density was  within the limits of normal. No gross liver lesion evident in this unenhanced exam.   GALLBLADDER: No calcified stone, gallbladder wall thickening, or adjacent edema.   BILE DUCTS: No intrahepatic biliary ductal dilatation. Common bile duct was within the limits of normal.   SPLEEN: No splenomegaly. No splenic gross mass. Multiple calcified splenic granulomas.   PANCREAS: No pancreatic mass or inflammation, or ductal dilatation.   KIDNEYS/ADRENALS: No adrenal mass or enlargement. There are 2 nonobstructing lower pole left renal stones, the larger measuring 4 mm in diameter. No calcified stone in the right kidney. No hydronephrosis, mass, or perinephric edema in either kidney. No ureteral stone or dilatation.   BLADDER/PELVIS: Short catheter in an otherwise empty urinary bladder. No prostate enlargement.   GREAT VESSELS/RETROPERITONEUM: Moderate to advanced aortoiliac calcifications. There is mild fusiform infrarenal abdominal aortic aneurysm, measuring up to 31 x 34 mm in axial dimensions, compared to 30 x 34 mm previously, not significantly changed. This tapers to normal just above the bifurcation. No suspicious retroperitoneal adenopathy. No suspicious mesenteric adenopathy. There were however 2 stable calcified mesenteric lymph nodes in the right pelvis medial to the cecum measuring 16 mm on image 161 and 15 mm on image 167. No adjacent edema. No suspicious pelvic or inguinal adenopathy.   PERITONEUM: No ascites. No pneumoperitoneum. No peritoneal or mesenteric mass or inflammation.   BOWEL: The stomach was  grossly intact. There was no small bowel dilatation or small bowel wall thickening. No small-bowel obstruction. There is mild-to-moderate diffuse retained colonic stool. There was no colonic wall thickening or large bowel obstruction.  No edema adjacent to the colon. The cecal appendix was intact.   BONES: There are 6 non-rib-bearing lumbar  vertebral bodies. No destructive lytic or blastic bone lesion. Interfacet hypertrophic arthritic changes in the mid and distal lumbar spine. Sclerotic arthritic changes in both SI joints. Stable superior endplate compression fracture with anterior wedging at L1. There is slight stable anterior superior endplate compression fracture at L4 with slight anterior wedging. No acute lumbar spine fracture. No acute fracture of the pelvis. No acute hip fracture or dislocation. Mild endplate osteophytosis at L2-3 and L3-4.   ABDOMINAL WALL: Unremarkable.       CHEST: Stable compression fractures at T10 and L1. DJD in the thoracic spine as described.   Stable findings of prior granulomatous disease in the right lower lobe. Scant scarring versus atelectasis at the posterior lung bases. Linear scarring at the right lung base. Grouping of 3 subcentimeter pleural-based nodules at the lateral left lung base, most likely sequela of remote infection or inflammation.   Cardiac pacemaker on the right, unchanged. Stable cardiomegaly. Stable prominent three-vessel left-sided coronary artery calcifications.       ABDOMEN/PELVIS: Arthritic changes in the lumbosacral spine and pelvis as described. No CT evidence of fracture of the lumbar spine or pelvis in this exam.   2 nonobstructing lower pole left renal stones. No hydronephrosis.   Grossly stable mild fusiform aneurysmal dilatation of the infrarenal abdominal aorta.   Short catheter in an otherwise empty urinary bladder.   Findings of prior granulomatous disease as described.   MACRO: None   Signed by: Silver Peraza 10/18/2024 4:03 PM Dictation workstation:   [id number]    CT cervical spine wo IV contrast    Result Date: 10/18/2024  Interpreted By:  Silver Peraza, STUDY: CT CERVICAL SPINE WO IV CONTRAST;  10/18/2024 3:13 pm   INDICATION: Signs/Symptoms:fall, did not hit head, but has neck pain, on warafin.     COMPARISON: None.   ACCESSION NUMBER(S): [id number]   ORDERING CLINICIAN:  MOMO SAUCEDA   TECHNIQUE: Thin section axial images were obtained from the skull base down through the thoracic inlet. Sagittal and coronal reconstruction images were generated. Soft tissue, lung, and bone windows were reviewed.   FINDINGS: VERTEBRAL BODIES AND POSTERIOR ELEMENTS: Moderate endplate osteophytosis at C6-7. Mild disc space narrowing with endplate spurring at C5-6. Vacuum disc phenomenon at that level. There is congenital disc space narrowing at C2-3. Joint space loss with subchondral sclerosis and spurring in the anterior superior aspect of the atlantoaxial joint. Uncovertebral hypertrophy with spur formation bilaterally at C5-6. Multilevel interfacet hypertrophy with spur formation. No cervical spine compression fracture. No posterior element fracture. No destructive bone lesion. No listhesis.   SPINAL CANAL: No gross disc herniation.   NECK SOFT TISSUES: Bilateral carotid bulb arterial calcifications, mild-to-moderate on the left and moderate on the right.   LUNG APICES: Imaged portion of the lung apices are within normal limits.   SKULL BASE: Within normal limits.       Cervical spine DJD as described. No CT evidence of cervical spine fracture in this exam.   MACRO: None   Signed by: Silver Peraza 10/18/2024 3:44 PM Dictation workstation:   [id number]    CT head wo IV contrast    Result Date: 10/18/2024  Interpreted By:  Silver Peraza, STUDY: CT HEAD WO IV CONTRAST;  10/18/2024 3:13 pm   INDICATION: Signs/Symptoms:fall, did not hit head, but has neck pain, on warafin.   COMPARISON: Previous exam is from 04/19/2018..   ACCESSION NUMBER(S): [id number]   ORDERING CLINICIAN: MOMO SAUCEDA   TECHNIQUE: Routine axial images were obtained from the skull base through the vertex.  Sagittal and coronal reconstruction images were generated. Brain, subdural, and bone windows were reviewed. N/A Unavailable   FINDINGS: INTRACRANIAL: Moderate prominence of ventricles and sulci. There is mild patchy hypodensity  throughout the deep periventricular white matter. No acute intracranial bleed, midline shift, or focal mass effect. No destructive bone lesion. No depressed skull fracture. Skullbase arterial calcifications in the carotid siphons and vertebral arteries.   EXTRACRANIAL: Small left maxillary sinus retention cyst. Otherwise, the visualized paranasal sinuses were clear. Visualized mastoid air cells were clear.       No depressed skull fracture. No acute intracranial bleed or focal mass effect.   Moderate volume loss.   Mild chronic white matter ischemic disease in the deep periventricular regions.   MACRO: None   Signed by: Silver Peraza 10/18/2024 3:41 PM Dictation workstation:   [id number]    ECG 12 Lead    Result Date: 10/18/2024  Afib/flutter and ventricular-paced rhythm No further analysis attempted due to paced rhythm    XR chest 1 view    Result Date: 10/18/2024  Interpreted By:  Chaitanya Borges, STUDY: Chest dated  10/18/2024.   INDICATION: Signs/Symptoms:weakness, fall   COMPARISON: Chest dated 09/22/2022.   ACCESSION NUMBER(S): [id number]   ORDERING CLINICIAN: MOMO SAUCEDA   TECHNIQUE: One view of the chest.   FINDINGS: Linear opacities are seen in the left lower lung zone similar to prior exam and most compatible with scarring.  No pneumothorax or effusion is evident. The cardiomediastinal silhouette is  enlarged but similar to the prior exam.There is a pulse generator on the right chest wall wtih leads tracking over the heart.Degenerative change is seen of the spine and shoulders.       No acute cardiopulmonary process is evident.   MACRO: None   Signed by: Chaitanya Borges 10/18/2024 1:27 PM Dictation workstation:   [id number]     Scheduled medications:  influenza, 0.5 mL, intramuscular, During hospitalization  pneumococcal polysaccharide, 0.5 mL, intramuscular, During hospitalization      Continuous medications:     PRN medications:        Past Medical History  He has a past medical history of Encounter  for screening for malignant neoplasm of prostate (06/15/2016), Long term (current) use of anticoagulants (06/15/2016), Personal history of other diseases of the circulatory system, Personal history of other diseases of the respiratory system (11/01/2019), Personal history of other endocrine, nutritional and metabolic disease, Personal history of transient ischemic attack (TIA), and cerebral infarction without residual deficits, and Unspecified atrial fibrillation (Multi) (10/31/2022).    Surgical History  He has a past surgical history that includes Carpal tunnel release (02/15/2016); Cataract extraction (02/15/2016); Hand surgery (02/15/2016); Total knee arthroplasty (05/26/2016); Foot surgery (06/15/2016); Cardiac pacemaker placement (06/08/2017); Cardiac pacemaker placement (06/08/2017); Other surgical history (06/08/2017); Other surgical history (06/08/2017); and Other surgical history (06/08/2017).     Social History  He reports that he has quit smoking. His smoking use included cigarettes. He has been exposed to tobacco smoke. He has never used smokeless tobacco. He reports that he does not drink alcohol and does not use drugs.    Family History  Family History   Problem Relation Name Age of Onset    Heart failure Mother      Diabetes Mother          mellitus    Colon cancer Father      Diabetes Sister          mellitus    Coronary artery disease Brother          Allergies  Pentoxifylline, Other, Amiodarone, Baclofen, Carvedilol, Cilostazol, Flecainide, Quinidine, and Simvastatin    Code Status  No Order     Review of Systems   Constitutional:  Positive for chills. Negative for appetite change, fatigue, fever and unexpected weight change.   HENT:  Negative for congestion, ear discharge, ear pain, mouth sores, nosebleeds, sinus pain, sore throat, trouble swallowing and voice change.    Eyes:  Negative for photophobia, pain, discharge, itching and visual disturbance.   Respiratory:  Negative for apnea, cough,  choking, chest tightness, shortness of breath and wheezing.    Cardiovascular:  Negative for chest pain, palpitations and leg swelling.   Gastrointestinal:  Negative for abdominal distention, abdominal pain, blood in stool, constipation, diarrhea, nausea and vomiting.   Endocrine: Negative for cold intolerance, heat intolerance, polydipsia, polyphagia and polyuria.   Genitourinary:  Positive for difficulty urinating. Negative for decreased urine volume, dysuria, flank pain, frequency, hematuria and urgency.   Musculoskeletal:  Negative for arthralgias, back pain, gait problem, myalgias, neck pain and neck stiffness.   Skin:  Negative for color change, pallor and wound.   Allergic/Immunologic: Negative for food allergies and immunocompromised state.   Neurological:  Positive for weakness. Negative for dizziness, tremors, seizures, syncope, speech difficulty, light-headedness, numbness and headaches.   Hematological:  Negative for adenopathy. Does not bruise/bleed easily.   Psychiatric/Behavioral:  Negative for confusion, dysphoric mood, hallucinations, sleep disturbance and suicidal ideas. The patient is not nervous/anxious.        Last Recorded Vitals  /56 (BP Location: Left arm, Patient Position: Sitting)   Pulse 64   Temp 37 °C (98.6 °F) (Tympanic)   Resp 18   Wt 83.9 kg (184 lb 15.5 oz)   SpO2 95%      Physical Exam  Vitals reviewed.   Constitutional:       General: He is not in acute distress.  HENT:      Head: Normocephalic and atraumatic.      Right Ear: External ear normal.      Left Ear: External ear normal.      Nose: Nose normal.      Mouth/Throat:      Mouth: Mucous membranes are moist.      Pharynx: Oropharynx is clear.   Eyes:      General: No scleral icterus.     Extraocular Movements: Extraocular movements intact.      Conjunctiva/sclera: Conjunctivae normal.      Pupils: Pupils are equal, round, and reactive to light.   Neck:      Vascular: No carotid bruit.   Cardiovascular:      Rate and  Rhythm: Normal rate and regular rhythm.      Pulses: Normal pulses.      Heart sounds: Normal heart sounds. No murmur heard.  Pulmonary:      Effort: Pulmonary effort is normal. No respiratory distress.      Breath sounds: Normal breath sounds. No wheezing, rhonchi or rales.   Chest:      Chest wall: No tenderness.   Abdominal:      General: Bowel sounds are normal. There is no distension.      Palpations: Abdomen is soft. There is no mass.      Tenderness: There is no abdominal tenderness. There is no rebound.   Genitourinary:     Comments: Short present  Musculoskeletal:         General: No swelling or deformity. Normal range of motion.      Cervical back: Normal range of motion.   Skin:     General: Skin is warm and dry.      Capillary Refill: Capillary refill takes less than 2 seconds.      Findings: No rash.   Neurological:      General: No focal deficit present.      Mental Status: He is alert and oriented to person, place, and time.      Cranial Nerves: No cranial nerve deficit.   Psychiatric:         Mood and Affect: Mood normal.         Behavior: Behavior normal.         Assessment/Plan   Assessment & Plan  Stage 3b chronic kidney disease (Multi)    Leukocytosis and leukocyte Estrace in patient with indwelling Short catheter suspicious for CAUTI  We will continue patient on Rocephin obtain urine culture and observe for fever or other evidence of infection  Patient follows with urology the Short was placed about a week ago due to neurogenic bladder and enlarged prostate  Will review and reconcile the patient's BPH medications    Atrial fibrillation  We will continue the patient's rate control medications as well as his anticoagulation therapy    Diabetes type 2  Diabetic diet sliding scale insulin    Lower extremity weakness  Likely multifactorial chronic and progressive in nature  ET OT evaluation    Peripheral neuropathy restless leg syndrome  Continue patient on his evening gabapentin 400 mg in the  evening  Patient follows with Dr. MORRISSEY    CKD3 baseline cr 2.5  Pt close to baseline    CAD  Pt with abnormal but stable troponin no cp  Continue home medications    CHF  Pt appears compensated  Continue home medications    Hypoxia  Patient with history COPD  Patient placed on supplemental oxygen  CT of the chest is negative for infiltrate or pulmonary edema  Does not appear to be having an exacerbation  Will give DuoNeb treatments    No new Assessment & Plan notes have been filed under this hospital service since the last note was generated.  Service: Internal Medicine          Flaco Osorio, APRN-CNP    Dragon dictation software was used to dictate this note and thus there may be minor errors in translation/transcription including garbled speech or misspellings. Please contact for clarification if needed.

## 2024-10-19 LAB
ANION GAP SERPL CALC-SCNC: 14 MMOL/L (ref 10–20)
BACTERIA UR CULT: NORMAL
BUN SERPL-MCNC: 58 MG/DL (ref 6–23)
CALCIUM SERPL-MCNC: 8.4 MG/DL (ref 8.6–10.3)
CHLORIDE SERPL-SCNC: 105 MMOL/L (ref 98–107)
CO2 SERPL-SCNC: 22 MMOL/L (ref 21–32)
CREAT SERPL-MCNC: 2.27 MG/DL (ref 0.5–1.3)
EGFRCR SERPLBLD CKD-EPI 2021: 27 ML/MIN/1.73M*2
ERYTHROCYTE [DISTWIDTH] IN BLOOD BY AUTOMATED COUNT: 14.1 % (ref 11.5–14.5)
GLUCOSE BLD MANUAL STRIP-MCNC: 147 MG/DL (ref 74–99)
GLUCOSE BLD MANUAL STRIP-MCNC: 153 MG/DL (ref 74–99)
GLUCOSE BLD MANUAL STRIP-MCNC: 168 MG/DL (ref 74–99)
GLUCOSE BLD MANUAL STRIP-MCNC: 178 MG/DL (ref 74–99)
GLUCOSE SERPL-MCNC: 133 MG/DL (ref 74–99)
HCT VFR BLD AUTO: 38.5 % (ref 41–52)
HGB BLD-MCNC: 12.7 G/DL (ref 13.5–17.5)
HOLD SPECIMEN: NORMAL
INR PPP: 2 (ref 0.9–1.1)
MCH RBC QN AUTO: 32.4 PG (ref 26–34)
MCHC RBC AUTO-ENTMCNC: 33 G/DL (ref 32–36)
MCV RBC AUTO: 98 FL (ref 80–100)
NRBC BLD-RTO: 0 /100 WBCS (ref 0–0)
PLATELET # BLD AUTO: 181 X10*3/UL (ref 150–450)
POTASSIUM SERPL-SCNC: 4.2 MMOL/L (ref 3.5–5.3)
PROTHROMBIN TIME: 23.1 SECONDS (ref 9.8–12.8)
RBC # BLD AUTO: 3.92 X10*6/UL (ref 4.5–5.9)
SODIUM SERPL-SCNC: 137 MMOL/L (ref 136–145)
WBC # BLD AUTO: 16.4 X10*3/UL (ref 4.4–11.3)

## 2024-10-19 PROCEDURE — 80048 BASIC METABOLIC PNL TOTAL CA: CPT | Performed by: NURSE PRACTITIONER

## 2024-10-19 PROCEDURE — 85610 PROTHROMBIN TIME: CPT | Performed by: NURSE PRACTITIONER

## 2024-10-19 PROCEDURE — 2500000001 HC RX 250 WO HCPCS SELF ADMINISTERED DRUGS (ALT 637 FOR MEDICARE OP): Performed by: INTERNAL MEDICINE

## 2024-10-19 PROCEDURE — 85027 COMPLETE CBC AUTOMATED: CPT | Performed by: NURSE PRACTITIONER

## 2024-10-19 PROCEDURE — G0378 HOSPITAL OBSERVATION PER HR: HCPCS

## 2024-10-19 PROCEDURE — 82947 ASSAY GLUCOSE BLOOD QUANT: CPT

## 2024-10-19 PROCEDURE — 2500000001 HC RX 250 WO HCPCS SELF ADMINISTERED DRUGS (ALT 637 FOR MEDICARE OP): Performed by: NURSE PRACTITIONER

## 2024-10-19 PROCEDURE — 2500000004 HC RX 250 GENERAL PHARMACY W/ HCPCS (ALT 636 FOR OP/ED): Performed by: NURSE PRACTITIONER

## 2024-10-19 PROCEDURE — 97166 OT EVAL MOD COMPLEX 45 MIN: CPT | Mod: GO

## 2024-10-19 PROCEDURE — 2500000002 HC RX 250 W HCPCS SELF ADMINISTERED DRUGS (ALT 637 FOR MEDICARE OP, ALT 636 FOR OP/ED): Performed by: NURSE PRACTITIONER

## 2024-10-19 PROCEDURE — 2500000002 HC RX 250 W HCPCS SELF ADMINISTERED DRUGS (ALT 637 FOR MEDICARE OP, ALT 636 FOR OP/ED): Performed by: INTERNAL MEDICINE

## 2024-10-19 PROCEDURE — 36415 COLL VENOUS BLD VENIPUNCTURE: CPT | Performed by: NURSE PRACTITIONER

## 2024-10-19 PROCEDURE — 99233 SBSQ HOSP IP/OBS HIGH 50: CPT | Performed by: INTERNAL MEDICINE

## 2024-10-19 PROCEDURE — 2500000002 HC RX 250 W HCPCS SELF ADMINISTERED DRUGS (ALT 637 FOR MEDICARE OP, ALT 636 FOR OP/ED)

## 2024-10-19 PROCEDURE — 94640 AIRWAY INHALATION TREATMENT: CPT

## 2024-10-19 PROCEDURE — 97162 PT EVAL MOD COMPLEX 30 MIN: CPT | Mod: GP

## 2024-10-19 RX ORDER — OXCARBAZEPINE 150 MG/1
300 TABLET, FILM COATED ORAL
Status: DISCONTINUED | OUTPATIENT
Start: 2024-10-19 | End: 2024-10-20 | Stop reason: HOSPADM

## 2024-10-19 RX ORDER — BISACODYL 5 MG
10 TABLET, DELAYED RELEASE (ENTERIC COATED) ORAL DAILY PRN
Status: DISCONTINUED | OUTPATIENT
Start: 2024-10-19 | End: 2024-10-20 | Stop reason: HOSPADM

## 2024-10-19 RX ORDER — IPRATROPIUM BROMIDE AND ALBUTEROL SULFATE 2.5; .5 MG/3ML; MG/3ML
3 SOLUTION RESPIRATORY (INHALATION)
Status: DISCONTINUED | OUTPATIENT
Start: 2024-10-19 | End: 2024-10-20 | Stop reason: HOSPADM

## 2024-10-19 RX ORDER — GUAIFENESIN 600 MG/1
600 TABLET, EXTENDED RELEASE ORAL EVERY 12 HOURS PRN
Status: DISCONTINUED | OUTPATIENT
Start: 2024-10-19 | End: 2024-10-20 | Stop reason: HOSPADM

## 2024-10-19 RX ORDER — TORSEMIDE 20 MG/1
20 TABLET ORAL DAILY
Status: DISCONTINUED | OUTPATIENT
Start: 2024-10-19 | End: 2024-10-20 | Stop reason: HOSPADM

## 2024-10-19 RX ORDER — IPRATROPIUM BROMIDE AND ALBUTEROL SULFATE 2.5; .5 MG/3ML; MG/3ML
3 SOLUTION RESPIRATORY (INHALATION) EVERY 2 HOUR PRN
Status: DISCONTINUED | OUTPATIENT
Start: 2024-10-19 | End: 2024-10-20 | Stop reason: HOSPADM

## 2024-10-19 RX ORDER — METOPROLOL SUCCINATE 25 MG/1
25 TABLET, EXTENDED RELEASE ORAL DAILY
Status: DISCONTINUED | OUTPATIENT
Start: 2024-10-19 | End: 2024-10-20 | Stop reason: HOSPADM

## 2024-10-19 RX ORDER — INSULIN LISPRO 100 [IU]/ML
0-5 INJECTION, SOLUTION INTRAVENOUS; SUBCUTANEOUS
Status: DISCONTINUED | OUTPATIENT
Start: 2024-10-19 | End: 2024-10-20 | Stop reason: HOSPADM

## 2024-10-19 RX ORDER — CLONAZEPAM 0.5 MG/1
0.5 TABLET ORAL NIGHTLY
Status: DISCONTINUED | OUTPATIENT
Start: 2024-10-19 | End: 2024-10-20 | Stop reason: HOSPADM

## 2024-10-19 RX ORDER — DEXTROSE 50 % IN WATER (D50W) INTRAVENOUS SYRINGE
12.5
Status: DISCONTINUED | OUTPATIENT
Start: 2024-10-19 | End: 2024-10-20 | Stop reason: HOSPADM

## 2024-10-19 RX ORDER — WARFARIN SODIUM 5 MG/1
7.5 TABLET ORAL ONCE
Status: DISCONTINUED | OUTPATIENT
Start: 2024-10-19 | End: 2024-10-19

## 2024-10-19 RX ORDER — ONDANSETRON HYDROCHLORIDE 2 MG/ML
4 INJECTION, SOLUTION INTRAVENOUS EVERY 8 HOURS PRN
Status: DISCONTINUED | OUTPATIENT
Start: 2024-10-19 | End: 2024-10-20 | Stop reason: HOSPADM

## 2024-10-19 RX ORDER — GABAPENTIN 400 MG/1
400 CAPSULE ORAL NIGHTLY
Status: DISCONTINUED | OUTPATIENT
Start: 2024-10-19 | End: 2024-10-20 | Stop reason: HOSPADM

## 2024-10-19 RX ORDER — TALC
3 POWDER (GRAM) TOPICAL NIGHTLY PRN
Status: DISCONTINUED | OUTPATIENT
Start: 2024-10-19 | End: 2024-10-20 | Stop reason: HOSPADM

## 2024-10-19 RX ORDER — ATORVASTATIN CALCIUM 20 MG/1
20 TABLET, FILM COATED ORAL NIGHTLY
Status: DISCONTINUED | OUTPATIENT
Start: 2024-10-19 | End: 2024-10-20 | Stop reason: HOSPADM

## 2024-10-19 RX ORDER — CEFTRIAXONE 2 G/50ML
2 INJECTION, SOLUTION INTRAVENOUS EVERY 24 HOURS
Status: DISCONTINUED | OUTPATIENT
Start: 2024-10-19 | End: 2024-10-20 | Stop reason: HOSPADM

## 2024-10-19 RX ORDER — WARFARIN SODIUM 5 MG/1
7.5 TABLET ORAL DAILY
Status: DISCONTINUED | OUTPATIENT
Start: 2024-10-19 | End: 2024-10-19

## 2024-10-19 RX ORDER — ONDANSETRON 4 MG/1
4 TABLET, FILM COATED ORAL EVERY 8 HOURS PRN
Status: DISCONTINUED | OUTPATIENT
Start: 2024-10-19 | End: 2024-10-20 | Stop reason: HOSPADM

## 2024-10-19 RX ORDER — OXCARBAZEPINE 150 MG/1
150 TABLET, FILM COATED ORAL
Status: DISCONTINUED | OUTPATIENT
Start: 2024-10-20 | End: 2024-10-20 | Stop reason: HOSPADM

## 2024-10-19 RX ORDER — PANTOPRAZOLE SODIUM 40 MG/10ML
40 INJECTION, POWDER, LYOPHILIZED, FOR SOLUTION INTRAVENOUS DAILY
Status: DISCONTINUED | OUTPATIENT
Start: 2024-10-19 | End: 2024-10-20 | Stop reason: HOSPADM

## 2024-10-19 RX ORDER — PANTOPRAZOLE SODIUM 40 MG/1
40 TABLET, DELAYED RELEASE ORAL DAILY
Status: DISCONTINUED | OUTPATIENT
Start: 2024-10-19 | End: 2024-10-20 | Stop reason: HOSPADM

## 2024-10-19 RX ORDER — LISINOPRIL 5 MG/1
5 TABLET ORAL DAILY
Status: DISCONTINUED | OUTPATIENT
Start: 2024-10-19 | End: 2024-10-20 | Stop reason: HOSPADM

## 2024-10-19 RX ORDER — TAMSULOSIN HYDROCHLORIDE 0.4 MG/1
0.4 CAPSULE ORAL DAILY
Status: DISCONTINUED | OUTPATIENT
Start: 2024-10-19 | End: 2024-10-20 | Stop reason: HOSPADM

## 2024-10-19 RX ORDER — FLUTICASONE FUROATE AND VILANTEROL 200; 25 UG/1; UG/1
1 POWDER RESPIRATORY (INHALATION) DAILY
Status: DISCONTINUED | OUTPATIENT
Start: 2024-10-19 | End: 2024-10-20 | Stop reason: HOSPADM

## 2024-10-19 RX ORDER — POLYETHYLENE GLYCOL 3350 17 G/17G
17 POWDER, FOR SOLUTION ORAL DAILY
Status: DISCONTINUED | OUTPATIENT
Start: 2024-10-19 | End: 2024-10-20 | Stop reason: HOSPADM

## 2024-10-19 RX ORDER — WARFARIN SODIUM 5 MG/1
5 TABLET ORAL DAILY
Status: DISCONTINUED | OUTPATIENT
Start: 2024-10-20 | End: 2024-10-20 | Stop reason: HOSPADM

## 2024-10-19 RX ORDER — DEXTROSE 50 % IN WATER (D50W) INTRAVENOUS SYRINGE
25
Status: DISCONTINUED | OUTPATIENT
Start: 2024-10-19 | End: 2024-10-20 | Stop reason: HOSPADM

## 2024-10-19 RX ADMIN — INSULIN LISPRO 1 UNITS: 100 INJECTION, SOLUTION INTRAVENOUS; SUBCUTANEOUS at 16:30

## 2024-10-19 RX ADMIN — BISACODYL 10 MG: 5 TABLET, COATED ORAL at 10:49

## 2024-10-19 RX ADMIN — CEFTRIAXONE SODIUM 2 G: 2 INJECTION, SOLUTION INTRAVENOUS at 16:30

## 2024-10-19 RX ADMIN — WARFARIN SODIUM 7.5 MG: 2.5 TABLET ORAL at 18:05

## 2024-10-19 RX ADMIN — CLONAZEPAM 0.5 MG: 0.5 TABLET ORAL at 22:17

## 2024-10-19 RX ADMIN — IPRATROPIUM BROMIDE AND ALBUTEROL SULFATE 3 ML: 2.5; .5 SOLUTION RESPIRATORY (INHALATION) at 00:38

## 2024-10-19 RX ADMIN — TAMSULOSIN HYDROCHLORIDE 0.4 MG: 0.4 CAPSULE ORAL at 10:13

## 2024-10-19 RX ADMIN — PANTOPRAZOLE SODIUM 40 MG: 40 TABLET, DELAYED RELEASE ORAL at 10:13

## 2024-10-19 RX ADMIN — ATORVASTATIN CALCIUM 20 MG: 40 TABLET, FILM COATED ORAL at 00:38

## 2024-10-19 RX ADMIN — ATORVASTATIN CALCIUM 20 MG: 40 TABLET, FILM COATED ORAL at 19:52

## 2024-10-19 RX ADMIN — GABAPENTIN 400 MG: 400 CAPSULE ORAL at 19:52

## 2024-10-19 RX ADMIN — IPRATROPIUM BROMIDE AND ALBUTEROL SULFATE 3 ML: 2.5; .5 SOLUTION RESPIRATORY (INHALATION) at 10:14

## 2024-10-19 RX ADMIN — Medication 3 MG: at 00:38

## 2024-10-19 RX ADMIN — TORSEMIDE 20 MG: 20 TABLET ORAL at 10:13

## 2024-10-19 RX ADMIN — OXCARBAZEPINE 300 MG: 150 TABLET, FILM COATED ORAL at 16:30

## 2024-10-19 RX ADMIN — IPRATROPIUM BROMIDE AND ALBUTEROL SULFATE 3 ML: 2.5; .5 SOLUTION RESPIRATORY (INHALATION) at 20:24

## 2024-10-19 RX ADMIN — GABAPENTIN 400 MG: 400 CAPSULE ORAL at 00:38

## 2024-10-19 RX ADMIN — EMPAGLIFLOZIN 10 MG: 10 TABLET, FILM COATED ORAL at 10:49

## 2024-10-19 RX ADMIN — METOPROLOL SUCCINATE 25 MG: 25 TABLET, EXTENDED RELEASE ORAL at 10:13

## 2024-10-19 RX ADMIN — INSULIN LISPRO 1 UNITS: 100 INJECTION, SOLUTION INTRAVENOUS; SUBCUTANEOUS at 12:08

## 2024-10-19 SDOH — SOCIAL STABILITY: SOCIAL INSECURITY: WITHIN THE LAST YEAR, HAVE YOU BEEN HUMILIATED OR EMOTIONALLY ABUSED IN OTHER WAYS BY YOUR PARTNER OR EX-PARTNER?: NO

## 2024-10-19 SDOH — HEALTH STABILITY: MENTAL HEALTH: HOW OFTEN DO YOU HAVE A DRINK CONTAINING ALCOHOL?: NEVER

## 2024-10-19 SDOH — SOCIAL STABILITY: SOCIAL INSECURITY
WITHIN THE LAST YEAR, HAVE YOU BEEN RAPED OR FORCED TO HAVE ANY KIND OF SEXUAL ACTIVITY BY YOUR PARTNER OR EX-PARTNER?: NO

## 2024-10-19 SDOH — ECONOMIC STABILITY: INCOME INSECURITY: IN THE PAST 12 MONTHS HAS THE ELECTRIC, GAS, OIL, OR WATER COMPANY THREATENED TO SHUT OFF SERVICES IN YOUR HOME?: NO

## 2024-10-19 SDOH — SOCIAL STABILITY: SOCIAL INSECURITY: WITHIN THE LAST YEAR, HAVE YOU BEEN AFRAID OF YOUR PARTNER OR EX-PARTNER?: NO

## 2024-10-19 SDOH — ECONOMIC STABILITY: HOUSING INSECURITY: IN THE LAST 12 MONTHS, WAS THERE A TIME WHEN YOU WERE NOT ABLE TO PAY THE MORTGAGE OR RENT ON TIME?: NO

## 2024-10-19 SDOH — SOCIAL STABILITY: SOCIAL INSECURITY
WITHIN THE LAST YEAR, HAVE YOU BEEN KICKED, HIT, SLAPPED, OR OTHERWISE PHYSICALLY HURT BY YOUR PARTNER OR EX-PARTNER?: NO

## 2024-10-19 SDOH — ECONOMIC STABILITY: FOOD INSECURITY: WITHIN THE PAST 12 MONTHS, THE FOOD YOU BOUGHT JUST DIDN'T LAST AND YOU DIDN'T HAVE MONEY TO GET MORE.: NEVER TRUE

## 2024-10-19 SDOH — HEALTH STABILITY: MENTAL HEALTH: HOW OFTEN DO YOU HAVE SIX OR MORE DRINKS ON ONE OCCASION?: NEVER

## 2024-10-19 SDOH — ECONOMIC STABILITY: HOUSING INSECURITY: AT ANY TIME IN THE PAST 12 MONTHS, WERE YOU HOMELESS OR LIVING IN A SHELTER (INCLUDING NOW)?: NO

## 2024-10-19 SDOH — ECONOMIC STABILITY: FOOD INSECURITY: WITHIN THE PAST 12 MONTHS, YOU WORRIED THAT YOUR FOOD WOULD RUN OUT BEFORE YOU GOT THE MONEY TO BUY MORE.: NEVER TRUE

## 2024-10-19 SDOH — ECONOMIC STABILITY: FOOD INSECURITY: HOW HARD IS IT FOR YOU TO PAY FOR THE VERY BASICS LIKE FOOD, HOUSING, MEDICAL CARE, AND HEATING?: NOT HARD AT ALL

## 2024-10-19 SDOH — ECONOMIC STABILITY: TRANSPORTATION INSECURITY: IN THE PAST 12 MONTHS, HAS LACK OF TRANSPORTATION KEPT YOU FROM MEDICAL APPOINTMENTS OR FROM GETTING MEDICATIONS?: NO

## 2024-10-19 SDOH — ECONOMIC STABILITY: HOUSING INSECURITY: IN THE PAST 12 MONTHS, HOW MANY TIMES HAVE YOU MOVED WHERE YOU WERE LIVING?: 1

## 2024-10-19 SDOH — HEALTH STABILITY: MENTAL HEALTH: HOW MANY DRINKS CONTAINING ALCOHOL DO YOU HAVE ON A TYPICAL DAY WHEN YOU ARE DRINKING?: PATIENT DOES NOT DRINK

## 2024-10-19 ASSESSMENT — ENCOUNTER SYMPTOMS
NECK PAIN: 0
COUGH: 0
BACK PAIN: 0
FEVER: 0
DYSURIA: 0
VOMITING: 0
FATIGUE: 0
WHEEZING: 0
NERVOUS/ANXIOUS: 0
CONFUSION: 0
CHEST TIGHTNESS: 0
SORE THROAT: 0
DIFFICULTY URINATING: 1
CONSTIPATION: 0
PALPITATIONS: 0
NAUSEA: 0
DIARRHEA: 0
JOINT SWELLING: 0
DIAPHORESIS: 0
CHILLS: 1
WOUND: 0
SHORTNESS OF BREATH: 0
HEMATURIA: 0
FACIAL SWELLING: 0
AGITATION: 0
ABDOMINAL PAIN: 0
CHOKING: 0
WEAKNESS: 1
LIGHT-HEADEDNESS: 0
HALLUCINATIONS: 0

## 2024-10-19 ASSESSMENT — COGNITIVE AND FUNCTIONAL STATUS - GENERAL
DAILY ACTIVITIY SCORE: 20
WALKING IN HOSPITAL ROOM: A LOT
DRESSING REGULAR UPPER BODY CLOTHING: A LOT
DRESSING REGULAR UPPER BODY CLOTHING: A LITTLE
PERSONAL GROOMING: A LITTLE
MOVING TO AND FROM BED TO CHAIR: A LITTLE
HELP NEEDED FOR BATHING: A LITTLE
HELP NEEDED FOR BATHING: A LOT
DRESSING REGULAR LOWER BODY CLOTHING: A LITTLE
TURNING FROM BACK TO SIDE WHILE IN FLAT BAD: A LITTLE
TURNING FROM BACK TO SIDE WHILE IN FLAT BAD: A LITTLE
STANDING UP FROM CHAIR USING ARMS: A LITTLE
TURNING FROM BACK TO SIDE WHILE IN FLAT BAD: A LITTLE
MOBILITY SCORE: 14
EATING MEALS: A LITTLE
STANDING UP FROM CHAIR USING ARMS: A LOT
PATIENT BASELINE BEDBOUND: NO
DAILY ACTIVITIY SCORE: 12
MOBILITY SCORE: 17
HELP NEEDED FOR BATHING: A LITTLE
MOVING TO AND FROM BED TO CHAIR: A LOT
STANDING UP FROM CHAIR USING ARMS: A LITTLE
TOILETING: TOTAL
DRESSING REGULAR LOWER BODY CLOTHING: A LITTLE
DRESSING REGULAR UPPER BODY CLOTHING: A LITTLE
WALKING IN HOSPITAL ROOM: A LOT
DRESSING REGULAR LOWER BODY CLOTHING: TOTAL
MOVING TO AND FROM BED TO CHAIR: A LITTLE
WALKING IN HOSPITAL ROOM: A LOT
TOILETING: A LITTLE
CLIMB 3 TO 5 STEPS WITH RAILING: A LOT
TOILETING: A LITTLE
CLIMB 3 TO 5 STEPS WITH RAILING: A LOT
CLIMB 3 TO 5 STEPS WITH RAILING: TOTAL
MOBILITY SCORE: 17
DAILY ACTIVITIY SCORE: 20

## 2024-10-19 ASSESSMENT — PAIN - FUNCTIONAL ASSESSMENT
PAIN_FUNCTIONAL_ASSESSMENT: 0-10

## 2024-10-19 ASSESSMENT — ACTIVITIES OF DAILY LIVING (ADL)
LACK_OF_TRANSPORTATION: NO
ADL_ASSISTANCE: INDEPENDENT
BATHING_ASSISTANCE: MAXIMAL
LACK_OF_TRANSPORTATION: NO

## 2024-10-19 ASSESSMENT — LIFESTYLE VARIABLES
SKIP TO QUESTIONS 9-10: 1
AUDIT-C TOTAL SCORE: 0

## 2024-10-19 ASSESSMENT — PAIN SCALES - GENERAL
PAINLEVEL_OUTOF10: 1
PAINLEVEL_OUTOF10: 0 - NO PAIN
PAINLEVEL_OUTOF10: 1
PAINLEVEL_OUTOF10: 0 - NO PAIN

## 2024-10-19 NOTE — ASSESSMENT & PLAN NOTE
Leukocytosis and leukocyte esterase in patient with indwelling Short catheter suspicious for CAUTI  We will continue patient on Rocephin obtain urine culture and observe for fever or other evidence of infection  Patient follows with urology the Short was placed about a week ago due to neurogenic bladder and enlarged prostate  Will review and reconcile the patient's BPH medications  10/19: Short catheter has been in since 10/3.  Will check and see if it was changed in the ED.  Awaiting cultures.  Continue ceftriaxone for now.  19/20: Urine culture was negative,.  Obtained prior to antibiotic therapy.  Suspect pyuria may be related to new Short catheter.  Patient states he is comfortable at present.  Discontinue antibiotic therapy and have patient follow-up with urologist as outpatient.

## 2024-10-19 NOTE — CARE PLAN
Problem: Mobility  Goal: Goal 1  Description: Increase DURAN LE strength to 5/5 to ease capability to perform transfers  Outcome: Progressing  Goal: Goal 2  Description: The patient will be able to ascend/descend 2 steps with 1 handrail to enter/exit home  Outcome: Progressing  Goal: Goal 3  Description: The patient till be able to ambulate 50 feet with FWW/CGA x 1 for household ADLs  Outcome: Progressing     Problem: PT Transfers  Goal: Goal 1  Description: The patient will be able to perform all transfers with FWW/SBA x 1   Outcome: Progressing

## 2024-10-19 NOTE — PROGRESS NOTES
Occupational Therapy  Evaluation    Patient Name: Michele Maya  MRN: [id number]  Today's Date: 10/19/2024  Time Calculation  Start Time: 1326  Stop Time: 1345  Time Calculation (min): 19 min    Current Problem:   1. Fall, initial encounter    2. Urinary tract infection without hematuria, site unspecified    3. Hypoxia        OT order: OT eval and treat   Referred by: Devon CHAPMAN  Reason for referral: ADLs, safety assessment  Past medical history related to rehab: Ad Dx: fall, UTI without hemturia, hypoxia (PMHx: RLS, vascular claudication, peripheral neuropathy, YAYA, A-fib, pacemaker, HF, BPH)    Precautions:   Hearing/Visual Limitations: [place], wears hearing aides  Medical Precautions: Fall precautions    ASSESSMENT  OT Assessment: OT eval completed. The patient is functioning below baseline for ADLs and mobility. can benefit from continued OT. Pt with Decreased ADL status, Decreased upper extremity strength, Decreased safe judgment during ADL, Decreased cognition, Decreased endurance, Decreased functional mobility, Decreased gross motor control, Decreased IADLs  Prognosis:    Barriers to discharge: Inaccessible home environment, Decreased caregiver support  Tolerance:      PLAN  Frequency: 3 times per week  Treatment Interventions: ADL retraining, UE strengthening/ROM, Functional transfer training, Endurance training, Cognitive reorientation, Patient/family training, Equipment evaluation/education, Neuromuscular reeducation, Fine motor coordination activities  Discharge Recommendations: Moderate intensity level of continued care  OT OK to discharge: Yes    GENERAL VISIT INFORMATION   Start of session communication: Bedside nurse  End of session communication: Bedside nurse  Family/caregiver present: Yes  Caregiver feedback: wife  Co-Treatment: PT  Reason for co-treatment: to optimize safety and mobility, while focusing on discipline specific goals   Position Pt Received:  Alarm on, Bed, 3 rail up  End of  session position: Bed, 3 rail up, Alarm on    SUBJECTIVE  Home Living:  Type of Home: House  Lives With: Spouse  Home Adaptive Equipment: Walker rolling or standard, Wheelchair-power  Home Layout: One level  Home Access: Stairs to enter with rails  Entrance Stairs-Number of Steps: 2     Prior Level of Function:  Receives Help From: Family  ADL Assistance: Independent  Homemaking Assistance: Needs assistance (spouse performs most iadls)  Driving/Transportation:  (pt does not drive. spouse provides transport)  Ambulatory Assistance: Independent (FWW)    IADL History:       Pain:  Assessment: 0-10  Score: 1  Type: Chronic pain, Acute pain  Location: Toe (Comment which one)  Interventions:    Response to pain interventions:      OBJECTIVE  Vital Signs:       Cognition:  Overall Cognitive Status: Within Functional Limits (oriented to person, place, month, year, time on clock. partial situation)  Processing Speed: Delayed             Current ADL function:   EATING:  Stand by     GROOMING: Minimal     BATHING: Maximal     UB DRESSING: Moderate     LB DRESSING: Total     TOILETING: Total    ADL comments:  anticipated current levels of assist    Activity Tolerance:  Endurance: Decreased tolerance for upright activites    Bed Mobility/Transfers:   Bed Mobility  Bed Mobility: Yes  Bed Mobility 1  Bed Mobility 1: Supine to sitting, Sitting to supine  Level of Assistance 1: Contact guard  Transfers  Transfer:  (sit<>stand from bedside 2x with min A x2 FWW)    Ambulation/Gait Training:  Functional Mobility  Functional Mobility Performed:  (pt performed functional mobility at bedside a few steps forward, back, sideways towards HOB with mod A x2 FWW)    Sitting Balance:  Static Sitting Balance  Static Sitting-Level of Assistance: Close supervision  Dynamic Sitting Balance  Dynamic Sitting-Level of Assistance: Contact guard    Standing Balance:  Static Standing Balance  Static Standing-Level of Assistance: Minimum assistance,  Contact guard  Dynamic Standing Balance  Dynamic Standing-Level of Assistance: Minimum assistance, Moderate assistance    Vision: Vision - Basic Assessment  Current Vision: No visual deficits   and      Sensation:  Light Touch: Severe deficits in the RLE, Severe deficits in the LLE    Strength:  Strength Comments: BUE grossly 4-/5    Perception:  Inattention/Neglect: Appears intact  Initiation: Appears intact  Motor Planning: Appears intact  Perseveration: Not present    Coordination:  Movements are Fluid and Coordinated: Yes     Hand Function:  Hand Function  Gross Grasp: Functional  Coordination: Functional    Extremities: RUE   RUE : Within Functional Limits and LUE   LUE: Within Functional Limits    Outcome Measures: [facility] Daily Activity   Putting on and taking off regular lower body clothing: Total  Bathing (including washing, rinsing, drying): A lot  Putting on and taking off regular upper body clothing: A lot  Toileting, which includes using toilet, bedpan or urinal: Total  Taking care of personal grooming such as brushing teeth: A little   Eating Meals: A little   Daily Activity - Total Score: 12    EDUCATION:     Education Documentation  ADL Training, taught by Shavonne Leo OT at 10/19/2024  3:12 PM.  Learner: Patient  Readiness: Acceptance  Method: Explanation  Response: Needs Reinforcement    Education Comments  No comments found.        Goals:   Encounter Problems       Encounter Problems (Active)       ADLs       Patient with complete lower body dressing with modified independent level of assistance donning and doffing all LE clothes  with PRN adaptive equipment while supported sitting and standing (Progressing)       Start:  10/19/24    Expected End:  11/02/24               MOBILITY       Patient will perform Functional mobility mod  Household distances/Community Distances with modified independent level of assistance and least restrictive device in order to improve safety and functional mobility.  (Progressing)       Start:  10/19/24    Expected End:  11/02/24               TRANSFERS       Patient will complete functional transfers with least restrictive device with modified independent level of assistance. (Progressing)       Start:  10/19/24    Expected End:  11/02/24

## 2024-10-19 NOTE — PROGRESS NOTES
Michele Maya is a 89 y.o. male on day 0 of admission presenting with Complicated UTI (urinary tract infection).    Review of Systems   Constitutional:  Positive for chills. Negative for diaphoresis, fatigue and fever.   HENT:  Negative for congestion, facial swelling, sneezing and sore throat.    Respiratory:  Negative for cough, choking, chest tightness, shortness of breath and wheezing.    Cardiovascular:  Negative for chest pain, palpitations and leg swelling.   Gastrointestinal:  Negative for abdominal pain, constipation, diarrhea, nausea and vomiting.   Genitourinary:  Positive for difficulty urinating. Negative for dysuria, hematuria and urgency.   Musculoskeletal:  Negative for back pain, gait problem, joint swelling and neck pain.   Skin:  Negative for rash and wound.   Neurological:  Positive for weakness. Negative for syncope and light-headedness.   Psychiatric/Behavioral:  Negative for agitation, confusion and hallucinations. The patient is not nervous/anxious.    All other systems reviewed and are negative.     Subjective   Michele Maya is a 89 y.o. male with PMHx s/f restless leg syndrome benign prostatic hypertrophy cardiomyopathy coronary artery disease COPD neurogenic bladder peripheral neuropathy atrial fibrillation peripheral arterial disease type 2 diabetes presenting with weakness.  Patient states he did have some chills last night had attempted to ambulate with his walker his legs gave out on him he felt somewhat weak today and came in to hospital for further evaluation.  The patient does have a history of enlarged prostate has had neurogenic bladder followed by urology Short catheter was placed for now with plans for eventual greenlight laser prostatectomy possibly.  Denies any coughing or purulent sputum production no abdominal pain no diarrhea no new rashes.  He does have some baseline ambulatory dysfunction due to peripheral neuropathy peripheral arterial disease with  intermittent claudication as well as restless leg syndrome in the evening he uses a walker typically and for extended trips he will use his power wheelchair.  Eitel signs on presentation show temperature 98.6 heart rate 68 respiratory rate 20 blood pressure 136/68 SpO2 96% on room air.  Chemistry panel shows sodium 138 potassium 4.6 chloride 103 CO2 24 BUN 61 creatinine 2.62 glucose 131 magnesium 2.01 BN peptide 166 troponin 30 5 repeat troponin 35 INR 2.2 CBC showing leukocytosis with white blood cell count 14.6 hemoglobin 13.8 hematocrit 41.9 platelets 206.  Influenza AMB are negative COVID-19 negative urine analysis is positive for glucose 25 leukocyte esterase no nitrites 1-5 WBCs 1-2 RBCs per high-powered field 2+ hyaline casts CT of the head was done showing a small left sinus retention cyst no depressed skull fracture or acute intracranial bleed or focal mass effect mild chronic white matter ischemic disease in the deep periventricular regions was noted.  CT of the neck showing cervical spine DJD no spinal fracture.  A CT scan of the chest abdomen and pelvis showing stable compression fractures at T10 L1 and DJD there is granulomatous disease pleural nodules thought to be inflammatory in nature no pulmonary edema focal infiltrate or effusion noted in the abdomen and pelvis 2 nonobstructing lower pole left renal stones were noted without hydronephrosis patient was started on ceftriaxone and referred for admission.     10/19: Patient seen.  Family has requested his home medications to be restarted to help with tremors.  Patient does not think his catheter was changed in the ED, will confirm with nursing, needs to be changed if not changed already.  Blood and urine cultures are still pending.  Continue IV antibiotics empirically until results are available.  Once available can begin discharge planning.  Patient states he and his wife have not decided yet whether to proceed with laser prostatectomy versus leaving  chronic Short catheter.  They say if they decide on the procedure they will have to follow-up with cardiology for clearance.       Objective     Last Recorded Vitals  /62 (BP Location: Right arm, Patient Position: Lying)   Pulse 62   Temp 37 °C (98.6 °F) (Temporal)   Resp 16   Wt 83.9 kg (184 lb 15.5 oz)   SpO2 97%   Intake/Output last 3 Shifts:  No intake or output data in the 24 hours ending 10/19/24 1124    Admission Weight  Weight: 83.9 kg (184 lb 15.5 oz) (10/18/24 1239)    Daily Weight  10/18/24 : 83.9 kg (184 lb 15.5 oz)      Physical Exam  Constitutional:       General: He is not in acute distress.  HENT:      Head: Normocephalic and atraumatic.      Nose: Nose normal. No congestion or rhinorrhea.      Mouth/Throat:      Mouth: Mucous membranes are dry.      Pharynx: Oropharynx is clear.   Eyes:      General: No scleral icterus.     Extraocular Movements: Extraocular movements intact.      Pupils: Pupils are equal, round, and reactive to light.   Cardiovascular:      Rate and Rhythm: Normal rate and regular rhythm.      Heart sounds: Normal heart sounds. No murmur heard.     No friction rub. No gallop.   Pulmonary:      Effort: Pulmonary effort is normal.      Breath sounds: Normal breath sounds. No wheezing, rhonchi or rales.   Chest:      Chest wall: No tenderness.   Abdominal:      General: There is no distension.      Palpations: Abdomen is soft.      Tenderness: There is no abdominal tenderness. There is no guarding or rebound.   Genitourinary:     Comments: Short to gravity  Musculoskeletal:         General: No swelling, tenderness or signs of injury. Normal range of motion.      Cervical back: Normal range of motion.   Skin:     General: Skin is warm and dry.      Coloration: Skin is not jaundiced.      Findings: No bruising, erythema or rash.   Neurological:      General: No focal deficit present.      Mental Status: He is oriented to person, place, and time.          Lab Results  Results  for orders placed or performed during the hospital encounter of 10/18/24 (from the past 24 hours)   ECG 12 Lead   Result Value Ref Range    Ventricular Rate 60 BPM    Atrial Rate 0 BPM    CT Interval 45 ms    QRS Duration 130 ms    QT Interval 440 ms    QTC Calculation(Bazett) 440 ms    R Axis 259 degrees    T Axis 93 degrees    QRS Count 10 beats    Q Onset 251 ms    T Offset 471 ms    QTC Fredericia 440 ms   CBC and Auto Differential   Result Value Ref Range    WBC 14.6 (H) 4.4 - 11.3 x10*3/uL    nRBC 0.0 0.0 - 0.0 /100 WBCs    RBC 4.27 (L) 4.50 - 5.90 x10*6/uL    Hemoglobin 13.8 13.5 - 17.5 g/dL    Hematocrit 41.9 41.0 - 52.0 %    MCV 98 80 - 100 fL    MCH 32.3 26.0 - 34.0 pg    MCHC 32.9 32.0 - 36.0 g/dL    RDW 14.0 11.5 - 14.5 %    Platelets 206 150 - 450 x10*3/uL    Neutrophils % 81.6 40.0 - 80.0 %    Immature Granulocytes %, Automated 0.6 0.0 - 0.9 %    Lymphocytes % 7.9 13.0 - 44.0 %    Monocytes % 9.2 2.0 - 10.0 %    Eosinophils % 0.4 0.0 - 6.0 %    Basophils % 0.3 0.0 - 2.0 %    Neutrophils Absolute 11.88 (H) 1.60 - 5.50 x10*3/uL    Immature Granulocytes Absolute, Automated 0.09 0.00 - 0.50 x10*3/uL    Lymphocytes Absolute 1.15 0.80 - 3.00 x10*3/uL    Monocytes Absolute 1.34 (H) 0.05 - 0.80 x10*3/uL    Eosinophils Absolute 0.06 0.00 - 0.40 x10*3/uL    Basophils Absolute 0.04 0.00 - 0.10 x10*3/uL   Protime-INR   Result Value Ref Range    Protime 25.0 (H) 9.8 - 12.8 seconds    INR 2.2 (H) 0.9 - 1.1   Comprehensive Metabolic Panel   Result Value Ref Range    Glucose 131 (H) 74 - 99 mg/dL    Sodium 138 136 - 145 mmol/L    Potassium 4.6 3.5 - 5.3 mmol/L    Chloride 103 98 - 107 mmol/L    Bicarbonate 24 21 - 32 mmol/L    Anion Gap 16 10 - 20 mmol/L    Urea Nitrogen 61 (H) 6 - 23 mg/dL    Creatinine 2.62 (H) 0.50 - 1.30 mg/dL    eGFR 22 (L) >60 mL/min/1.73m*2    Calcium 8.9 8.6 - 10.3 mg/dL    Albumin 4.2 3.4 - 5.0 g/dL    Alkaline Phosphatase 79 33 - 136 U/L    Total Protein 7.3 6.4 - 8.2 g/dL    AST 19 9 - 39  U/L    Bilirubin, Total 0.7 0.0 - 1.2 mg/dL    ALT 17 10 - 52 U/L   Magnesium   Result Value Ref Range    Magnesium 2.01 1.60 - 2.40 mg/dL   B-Type Natriuretic Peptide   Result Value Ref Range     (H) 0 - 99 pg/mL   Troponin I, High Sensitivity, Initial   Result Value Ref Range    Troponin I, High Sensitivity 35 (H) 0 - 20 ng/L   BLOOD GAS VENOUS FULL PANEL   Result Value Ref Range    POCT pH, Venous 7.40 7.33 - 7.43 pH    POCT pCO2, Venous 42 41 - 51 mm Hg    POCT pO2, Venous 23 (L) 35 - 45 mm Hg    POCT SO2, Venous 32 (L) 45 - 75 %    POCT Oxy Hemoglobin, Venous 31.1 (L) 45.0 - 75.0 %    POCT Hematocrit Calculated, Venous 47.0 41.0 - 52.0 %    POCT Sodium, Venous 133 (L) 136 - 145 mmol/L    POCT Potassium, Venous 4.6 3.5 - 5.3 mmol/L    POCT Chloride, Venous 100 98 - 107 mmol/L    POCT Ionized Calicum, Venous 1.15 1.10 - 1.33 mmol/L    POCT Glucose, Venous 135 (H) 74 - 99 mg/dL    POCT Lactate, Venous 1.9 0.4 - 2.0 mmol/L    POCT Base Excess, Venous 0.9 -2.0 - 3.0 mmol/L    POCT HCO3 Calculated, Venous 26.0 22.0 - 26.0 mmol/L    POCT Hemoglobin, Venous 15.5 13.5 - 17.5 g/dL    POCT Anion Gap, Venous 12.0 10.0 - 25.0 mmol/L    Patient Temperature 37.0 degrees Celsius    FiO2 21 %   Urinalysis with Reflex Culture and Microscopic   Result Value Ref Range    Color, Urine Colorless (N) Light-Yellow, Yellow, Dark-Yellow    Appearance, Urine Clear Clear    Specific Gravity, Urine 1.007 1.005 - 1.035    pH, Urine 5.0 5.0, 5.5, 6.0, 6.5, 7.0, 7.5, 8.0    Protein, Urine NEGATIVE NEGATIVE, 10 (TRACE), 20 (TRACE) mg/dL    Glucose, Urine 200 (2+) (A) Normal mg/dL    Blood, Urine 0.03 (TRACE) (A) NEGATIVE    Ketones, Urine NEGATIVE NEGATIVE mg/dL    Bilirubin, Urine NEGATIVE NEGATIVE    Urobilinogen, Urine Normal Normal mg/dL    Nitrite, Urine NEGATIVE NEGATIVE    Leukocyte Esterase, Urine 25 Symone/µL (A) NEGATIVE   Extra Urine Gray Tube   Result Value Ref Range    Extra Tube Hold for add-ons.    Microscopic Only, Urine    Result Value Ref Range    WBC, Urine 1-5 1-5, NONE /HPF    RBC, Urine 1-2 NONE, 1-2, 3-5 /HPF    Mucus, Urine FEW Reference range not established. /LPF    Hyaline Casts, Urine 2+ (A) NONE /LPF   Influenza A, and B PCR   Result Value Ref Range    Flu A Result Not Detected Not Detected    Flu B Result Not Detected Not Detected   Sars-CoV-2 PCR   Result Value Ref Range    Coronavirus 2019, PCR Not Detected Not Detected   Troponin, High Sensitivity, 1 Hour   Result Value Ref Range    Troponin I, High Sensitivity 35 (H) 0 - 20 ng/L   Blood Culture    Specimen: Peripheral Venipuncture; Blood culture   Result Value Ref Range    Blood Culture Loaded on Instrument - Culture in progress    Blood Culture    Specimen: Peripheral Venipuncture; Blood culture   Result Value Ref Range    Blood Culture Loaded on Instrument - Culture in progress    CBC   Result Value Ref Range    WBC 16.4 (H) 4.4 - 11.3 x10*3/uL    nRBC 0.0 0.0 - 0.0 /100 WBCs    RBC 3.92 (L) 4.50 - 5.90 x10*6/uL    Hemoglobin 12.7 (L) 13.5 - 17.5 g/dL    Hematocrit 38.5 (L) 41.0 - 52.0 %    MCV 98 80 - 100 fL    MCH 32.4 26.0 - 34.0 pg    MCHC 33.0 32.0 - 36.0 g/dL    RDW 14.1 11.5 - 14.5 %    Platelets 181 150 - 450 x10*3/uL   Basic metabolic panel   Result Value Ref Range    Glucose 133 (H) 74 - 99 mg/dL    Sodium 137 136 - 145 mmol/L    Potassium 4.2 3.5 - 5.3 mmol/L    Chloride 105 98 - 107 mmol/L    Bicarbonate 22 21 - 32 mmol/L    Anion Gap 14 10 - 20 mmol/L    Urea Nitrogen 58 (H) 6 - 23 mg/dL    Creatinine 2.27 (H) 0.50 - 1.30 mg/dL    eGFR 27 (L) >60 mL/min/1.73m*2    Calcium 8.4 (L) 8.6 - 10.3 mg/dL   Protime-INR   Result Value Ref Range    Protime 23.1 (H) 9.8 - 12.8 seconds    INR 2.0 (H) 0.9 - 1.1   POCT GLUCOSE   Result Value Ref Range    POCT Glucose 147 (H) 74 - 99 mg/dL     *Note: Due to a large number of results and/or encounters for the requested time period, some results have not been displayed. A complete set of results can be found in Results  Review.        Image Results  CT chest abdomen pelvis wo IV contrast  Narrative: Interpreted By:  Silver Peraza,   STUDY:  CT CHEST ABDOMEN PELVIS WO CONTRAST;  10/18/2024 3:13 pm      INDICATION:  89 y/o   M with  Signs/Symptoms:fall, weakness, diarhea, sepsis.          LIMITATIONS:  Evaluation of solid organs and vessels is limited without the use of  IV contrast enhancement..      ACCESSION NUMBER(S):  [id number]      ORDERING CLINICIAN:  MOMO SAUCEDA      TECHNIQUE:  Noncontrast images were obtained from the thoracic inlet down through  the symphysis pubis. Sagittal and coronal reconstruction images were  generated. Mediastinal, lung, bone, and liver windows were reviewed.      COMPARISON:  CT scan chest from 11/18/2013 and CT scan abdomen and pelvis from  10/02/2021.      FINDINGS:  CHEST FINDINGS:      CHEST WALL/BASE OF THE NECK:  The chest wall was grossly intact. Stable right-sided cardiac  pacemaker. No thyromegaly or thyroid mass.      LUNGS/ PLEURA/ AND TRACHEA:  Multiple calcified granulomas in the right lower lobe. 3 stable  subcentimeter pleural-based nodules at the lateral left lung base on  axial images 208-214. Scant scarring versus atelectasis at the  posterior lung bases. Stable linear scarring at the right lung base.  No mass or pneumonia in either lung. No pleural effusion. No  pneumothorax. The trachea was grossly intact.      MEDIASTINUM/SELIN:  No suspicious mediastinal, hilar, or axillary mass or adenopathy.  Mild stable cardiomegaly. Stable advanced three-vessel left-sided  coronary artery calcifications. No pericardial effusion.  Mild-to-moderate mural calcifications in the thoracic aorta. There  was no aneurysmal dilatation of the thoracic aorta.      BONES:  No destructive lytic or blastic bone lesion. Mild-to-moderate loss of  height at T10 from inferior endplate compression fracture with mild  anterior wedging, unchanged. Mild loss of height at L1 due to mild  superior endplate  compression fracture with anterior wedging,  unchanged. No new thoracic spine compression fracture. The sternum is  intact.  There is mild-to-moderate disc space narrowing and endplate  osteophytosis throughout the thoracic spine. No acute rib fracture on  either side. Each scapula was intact.                  ABDOMEN/PELVIS FINDINGS:      LIVER:  No hepatomegaly.  Liver density was  within the limits of normal.  No gross liver lesion evident in this unenhanced exam.      GALLBLADDER:  No calcified stone, gallbladder wall thickening, or adjacent edema.      BILE DUCTS:  No intrahepatic biliary ductal dilatation.  Common bile duct was within the limits of normal.      SPLEEN:  No splenomegaly.  No splenic gross mass. Multiple calcified splenic granulomas.      PANCREAS:  No pancreatic mass or inflammation, or ductal dilatation.      KIDNEYS/ADRENALS:  No adrenal mass or enlargement.  There are 2 nonobstructing lower pole left renal stones, the larger  measuring 4 mm in diameter. No calcified stone in the right kidney.  No hydronephrosis, mass, or perinephric edema in either kidney. No  ureteral stone or dilatation.      BLADDER/PELVIS:  Short catheter in an otherwise empty urinary bladder.  No prostate enlargement.      GREAT VESSELS/RETROPERITONEUM:  Moderate to advanced aortoiliac calcifications. There is mild  fusiform infrarenal abdominal aortic aneurysm, measuring up to 31 x  34 mm in axial dimensions, compared to 30 x 34 mm previously, not  significantly changed. This tapers to normal just above the  bifurcation. No suspicious retroperitoneal adenopathy.  No suspicious mesenteric adenopathy. There were however 2 stable  calcified mesenteric lymph nodes in the right pelvis medial to the  cecum measuring 16 mm on image 161 and 15 mm on image 167. No  adjacent edema. No suspicious pelvic or inguinal adenopathy.      PERITONEUM:  No ascites.  No pneumoperitoneum.  No peritoneal or mesenteric mass or inflammation.       BOWEL:  The stomach was  grossly intact.  There was no small bowel dilatation or small bowel wall thickening.  No small-bowel obstruction. There is mild-to-moderate diffuse  retained colonic stool. There was no colonic wall thickening or large  bowel obstruction.  No edema adjacent to the colon. The cecal  appendix was intact.      BONES:  There are 6 non-rib-bearing lumbar vertebral bodies. No destructive  lytic or blastic bone lesion. Interfacet hypertrophic arthritic  changes in the mid and distal lumbar spine. Sclerotic arthritic  changes in both SI joints. Stable superior endplate compression  fracture with anterior wedging at L1. There is slight stable anterior  superior endplate compression fracture at L4 with slight anterior  wedging. No acute lumbar spine fracture. No acute fracture of the  pelvis. No acute hip fracture or dislocation. Mild endplate  osteophytosis at L2-3 and L3-4.      ABDOMINAL WALL:  Unremarkable.      Impression: CHEST:  Stable compression fractures at T10 and L1. DJD in the thoracic spine  as described.      Stable findings of prior granulomatous disease in the right lower  lobe. Scant scarring versus atelectasis at the posterior lung bases.  Linear scarring at the right lung base. Grouping of 3 subcentimeter  pleural-based nodules at the lateral left lung base, most likely  sequela of remote infection or inflammation.      Cardiac pacemaker on the right, unchanged. Stable cardiomegaly.  Stable prominent three-vessel left-sided coronary artery  calcifications.              ABDOMEN/PELVIS:  Arthritic changes in the lumbosacral spine and pelvis as described.  No CT evidence of fracture of the lumbar spine or pelvis in this exam.      2 nonobstructing lower pole left renal stones. No hydronephrosis.      Grossly stable mild fusiform aneurysmal dilatation of the infrarenal  abdominal aorta.      Short catheter in an otherwise empty urinary bladder.      Findings of prior granulomatous  disease as described.      MACRO:  None      Signed by: Silver Peraza 10/18/2024 4:03 PM  Dictation workstation:   [id number]  CT cervical spine wo IV contrast  Narrative: Interpreted By:  Silver Peraza,   STUDY:  CT CERVICAL SPINE WO IV CONTRAST;  10/18/2024 3:13 pm      INDICATION:  Signs/Symptoms:fall, did not hit head, but has neck pain, on warafin.          COMPARISON:  None.      ACCESSION NUMBER(S):  [id number]      ORDERING CLINICIAN:  MOMO SAUCEDA      TECHNIQUE:  Thin section axial images were obtained from the skull base down  through the thoracic inlet. Sagittal and coronal reconstruction  images were generated. Soft tissue, lung, and bone windows were  reviewed.      FINDINGS:  VERTEBRAL BODIES AND POSTERIOR ELEMENTS:  Moderate endplate osteophytosis at C6-7. Mild disc space narrowing  with endplate spurring at C5-6. Vacuum disc phenomenon at that level.  There is congenital disc space narrowing at C2-3. Joint space loss  with subchondral sclerosis and spurring in the anterior superior  aspect of the atlantoaxial joint. Uncovertebral hypertrophy with spur  formation bilaterally at C5-6. Multilevel interfacet hypertrophy with  spur formation. No cervical spine compression fracture.  No posterior element fracture.  No destructive bone lesion.  No listhesis.      SPINAL CANAL: No gross disc herniation.      NECK SOFT TISSUES: Bilateral carotid bulb arterial calcifications,  mild-to-moderate on the left and moderate on the right.      LUNG APICES: Imaged portion of the lung apices are within normal  limits.      SKULL BASE: Within normal limits.      Impression: Cervical spine DJD as described. No CT evidence of cervical spine  fracture in this exam.      MACRO:  None      Signed by: Silver Peraza 10/18/2024 3:44 PM  Dictation workstation:   [id number]  CT head wo IV contrast  Narrative: Interpreted By:  Silver Peraza,   STUDY:  CT HEAD WO IV CONTRAST;  10/18/2024 3:13 pm       INDICATION:  Signs/Symptoms:fall, did not hit head, but has neck pain, on warafin.      COMPARISON:  Previous exam is from 04/19/2018..      ACCESSION NUMBER(S):  [id number]      ORDERING CLINICIAN:  MOMO SAUCEDA      TECHNIQUE:  Routine axial images were obtained from the skull base through the  vertex.  Sagittal and coronal reconstruction images were generated.  Brain, subdural, and bone windows were reviewed. N/A Unavailable      FINDINGS:  INTRACRANIAL:  Moderate prominence of ventricles and sulci. There is mild patchy  hypodensity throughout the deep periventricular white matter. No  acute intracranial bleed, midline shift, or focal mass effect. No  destructive bone lesion. No depressed skull fracture. Skullbase  arterial calcifications in the carotid siphons and vertebral arteries.      EXTRACRANIAL:  Small left maxillary sinus retention cyst. Otherwise, the visualized  paranasal sinuses were clear. Visualized mastoid air cells were clear.      Impression: No depressed skull fracture. No acute intracranial bleed or focal  mass effect.      Moderate volume loss.      Mild chronic white matter ischemic disease in the deep  periventricular regions.      MACRO:  None      Signed by: Silver Peraza 10/18/2024 3:41 PM  Dictation workstation:   [id number]  ECG 12 Lead  Afib/flutter and ventricular-paced rhythm  No further analysis attempted due to paced rhythm  XR chest 1 view  Narrative: Interpreted By:  Chaitanya Borges,   STUDY:  Chest dated  10/18/2024.      INDICATION:  Signs/Symptoms:weakness, fall      COMPARISON:  Chest dated 09/22/2022.      ACCESSION NUMBER(S):  [id number]      ORDERING CLINICIAN:  MOMO SAUCEDA      TECHNIQUE:  One view of the chest.      FINDINGS:  Linear opacities are seen in the left lower lung zone similar to  prior exam and most compatible with scarring.  No pneumothorax or  effusion is evident. The cardiomediastinal silhouette is  enlarged  but similar to the prior exam.There is  a pulse generator on the right  chest wall wtih leads tracking over the heart.Degenerative change is  seen of the spine and shoulders.      Impression: No acute cardiopulmonary process is evident.      MACRO:  None      Signed by: Chaitanya Borges 10/18/2024 1:27 PM  Dictation workstation:   [id number]       Assessment/Plan     * Complicated UTI (urinary tract infection)  Assessment & Plan  Leukocytosis and leukocyte esterase in patient with indwelling Short catheter suspicious for CAUTI  We will continue patient on Rocephin obtain urine culture and observe for fever or other evidence of infection  Patient follows with urology the Short was placed about a week ago due to neurogenic bladder and enlarged prostate  Will review and reconcile the patient's BPH medications  10/19: Short catheter has been in since 10/3.  Will check and see if it was changed in the ED.  Awaiting cultures.  Continue ceftriaxone for now.    Chronic obstructive pulmonary disease (Multi)  Assessment & Plan  Hypoxia  Patient with history COPD  Patient placed on supplemental oxygen  CT of the chest is negative for infiltrate or pulmonary edema  Does not appear to be having an exacerbation  Will give DuoNeb treatments    Arteriosclerosis of coronary artery  Assessment & Plan  Pt with abnormal but stable troponin no cp  Continue home medications    (HFpEF) heart failure with preserved ejection fraction  Assessment & Plan  Pt appears compensated  Continue home medications    Stage 3b chronic kidney disease (Multi)  Assessment & Plan  CKD3 baseline cr 2.5  Pt close to baseline    Diabetes mellitus, type 2 (Multi)  Assessment & Plan  Diabetic diet sliding scale insulin    Chronic painful diabetic neuropathy (Multi)  Assessment & Plan  See DM2    Atrial fibrillation (Multi)  Assessment & Plan  We will continue the patient's rate control medications as well as his anticoagulation therapy  10/19: Patient is on warfarin, check INR in AM.  Ceftriaxone  generally has a neutral effect on warfarin.                 Yinka Long MD

## 2024-10-19 NOTE — CARE PLAN
Problem: Pain - Adult  Goal: Verbalizes/displays adequate comfort level or baseline comfort level  Outcome: Progressing     Problem: Safety - Adult  Goal: Free from fall injury  Outcome: Progressing     Problem: Discharge Planning  Goal: Discharge to home or other facility with appropriate resources  Outcome: Progressing     Problem: Chronic Conditions and Co-morbidities  Goal: Patient's chronic conditions and co-morbidity symptoms are monitored and maintained or improved  Outcome: Progressing     Problem: Diabetes  Goal: Achieve decreasing blood glucose levels by end of shift  Outcome: Progressing  Goal: Increase stability of blood glucose readings by end of shift  Outcome: Progressing  Goal: Decrease in ketones present in urine by end of shift  Outcome: Progressing  Goal: Maintain electrolyte levels within acceptable range throughout shift  Outcome: Progressing  Goal: Maintain glucose levels >70mg/dl to <250mg/dl throughout shift  Outcome: Progressing  Goal: No changes in neurological exam by end of shift  Outcome: Progressing  Goal: Learn about and adhere to nutrition recommendations by end of shift  Outcome: Progressing  Goal: Vital signs within normal range for age by end of shift  Outcome: Progressing  Goal: Increase self care and/or family involovement by end of shift  Outcome: Progressing  Goal: Receive DSME education by end of shift  Outcome: Progressing     Problem: Heart Failure  Goal: Improved gas exchange this shift  Outcome: Progressing  Goal: Improved urinary output this shift  Outcome: Progressing  Goal: Reduction in peripheral edema within 24 hours  Outcome: Progressing  Goal: Report improvement of dyspnea/breathlessness this shift  Outcome: Progressing  Goal: Weight from fluid excess reduced over 2-3 days, then stabilize  Outcome: Progressing  Goal: Increase self care and/or family involvement in 24 hours  Outcome: Progressing    The patient's goals for the shift include      The clinical goals  for the shift include Patient will remain free from falls and injury during shift

## 2024-10-19 NOTE — ASSESSMENT & PLAN NOTE
Pt appears compensated  Continue home medications  10/20: Patient has had increased lower extremity.  Patient appears to tolerate well here on his home medications as well as a salt restricted diet.  Recommend he follows all restrictions as an outpatient.  Follow-up with cardiology.

## 2024-10-19 NOTE — ASSESSMENT & PLAN NOTE
We will continue the patient's rate control medications as well as his anticoagulation therapy  10/19: Patient is on warfarin, check INR in AM.  Ceftriaxone generally has a neutral effect on warfarin.  10/20: Resume anticoagulation.

## 2024-10-19 NOTE — ED NOTES
Pt arrives to ED via EMS from home    Code Status:  Full Code    HPI     Chief Complaint   Patient presents with    Weakness, Gen     C/O generalized weakness and fall today.    Fall    Diarrhea       /51   Pulse 71   Temp 37.3 °C (99.1 °F)   Resp 19   Wt 83.9 kg (184 lb 15.5 oz)   SpO2 98%     [place] Coma Scale Score: 15      LDA:   Peripheral IV 10/18/24 20 G Right Antecubital (Active)   Placement Date/Time: 10/18/24 1230   Placed by External Staff?: EMS  Hand Hygiene Completed: Yes  Size (Gauge): 20 G  Orientation: Right  Location: Antecubital  Site Prep: Chlorhexidine    Number of days: 0       Urethral Catheter Non-latex 16 Fr. (Active)   Placement Date/Time: 10/11/24 1000   Placed by External Staff?: Other (Comment)  Catheter Type: Non-latex  Tube Size (Fr.): 16 Fr.  Catheter Balloon Size: 10 mL   Number of days: 7        BACKGROUND  Past Medical History:   Diagnosis Date    Encounter for screening for malignant neoplasm of prostate 06/15/2016    Prostate cancer screening    Long term (current) use of anticoagulants 06/15/2016    Anticoagulated on Coumadin    Personal history of other diseases of the circulatory system     History of atrial fibrillation    Personal history of other diseases of the respiratory system 11/01/2019    History of acute sinusitis    Personal history of other endocrine, nutritional and metabolic disease     History of diabetes mellitus    Personal history of transient ischemic attack (TIA), and cerebral infarction without residual deficits     History of transient cerebral ischemia    Unspecified atrial fibrillation (Multi) 10/31/2022    Atrial fibrillation     Past Surgical History:   Procedure Laterality Date    CARDIAC PACEMAKER PLACEMENT  06/08/2017    Pacemaker Placement    CARDIAC PACEMAKER PLACEMENT  06/08/2017    Pacemaker Placement    CARPAL TUNNEL RELEASE  02/15/2016    Neuroplasty Decompression Median Nerve At Carpal Tunnel    CATARACT EXTRACTION  02/15/2016     Cataract Surgery    FOOT SURGERY  06/15/2016    Foot Surgery    HAND SURGERY  02/15/2016    Hand Surgery                                                                                                                                                          OTHER SURGICAL HISTORY  06/08/2017    Cardio-Defib Pulse Gen Venous Insertion Of Electrode For Ventricular Pacing    OTHER SURGICAL HISTORY  06/08/2017    Cardio-Defib Pulse Gen Venous Insertion Of Electrode For Ventricular Pacing    OTHER SURGICAL HISTORY  06/08/2017    Cardio-Defib Pulse Gen Venous Insertion Of Electrode For Ventricular Pacing    TOTAL KNEE ARTHROPLASTY  05/26/2016    Knee Replacement     No current facility-administered medications on file prior to encounter.     Current Outpatient Medications on File Prior to Encounter   Medication Sig Dispense Refill    atorvastatin (Lipitor) 20 mg tablet Take 1 tablet (20 mg) by mouth once daily. 90 tablet 1    blood sugar diagnostic (OneTouch Ultra Test) strip Inject 1 strip as directed 2 times a day. 90 strip 3    budesonide-glycopyr-formoterol (Breztri Aerosphere) 160-9-4.8 mcg/actuation HFA aerosol inhaler Breztri Aerosphere 160-9-4.8 MCG/ACT Inhalation Aerosol   Refills: 0        Start : 31-Oct-2022   Active  5.9 GM Inhaler      budesonide/glycopyr/formoterol (BREZTRI AEROSPHERE INHL) Inhale.      [START ON 10/22/2024] clonazePAM (KlonoPIN) 0.5 mg tablet TAKE 1.5 TABLET at Bedtime Do not fill before October 22, 2024. 45 tablet 2    empagliflozin (Jardiance) 10 mg Take 1 tablet (10 mg) by mouth once daily. 30 tablet 11    finasteride (Proscar) 5 mg tablet Take 1 tablet (5 mg) by mouth once daily. Do not crush, chew, or split. (Patient not taking: Reported on 10/8/2024) 30 tablet 2    FreeStyle glucose monitoring kit 1 each if needed.      gabapentin (Neurontin) 100 mg capsule Take 200mg (together with 300mg capsule) to make up to 500mg at bedtime 60 capsule 0    gabapentin (Neurontin) 300 mg capsule  Take 1 capsule (300 mg) by mouth once daily at bedtime. 30 capsule 2    glipiZIDE XL (Glucotrol XL) 2.5 mg 24 hr tablet Take 2 tablets (5 mg) by mouth once daily. 60 tablet 11    lancets (OneTouch UltraSoft Lancets) misc test twice a day 90 each 2    lisinopril 5 mg tablet Take 1 tablet (5 mg) by mouth once daily. As directed 90 tablet 1    metoprolol succinate XL (Toprol-XL) 25 mg 24 hr tablet Take 1 tablet (25 mg) by mouth once daily. Do not crush or chew. 90 tablet 3    mv-min/FA/vit K/lutein/zeaxant (PRESERVISION AREDS 2 PLUS MV ORAL) Take by mouth.      omeprazole (PriLOSEC) 40 mg DR capsule Take 1 capsule (40 mg) by mouth once daily in the morning. Take before meals. 30 capsule 11    OXcarbazepine (Trileptal) 150 mg tablet Take 150 mg every morning and 300 mg every evening 270 tablet 1    tamsulosin (Flomax) 0.4 mg 24 hr capsule Take 1 capsule (0.4 mg) by mouth once daily. Daily before bed (Patient not taking: Reported on 10/8/2024) 90 capsule 3    torsemide (Demadex) 20 mg tablet Take 1 tablet (20 mg) by mouth once daily. 30 tablet 3    vitamin B complex/folic acid (B COMPLEX 100 ORAL) as directed Orally      warfarin (Coumadin) 5 mg tablet Take 1.5 tablets (7.5 mg) by mouth once daily in the evening. 180 tablet 2    [DISCONTINUED] lisinopril 5 mg tablet Take 1 tablet (5 mg) by mouth once daily. As directed 90 tablet 0        ASSESSMENT  ED Course as of 10/19/24 0856   Fri Oct 18, 2024   1301 Patient twelve-lead EKG interpreted by myself shows ventricularly paced rhythm, rate of 60, left axis deviation, appears not significantly changed unchanged when compared to prior EKG obtained in October 25, 2019. [WJ]   1341 POCT Lactate, Venous: 1.9 [WJ]   1342 Wbc 14.6 - no clear source, maybe from diarrhea. Will obtain CT [WJ]      ED Course User Index  [WJ] Greg Guerrero DO         Diagnoses as of 10/19/24 0856   Fall, initial encounter   Urinary tract infection without hematuria, site unspecified   Hypoxia        Medications Currently Running:        Medications Given:  ED Medication Administration from 10/18/2024 1238 to 10/19/2024 0856         Date/Time Order Dose Route Action Action by     10/18/2024 1623 EDT cefTRIAXone (Rocephin) 2 g in dextrose (iso) IV 50 mL 2 g intravenous New Bag LOVE [place]     10/18/2024 1646 EDT cefTRIAXone (Rocephin) 2 g in dextrose (iso) IV 50 mL 0 g intravenous Stopped LOVE [place]     10/18/2024 1835 EDT ipratropium-albuteroL (Duo-Neb) 0.5-2.5 mg/3 mL nebulizer solution 3 mL 3 mL nebulization Given [place], D     10/19/2024 0038 EDT atorvastatin (Lipitor) tablet 20 mg 20 mg oral Given [place], JAZMINE     10/19/2024 0038 EDT gabapentin (Neurontin) capsule 400 mg 400 mg oral Given [place], E     10/19/2024 0038 EDT ipratropium-albuteroL (Duo-Neb) 0.5-2.5 mg/3 mL nebulizer solution 3 mL 3 mL nebulization Given [place], JAZMINE     10/19/2024 0038 EDT melatonin tablet 3 mg 3 mg oral Given [place], E     10/19/2024 0849 EDT insulin lispro (HumaLOG) injection 0-5 Units -- subcutaneous Not Given CECE Arvizu                 RESULTS    Imaging:  CT head wo IV contrast   Final Result   No depressed skull fracture. No acute intracranial bleed or focal   mass effect.        Moderate volume loss.        Mild chronic white matter ischemic disease in the deep   periventricular regions.        MACRO:   None        Signed by: Silver Peraza 10/18/2024 3:41 PM   Dictation workstation:   [id number]      CT cervical spine wo IV contrast   Final Result   Cervical spine DJD as described. No CT evidence of cervical spine   fracture in this exam.        MACRO:   None        Signed by: Silver Peraza 10/18/2024 3:44 PM   Dictation workstation:   [id number]      CT chest abdomen pelvis wo IV contrast   Final Result   CHEST:   Stable compression fractures at T10 and L1. DJD in the thoracic spine   as described.        Stable findings of prior granulomatous disease in the right lower   lobe. Scant scarring versus atelectasis at the  posterior lung bases.   Linear scarring at the right lung base. Grouping of 3 subcentimeter   pleural-based nodules at the lateral left lung base, most likely   sequela of remote infection or inflammation.        Cardiac pacemaker on the right, unchanged. Stable cardiomegaly.   Stable prominent three-vessel left-sided coronary artery   calcifications.                  ABDOMEN/PELVIS:   Arthritic changes in the lumbosacral spine and pelvis as described.   No CT evidence of fracture of the lumbar spine or pelvis in this exam.        2 nonobstructing lower pole left renal stones. No hydronephrosis.        Grossly stable mild fusiform aneurysmal dilatation of the infrarenal   abdominal aorta.        Short catheter in an otherwise empty urinary bladder.        Findings of prior granulomatous disease as described.        MACRO:   None        Signed by: Silver Peraza 10/18/2024 4:03 PM   Dictation workstation:   [id number]      XR chest 1 view   Final Result   No acute cardiopulmonary process is evident.        MACRO:   None        Signed by: Chaitanya Borges 10/18/2024 1:27 PM   Dictation workstation:   [id number]         }  Labs ::99  Abnormal Labs Reviewed   CBC WITH AUTO DIFFERENTIAL - Abnormal; Notable for the following components:       Result Value    WBC 14.6 (*)     RBC 4.27 (*)     Neutrophils Absolute 11.88 (*)     Monocytes Absolute 1.34 (*)     All other components within normal limits   PROTIME-INR - Abnormal; Notable for the following components:    Protime 25.0 (*)     INR 2.2 (*)     All other components within normal limits   COMPREHENSIVE METABOLIC PANEL - Abnormal; Notable for the following components:    Glucose 131 (*)     Urea Nitrogen 61 (*)     Creatinine 2.62 (*)     eGFR 22 (*)     All other components within normal limits   B-TYPE NATRIURETIC PEPTIDE - Abnormal; Notable for the following components:     (*)     All other components within normal limits    Narrative:        <100 pg/mL -  Heart failure unlikely                  100-299 pg/mL - Intermediate probability of acute heart                                  failure exacerbation. Correlate with clinical                                  context and patient history.                    >=300 pg/mL - Heart Failure likely. Correlate with clinical                                  context and patient history.                                    BNP testing is performed using different testing methodology at [facility] than at other [facility]. Direct result comparisons should only be made within the same method.                      URINALYSIS WITH REFLEX CULTURE AND MICROSCOPIC - Abnormal; Notable for the following components:    Color, Urine Colorless (*)     Glucose, Urine 200 (2+) (*)     Blood, Urine 0.03 (TRACE) (*)     Leukocyte Esterase, Urine 25 Symone/µL (*)     All other components within normal limits   SERIAL TROPONIN-INITIAL - Abnormal; Notable for the following components:    Troponin I, High Sensitivity 35 (*)     All other components within normal limits    Narrative:     Less than 99th percentile of normal range cutoff-                  Female and children under 18 years old <14 ng/L; Male <21 ng/L: Negative                  Repeat testing should be performed if clinically indicated.                                     Female and children under 18 years old 14-50 ng/L; Male 21-50 ng/L:                  Consistent with possible cardiac damage and possible increased clinical                   risk. Serial measurements may help to assess extent of myocardial damage.                                     >50 ng/L: Consistent with cardiac damage, increased clinical risk and                  myocardial infarction. Serial measurements may help assess extent of                   myocardial damage.                                      NOTE: Children less than 1 year old may have higher baseline troponin                   levels  and results should be interpreted in conjunction with the overall                   clinical context.                                     NOTE: Troponin I testing is performed using a different                   testing methodology at [facility] than at other                   [facility]. Direct result comparisons should only                   be made within the same method.   BLOOD GAS VENOUS FULL PANEL - Abnormal; Notable for the following components:    POCT pO2, Venous 23 (*)     POCT SO2, Venous 32 (*)     POCT Oxy Hemoglobin, Venous 31.1 (*)     POCT Sodium, Venous 133 (*)     POCT Glucose, Venous 135 (*)     All other components within normal limits   SERIAL TROPONIN, 1 HOUR - Abnormal; Notable for the following components:    Troponin I, High Sensitivity 35 (*)     All other components within normal limits    Narrative:     Less than 99th percentile of normal range cutoff-                  Female and children under 18 years old <14 ng/L; Male <21 ng/L: Negative                  Repeat testing should be performed if clinically indicated.                                     Female and children under 18 years old 14-50 ng/L; Male 21-50 ng/L:                  Consistent with possible cardiac damage and possible increased clinical                   risk. Serial measurements may help to assess extent of myocardial damage.                                     >50 ng/L: Consistent with cardiac damage, increased clinical risk and                  myocardial infarction. Serial measurements may help assess extent of                   myocardial damage.                                      NOTE: Children less than 1 year old may have higher baseline troponin                   levels and results should be interpreted in conjunction with the overall                   clinical context.                                     NOTE: Troponin I testing is performed using a different                   testing  methodology at [facility] than at other                   [facility]. Direct result comparisons should only                   be made within the same method.   MICROSCOPIC ONLY, URINE - Abnormal; Notable for the following components:    Hyaline Casts, Urine 2+ (*)     All other components within normal limits   CBC - Abnormal; Notable for the following components:    WBC 16.4 (*)     RBC 3.92 (*)     Hemoglobin 12.7 (*)     Hematocrit 38.5 (*)     All other components within normal limits   BASIC METABOLIC PANEL - Abnormal; Notable for the following components:    Glucose 133 (*)     Urea Nitrogen 58 (*)     Creatinine 2.27 (*)     eGFR 27 (*)     Calcium 8.4 (*)     All other components within normal limits   PROTIME-INR - Abnormal; Notable for the following components:    Protime 23.1 (*)     INR 2.0 (*)     All other components within normal limits   POCT GLUCOSE - Abnormal; Notable for the following components:    POCT Glucose 147 (*)     All other components within normal limits                Sandrine Arvizu RN  10/19/24 0868

## 2024-10-19 NOTE — PROGRESS NOTES
Michele Maya is a 89 y.o. male admitted for Complicated UTI (urinary tract infection). Pharmacy reviewed the patient's [id number] medications and allergies for accuracy.    The list below reflects the PTA list prior to pharmacy medication history. A summary a changes to the PTA medication list has been listed below. Please review each medication in order reconciliation for additional clarification and justification.    Source of information: T2P AND WIFE     Medications added:    Medications modified:  BREZTRI INHALER --> 2 PUFFS BID   KLONOPIN 0.5MG 1.5 T at bedtime --> 1.5 TABLETS (.75MG) at bedtime   GABAPENTIN 100MG 2 C at bedtime --> 1 C at bedtime TO EQUAL 400MG at bedtime   AREDS 2 --> 2 every day   B COMPLEX --> 1 every day 1  WARFARIN 5MG 1.5TABLETS QPM --> 5MG every day AND 10MG ON SUNDAYS       Medications to be removed:  BREZTRI INHALER - DUPLICATE THERAPY   PROSCAR 5MG   FLOMAX 0.4MG     Medications of concern:      Prior to Admission Medications   Prescriptions Last Dose Informant Patient Reported? Taking?   FreeStyle glucose monitoring kit   Yes No   Si each if needed.   OXcarbazepine (Trileptal) 150 mg tablet   No No   Sig: Take 150 mg every morning and 300 mg every evening   atorvastatin (Lipitor) 20 mg tablet   No No   Sig: Take 1 tablet (20 mg) by mouth once daily.   blood sugar diagnostic (OneTouch Ultra Test) strip   No No   Sig: Inject 1 strip as directed 2 times a day.   budesonide-glycopyr-formoterol (BREZTRI) 160-9-4.8 mcg/actuation HFA aerosol inhaler   Yes No   Sig: Inhale 2 puffs 2 times a day.   budesonide/glycopyr/formoterol (BREZTRI AEROSPHERE INHL) Not Taking  Yes No   Sig: Inhale.   Patient not taking: Reported on 10/19/2024   clonazePAM (KlonoPIN) 0.5 mg tablet   No No   Sig: TAKE 1.5 TABLET at Bedtime Do not fill before 2024.   empagliflozin (Jardiance) 10 mg   No No   Sig: Take 1 tablet (10 mg) by mouth once daily.   finasteride (Proscar) 5 mg tablet    No No   Sig: Take 1 tablet (5 mg) by mouth once daily. Do not crush, chew, or split.   Patient not taking: Reported on 10/8/2024   gabapentin (Neurontin) 100 mg capsule   No No   Sig: Take 200mg (together with 300mg capsule) to make up to 500mg at bedtime   gabapentin (Neurontin) 300 mg capsule   No No   Sig: Take 1 capsule (300 mg) by mouth once daily at bedtime.   glipiZIDE XL (Glucotrol XL) 2.5 mg 24 hr tablet   No No   Sig: Take 2 tablets (5 mg) by mouth once daily.   lancets (OneTouch UltraSoft Lancets) misc   No No   Sig: test twice a day   lisinopril 5 mg tablet   No No   Sig: Take 1 tablet (5 mg) by mouth once daily. As directed   metoprolol succinate XL (Toprol-XL) 25 mg 24 hr tablet   No No   Sig: Take 1 tablet (25 mg) by mouth once daily. Do not crush or chew.   mv-min/FA/vit K/lutein/zeaxant (PRESERVISION AREDS 2 PLUS MV ORAL)   Yes No   Sig: Take by mouth.   omeprazole (PriLOSEC) 40 mg DR capsule   No No   Sig: Take 1 capsule (40 mg) by mouth once daily in the morning. Take before meals.   tamsulosin (Flomax) 0.4 mg 24 hr capsule   No No   Sig: Take 1 capsule (0.4 mg) by mouth once daily. Daily before bed   Patient not taking: Reported on 10/8/2024   torsemide (Demadex) 20 mg tablet   No No   Sig: Take 1 tablet (20 mg) by mouth once daily.   vitamin B complex/folic acid (B COMPLEX 100 ORAL)   Yes No   Sig: as directed Orally   warfarin (Coumadin) 5 mg tablet   No No   Sig: Take 1.5 tablets (7.5 mg) by mouth once daily in the evening.      Facility-Administered Medications: None       ERIC BELCHER

## 2024-10-19 NOTE — PROGRESS NOTES
Physical Therapy    Physical Therapy Evaluation    Patient Name: Michele Maya  MRN: [id number]  Department: [address]  Room: [facility]-A  Today's Date: 10/19/2024   Time Calculation  Start Time: 1325  Stop Time: 1344  Time Calculation (min): 19 min    Assessment/Plan   PT Assessment  PT Assessment Results: Decreased strength, Impaired balance, Decreased endurance, Decreased mobility, Decreased safety awareness, Impaired judgement, Impaired hearing  Rehab Prognosis: Good  Evaluation/Treatment Tolerance: Patient limited by fatigue  End of Session Communication: Bedside nurse  End of Session Patient Position: Bed, 2 rail up, Alarm on  IP OR SWING BED PT PLAN  Inpatient or Swing Bed: Inpatient  PT Plan  Treatment/Interventions: Transfer training, Gait training, Stair training, Balance training, Strengthening, Endurance training, Therapeutic exercise  PT Plan: Ongoing PT  PT Frequency: 4 times per week  PT Discharge Recommendations: Moderate intensity level of continued care  PT - OK to Discharge: Yes    Subjective   General Visit Information:  General  Reason for Referral: impaired mobility  Referred By: Devon CHAPMAN  Past Medical History Relevant to Rehab: Ad Dx: fall, UTI without hemturia, hypoxia (PMHx: RLS, vascular claudication, peripheral neuropathy, YAYA, A-fib, pacemaker, HF, BPH)  Family/Caregiver Present: Yes (spouse)  Co-Treatment: OT  Co-Treatment Reason: to ensure patient safety  Patient Position Received: Bed, 2 rail up, Alarm on  Home Living:  Home Living  Type of Home: House  Lives With: Spouse  Home Adaptive Equipment: Walker rolling or standard, Wheelchair-power  Home Layout: One level  Home Access: Stairs to enter with rails (1 rail)  Entrance Stairs-Number of Steps: 2  Prior Level of Function:  Prior Function Per Pt/Caregiver Report  Homemaking Assistance: Needs assistance  Meal Prep:  (spouse)  Laundry:  (spouse)  Driving/Transportation:  (spouse)  Precautions:  Precautions  Hearing/Visual  Limitations: [place] - hearing aids  Precautions Comment: Fall risk     Vital Signs (Past 2hrs)        Date/Time Vitals Session Patient Position Pulse Resp SpO2 BP MAP (mmHg)    10/19/24 1309 --  --  63  16  95 %  112/56  74                         Objective   Pain:  Pain Assessment  Pain Assessment: 0-10  0-10 (Numeric) Pain Score: 1  Pain Location: Toe (Comment which one) (patient did not specify which ones)  Cognition:  Cognition  Overall Cognitive Status: Within Functional Limits    General Assessments:  General Observation  General Observation: catheter in place, spouse in room, pt. is pleasant and agreeable to PT     Functional Assessments:  Bed Mobility  Bed Mobility: Yes  Bed Mobility 1  Bed Mobility 1: Supine to sitting, Sitting to supine  Level of Assistance 1: Contact guard (x1)    Transfers  Transfer: Yes  Transfer 1  Transfer From 1: Sit to, Stand to  Transfer to 1: Stand, Sit  Transfer Device 1: Walker  Transfer Level of Assistance 1: Minimum assistance (x2)    Ambulation/Gait Training  Ambulation/Gait Training Performed: Yes  Ambulation/Gait Training 1  Surface 1: Level tile  Device 1: Rolling walker  Assistance 1: Moderate assistance (x2)  Comments/Distance (ft) 1: 6 (side steps 3' & forward/back steps 3')  Extremity/Trunk Assessments:  Strength RLE  R Knee Flexion: 4/5  R Knee Extension: 4/5  Strength LLE  L Knee Flexion: 4/5  L Knee Extension: 4/5  Outcome Measures:  [facility] Basic Mobility  Turning from your back to your side while in a flat bed without using bedrails: None  Moving from lying on your back to sitting on the side of a flat bed without using bedrails: A little  Moving to and from bed to chair (including a wheelchair): A lot  Standing up from a chair using your arms (e.g. wheelchair or bedside chair): A lot  To walk in hospital room: A lot  Climbing 3-5 steps with railing: Total  Basic Mobility - Total Score: 14    Encounter Problems       Encounter Problems (Active)       Mobility        Goal 1 (Progressing)       Start:  10/19/24    Expected End:  11/02/24       Increase DURAN LE strength to 5/5 to ease capability to perform transfers         Goal 2 (Progressing)       Start:  10/19/24    Expected End:  11/02/24       The patient will be able to ascend/descend 2 steps with 1 handrail to enter/exit home         Goal 3 (Progressing)       Start:  10/19/24    Expected End:  11/02/24       The patient till be able to ambulate 50 feet with FWW/CGA x 1 for household ADLs            PT Transfers       Goal 1 (Progressing)       Start:  10/19/24    Expected End:  11/02/24       The patient will be able to perform all transfers with FWW/SBA x 1             Pain - Adult              Education Documentation  Precautions, taught by Eugenio Velazquez, PT at 10/19/2024  2:20 PM.  Learner: Family, Patient  Readiness: Acceptance  Method: Explanation, Demonstration  Response: Needs Reinforcement  Comment: Needs reinforcement to push up from bed and reach back to bed with sit to stand transfers    Mobility Training, taught by Eugenio Velazquez, PT at 10/19/2024  2:20 PM.  Learner: Family, Patient  Readiness: Acceptance  Method: Explanation, Demonstration  Response: Needs Reinforcement  Comment: Needs reinforcement to push up from bed and reach back to bed with sit to stand transfers    Education Comments  No comments found.

## 2024-10-19 NOTE — ASSESSMENT & PLAN NOTE
Hypoxia  Patient with history COPD  Patient placed on supplemental oxygen  CT of the chest is negative for infiltrate or pulmonary edema  Does not appear to be having an exacerbation  Will give DuoNeb treatments

## 2024-10-20 VITALS
BODY MASS INDEX: 26.48 KG/M2 | RESPIRATION RATE: 16 BRPM | HEIGHT: 70 IN | TEMPERATURE: 97.6 F | OXYGEN SATURATION: 94 % | WEIGHT: 184.97 LBS | SYSTOLIC BLOOD PRESSURE: 111 MMHG | HEART RATE: 62 BPM | DIASTOLIC BLOOD PRESSURE: 53 MMHG

## 2024-10-20 PROBLEM — R82.81 STERILE PYURIA: Status: ACTIVE | Noted: 2024-10-18
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
## 2024-10-20 LAB
ANION GAP SERPL CALC-SCNC: 15 MMOL/L (ref 10–20)
ATRIAL RATE: 0 BPM
BACTERIA BLD CULT: NORMAL
BACTERIA BLD CULT: NORMAL
BUN SERPL-MCNC: 66 MG/DL (ref 6–23)
CALCIUM SERPL-MCNC: 8.3 MG/DL (ref 8.6–10.3)
CHLORIDE SERPL-SCNC: 103 MMOL/L (ref 98–107)
CO2 SERPL-SCNC: 22 MMOL/L (ref 21–32)
CREAT SERPL-MCNC: 2.26 MG/DL (ref 0.5–1.3)
EGFRCR SERPLBLD CKD-EPI 2021: 27 ML/MIN/1.73M*2
ERYTHROCYTE [DISTWIDTH] IN BLOOD BY AUTOMATED COUNT: 13.8 % (ref 11.5–14.5)
GLUCOSE BLD MANUAL STRIP-MCNC: 142 MG/DL (ref 74–99)
GLUCOSE BLD MANUAL STRIP-MCNC: 241 MG/DL (ref 74–99)
GLUCOSE SERPL-MCNC: 131 MG/DL (ref 74–99)
HCT VFR BLD AUTO: 35.8 % (ref 41–52)
HGB BLD-MCNC: 11.8 G/DL (ref 13.5–17.5)
INR PPP: 1.9 (ref 0.9–1.1)
MCH RBC QN AUTO: 32 PG (ref 26–34)
MCHC RBC AUTO-ENTMCNC: 33 G/DL (ref 32–36)
MCV RBC AUTO: 97 FL (ref 80–100)
NRBC BLD-RTO: 0 /100 WBCS (ref 0–0)
PLATELET # BLD AUTO: 168 X10*3/UL (ref 150–450)
POTASSIUM SERPL-SCNC: 4.3 MMOL/L (ref 3.5–5.3)
PR INTERVAL: 45 MS
PROTHROMBIN TIME: 21.1 SECONDS (ref 9.8–12.8)
Q ONSET: 251 MS
QRS COUNT: 10 BEATS
QRS DURATION: 130 MS
QT INTERVAL: 440 MS
QTC CALCULATION(BAZETT): 440 MS
QTC FREDERICIA: 440 MS
R AXIS: 259 DEGREES
RBC # BLD AUTO: 3.69 X10*6/UL (ref 4.5–5.9)
SODIUM SERPL-SCNC: 136 MMOL/L (ref 136–145)
T AXIS: 93 DEGREES
T OFFSET: 471 MS
VENTRICULAR RATE: 60 BPM
WBC # BLD AUTO: 13.6 X10*3/UL (ref 4.4–11.3)

## 2024-10-20 PROCEDURE — 2500000002 HC RX 250 W HCPCS SELF ADMINISTERED DRUGS (ALT 637 FOR MEDICARE OP, ALT 636 FOR OP/ED): Performed by: NURSE PRACTITIONER

## 2024-10-20 PROCEDURE — 85610 PROTHROMBIN TIME: CPT | Performed by: NURSE PRACTITIONER

## 2024-10-20 PROCEDURE — 2500000002 HC RX 250 W HCPCS SELF ADMINISTERED DRUGS (ALT 637 FOR MEDICARE OP, ALT 636 FOR OP/ED): Performed by: INTERNAL MEDICINE

## 2024-10-20 PROCEDURE — 2500000001 HC RX 250 WO HCPCS SELF ADMINISTERED DRUGS (ALT 637 FOR MEDICARE OP): Performed by: INTERNAL MEDICINE

## 2024-10-20 PROCEDURE — 80048 BASIC METABOLIC PNL TOTAL CA: CPT | Performed by: INTERNAL MEDICINE

## 2024-10-20 PROCEDURE — 2500000001 HC RX 250 WO HCPCS SELF ADMINISTERED DRUGS (ALT 637 FOR MEDICARE OP): Performed by: NURSE PRACTITIONER

## 2024-10-20 PROCEDURE — 85027 COMPLETE CBC AUTOMATED: CPT | Performed by: INTERNAL MEDICINE

## 2024-10-20 PROCEDURE — 94640 AIRWAY INHALATION TREATMENT: CPT

## 2024-10-20 PROCEDURE — 82947 ASSAY GLUCOSE BLOOD QUANT: CPT

## 2024-10-20 PROCEDURE — 36415 COLL VENOUS BLD VENIPUNCTURE: CPT | Performed by: INTERNAL MEDICINE

## 2024-10-20 PROCEDURE — 1200000002 HC GENERAL ROOM WITH TELEMETRY DAILY

## 2024-10-20 PROCEDURE — 99239 HOSP IP/OBS DSCHRG MGMT >30: CPT | Performed by: INTERNAL MEDICINE

## 2024-10-20 RX ADMIN — TAMSULOSIN HYDROCHLORIDE 0.4 MG: 0.4 CAPSULE ORAL at 08:23

## 2024-10-20 RX ADMIN — IPRATROPIUM BROMIDE AND ALBUTEROL SULFATE 3 ML: 2.5; .5 SOLUTION RESPIRATORY (INHALATION) at 07:56

## 2024-10-20 RX ADMIN — OXCARBAZEPINE 150 MG: 150 TABLET, FILM COATED ORAL at 06:33

## 2024-10-20 RX ADMIN — INSULIN LISPRO 2 UNITS: 100 INJECTION, SOLUTION INTRAVENOUS; SUBCUTANEOUS at 11:57

## 2024-10-20 RX ADMIN — METOPROLOL SUCCINATE 25 MG: 25 TABLET, EXTENDED RELEASE ORAL at 08:23

## 2024-10-20 RX ADMIN — PANTOPRAZOLE SODIUM 40 MG: 40 TABLET, DELAYED RELEASE ORAL at 08:24

## 2024-10-20 RX ADMIN — EMPAGLIFLOZIN 10 MG: 10 TABLET, FILM COATED ORAL at 08:23

## 2024-10-20 RX ADMIN — TORSEMIDE 20 MG: 20 TABLET ORAL at 08:23

## 2024-10-20 RX ADMIN — IPRATROPIUM BROMIDE AND ALBUTEROL SULFATE 3 ML: 2.5; .5 SOLUTION RESPIRATORY (INHALATION) at 11:35

## 2024-10-20 ASSESSMENT — PAIN - FUNCTIONAL ASSESSMENT: PAIN_FUNCTIONAL_ASSESSMENT: 0-10

## 2024-10-20 ASSESSMENT — COGNITIVE AND FUNCTIONAL STATUS - GENERAL
MOVING TO AND FROM BED TO CHAIR: A LITTLE
CLIMB 3 TO 5 STEPS WITH RAILING: A LOT
MOBILITY SCORE: 17
TURNING FROM BACK TO SIDE WHILE IN FLAT BAD: A LITTLE
STANDING UP FROM CHAIR USING ARMS: A LITTLE
WALKING IN HOSPITAL ROOM: A LOT
HELP NEEDED FOR BATHING: A LITTLE
DAILY ACTIVITIY SCORE: 20
DRESSING REGULAR UPPER BODY CLOTHING: A LITTLE
DRESSING REGULAR LOWER BODY CLOTHING: A LITTLE
TOILETING: A LITTLE

## 2024-10-20 ASSESSMENT — PAIN SCALES - GENERAL: PAINLEVEL_OUTOF10: 0 - NO PAIN

## 2024-10-20 NOTE — DISCHARGE INSTRUCTIONS
Short to remain in place  Follow up with PCP in 1-2 weeks. Call to schedule. Bring all your discharge paperwork and medications when you go to your follow up appointment. Return to ED if your symptoms come back.  Follow-up with urology, contact ASAP.

## 2024-10-20 NOTE — DISCHARGE SUMMARY
Discharge Diagnosis  * Sterile pyuria  Assessment & Plan  Leukocytosis and leukocyte esterase in patient with indwelling Short catheter suspicious for CAUTI  We will continue patient on Rocephin obtain urine culture and observe for fever or other evidence of infection  Patient follows with urology the Short was placed about a week ago due to neurogenic bladder and enlarged prostate  Will review and reconcile the patient's BPH medications  10/19: Short catheter has been in since 10/3.  Will check and see if it was changed in the ED.  Awaiting cultures.  Continue ceftriaxone for now.  19/20: Urine culture was negative,.  Obtained prior to antibiotic therapy.  Suspect pyuria may be related to new Short catheter.  Patient states he is comfortable at present.  Discontinue antibiotic therapy and have patient follow-up with urologist as outpatient.    Fall, initial encounter  Assessment & Plan  Continue PT and OT.  AM-PAC is 17 and 20, does not qualify for skilled nursing on discharge.  Continue using walker and skills taught by PT and OT.  Will have [facility] assess patient and provide treatment.    Chronic obstructive pulmonary disease (Multi)  Assessment & Plan  Hypoxia  Patient with history COPD  Patient placed on supplemental oxygen  CT of the chest is negative for infiltrate or pulmonary edema  Does not appear to be having an exacerbation  Will give DuoNeb treatments    Arteriosclerosis of coronary artery  Assessment & Plan  Pt with abnormal but stable troponin no cp  Continue home medications    (HFpEF) heart failure with preserved ejection fraction  Assessment & Plan  Pt appears compensated  Continue home medications  10/20: Patient has had increased lower extremity.  Patient appears to tolerate well here on his home medications as well as a salt restricted diet.  Recommend he follows all restrictions as an outpatient.  Follow-up with cardiology.    Stage 3b chronic kidney disease (Multi)  Assessment & Plan  CKD3  baseline cr 2.5  Pt close to baseline    Diabetes mellitus, type 2 (Multi)  Assessment & Plan  Diabetic diet sliding scale insulin    Chronic painful diabetic neuropathy (Multi)  Assessment & Plan  See DM2    Atrial fibrillation (Multi)  Assessment & Plan  We will continue the patient's rate control medications as well as his anticoagulation therapy  10/19: Patient is on warfarin, check INR in AM.  Ceftriaxone generally has a neutral effect on warfarin.  10/20: Resume anticoagulation.          Issues Requiring Follow-Up  Follow-up with PCP in 1 to 2 weeks.  Follow-up with urology, may need further workup for his BPH.  Discharged with Short in place.    Discharge Meds     Medication List      CHANGE how you take these medications     budesonide-glycopyr-formoterol 160-9-4.8 mcg/actuation HFA aerosol   inhaler; Commonly known as: BREZTRI; What changed: Another medication with   the same name was removed. Continue taking this medication, and follow the   directions you see here.     CONTINUE taking these medications     atorvastatin 20 mg tablet; Commonly known as: Lipitor; Take 1 tablet (20   mg) by mouth once daily.   B COMPLEX 100 ORAL   Blood glucose monitoring meter kit kit   clonazePAM 0.5 mg tablet; Commonly known as: KlonoPIN; TAKE 1.5 TABLET   at Bedtime Do not fill before October 22, 2024.; Start taking on: October 22, 2024   empagliflozin 10 mg; Commonly known as: Jardiance; Take 1 tablet (10 mg)   by mouth once daily.   * gabapentin 100 mg capsule; Commonly known as: Neurontin; Take 200mg   (together with 300mg capsule) to make up to 500mg at bedtime   * gabapentin 300 mg capsule; Commonly known as: Neurontin; Take 1   capsule (300 mg) by mouth once daily at bedtime.   glipiZIDE XL 2.5 mg 24 hr tablet; Commonly known as: Glucotrol XL; Take   2 tablets (5 mg) by mouth once daily.   lancets misc; Commonly known as: OneTouch UltraSoft Lancets; test twice   a day   lisinopril 5 mg tablet; Take 1 tablet (5 mg)  by mouth once daily. As   directed   metoprolol succinate XL 25 mg 24 hr tablet; Commonly known as:   Toprol-XL; Take 1 tablet (25 mg) by mouth once daily. Do not crush or   chew.   omeprazole 40 mg DR capsule; Commonly known as: PriLOSEC; Take 1 capsule   (40 mg) by mouth once daily in the morning. Take before meals.   OneTouch Ultra Test strip; Generic drug: blood sugar diagnostic; Inject   1 strip as directed 2 times a day.   OXcarbazepine 150 mg tablet; Commonly known as: Trileptal; Take 150 mg   every morning and 300 mg every evening   PRESERVISION AREDS 2 PLUS MV ORAL   torsemide 20 mg tablet; Commonly known as: Demadex; Take 1 tablet (20   mg) by mouth once daily.   warfarin 5 mg tablet; Commonly known as: Coumadin; Take as directed. If   you are unsure how to take this medication, talk to your nurse or doctor.;   Original instructions: Take 1.5 tablets (7.5 mg) by mouth once daily in   the evening.  * This list has 2 medication(s) that are the same as other medications   prescribed for you. Read the directions carefully, and ask your doctor or   other care provider to review them with you.     STOP taking these medications     finasteride 5 mg tablet; Commonly known as: Proscar   tamsulosin 0.4 mg 24 hr capsule; Commonly known as: Flomax       Test Results Pending At Discharge  Pending Labs       Order Current Status    Blood Culture Preliminary result    Blood Culture Preliminary result            Hospital Course   Michele Maya is a 89 y.o. male with PMHx s/f restless leg syndrome benign prostatic hypertrophy cardiomyopathy coronary artery disease COPD neurogenic bladder peripheral neuropathy atrial fibrillation peripheral arterial disease type 2 diabetes presenting with weakness.  Patient states he did have some chills last night had attempted to ambulate with his walker his legs gave out on him he felt somewhat weak today and came in to hospital for further evaluation.  The patient does have a  history of enlarged prostate has had neurogenic bladder followed by urology Short catheter was placed for now with plans for eventual greenlight laser prostatectomy possibly.  Denies any coughing or purulent sputum production no abdominal pain no diarrhea no new rashes.  He does have some baseline ambulatory dysfunction due to peripheral neuropathy peripheral arterial disease with intermittent claudication as well as restless leg syndrome in the evening he uses a walker typically and for extended trips he will use his power wheelchair.  Eitel signs on presentation show temperature 98.6 heart rate 68 respiratory rate 20 blood pressure 136/68 SpO2 96% on room air.  Chemistry panel shows sodium 138 potassium 4.6 chloride 103 CO2 24 BUN 61 creatinine 2.62 glucose 131 magnesium 2.01 BN peptide 166 troponin 30 5 repeat troponin 35 INR 2.2 CBC showing leukocytosis with white blood cell count 14.6 hemoglobin 13.8 hematocrit 41.9 platelets 206.  Influenza AMB are negative COVID-19 negative urine analysis is positive for glucose 25 leukocyte esterase no nitrites 1-5 WBCs 1-2 RBCs per high-powered field 2+ hyaline casts CT of the head was done showing a small left sinus retention cyst no depressed skull fracture or acute intracranial bleed or focal mass effect mild chronic white matter ischemic disease in the deep periventricular regions was noted.  CT of the neck showing cervical spine DJD no spinal fracture.  A CT scan of the chest abdomen and pelvis showing stable compression fractures at T10 L1 and DJD there is granulomatous disease pleural nodules thought to be inflammatory in nature no pulmonary edema focal infiltrate or effusion noted in the abdomen and pelvis 2 nonobstructing lower pole left renal stones were noted without hydronephrosis patient was started on ceftriaxone and referred for admission.      10/19: Patient seen.  Family has requested his home medications to be restarted to help with tremors.  Patient does  not think his catheter was changed in the ED, will confirm with nursing, needs to be changed if not changed already.  Blood and urine cultures are still pending.  Continue IV antibiotics empirically until results are available.  Once available can begin discharge planning.  Patient states he and his wife have not decided yet whether to proceed with laser prostatectomy versus leaving chronic Short catheter.  They say if they decide on the procedure they will have to follow-up with cardiology for clearance.    10/20: Patient seen.  Wife states patient was closer to his baseline now.  No pain.  No dysuria.  Urine culture obtained before antibiotic therapy has grown no bacteria.  Suspect patient has an aseptic pyuria, may have been triggered by new Short catheter.  Catheter has been changed.  Was briefly on ceftriaxone started after initial cultures.  Needs to continue PT and OT as outpatient.  Chief complaint upon mission was generalized weakness.  Okay to discharge to home.  Follow-up with urology as outpatient.  Continue catheter on discharge.    This discharge took greater than 35 minutes to arrange.    Pertinent Physical Exam At Time of Discharge  Physical Exam  Constitutional:       General: He is not in acute distress.  HENT:      Head: Normocephalic and atraumatic.      Nose: Nose normal. No congestion or rhinorrhea.      Mouth/Throat:      Mouth: Mucous membranes are dry.      Pharynx: Oropharynx is clear.   Eyes:      General: No scleral icterus.     Extraocular Movements: Extraocular movements intact.      Pupils: Pupils are equal, round, and reactive to light.   Cardiovascular:      Rate and Rhythm: Normal rate and regular rhythm.      Heart sounds: Normal heart sounds. No murmur heard.     No friction rub. No gallop.   Pulmonary:      Effort: Pulmonary effort is normal.      Breath sounds: Normal breath sounds. No wheezing, rhonchi or rales.   Chest:      Chest wall: No tenderness.   Abdominal:       General: There is no distension.      Palpations: Abdomen is soft.      Tenderness: There is no abdominal tenderness. There is no guarding or rebound.   Genitourinary:     Comments: Short to gravity  Musculoskeletal:         General: No swelling, tenderness or signs of injury. Normal range of motion.      Cervical back: Normal range of motion.   Skin:     General: Skin is warm and dry.      Coloration: Skin is not jaundiced.      Findings: No bruising, erythema or rash.   Neurological:      General: No focal deficit present.      Mental Status: He is oriented to person, place, and time.         Outpatient Follow-Up  Future Appointments   Date Time Provider Department Center   11/1/2024  1:45 PM Yinka Lara MD [facility]   11/6/2024  2:20 PM Charanjit Mujica, APRN-[facility] [id number] [facility]   12/5/2024 11:00 AM [facility] [place] [facility]   1/9/2025  2:30 PM Richard Burleson, DO [id number] [facility]   1/14/2025 11:00 AM Willian Castellanos MD [id number] [facility]   3/7/2025  3:30 PM Rod Milner, DO [id number] [facility]         Yinka Long MD

## 2024-10-20 NOTE — CARE PLAN
Problem: Pain - Adult  Goal: Verbalizes/displays adequate comfort level or baseline comfort level  Outcome: Progressing     Problem: Safety - Adult  Goal: Free from fall injury  Outcome: Progressing     Problem: Discharge Planning  Goal: Discharge to home or other facility with appropriate resources  Outcome: Progressing     Problem: Chronic Conditions and Co-morbidities  Goal: Patient's chronic conditions and co-morbidity symptoms are monitored and maintained or improved  Outcome: Progressing     Problem: Diabetes  Goal: Achieve decreasing blood glucose levels by end of shift  Outcome: Progressing  Goal: Increase stability of blood glucose readings by end of shift  Outcome: Progressing  Goal: Decrease in ketones present in urine by end of shift  Outcome: Progressing  Goal: Maintain electrolyte levels within acceptable range throughout shift  Outcome: Progressing  Goal: Maintain glucose levels >70mg/dl to <250mg/dl throughout shift  Outcome: Progressing  Goal: No changes in neurological exam by end of shift  Outcome: Progressing  Goal: Learn about and adhere to nutrition recommendations by end of shift  Outcome: Progressing  Goal: Vital signs within normal range for age by end of shift  Outcome: Progressing  Goal: Increase self care and/or family involovement by end of shift  Outcome: Progressing  Goal: Receive DSME education by end of shift  Outcome: Progressing     Problem: Heart Failure  Goal: Improved gas exchange this shift  Outcome: Progressing  Goal: Improved urinary output this shift  Outcome: Progressing  Goal: Reduction in peripheral edema within 24 hours  Outcome: Progressing  Goal: Report improvement of dyspnea/breathlessness this shift  Outcome: Progressing  Goal: Weight from fluid excess reduced over 2-3 days, then stabilize  Outcome: Progressing  Goal: Increase self care and/or family involvement in 24 hours  Outcome: Progressing     Problem: Fall/Injury  Goal: Not fall by end of shift  Outcome:  Progressing  Goal: Be free from injury by end of the shift  Outcome: Progressing  Goal: Verbalize understanding of personal risk factors for fall in the hospital  Outcome: Progressing  Goal: Verbalize understanding of risk factor reduction measures to prevent injury from fall in the home  Outcome: Progressing  Goal: Use assistive devices by end of the shift  Outcome: Progressing  Goal: Pace activities to prevent fatigue by end of the shift  Outcome: Progressing    The patient's goals for the shift include      The clinical goals for the shift include Patient will remain free from falls and inury during shift     Alert and Oriented x 3  Lung CTAB  Heart RRR  Abdomen gravid soft and nontender    FHR: 135  Contractions: irregular   CAT I     Sono:  BPP 8/8  presentation vertex  placenta: posterior  ISAÍAS: 18.97    Speculum Exam:   no pooling  Ferning neg  Nitrazine neg     Vaginal Exam: 0.5/40/-3    Vital Signs Last 24 Hrs  T(C): 37.4 (11 Sep 2021 23:55), Max: 37.4 (11 Sep 2021 23:55)  T(F): 99.4 (11 Sep 2021 23:55), Max: 99.4 (11 Sep 2021 23:55)  HR: 75 (12 Sep 2021 00:43) (75 - 83)  BP: 116/68 (12 Sep 2021 00:43) (116/68 - 124/77)  RR: 18 (11 Sep 2021 23:55) (18 - 18)

## 2024-10-20 NOTE — ASSESSMENT & PLAN NOTE
Continue PT and OT.  AM-PAC is 17 and 20, does not qualify for skilled nursing on discharge.  Continue using walker and skills taught by PT and OT.  Will have C assess patient and provide treatment.

## 2024-10-21 ENCOUNTER — PATIENT OUTREACH (OUTPATIENT)
Dept: PRIMARY CARE | Facility: CLINIC | Age: 89
End: 2024-10-21
Payer: MEDICARE

## 2024-10-21 ENCOUNTER — DOCUMENTATION (OUTPATIENT)
Dept: HOME HEALTH SERVICES | Facility: HOME HEALTH | Age: 89
End: 2024-10-21
Payer: MEDICARE

## 2024-10-21 ENCOUNTER — HOME HEALTH ADMISSION (OUTPATIENT)
Dept: HOME HEALTH SERVICES | Facility: HOME HEALTH | Age: 89
End: 2024-10-21
Payer: MEDICARE

## 2024-10-21 NOTE — HH CARE COORDINATION
Home Care received a Referral for Physical Therapy and Occupational Therapy. We have processed the referral for a Start of Care on 10.22 or 10.23.     If you have any questions or concerns, please feel free to contact us at [phone]. Follow the prompts, enter your five digit zip code, and you will be directed to your care team on [facility].

## 2024-10-21 NOTE — PROGRESS NOTES
"Discharge Facility: [facility]   Discharge Diagnosis: Fall, initial encounter; Urinary tract infection without hematuria, site unspecified; Hypoxia; Atrial fibrillation, unspecified type   Admission Date: 10/19/2024   Discharge Date:  10/20/2024     PCP Appointment Date: 11/6/2024-declined wanting to make an earlier appt date/time  Specialist Appointment Date: \"early November\" Urology 11/1/2024   Schedule an appointment with [facility] Blue (Family Medicine) in 1 week (10/27/2024)   ? Schedule an appointment with Yinka Lara MD (Urology); Call for follow-up visit in November   ? Follow up with  [facility] (Home Health Services)     Hospital Encounter and Summary Linked: Yes  See discharge assessment below for further details  Engagement  Call Start Time: 1419 (10/21/2024  2:30 PM)    Medications  Medications reviewed with patient/caregiver?: Yes (Breztri; STOP finasteride, tamsulosin) (10/21/2024  2:30 PM)  Is the patient having any side effects they believe may be caused by any medication additions or changes?: No (10/21/2024  2:30 PM)  Does the patient have all medications ordered at discharge?: Yes (10/21/2024  2:30 PM)  Care Management Interventions: No intervention needed (10/21/2024  2:30 PM)  Prescription Comments: see med list (10/21/2024  2:30 PM)  Is the patient taking all medications as directed (includes completed medication regime)?: Yes (10/21/2024  2:30 PM)  Care Management Interventions: Provided patient education (10/21/2024  2:30 PM)    Appointments  Does the patient have a primary care provider?: Yes (10/21/2024  2:30 PM)  Care Management Interventions: Verified appointment date/time/provider (10/21/2024  2:30 PM)  Has the patient kept scheduled appointments due by today?: Yes (10/21/2024  2:30 PM)  Care Management Interventions: Advised patient to keep appointment (10/21/2024  2:30 PM)    Self Management  What is the home health agency?: [facility] " (10/21/2024  2:30 PM)  Has home health visited the patient within 72 hours of discharge?: Yes (10/21/2024  2:30 PM)  What Durable Medical Equipment (DME) was ordered?: bower (10/21/2024  2:30 PM)    Patient Teaching  Does the patient have access to their discharge instructions?: Yes (10/21/2024  2:30 PM)  Care Management Interventions: Reviewed instructions with patient (10/21/2024  2:30 PM)  What is the patient's perception of their health status since discharge?: Improving (10/21/2024  2:30 PM)  Is the patient/caregiver able to teach back the hierarchy of who to call/visit for symptoms/problems? PCP, Specialist, Home Health nurse, Urgent Care, ED, 911: Yes (10/21/2024  2:30 PM)  Patient/Caregiver Education Comments: Successful transition of care outreach with the patient. The patient reports doing well at home since discharge. New meds/changes were reviewed with the patient during outreach. The patient denies pain during outreach call. Still has bower cath in place. [facility] has an appt with urology arranged in early November. Patient denies further discharge questions/concerns/needs during outreach call. Emphasized that follow-up appts are needed after discharge with PCP and reviewed needed follow-ups with any specialties to assess response to treatment from hospitalization. The patient is aware of my availability for non-emergent concerns. Contact information was provided to the patient. (10/21/2024  2:30 PM)

## 2024-10-22 ENCOUNTER — ANTICOAGULATION - WARFARIN VISIT (OUTPATIENT)
Dept: PRIMARY CARE | Facility: CLINIC | Age: 89
End: 2024-10-22
Payer: MEDICARE

## 2024-10-22 ENCOUNTER — HOME CARE VISIT (OUTPATIENT)
Dept: HOME HEALTH SERVICES | Facility: HOME HEALTH | Age: 89
End: 2024-10-22
Payer: MEDICARE

## 2024-10-22 DIAGNOSIS — I48.91 ATRIAL FIBRILLATION, UNSPECIFIED TYPE (MULTI): Primary | ICD-10-CM

## 2024-10-22 LAB
POC INR: 2.4
POC PROTHROMBIN TIME: NORMAL

## 2024-10-22 PROCEDURE — 169592 NO-PAY CLAIM PROCEDURE

## 2024-10-22 PROCEDURE — 85610 PROTHROMBIN TIME: CPT | Performed by: NURSE PRACTITIONER

## 2024-10-22 PROCEDURE — G0151 HHCP-SERV OF PT,EA 15 MIN: HCPCS | Mod: HHH

## 2024-10-22 SDOH — ECONOMIC STABILITY: HOUSING INSECURITY
HOME SAFETY: UH BOOKLET REVIEWED. CONSENT SIGNED. EEP REVIEWED.PT SHOWN CODE TO SCAN FOR HOME CARE NATIONAL PATIENT SAFETY GOALS ON FRONT OF UHHC FOLDER.

## 2024-10-22 SDOH — HEALTH STABILITY: PHYSICAL HEALTH: EXERCISE COMMENTS: BLE AROM ASSESSED

## 2024-10-22 ASSESSMENT — ACTIVITIES OF DAILY LIVING (ADL)
CURRENT_FUNCTION: STAND BY ASSIST
OASIS_M1830: 05
AMBULATION ASSISTANCE ON FLAT SURFACES: 1
AMBULATION ASSISTANCE: STAND BY ASSIST
ENTERING_EXITING_HOME: MINIMUM ASSIST
PHYSICAL TRANSFERS ASSESSED: 1
AMBULATION ASSISTANCE: 1

## 2024-10-22 ASSESSMENT — ENCOUNTER SYMPTOMS: MUSCLE WEAKNESS: 1

## 2024-10-22 NOTE — PROGRESS NOTES
[facility] Heart and Vascular Cardiology    Patient Name: Michele Maya  Patient : [id number]      Scribe Attestation  By signing my name below, I, Ceci Ridley   attest that this documentation has been prepared under the direction and in the presence of Rod Milner DO.      Reason for visit:  This is a 89-year-old male here for follow-up regarding HFpEF, persistent atrial fibrillation/pacemaker, anticoagulation with warfarin, moderate coronary artery disease as seen on cardiac catheterization done in 2019, hypertension, dyslipidemia, diabetes mellitus, and CKD.      HPI:  This is a 89-year-old male here for follow-up regarding HFpEF, persistent atrial fibrillation/pacemaker, anticoagulation with warfarin, moderate coronary artery disease as seen on cardiac catheterization done in 2019, hypertension, dyslipidemia, diabetes mellitus, and CKD.  The patient was last evaluated by me in 2024.  At that visit I ordered blood work including BMP/BNP/magnesium, ordered additional blood work including CMP/lipid/magnesium/CBC/BNP to be drawn in 6 months, and asked the patient to follow-up in 6 months and sooner if necessary.  Patient was subsequently seen in the emergency department in 2024 with urinary retention.  He had a hospitalization on 10/15/24  with generalized weakness/fall/diarrhea.  Patient was treated for a complicated UTI reportedly appeared compensated in regards to his heart failure.  BMP done 10/20/2024 showed normal serum sodium and potassium with a serum creatinine of 2.26 consistent with known CKD, INR was 1.9, CBC showed a hemoglobin of 11.8.  CT scan of the chest done 10/18/2024 showed stable compression fractures at T10/L1, stable prior granulomatous disease in the right lower lobe, linear scarring at the right lung base, pacemaker.  CT scan of the head done 10/18/2024 showed no depressed skull fracture, no acute intracranial bleed or  focal mass effect, moderate volume loss, mild chronic white matter ischemic disease.  Echocardiogram done in June 2024 showed normal left ventricular systolic function with an ejection fraction of 62%, right ventricle not well-visualized, mild aortic valve stenosis with a mean aortic valve gradients 5.0 and a dimensionless index is 0.69. ECG done today showed ventricular paced rhythm with a heart rate of 62 bpm.  The patient reports that he has been feeling generally well from the cardiac standpoint since his recent hospitalization. He denies any new chest pain, shortness of breath, palpitations and lightheadedness.  He does report mild increase in lower extremity edema after eating cheeseburgers.  He states that there are plans for TURP/prostate surgery in the future and he will be needing preoperative cardiovascular exam. He states that he takes all of his medications as prescribed. During my exam, he was resting comfortably on the exam table.            Assessment/Plan:   1. HFpEF  The patient has HFpEF which improved after placement of CRT-D device.  Echocardiogram done in June 2024 showed normal left ventricular systolic function with an ejection fraction of 62%, right ventricle not well-visualized, mild aortic valve stenosis with a mean aortic valve gradients 5.0 and a dimensionless index is 0.69.   He does have 1+ pitting pedal edema on exam today.  He should continue his current cardiac medications.  Recent lab works as noted in the HPI.   I discussed with him the importance of following a low-sodium heart healthy diet, wearing compression stockings and elevating legs when seated.   Follow up and blood works as previously recommended.    2. Persistent atrial fibrillation/pacemaker  The patient has a history of persistent atrial fibrillation/pacemaker who underwent AV node ablation and pacemaker implant in 1996 but subsequently developed cardiomyopathy and had an ICD implanted subsequently transition to CRT-D  device.  He is on warfarin for thromboembolism prophylaxis, which should be continued.  Warfarin dosing and INR monitoring is being managed by his PCP.  ECG done today showed ventricular paced rhythm with a heart rate of 62 bpm.    He denies chest pain, palpitations or lightheadedness.   Echocardiogram done in June 2024 showed normal left ventricular systolic function with an ejection fraction of 62%, right ventricle not well-visualized, mild aortic valve stenosis with a mean aortic valve gradients 5.0 and a dimensionless index is 0.69.   Recent lab works as noted in the HPI.  He should continue to follow with the device clinic.  Follow up and blood works as previously recommended.     3. Anticoagulation with warfarin  The patient is on anticoagulation with warfarin for persistent atrial fibrillation.  Warfarin dosing and INR monitoring is being managed by his PCP.  Recent lab works as noted in the HPI.   Lab works as previously recommended.      4. Coronary artery disease  The patient has a history of moderate coronary artery disease as seen on cardiac catheterization done in December 2019.  ECG done today showed ventricular paced rhythm with a heart rate of 62 bpm.    He denies anginal chest discomfort.   Blood pressure appears controlled on exam today.  He should continue his current antihypertensive medications.  Echocardiogram done in June 2024 showed normal left ventricular systolic function with an ejection fraction of 62%, right ventricle not well-visualized, mild aortic valve stenosis with a mean aortic valve gradients 5.0 and a dimensionless index is 0.69.   Recent lab works as noted in the HPI.   Lipid panel done in January 2024 showed an LDL cholesterol of 94 and triglycerides of 124 while on atorvastatin 10 mg daily.   He is currently on atorvastatin to 20 mg daily.   Please see lifestyle recommendations below.  Follow up and lab works as previously recommended.     5. Hypertension  The patient has a  history of hypertension which appears controlled on exam today.  He should continue his current antihypertensive medications and monitor his blood pressure at home.      6. Dyslipidemia  Lipid panel done in January 2024 showed an LDL cholesterol of 94 and triglycerides of 124 while on atorvastatin 10 mg daily.   He is currently on atorvastatin to 20 mg daily.   Lab works as previously recommended.  Please see lifestyle recommendations below.     7. Diabetes mellitus  Stable, medication management per PCP.     8. CKD   Serum creatinine done in October 2024 was 2.26 consistent with known CKD.  Management as per PCP.    9. Preoperative cardiovascular examination  The patient is being evaluated for TURP/prostate surgery although it has not been scheduled yet. He does have a history of HFpEF with an ejection fraction of 62%, persistent atrial fibrillation, moderate coronary artery disease as seen on cardiac catheterization done in December 2019, and CKD. He denies a history of ischemic cerebrovascular disease, and insulin-dependent diabetes mellitus.    RCRI of 3 based on history placing him at elevated risk of perioperative MACE.    I do not believe any additional preoperative testing is necessary at this time. Patient should continue his current cardiac medications perioperatively depending on the disposition of the surgical service. I will defer warfarin recommendations to his PCP who is managing warfarin dosing and INR monitoring, Overall, the patient appears to be elevated risk  for perioperative MACE. The patient expressed understanding of his risk for perioperative cardiovascular complications.             Orders:   Preoperative cardiovascular examination  Follow-up/blood work as previously recommended     Lifestyle Recommendations  I recommend a whole-food plant-based diet, an eating pattern that encourages the consumption of unrefined plant foods (such as fruits, vegetables, tubers, whole grains, legumes, nuts  and seeds) and discourages meats, dairy products, eggs and processed foods.     The AHA/ACC recommends that the patient consume a dietary pattern that emphasizes intake of vegetables, fruits, and whole grains; includes low-fat dairy products, poultry, fish, legumes, non-tropical vegetable oils, and nuts; and limits intake of sodium, sweets, sugar-sweetened beverages, and red meats.  Adapt this dietary pattern to appropriate calorie requirements (a 500-750 kcal/day deficit to loose weight), personal and cultural food preferences, and nutrition therapy for other medical conditions (including diabetes).  Achieve this pattern by following plans such as the [place] Mediterranean, DASH dietary pattern, or AHA diet.     Engage in 2 hours and 30 minutes per week of moderate-intensity physical activity, or 1 hour and 15 minutes (75 minutes) per week of vigorous-intensity aerobic physical activity, or an equivalent combination of moderate and vigorous-intensity aerobic physical activity. Aerobic activity should be performed in episodes of at least 10 minutes preferably spread throughout the week.     Adhering to a heart healthy diet, regular exercise habits, avoidance of tobacco products, and maintenance of a healthy weight are crucial components of their heart disease risk reduction.     Any positive review of systems not specifically addressed in the office visit today should be evaluated and treated by the patients primary care physician or in an emergency department if necessary     Patient was notified that results from ordered tests will be called to the patient if it changes current management; it will otherwise be discussed at a future appointment and available on [facility].     Thank you for allowing me to participate in the care of this patient.        This document was generated using the assistance of voice recognition software. If there are any errors of spelling, grammar, syntax, or meaning; please feel free to  contact me directly for clarification.    Past Medical History:  He has a past medical history of Encounter for screening for malignant neoplasm of prostate (06/15/2016), Long term (current) use of anticoagulants (06/15/2016), Personal history of other diseases of the circulatory system, Personal history of other diseases of the respiratory system (11/01/2019), Personal history of other endocrine, nutritional and metabolic disease, Personal history of transient ischemic attack (TIA), and cerebral infarction without residual deficits, and Unspecified atrial fibrillation (Multi) (10/31/2022).    Past Surgical History:  He has a past surgical history that includes Carpal tunnel release (02/15/2016); Cataract extraction (02/15/2016); Hand surgery (02/15/2016); Total knee arthroplasty (05/26/2016); Foot surgery (06/15/2016); Cardiac pacemaker placement (06/08/2017); Cardiac pacemaker placement (06/08/2017); Other surgical history (06/08/2017); Other surgical history (06/08/2017); and Other surgical history (06/08/2017).      Social History:  He reports that he has quit smoking. His smoking use included cigarettes. He has been exposed to tobacco smoke. He has never used smokeless tobacco. He reports that he does not drink alcohol and does not use drugs.    Family History:  Family History   Problem Relation Name Age of Onset    Heart failure Mother      Diabetes Mother          mellitus    Colon cancer Father      Diabetes Sister          mellitus    Coronary artery disease Brother          Allergies:  Pentoxifylline, Other, Amiodarone, Baclofen, Carvedilol, Cilostazol, Flecainide, Quinidine, and Simvastatin    Outpatient Medications:  Current Outpatient Medications   Medication Instructions    atorvastatin (LIPITOR) 20 mg, oral, Daily    blood sugar diagnostic (OneTouch Ultra Test) strip 1 strip, injection, 2 times daily    budesonide-glycopyr-formoterol (BREZTRI) 160-9-4.8 mcg/actuation HFA aerosol inhaler Inhale 2 puffs  2 times a day.    clonazePAM (KlonoPIN) 0.5 mg tablet TAKE 1.5 TABLET at Bedtime    empagliflozin (JARDIANCE) 10 mg, oral, Daily    FreeStyle glucose monitoring kit 1 each, miscellaneous, As needed    gabapentin (Neurontin) 100 mg capsule Take 200mg (together with 300mg capsule) to make up to 500mg at bedtime    gabapentin (NEURONTIN) 300 mg, oral, Nightly    glipiZIDE XL (GLUCOTROL XL) 5 mg, oral, Daily    lancets (OneTouch UltraSoft Lancets) misc test twice a day    lisinopril 5 mg, oral, Daily, As directed    metoprolol succinate XL (TOPROL-XL) 25 mg, oral, Daily, Do not crush or chew.    mv-min/FA/vit K/lutein/zeaxant (PRESERVISION AREDS 2 PLUS MV ORAL) 2 tablets, oral, Daily    omeprazole (PRILOSEC) 40 mg, oral, Daily before breakfast    OXcarbazepine (Trileptal) 150 mg tablet Take 150 mg every morning and 300 mg every evening    torsemide (DEMADEX) 20 mg, oral, Daily    vitamin B complex/folic acid (B COMPLEX 100 ORAL) Take 1 tablet by mouth once daily.    warfarin (COUMADIN) 7.5 mg, oral, Every evening        ROS:  A 14 point review of systems was done and is negative other than as stated in HPI    Vitals:      10/19/2024     1:09 PM 10/19/2024     5:35 PM 10/19/2024     9:00 PM 10/20/2024     1:00 AM 10/20/2024     5:00 AM 10/20/2024     9:20 AM 10/20/2024     1:32 PM   Vitals   Systolic 112 105 113 115 121 120 111   Diastolic 56 53 58 63 61 54 53   Heart Rate 63 62 67 72 60 66 62   Temp 37.1 °C (98.7 °F) 37.5 °C (99.5 °F) 37.2 °C (98.9 °F) 36.9 °C (98.4 °F) 36.6 °C (97.9 °F) 36.4 °C (97.6 °F) 36.4 °C (97.6 °F)   Resp 16 16 16 16 16 16 16        Physical Exam:     Constitutional: Cooperative, in no acute distress, alert, appears stated age.  Skin: Skin color, texture, turgor normal. No rashes or lesions.  Head: Normocephalic. No masses, lesions, tenderness or abnormalities  Eyes: Extraocular movements are grossly intact.  Mouth and throat: Mucous membranes moist  Neck: Neck supple, no carotid bruits, no  JVD  Respiratory: Lungs clear to auscultation, no wheezing or rhonchi, no use of accessory muscles  Chest wall: PPM, normal excursion with respiration  Cardiovascular: Regular rhythm without murmur  Gastrointestinal: Abdomen soft, nontender. Bowel sounds normal.  Musculoskeletal: Strength equal in upper extremities  Extremities:  1+ pitting pedal edema  Neurologic: Sensation grossly intact, alert and oriented x3        Intake/Output:   No intake/output data recorded.    Outpatient Medications  Current Outpatient Medications on File Prior to Visit   Medication Sig Dispense Refill    atorvastatin (Lipitor) 20 mg tablet Take 1 tablet (20 mg) by mouth once daily. 90 tablet 1    blood sugar diagnostic (OneTouch Ultra Test) strip Inject 1 strip as directed 2 times a day. 90 strip 3    budesonide-glycopyr-formoterol (BREZTRI) 160-9-4.8 mcg/actuation HFA aerosol inhaler Inhale 2 puffs 2 times a day.      clonazePAM (KlonoPIN) 0.5 mg tablet TAKE 1.5 TABLET at Bedtime Do not fill before October 22, 2024. (Patient taking differently: Take 1.5 tablets (0.75 mg) by mouth once daily at bedtime. Do not fill before October 22, 2024.) 45 tablet 2    empagliflozin (Jardiance) 10 mg Take 1 tablet (10 mg) by mouth once daily. 30 tablet 11    FreeStyle glucose monitoring kit 1 each if needed.      gabapentin (Neurontin) 100 mg capsule Take 200mg (together with 300mg capsule) to make up to 500mg at bedtime (Patient taking differently: Take 1 capsule (100 mg) by mouth once daily at bedtime. together with 300mg capsule to make up to 400mg at bedtime) 60 capsule 0    gabapentin (Neurontin) 300 mg capsule Take 1 capsule (300 mg) by mouth once daily at bedtime. 30 capsule 2    glipiZIDE XL (Glucotrol XL) 2.5 mg 24 hr tablet Take 2 tablets (5 mg) by mouth once daily. 60 tablet 11    lancets (OneTouch UltraSoft Lancets) misc test twice a day 90 each 2    lisinopril 5 mg tablet Take 1 tablet (5 mg) by mouth once daily. As directed 90 tablet 1     metoprolol succinate XL (Toprol-XL) 25 mg 24 hr tablet Take 1 tablet (25 mg) by mouth once daily. Do not crush or chew. 90 tablet 3    mv-min/FA/vit K/lutein/zeaxant (PRESERVISION AREDS 2 PLUS MV ORAL) Take 2 tablets by mouth once daily.      omeprazole (PriLOSEC) 40 mg DR capsule Take 1 capsule (40 mg) by mouth once daily in the morning. Take before meals. 30 capsule 11    OXcarbazepine (Trileptal) 150 mg tablet Take 150 mg every morning and 300 mg every evening 270 tablet 1    torsemide (Demadex) 20 mg tablet Take 1 tablet (20 mg) by mouth once daily. 30 tablet 3    vitamin B complex/folic acid (B COMPLEX 100 ORAL) Take 1 tablet by mouth once daily.      warfarin (Coumadin) 5 mg tablet Take 1.5 tablets (7.5 mg) by mouth once daily in the evening. (Patient taking differently: Take 1 tablet (5 mg) by mouth once daily. AND 10MG ON SUNDAYS) 180 tablet 2    [DISCONTINUED] budesonide/glycopyr/formoterol (BREZTRI AEROSPHERE INHL) Inhale. (Patient not taking: Reported on 10/19/2024)      [DISCONTINUED] finasteride (Proscar) 5 mg tablet Take 1 tablet (5 mg) by mouth once daily. Do not crush, chew, or split. (Patient not taking: Reported on 10/8/2024) 30 tablet 2    [DISCONTINUED] tamsulosin (Flomax) 0.4 mg 24 hr capsule Take 1 capsule (0.4 mg) by mouth once daily. Daily before bed (Patient not taking: Reported on 10/8/2024) 90 capsule 3     No current facility-administered medications on file prior to visit.       Labs: (past 26 weeks)  Recent Results (from the past 26 weeks)   Protime-INR    Collection Time: 04/30/24 12:00 AM   Result Value Ref Range    Protime External  seconds    INR External 2.30    Vitamin B12    Collection Time: 04/30/24 10:52 AM   Result Value Ref Range    Vitamin B12 634 211 - 911 pg/mL   Ferritin    Collection Time: 04/30/24 10:52 AM   Result Value Ref Range    Ferritin 187 20 - 300 ng/mL   Basic Metabolic Panel    Collection Time: 04/30/24 10:52 AM   Result Value Ref Range    Glucose 156 (H) 74 -  99 mg/dL    Sodium 140 136 - 145 mmol/L    Potassium 4.5 3.5 - 5.3 mmol/L    Chloride 105 98 - 107 mmol/L    Bicarbonate 27 21 - 32 mmol/L    Anion Gap 13 10 - 20 mmol/L    Urea Nitrogen 44 (H) 6 - 23 mg/dL    Creatinine 1.71 (H) 0.50 - 1.30 mg/dL    eGFR 38 (L) >60 mL/min/1.73m*2    Calcium 9.1 8.6 - 10.3 mg/dL   B-Type Natriuretic Peptide    Collection Time: 04/30/24 10:52 AM   Result Value Ref Range    BNP 98 0 - 99 pg/mL   Magnesium    Collection Time: 04/30/24 10:52 AM   Result Value Ref Range    Magnesium 1.79 1.60 - 2.40 mg/dL   Protime-INR    Collection Time: 05/07/24 12:00 AM   Result Value Ref Range    Protime External  seconds    INR External 2.20    Protime-INR    Collection Time: 05/14/24 12:00 AM   Result Value Ref Range    Protime External  seconds    INR External 1.90    Protime-INR    Collection Time: 05/21/24 12:00 AM   Result Value Ref Range    Protime External  seconds    INR External 2.40    Protime-INR    Collection Time: 05/28/24 12:00 AM   Result Value Ref Range    Protime External  seconds    INR External 2.50    Protime-INR    Collection Time: 06/04/24 12:00 AM   Result Value Ref Range    Protime External  seconds    INR External 2.40    Basic Metabolic Panel    Collection Time: 06/05/24  9:57 AM   Result Value Ref Range    Glucose 157 (H) 74 - 99 mg/dL    Sodium 138 136 - 145 mmol/L    Potassium 4.6 3.5 - 5.3 mmol/L    Chloride 102 98 - 107 mmol/L    Bicarbonate 27 21 - 32 mmol/L    Anion Gap 14 10 - 20 mmol/L    Urea Nitrogen 54 (H) 6 - 23 mg/dL    Creatinine 1.95 (H) 0.50 - 1.30 mg/dL    eGFR 32 (L) >60 mL/min/1.73m*2    Calcium 9.2 8.6 - 10.3 mg/dL   Protein, Urine Random    Collection Time: 06/05/24  9:57 AM   Result Value Ref Range    Total Protein, Urine Random 4 (L) 5 - 25 mg/dL    Creatinine, Urine Random 57.0 20.0 - 370.0 mg/dL    T. Protein/Creatinine Ratio 0.07 0.00 - 0.17 mg/mg Creat   Urinalysis with Reflex Microscopic    Collection Time: 06/05/24  9:57 AM   Result Value Ref  Range    Color, Urine Light-Yellow Light-Yellow, Yellow, Dark-Yellow    Appearance, Urine Clear Clear    Specific Gravity, Urine 1.011 1.005 - 1.035    pH, Urine 5.0 5.0, 5.5, 6.0, 6.5, 7.0, 7.5, 8.0    Protein, Urine NEGATIVE NEGATIVE, 10 (TRACE), 20 (TRACE) mg/dL    Glucose, Urine Normal Normal mg/dL    Blood, Urine NEGATIVE NEGATIVE    Ketones, Urine NEGATIVE NEGATIVE mg/dL    Bilirubin, Urine NEGATIVE NEGATIVE    Urobilinogen, Urine Normal Normal mg/dL    Nitrite, Urine NEGATIVE NEGATIVE    Leukocyte Esterase, Urine NEGATIVE NEGATIVE   Protime-INR    Collection Time: 06/11/24 12:00 AM   Result Value Ref Range    Protime External  seconds    INR External 2.30    Protime-INR    Collection Time: 06/18/24 12:00 AM   Result Value Ref Range    Protime External  seconds    INR External 2.40    Transthoracic Echo (TTE) Complete    Collection Time: 06/21/24  2:10 PM   Result Value Ref Range    LVOT diam 1.97 cm    LV Biplane EF 62 %    Tricuspid annular plane systolic excursion 2.9 cm    AV mn grad 5.0 mmHg    LA vol index A/L 13.8 ml/m2    AV pk jonatan 1.66 m/s    RV free wall pk S' 15.00 cm/s    RVSP 46.2 mmHg    LVIDd 4.97 cm    Aortic Valve Area by Continuity of VTI 2.12 cm2    Aortic Valve Area by Continuity of Peak Velocity 2.14 cm2    AV pk grad 11.0 mmHg    LV A4C EF 62.4    Protime-INR    Collection Time: 06/25/24 12:00 AM   Result Value Ref Range    Protime External  seconds    INR External 2.80    Protime-INR    Collection Time: 07/02/24 12:00 AM   Result Value Ref Range    Protime External  seconds    INR External 2.10    Protime-INR    Collection Time: 07/09/24 12:00 AM   Result Value Ref Range    Protime External  seconds    INR External 2.50 2 - 3   Protime-INR    Collection Time: 07/16/24 12:00 AM   Result Value Ref Range    Protime External      INR External 2.60    Protime-INR    Collection Time: 07/23/24 12:00 AM   Result Value Ref Range    Protime External      INR External 2.90    Protime-INR     Collection Time: 07/30/24 12:00 AM   Result Value Ref Range    Protime External      INR External 3.70    Protime-INR    Collection Time: 08/06/24 12:00 AM   Result Value Ref Range    Protime External      INR External 2.30    Protime-INR    Collection Time: 08/13/24 12:00 AM   Result Value Ref Range    Protime External      INR External 2.80    Protime-INR    Collection Time: 08/20/24 12:00 AM   Result Value Ref Range    Protime External      INR External 2.90 2.00 - 3.00   Protime-INR    Collection Time: 09/03/24 12:00 AM   Result Value Ref Range    Protime External      INR External 3.60    Protime-INR    Collection Time: 09/17/24 12:00 AM   Result Value Ref Range    Protime External      INR External 2.80 2.00 - 3.00   Urinalysis with Reflex Culture and Microscopic    Collection Time: 09/18/24  3:06 PM   Result Value Ref Range    Color, Urine Light-Yellow Light-Yellow, Yellow, Dark-Yellow    Appearance, Urine Clear Clear    Specific Gravity, Urine 1.009 1.005 - 1.035    pH, Urine 5.0 5.0, 5.5, 6.0, 6.5, 7.0, 7.5, 8.0    Protein, Urine NEGATIVE NEGATIVE, 10 (TRACE), 20 (TRACE) mg/dL    Glucose, Urine 500 (3+) (A) Normal mg/dL    Blood, Urine NEGATIVE NEGATIVE    Ketones, Urine NEGATIVE NEGATIVE mg/dL    Bilirubin, Urine NEGATIVE NEGATIVE    Urobilinogen, Urine Normal Normal mg/dL    Nitrite, Urine NEGATIVE NEGATIVE    Leukocyte Esterase, Urine NEGATIVE NEGATIVE   Extra Urine Gray Tube    Collection Time: 09/18/24  3:06 PM   Result Value Ref Range    Extra Tube Hold for add-ons.    Basic Metabolic Panel    Collection Time: 09/19/24 11:56 AM   Result Value Ref Range    Glucose 119 (H) 74 - 99 mg/dL    Sodium 137 136 - 145 mmol/L    Potassium 4.9 3.5 - 5.3 mmol/L    Chloride 104 98 - 107 mmol/L    Bicarbonate 23 21 - 32 mmol/L    Anion Gap 15 10 - 20 mmol/L    Urea Nitrogen 65 (H) 6 - 23 mg/dL    Creatinine 2.45 (H) 0.50 - 1.30 mg/dL    eGFR 24 (L) >60 mL/min/1.73m*2    Calcium 8.4 (L) 8.6 - 10.3 mg/dL   B-Type  Natriuretic Peptide    Collection Time: 09/19/24 11:56 AM   Result Value Ref Range     (H) 0 - 99 pg/mL   Magnesium    Collection Time: 09/19/24 11:56 AM   Result Value Ref Range    Magnesium 2.11 1.60 - 2.40 mg/dL   Protime-INR    Collection Time: 09/24/24 12:00 AM   Result Value Ref Range    Protime External      INR External 2.60 2.00 - 3.00   Protime-INR    Collection Time: 10/01/24 12:00 AM   Result Value Ref Range    Protime External      INR External 2.50 2.00 - 3.00   Protime-INR    Collection Time: 10/08/24 12:00 AM   Result Value Ref Range    Protime External      INR External 2.60    Protime-INR    Collection Time: 10/15/24 12:00 AM   Result Value Ref Range    Protime External      INR External 2.70 2.00 - 3.00   ECG 12 Lead    Collection Time: 10/18/24 12:56 PM   Result Value Ref Range    Ventricular Rate 60 BPM    Atrial Rate 0 BPM    NJ Interval 45 ms    QRS Duration 130 ms    QT Interval 440 ms    QTC Calculation(Bazett) 440 ms    R Axis 259 degrees    T Axis 93 degrees    QRS Count 10 beats    Q Onset 251 ms    T Offset 471 ms    QTC Fredericia 440 ms   CBC and Auto Differential    Collection Time: 10/18/24 12:57 PM   Result Value Ref Range    WBC 14.6 (H) 4.4 - 11.3 x10*3/uL    nRBC 0.0 0.0 - 0.0 /100 WBCs    RBC 4.27 (L) 4.50 - 5.90 x10*6/uL    Hemoglobin 13.8 13.5 - 17.5 g/dL    Hematocrit 41.9 41.0 - 52.0 %    MCV 98 80 - 100 fL    MCH 32.3 26.0 - 34.0 pg    MCHC 32.9 32.0 - 36.0 g/dL    RDW 14.0 11.5 - 14.5 %    Platelets 206 150 - 450 x10*3/uL    Neutrophils % 81.6 40.0 - 80.0 %    Immature Granulocytes %, Automated 0.6 0.0 - 0.9 %    Lymphocytes % 7.9 13.0 - 44.0 %    Monocytes % 9.2 2.0 - 10.0 %    Eosinophils % 0.4 0.0 - 6.0 %    Basophils % 0.3 0.0 - 2.0 %    Neutrophils Absolute 11.88 (H) 1.60 - 5.50 x10*3/uL    Immature Granulocytes Absolute, Automated 0.09 0.00 - 0.50 x10*3/uL    Lymphocytes Absolute 1.15 0.80 - 3.00 x10*3/uL    Monocytes Absolute 1.34 (H) 0.05 - 0.80 x10*3/uL     Eosinophils Absolute 0.06 0.00 - 0.40 x10*3/uL    Basophils Absolute 0.04 0.00 - 0.10 x10*3/uL   Protime-INR    Collection Time: 10/18/24 12:57 PM   Result Value Ref Range    Protime 25.0 (H) 9.8 - 12.8 seconds    INR 2.2 (H) 0.9 - 1.1   Comprehensive Metabolic Panel    Collection Time: 10/18/24 12:57 PM   Result Value Ref Range    Glucose 131 (H) 74 - 99 mg/dL    Sodium 138 136 - 145 mmol/L    Potassium 4.6 3.5 - 5.3 mmol/L    Chloride 103 98 - 107 mmol/L    Bicarbonate 24 21 - 32 mmol/L    Anion Gap 16 10 - 20 mmol/L    Urea Nitrogen 61 (H) 6 - 23 mg/dL    Creatinine 2.62 (H) 0.50 - 1.30 mg/dL    eGFR 22 (L) >60 mL/min/1.73m*2    Calcium 8.9 8.6 - 10.3 mg/dL    Albumin 4.2 3.4 - 5.0 g/dL    Alkaline Phosphatase 79 33 - 136 U/L    Total Protein 7.3 6.4 - 8.2 g/dL    AST 19 9 - 39 U/L    Bilirubin, Total 0.7 0.0 - 1.2 mg/dL    ALT 17 10 - 52 U/L   Magnesium    Collection Time: 10/18/24 12:57 PM   Result Value Ref Range    Magnesium 2.01 1.60 - 2.40 mg/dL   B-Type Natriuretic Peptide    Collection Time: 10/18/24 12:57 PM   Result Value Ref Range     (H) 0 - 99 pg/mL   Troponin I, High Sensitivity, Initial    Collection Time: 10/18/24 12:57 PM   Result Value Ref Range    Troponin I, High Sensitivity 35 (H) 0 - 20 ng/L   BLOOD GAS VENOUS FULL PANEL    Collection Time: 10/18/24 12:57 PM   Result Value Ref Range    POCT pH, Venous 7.40 7.33 - 7.43 pH    POCT pCO2, Venous 42 41 - 51 mm Hg    POCT pO2, Venous 23 (L) 35 - 45 mm Hg    POCT SO2, Venous 32 (L) 45 - 75 %    POCT Oxy Hemoglobin, Venous 31.1 (L) 45.0 - 75.0 %    POCT Hematocrit Calculated, Venous 47.0 41.0 - 52.0 %    POCT Sodium, Venous 133 (L) 136 - 145 mmol/L    POCT Potassium, Venous 4.6 3.5 - 5.3 mmol/L    POCT Chloride, Venous 100 98 - 107 mmol/L    POCT Ionized Calicum, Venous 1.15 1.10 - 1.33 mmol/L    POCT Glucose, Venous 135 (H) 74 - 99 mg/dL    POCT Lactate, Venous 1.9 0.4 - 2.0 mmol/L    POCT Base Excess, Venous 0.9 -2.0 - 3.0 mmol/L    POCT  HCO3 Calculated, Venous 26.0 22.0 - 26.0 mmol/L    POCT Hemoglobin, Venous 15.5 13.5 - 17.5 g/dL    POCT Anion Gap, Venous 12.0 10.0 - 25.0 mmol/L    Patient Temperature 37.0 degrees Celsius    FiO2 21 %   Urinalysis with Reflex Culture and Microscopic    Collection Time: 10/18/24 12:58 PM   Result Value Ref Range    Color, Urine Colorless (N) Light-Yellow, Yellow, Dark-Yellow    Appearance, Urine Clear Clear    Specific Gravity, Urine 1.007 1.005 - 1.035    pH, Urine 5.0 5.0, 5.5, 6.0, 6.5, 7.0, 7.5, 8.0    Protein, Urine NEGATIVE NEGATIVE, 10 (TRACE), 20 (TRACE) mg/dL    Glucose, Urine 200 (2+) (A) Normal mg/dL    Blood, Urine 0.03 (TRACE) (A) NEGATIVE    Ketones, Urine NEGATIVE NEGATIVE mg/dL    Bilirubin, Urine NEGATIVE NEGATIVE    Urobilinogen, Urine Normal Normal mg/dL    Nitrite, Urine NEGATIVE NEGATIVE    Leukocyte Esterase, Urine 25 Symone/µL (A) NEGATIVE   Extra Urine Gray Tube    Collection Time: 10/18/24 12:58 PM   Result Value Ref Range    Extra Tube Hold for add-ons.    Microscopic Only, Urine    Collection Time: 10/18/24 12:58 PM   Result Value Ref Range    WBC, Urine 1-5 1-5, NONE /HPF    RBC, Urine 1-2 NONE, 1-2, 3-5 /HPF    Mucus, Urine FEW Reference range not established. /LPF    Hyaline Casts, Urine 2+ (A) NONE /LPF   Urine Culture    Collection Time: 10/18/24 12:58 PM    Specimen: Clean Catch/Voided; Urine   Result Value Ref Range    Urine Culture Normal genitourinary claudia    Influenza A, and B PCR    Collection Time: 10/18/24  1:00 PM   Result Value Ref Range    Flu A Result Not Detected Not Detected    Flu B Result Not Detected Not Detected   Sars-CoV-2 PCR    Collection Time: 10/18/24  1:00 PM   Result Value Ref Range    Coronavirus 2019, PCR Not Detected Not Detected   Troponin, High Sensitivity, 1 Hour    Collection Time: 10/18/24  1:44 PM   Result Value Ref Range    Troponin I, High Sensitivity 35 (H) 0 - 20 ng/L   Blood Culture    Collection Time: 10/18/24  4:21 PM    Specimen: Peripheral  Venipuncture; Blood culture   Result Value Ref Range    Blood Culture No growth at 2 days    Blood Culture    Collection Time: 10/18/24  4:21 PM    Specimen: Peripheral Venipuncture; Blood culture   Result Value Ref Range    Blood Culture No growth at 2 days    CBC    Collection Time: 10/19/24  4:12 AM   Result Value Ref Range    WBC 16.4 (H) 4.4 - 11.3 x10*3/uL    nRBC 0.0 0.0 - 0.0 /100 WBCs    RBC 3.92 (L) 4.50 - 5.90 x10*6/uL    Hemoglobin 12.7 (L) 13.5 - 17.5 g/dL    Hematocrit 38.5 (L) 41.0 - 52.0 %    MCV 98 80 - 100 fL    MCH 32.4 26.0 - 34.0 pg    MCHC 33.0 32.0 - 36.0 g/dL    RDW 14.1 11.5 - 14.5 %    Platelets 181 150 - 450 x10*3/uL   Basic metabolic panel    Collection Time: 10/19/24  4:12 AM   Result Value Ref Range    Glucose 133 (H) 74 - 99 mg/dL    Sodium 137 136 - 145 mmol/L    Potassium 4.2 3.5 - 5.3 mmol/L    Chloride 105 98 - 107 mmol/L    Bicarbonate 22 21 - 32 mmol/L    Anion Gap 14 10 - 20 mmol/L    Urea Nitrogen 58 (H) 6 - 23 mg/dL    Creatinine 2.27 (H) 0.50 - 1.30 mg/dL    eGFR 27 (L) >60 mL/min/1.73m*2    Calcium 8.4 (L) 8.6 - 10.3 mg/dL   Protime-INR    Collection Time: 10/19/24  5:13 AM   Result Value Ref Range    Protime 23.1 (H) 9.8 - 12.8 seconds    INR 2.0 (H) 0.9 - 1.1   POCT GLUCOSE    Collection Time: 10/19/24  6:58 AM   Result Value Ref Range    POCT Glucose 147 (H) 74 - 99 mg/dL   POCT GLUCOSE    Collection Time: 10/19/24 11:29 AM   Result Value Ref Range    POCT Glucose 168 (H) 74 - 99 mg/dL   POCT GLUCOSE    Collection Time: 10/19/24  4:07 PM   Result Value Ref Range    POCT Glucose 153 (H) 74 - 99 mg/dL   POCT GLUCOSE    Collection Time: 10/19/24  9:09 PM   Result Value Ref Range    POCT Glucose 178 (H) 74 - 99 mg/dL   Protime-INR    Collection Time: 10/20/24  4:39 AM   Result Value Ref Range    Protime 21.1 (H) 9.8 - 12.8 seconds    INR 1.9 (H) 0.9 - 1.1   CBC    Collection Time: 10/20/24  4:39 AM   Result Value Ref Range    WBC 13.6 (H) 4.4 - 11.3 x10*3/uL    nRBC 0.0 0.0 -  0.0 /100 WBCs    RBC 3.69 (L) 4.50 - 5.90 x10*6/uL    Hemoglobin 11.8 (L) 13.5 - 17.5 g/dL    Hematocrit 35.8 (L) 41.0 - 52.0 %    MCV 97 80 - 100 fL    MCH 32.0 26.0 - 34.0 pg    MCHC 33.0 32.0 - 36.0 g/dL    RDW 13.8 11.5 - 14.5 %    Platelets 168 150 - 450 x10*3/uL   Basic Metabolic Panel    Collection Time: 10/20/24  4:39 AM   Result Value Ref Range    Glucose 131 (H) 74 - 99 mg/dL    Sodium 136 136 - 145 mmol/L    Potassium 4.3 3.5 - 5.3 mmol/L    Chloride 103 98 - 107 mmol/L    Bicarbonate 22 21 - 32 mmol/L    Anion Gap 15 10 - 20 mmol/L    Urea Nitrogen 66 (H) 6 - 23 mg/dL    Creatinine 2.26 (H) 0.50 - 1.30 mg/dL    eGFR 27 (L) >60 mL/min/1.73m*2    Calcium 8.3 (L) 8.6 - 10.3 mg/dL   POCT GLUCOSE    Collection Time: 10/20/24  6:35 AM   Result Value Ref Range    POCT Glucose 142 (H) 74 - 99 mg/dL   POCT GLUCOSE    Collection Time: 10/20/24 11:29 AM   Result Value Ref Range    POCT Glucose 241 (H) 74 - 99 mg/dL       ECG  Encounter Date: 10/18/24   ECG 12 Lead   Result Value    Ventricular Rate 60    Atrial Rate 0    NE Interval 45    QRS Duration 130    QT Interval 440    QTC Calculation(Bazett) 440    R Axis 259    T Axis 93    QRS Count 10    Q Onset 251    T Offset 471    QTC Fredericia 440    Narrative    Afib/flutter and ventricular-paced rhythm  No further analysis attempted due to paced rhythm    See ED provider note for full interpretation and clinical correlation  Confirmed by Yarely Washington ([id number]) on 10/20/2024 11:18:07 AM       Echocardiogram  No results found for this or any previous visit from the past 1095 days.      CV Studies:  EKG:  Encounter Date: 10/18/24   ECG 12 Lead   Result Value    Ventricular Rate 60    Atrial Rate 0    NE Interval 45    QRS Duration 130    QT Interval 440    QTC Calculation(Bazett) 440    R Axis 259    T Axis 93    QRS Count 10    Q Onset 251    T Offset 471    QTC Fredericia 440    Narrative    Afib/flutter and ventricular-paced rhythm  No further analysis attempted  due to paced rhythm    See ED provider note for full interpretation and clinical correlation  Confirmed by Yarely Washington ([id number]) on 10/20/2024 11:18:07 AM     Echocardiogram:   Echocardiogram     Narrative  [place] [id number]  Phone [phone] Fax [phone]    TRANSTHORACIC ECHOCARDIOGRAM REPORT      Patient Name:     LESIA PEÑA Reading Physician:   [id number] Sudeep Blackwood MD  Study Date:       10/29/2021         Referring Physician: [id number] MELENDEZ  MRN/PID:          [id number]           PCP:  Accession/Order#: [id number]       Department Location: [facility]  YOB: 1933          Fellow:  Gender:           M                  Nurse:  Admit Date:       10/29/2021         Sonographer:         Raven Elias SEFERINO  Admission Status: Outpatient         Additional Staff:  Height:           180.34 cm          CC Report to:  Weight:           79.83 kg           Study Type:          Echocardiogram  BSA:              2.00 m2  Blood Pressure: 166 /102 mmHg    Diagnosis/ICD: R06.02-Shortness of breath  Indication:    Dyspnea on Exertion  Procedure/CPT: Echo Complete w Full Doppler-[id number]    Patient History:  Pacer/Defib:       Permanent pacemaker  Pertinent History: Dyspnea.    Study Detail: The following Echo studies were performed: 2D, M-Mode, Doppler and  color flow. Technically challenging study due to prominent lung  artifact.      PHYSICIAN INTERPRETATION:  Left Ventricle: The left ventricular systolic function is normal. There are no regional wall motion abnormalities. The left ventricular cavity size is normal. There is mild left ventricular hypertrophy. Abnormal (paradoxical) septal motion, consistent with RV pacemaker. Spectral Doppler shows an impaired relaxation pattern of left ventricular diastolic filling.  Left Atrium: The left atrium is normal in size.  Right Ventricle: The right ventricle is normal in size. There is normal  right ventricular global systolic function. A device is visualized in the right ventricle.  Right Atrium: The right atrium is normal in size.  Aortic Valve: The aortic valve appears structurally normal. There is no evidence of aortic valve regurgitation. The peak instantaneous gradient of the aortic valve is 11.7 mmHg. The mean gradient of the aortic valve is 6.0 mmHg.  Mitral Valve: The mitral valve is normal in structure. There is no evidence of mitral valve regurgitation.  Tricuspid Valve: The tricuspid valve is structurally normal. There is trace tricuspid regurgitation.  Pulmonic Valve: The pulmonic valve is structurally normal. There is no indication of pulmonic valve regurgitation.  Pericardium: There is no pericardial effusion noted.  Aorta: The aortic root is normal.      CONCLUSIONS:  1. The left ventricular systolic function is normal.  2. Abnormal septal motion consistent with RV pacemaker.  3. Spectral Doppler shows an impaired relaxation pattern of left ventricular diastolic filling.    QUANTITATIVE DATA SUMMARY:  2D MEASUREMENTS:  Normal Ranges:  Ao Root d:     3.40 cm    (2.0-3.7cm)  IVSd:          1.25 cm    (0.6-1.1cm)  LVPWd:         1.33 cm    (0.6-1.1cm)  LVIDd:         4.47 cm    (3.9-5.9cm)  LVIDs:         3.56 cm  LV Mass Index: 109.1 g/m2  LV % FS        20.4 %    LA VOLUME:  Normal Ranges:  LA Vol A4C:        51.1 ml    (22+/-6mL/m2)  LA Vol A2C:        56.7 ml  LA Vol BP:         53.8 ml  LA Vol Index A4C:  25.6ml/m2  LA Vol Index A2C:  28.4 ml/m2  LA Vol Index BP:   27.0 ml/m2  LA Area A4C:       19.0 cm2  LA Area A2C:       20.0 cm2  LA Major Axis A4C: 6.0 cm  LA Major Axis A2C: 6.0 cm  LA Volume Index:   27.0 ml/m2    RA VOLUME BY A/L METHOD:  Normal Ranges:  RA Area A4C: 17.0 cm2    M-MODE MEASUREMENTS:  Normal Ranges:  Ao Root: 3.30 cm (2.0-3.7cm)  AoV Exc: 1.90 cm (1.5-2.5cm)  LAs:     2.80 cm (2.7-4.0cm)    AORTA MEASUREMENTS:  Normal Ranges:  AoV Exc:   1.90 cm (1.5-2.5cm)  Asc Ao,  d: 3.30 cm (2.1-3.4cm)    LV SYSTOLIC FUNCTION BY 2D PLANIMETRY (MOD):  Normal Ranges:  EF-A4C View: 61.7 % (>55%)  EF-A2C View: 62.5 %  EF-Biplane:  61.1 %    LV DIASTOLIC FUNCTION:  Normal Ranges:  MV Peak E:    0.81 m/s   (0.7-1.2 m/s)  MV Peak A:    0.44 m/s   (0.42-0.7 m/s)  E/A Ratio:    1.86       (1.0-2.2)  MV e'         0.10 m/s   (>8.0)  MV lateral e' 0.10 m/s  MV medial e'  0.09 m/s  MV A Dur:     95.00 msec  E/e' Ratio:   8.57       (<8.0)  LV IVRT:      100 msec   (<100ms)    MITRAL VALVE:  Normal Ranges:  MV DT: 135 msec (150-240msec)    AORTIC VALVE:  Normal Ranges:  AoV Vmax:                1.71 m/s  (<1.7m/s)  AoV Peak P.7 mmHg (<20mmHg)  AoV Mean P.0 mmHg  (1.7-11.5mmHg)  LVOT Max King:            0.84 m/s  (<1.1m/s)  AoV VTI:                 33.00 cm  (18-25cm)  LVOT VTI:                17.80 cm  LVOT Diameter:           2.00 cm   (1.8-2.4cm)  AoV Area, VTI:           1.69 cm2  (2.5-5.5cm2)  AoV Area,Vmax:           1.54 cm2  (2.5-4.5cm2)  AoV Area, planim:        2.23 cm2  (2.5-4.5cm2)  AoV Dimensionless Index: 0.54    RIGHT VENTRICLE:  RV 1   3.5 cm  RV 2   3.1 cm  RV 3   6.0 cm  TAPSE: 16.1 mm  RV s'  0.13 m/s    TRICUSPID VALVE/RVSP:  Normal Ranges:  Peak TR Velocity: 2.70 m/s  RV Syst Pressure: 32.2 mmHg (< 30mmHg)  IVC Diam:         1.45 cm      [id number] Sudeep Blackwood MD  Electronically signed on 10/30/2021 at 10:29:19 AM         Final     Stress Testing SITA([id number]:1825):   NM CARDIAC STRESS REST (MYOCARDIAL PERFUSION MIBI) 10/29/2021    Narrative  Addendum Begins  MRN: [id number]  Patient Name: LESIA PEÑA    ADDENDUM:  Please disregard the prior report as it was signed in error.    STUDY:  CARDIAC STRESS/REST INJECTION; CARDIAC STRESS/REST (MYOCARDIAL  PERFUSION/MIBI); PART 2 STRESS OR REST (NO CHARGE); 10/29/2021 10:36  am    INDICATION:  SOBOE  R06.02: SOBOE (shortness of breath on exertion).    COMPARISON:  None.    ACCESSION NUMBER(S):  [id number];  [id number]; [id number]    ORDERING CLINICIAN:  GRISELDA MELENDEZ    TECHNIQUE:  DIVISION OF NUCLEAR MEDICINE  PHARMACOLOGIC STRESS MYOCARDIAL PERFUSION SCAN, ONE DAY PROTOCOL    The patient received an intravenous dose of 11.6 mCi of Tc-99m  tetrofosmin and resting emission tomographic (SPECT) images of the  myocardium were acquired. The patient then received an intravenous  infusion of 0.4mg regadenoson (Lexiscan) followed by an additional  dose of 35.4 mCi of Tc-99m tetrofosmin. Stress phase SPECT images of  the myocardium were then acquired. These included ECG-gated images to  assess and quantify ventricular function.    FINDINGS:  Stress and rest images both demonstrate a normal distribution of  perfusion throughout all LV segments with no sign of ischemia.    ECG-gated images demonstrate normal LV size and myocardial  contractility with an LV ejection fraction of  63 % (normal above 50  percent).    Impression  1. Normal stress myocardial perfusion imaging in response to  pharmacologic stress.  2. Well-maintained left ventricular function.    Addendum Ends  MRN: [id number]  Patient Name: LESIA PEÑA    STUDY:  CARDIAC STRESS/REST INJECTION; PART 2 STRESS OR REST (NO CHARGE);  CARDIAC STRESS/REST (MYOCARDIAL PERFUSION/MIBI);  10/29/2021 10:36 am    INDICATION:  SOBOE  R06.02: SOBOE (shortness of breath on exertion).    COMPARISON:  None.    ACCESSION NUMBER(S):  [id number]; [id number]; [id number]    ORDERING CLINICIAN:  GRISELDA MELENDEZ    TECHNIQUE:  DIVISION OF NUCLEAR MEDICINE  PHARMACOLOGIC STRESS MYOCARDIAL PERFUSION SCAN, ONE DAY PROTOCOL    The patient received an intravenous dose of  11.4 mCi of Tc-99m  tetrofosmin and resting emission tomographic (SPECT) images of the  myocardium were acquired. The patient then received an intravenous  infusion of 0.4mg regadenoson (Lexiscan) followed by an additional  dose of  34.2 mCi of Tc-99m tetrofosmin. Stress phase SPECT images of  the myocardium were then acquired. These included  ECG-gated images to  assess and quantify ventricular function.    FINDINGS:  Stress and rest images both demonstrate a normal distribution of  perfusion throughout all LV segments with no sign of ischemia.    ECG-gated images demonstrate normal LV size and myocardial  contractility with an LV ejection fraction of   86 % (normal above 50  percent).    IMPRESSION:  1. Normal stress myocardial perfusion imaging in response to  pharmacologic stress.  2. Well-maintained left ventricular function.    Cardiac Catheterization:   Adult Cath     [facility], Cath Lab  [address] [place] [address]  Phone [phone] Fax [phone]    Cardiovascular Catheterization Report    Patient Name:     LESIA PEÑA Performing Physician: [id number] Blackwood MD  Study Date:       12/9/2019        Verifying Physician:  [id number] Blackwood MD  MRN/PID:          [id number]         Cardiologist:  Accession/Order#: [id number]        Referring Physician:  [id number] Thor Baker MD  YOB: 1933        Referring Physician:  Gender:           M                Referring Physician:      Study: Left and Right Heart Catheterization      Indications:  LESIA PEÑA is a 86 year old male who presents with hypertension and dyslipidemia. Suspected coronary artery disease, cardiomyopathy and left ventricular dysfunction, with a chest pain assessment of atypical angina. Study performed as an elective cath procedure.    Medical History:  Stress test performed: No. CTA performed: No. Alex accessed: No. LVEF Assessed: Yes.    Procedure Description:  After infiltration with 2% Lidocaine, the right radial artery was cannulated with a modified Seldinger technique. Subsequently a 6 Yakut sheath was placed in the right radial artery. After infiltration of local anesthetic, the Right Brachial vein was cannulated with a micropuncture technique. A 5 Yakut sheath was placed in the vein. Selective coronary  catheterization was performed using a 6 Fr catheter(s) exchanged over a guide wire to cannulate the coronary arteries.  Additional catheter(s) used to visualize the coronary arteries were: Jacqi. A balloon tipped catheter was advanced through the right heart to record pressures. Cardiac output was calculated via the Reggie method. After completion of the procedure, the arterial sheath was pulled and a TR Band Radial Compression Device was utilized to obtain patent hemostasis.    Coronary Angiography:  The coronary circulation is right dominant.    Left Main Coronary Artery:  The left main coronary artery is a normal caliber vessel. The left main arises normally from the left coronary sinus of Valsalva and bifurcates into the LAD and circumflex coronary arteries. The left main coronary artery showed no significant disease or stenosis greater than 30%.    Left Anterior Descending Coronary Artery Distribution:  The left anterior descending coronary artery is a normal caliber vessel. The LAD arises normally from the left main coronary artery. The LAD demonstrated mild atherosclerotic disease.    Circumflex Coronary Artery Distribution:  The circumflex coronary artery is a normal caliber vessel. The circumflex arises normally from the left main coronary artery and terminates in the AV groove. The circumflex revealed moderate atherosclerotic disease. The proximal to mid circumflex coronary artery showed 50% stenosis. This lesion was eccentric.    Right Heart Catheterization:  A balloon tipped catheter was advanced through the right heart to record pressures. Cardiac output was calculated via the Reggie method. Elevated left sided filling pressures with normal cardiac output. Elevated ventricular filling pressure. Cardiac output is normal. Preserved cardiac output at rest. No pulmonary vascular resistance.    Right Coronary Artery Distribution:    The right coronary artery is a normal caliber vessel. The RCA arises normally  from the right sinus of Valsalva. The RCA showed no significant disease or stenosis greater than 30%.    Coronary Interventions:  Angiography reveals a 50% stenosis of the proximal to mid circumflex and rFR - 1. Percutaneous coronary intervention was performed within the proximal to mid circumflex and rFR - 1.    Coronary Lesion Summary:  Vessel       Stenosis   Vessel Segment  Circumflex 50% stenosis proximal to mid          Complications:  No in-lab complications observed.    Cardiac Cath Transition of Care Summary:  Post Procedure Diagnosis: Single vessel disease.  Blood Loss:               Estimated blood loss during the procedure was 0 mls.  Specimens Removed:        Number of specimen(s) removed: none.      Recommendations:  Maximize medical therapy.    ____________________________________________________________________________________  CONCLUSIONS:  1. Moderate non-obstructive coronary artery disease.  2. Elevated LV filling pressures.    ____________________________________________________________________________________  CPT Codes:  Right Heart Cath, O2/Cardiac output (RHC)-45729; Coronary Angiography S&I (RHC)([facility])-29657; Moderate Sedation Services initial 15 minutes patient >5 years-58633; Moderate Sedation Services 1st additional 15 minutes patient >5 years-05115; FFR, initial Vessel (PCI)-66111    ICD 10 Codes:  I20.9-Angina pectoris, unspecified Class III; I50.42-Chronic combined systolic (congestive) and diastolic (congestive) heart failure    [id number] Sudeep Blackwood MD  Performing Physician  Electronically signed by [id number] Sudeep Blackwood MD on 12/9/2019 at 3:02:59 PM      cc Report to: [id number] Thor Baker MD           Final   No results found for this or any previous visit from the past 3650 days.     Cardiac Scoring: No results found for this or any previous visit from the past 1825 days.    AAA : No results found for this or any previous visit from the past 1825 days.    OTHER: No results found for this or  any previous visit from the past 1825 days.    LAST IMAGING RESULTS  ECG 12 Lead  Afib/flutter and ventricular-paced rhythm  No further analysis attempted due to paced rhythm    See ED provider note for full interpretation and clinical correlation  Confirmed by Yarely Washington ([id number]) on 10/20/2024 11:18:07 AM      Problem List Items Addressed This Visit       ASHD (arteriosclerotic heart disease)    Diabetes mellitus, type 2 (Multi)    Dyslipidemia    Persistent atrial fibrillation with RVR (Multi)    Primary hypertension    Presence of cardiac pacemaker    Warfarin anticoagulation    CKD (chronic kidney disease)    (HFpEF) heart failure with preserved ejection fraction - Primary              Rod Milner DO, FACC, FACOI

## 2024-10-23 ENCOUNTER — HOME CARE VISIT (OUTPATIENT)
Dept: HOME HEALTH SERVICES | Facility: HOME HEALTH | Age: 89
End: 2024-10-23
Payer: MEDICARE

## 2024-10-23 VITALS
DIASTOLIC BLOOD PRESSURE: 78 MMHG | HEART RATE: 60 BPM | OXYGEN SATURATION: 96 % | SYSTOLIC BLOOD PRESSURE: 120 MMHG | TEMPERATURE: 97.6 F

## 2024-10-23 LAB
BACTERIA BLD CULT: NORMAL
BACTERIA BLD CULT: NORMAL

## 2024-10-23 PROCEDURE — G0152 HHCP-SERV OF OT,EA 15 MIN: HCPCS | Mod: HHH

## 2024-10-23 ASSESSMENT — ENCOUNTER SYMPTOMS
PERSON REPORTING PAIN: PATIENT
OCCASIONAL FEELINGS OF UNSTEADINESS: 1
DENIES PAIN: 1

## 2024-10-23 ASSESSMENT — ACTIVITIES OF DAILY LIVING (ADL)
TOILETING: 1
AMBULATION ASSISTANCE: CONTACT GUARD ASSIST
WASHING_LB_CURRENT_FUNCTION: CONTACT GUARD ASSIST
TOILETING: MODERATE ASSIST
GROOMING_WITHIN_DEFINED_LIMITS: 1
DRESSING_UB_CURRENT_FUNCTION: MINIMUM ASSIST
FEEDING_WITHIN_DEFINED_LIMITS: 1
CURRENT_FUNCTION: MINIMUM ASSIST
DRESSING_LB_CURRENT_FUNCTION: MINIMUM ASSIST
WASHING_UPB_CURRENT_FUNCTION: CONTACT GUARD ASSIST
PHYSICAL TRANSFERS ASSESSED: 1
AMBULATION ASSISTANCE: 1

## 2024-10-24 ENCOUNTER — OFFICE VISIT (OUTPATIENT)
Dept: PRIMARY CARE | Facility: CLINIC | Age: 89
End: 2024-10-24
Payer: MEDICARE

## 2024-10-24 VITALS
HEIGHT: 70 IN | OXYGEN SATURATION: 95 % | HEART RATE: 69 BPM | SYSTOLIC BLOOD PRESSURE: 105 MMHG | WEIGHT: 184 LBS | DIASTOLIC BLOOD PRESSURE: 62 MMHG | BODY MASS INDEX: 26.34 KG/M2

## 2024-10-24 DIAGNOSIS — N18.4 CKD (CHRONIC KIDNEY DISEASE) STAGE 4, GFR 15-29 ML/MIN (MULTI): ICD-10-CM

## 2024-10-24 DIAGNOSIS — E11.9 TYPE 2 DIABETES MELLITUS WITHOUT COMPLICATION, WITHOUT LONG-TERM CURRENT USE OF INSULIN (MULTI): ICD-10-CM

## 2024-10-24 DIAGNOSIS — E55.9 VITAMIN D DEFICIENCY: ICD-10-CM

## 2024-10-24 DIAGNOSIS — Z00.00 MEDICARE ANNUAL WELLNESS VISIT, SUBSEQUENT: ICD-10-CM

## 2024-10-24 DIAGNOSIS — I48.91 ATRIAL FIBRILLATION, UNSPECIFIED TYPE (MULTI): ICD-10-CM

## 2024-10-24 DIAGNOSIS — Z09 HOSPITAL DISCHARGE FOLLOW-UP: Primary | ICD-10-CM

## 2024-10-24 PROCEDURE — 1111F DSCHRG MED/CURRENT MED MERGE: CPT | Performed by: NURSE PRACTITIONER

## 2024-10-24 PROCEDURE — 1160F RVW MEDS BY RX/DR IN RCRD: CPT | Performed by: NURSE PRACTITIONER

## 2024-10-24 PROCEDURE — 1170F FXNL STATUS ASSESSED: CPT | Performed by: NURSE PRACTITIONER

## 2024-10-24 PROCEDURE — 99496 TRANSJ CARE MGMT HIGH F2F 7D: CPT | Performed by: NURSE PRACTITIONER

## 2024-10-24 PROCEDURE — 1036F TOBACCO NON-USER: CPT | Performed by: NURSE PRACTITIONER

## 2024-10-24 PROCEDURE — 1159F MED LIST DOCD IN RCRD: CPT | Performed by: NURSE PRACTITIONER

## 2024-10-24 PROCEDURE — 3074F SYST BP LT 130 MM HG: CPT | Performed by: NURSE PRACTITIONER

## 2024-10-24 PROCEDURE — 1123F ACP DISCUSS/DSCN MKR DOCD: CPT | Performed by: NURSE PRACTITIONER

## 2024-10-24 PROCEDURE — 1157F ADVNC CARE PLAN IN RCRD: CPT | Performed by: NURSE PRACTITIONER

## 2024-10-24 PROCEDURE — 3078F DIAST BP <80 MM HG: CPT | Performed by: NURSE PRACTITIONER

## 2024-10-24 ASSESSMENT — ACTIVITIES OF DAILY LIVING (ADL)
BATHING: INDEPENDENT
DOING_HOUSEWORK: TOTAL CARE
GROCERY_SHOPPING: TOTAL CARE
DRESSING: INDEPENDENT
TAKING_MEDICATION: NEEDS ASSISTANCE
MANAGING_FINANCES: NEEDS ASSISTANCE

## 2024-10-24 ASSESSMENT — PATIENT HEALTH QUESTIONNAIRE - PHQ9
2. FEELING DOWN, DEPRESSED OR HOPELESS: NOT AT ALL
SUM OF ALL RESPONSES TO PHQ9 QUESTIONS 1 AND 2: 0
1. LITTLE INTEREST OR PLEASURE IN DOING THINGS: NOT AT ALL

## 2024-10-24 ASSESSMENT — ENCOUNTER SYMPTOMS
OCCASIONAL FEELINGS OF UNSTEADINESS: 1
LOSS OF SENSATION IN FEET: 1
DEPRESSION: 0

## 2024-10-24 NOTE — PATIENT INSTRUCTIONS
Continue taking all current medication as prescribed and complete labs a few days prior to 6-month follow-up for annual Medicare wellness exam.

## 2024-10-25 ENCOUNTER — OFFICE VISIT (OUTPATIENT)
Dept: CARDIOLOGY | Facility: HOSPITAL | Age: 89
End: 2024-10-25
Payer: MEDICARE

## 2024-10-25 ENCOUNTER — HOME CARE VISIT (OUTPATIENT)
Dept: HOME HEALTH SERVICES | Facility: HOME HEALTH | Age: 89
End: 2024-10-25
Payer: MEDICARE

## 2024-10-25 VITALS
WEIGHT: 184 LBS | HEART RATE: 62 BPM | HEIGHT: 68 IN | SYSTOLIC BLOOD PRESSURE: 122 MMHG | DIASTOLIC BLOOD PRESSURE: 58 MMHG | BODY MASS INDEX: 27.89 KG/M2

## 2024-10-25 DIAGNOSIS — I50.32 CHRONIC HEART FAILURE WITH PRESERVED EJECTION FRACTION: Primary | ICD-10-CM

## 2024-10-25 DIAGNOSIS — Z95.0 PRESENCE OF CARDIAC PACEMAKER: ICD-10-CM

## 2024-10-25 DIAGNOSIS — E78.5 DYSLIPIDEMIA: ICD-10-CM

## 2024-10-25 DIAGNOSIS — Z79.01 WARFARIN ANTICOAGULATION: ICD-10-CM

## 2024-10-25 DIAGNOSIS — I48.19 PERSISTENT ATRIAL FIBRILLATION WITH RVR (MULTI): ICD-10-CM

## 2024-10-25 DIAGNOSIS — I25.10 ASHD (ARTERIOSCLEROTIC HEART DISEASE): ICD-10-CM

## 2024-10-25 DIAGNOSIS — N18.9 CHRONIC KIDNEY DISEASE, UNSPECIFIED CKD STAGE: ICD-10-CM

## 2024-10-25 DIAGNOSIS — I10 PRIMARY HYPERTENSION: ICD-10-CM

## 2024-10-25 DIAGNOSIS — Z01.810 PREOPERATIVE CARDIOVASCULAR EXAMINATION: ICD-10-CM

## 2024-10-25 DIAGNOSIS — E11.00 TYPE 2 DIABETES MELLITUS WITH HYPEROSMOLARITY WITHOUT COMA, WITHOUT LONG-TERM CURRENT USE OF INSULIN (MULTI): ICD-10-CM

## 2024-10-25 PROCEDURE — 1111F DSCHRG MED/CURRENT MED MERGE: CPT | Performed by: INTERNAL MEDICINE

## 2024-10-25 PROCEDURE — 3074F SYST BP LT 130 MM HG: CPT | Performed by: INTERNAL MEDICINE

## 2024-10-25 PROCEDURE — 93005 ELECTROCARDIOGRAM TRACING: CPT | Performed by: INTERNAL MEDICINE

## 2024-10-25 PROCEDURE — 93010 ELECTROCARDIOGRAM REPORT: CPT | Performed by: INTERNAL MEDICINE

## 2024-10-25 PROCEDURE — 1036F TOBACCO NON-USER: CPT | Performed by: INTERNAL MEDICINE

## 2024-10-25 PROCEDURE — 1157F ADVNC CARE PLAN IN RCRD: CPT | Performed by: INTERNAL MEDICINE

## 2024-10-25 PROCEDURE — 3078F DIAST BP <80 MM HG: CPT | Performed by: INTERNAL MEDICINE

## 2024-10-25 PROCEDURE — 99214 OFFICE O/P EST MOD 30 MIN: CPT | Performed by: INTERNAL MEDICINE

## 2024-10-25 PROCEDURE — 1159F MED LIST DOCD IN RCRD: CPT | Performed by: INTERNAL MEDICINE

## 2024-10-25 PROCEDURE — 1123F ACP DISCUSS/DSCN MKR DOCD: CPT | Performed by: INTERNAL MEDICINE

## 2024-10-28 ENCOUNTER — HOME CARE VISIT (OUTPATIENT)
Dept: HOME HEALTH SERVICES | Facility: HOME HEALTH | Age: 89
End: 2024-10-28
Payer: MEDICARE

## 2024-10-28 VITALS
DIASTOLIC BLOOD PRESSURE: 62 MMHG | HEART RATE: 77 BPM | TEMPERATURE: 97.7 F | OXYGEN SATURATION: 98 % | SYSTOLIC BLOOD PRESSURE: 125 MMHG | RESPIRATION RATE: 17 BRPM

## 2024-10-28 DIAGNOSIS — E11.9 TYPE 2 DIABETES MELLITUS WITHOUT COMPLICATION, WITHOUT LONG-TERM CURRENT USE OF INSULIN (MULTI): ICD-10-CM

## 2024-10-28 DIAGNOSIS — J44.9 CHRONIC OBSTRUCTIVE PULMONARY DISEASE, UNSPECIFIED COPD TYPE (MULTI): Primary | ICD-10-CM

## 2024-10-28 DIAGNOSIS — I50.32 CHRONIC HEART FAILURE WITH PRESERVED EJECTION FRACTION: ICD-10-CM

## 2024-10-28 PROCEDURE — G0157 HHC PT ASSISTANT EA 15: HCPCS | Mod: CQ,HHH

## 2024-10-28 SDOH — HEALTH STABILITY: PHYSICAL HEALTH: EXERCISE TYPE: B LE STRENGTHENING THER EX

## 2024-10-28 SDOH — HEALTH STABILITY: PHYSICAL HEALTH: EXERCISE COMMENTS: BD, HIP FLEX/EXT, MINI SQUATS , HAM CURLS

## 2024-10-28 SDOH — HEALTH STABILITY: PHYSICAL HEALTH
EXERCISE COMMENTS: 1 SET 10 EACH, BLUE THERABAND  SEATED THER EX: MARCHES, LEG EXT, LAQ, TBAND HIP ABD, BALL SQUEEZES HIP ADD  SUPINE THER EX: SLR, HIP ABD, HEEL SLIDES, SAQ, GLUT SETS 5 SEC HOLDS QUAD SETS 5 SEC HOLDS  STANDING THER EX: MARCHES, HEEL/TOE RAISES, HIP A

## 2024-10-28 ASSESSMENT — ENCOUNTER SYMPTOMS
MUSCLE WEAKNESS: 1
DENIES PAIN: 1
OCCASIONAL FEELINGS OF UNSTEADINESS: 1
PERSON REPORTING PAIN: PATIENT

## 2024-10-28 ASSESSMENT — ACTIVITIES OF DAILY LIVING (ADL)
BATHING_CURRENT_FUNCTION: STAND BY ASSIST
AMBULATION ASSISTANCE ON FLAT SURFACES: 1
BATHING EQUIPMENT USED: SHOWER CHAIR
AMBULATION ASSISTANCE: 1
BATHING ASSESSED: 1
AMBULATION_DISTANCE/DURATION_TOLERATED: 50 FT
AMBULATION ASSISTANCE: STAND BY ASSIST

## 2024-10-29 ENCOUNTER — HOME CARE VISIT (OUTPATIENT)
Dept: HOME HEALTH SERVICES | Facility: HOME HEALTH | Age: 89
End: 2024-10-29
Payer: MEDICARE

## 2024-10-29 VITALS — RESPIRATION RATE: 18 BRPM | HEART RATE: 61 BPM | OXYGEN SATURATION: 96 %

## 2024-10-29 PROCEDURE — G0158 HHC OT ASSISTANT EA 15: HCPCS | Mod: CO,HHH

## 2024-10-30 ENCOUNTER — ANTICOAGULATION - WARFARIN VISIT (OUTPATIENT)
Dept: PRIMARY CARE | Facility: CLINIC | Age: 89
End: 2024-10-30
Payer: MEDICARE

## 2024-10-31 ENCOUNTER — HOME CARE VISIT (OUTPATIENT)
Dept: HOME HEALTH SERVICES | Facility: HOME HEALTH | Age: 89
End: 2024-10-31
Payer: MEDICARE

## 2024-10-31 VITALS
RESPIRATION RATE: 17 BRPM | HEART RATE: 62 BPM | TEMPERATURE: 97.7 F | SYSTOLIC BLOOD PRESSURE: 117 MMHG | DIASTOLIC BLOOD PRESSURE: 61 MMHG | OXYGEN SATURATION: 97 %

## 2024-10-31 PROCEDURE — G0157 HHC PT ASSISTANT EA 15: HCPCS | Mod: CQ,HHH

## 2024-10-31 PROCEDURE — G0158 HHC OT ASSISTANT EA 15: HCPCS | Mod: CO,HHH

## 2024-10-31 SDOH — HEALTH STABILITY: PHYSICAL HEALTH
EXERCISE COMMENTS: 1 SET OF 10 EACH, BLUE THERABAND  SEATED THER EX: MARCHES, LEG EXT, LAQ, TBAND HIP ABD, BALL SQUEEZES HIP ADD  STANDING THER EX: MARCHES, HEEL/TOE RAISES, HIP ABD, HIP FLEX/EXT, MINI SQUATS , HAM CURLS  SUPINE THER EX: SLR, HIP ABD, HEEL SLIDES, SAQ,

## 2024-10-31 SDOH — HEALTH STABILITY: PHYSICAL HEALTH
EXERCISE COMMENTS: GLUT SETS 5 SEC HOLDS QUAD SETS 5 SEC HOLDS  BUE: BLUE THERABAND SHOULDER ABD/ADD,FLEX,EXT, IR/ER  BICEP CURLS, ROWS , 2# BALL BICEP CURLS, SHOULDER PRESS

## 2024-10-31 SDOH — HEALTH STABILITY: PHYSICAL HEALTH: EXERCISE TYPE: B LE STRENGTHENING THER EX, ENDURANCE TRAINING

## 2024-10-31 ASSESSMENT — ENCOUNTER SYMPTOMS
PERSON REPORTING PAIN: PATIENT
OCCASIONAL FEELINGS OF UNSTEADINESS: 1
MUSCLE WEAKNESS: 1
DENIES PAIN: 1

## 2024-10-31 ASSESSMENT — ACTIVITIES OF DAILY LIVING (ADL)
AMBULATION_DISTANCE/DURATION_TOLERATED: 85 FT
BATHING_CURRENT_FUNCTION: STAND BY ASSIST
AMBULATION ASSISTANCE ON FLAT SURFACES: 1
BATHING ASSESSED: 1
BATHING EQUIPMENT USED: SHOWER CHAIR
AMBULATION ASSISTANCE: 1
AMBULATION ASSISTANCE: STAND BY ASSIST

## 2024-11-01 ENCOUNTER — PATIENT OUTREACH (OUTPATIENT)
Dept: PRIMARY CARE | Facility: CLINIC | Age: 89
End: 2024-11-01

## 2024-11-01 ENCOUNTER — APPOINTMENT (OUTPATIENT)
Dept: UROLOGY | Facility: CLINIC | Age: 89
End: 2024-11-01
Payer: MEDICARE

## 2024-11-01 VITALS
BODY MASS INDEX: 25.9 KG/M2 | WEIGHT: 185 LBS | SYSTOLIC BLOOD PRESSURE: 109 MMHG | HEIGHT: 71 IN | DIASTOLIC BLOOD PRESSURE: 66 MMHG | HEART RATE: 80 BPM

## 2024-11-01 VITALS — OXYGEN SATURATION: 95 % | RESPIRATION RATE: 18 BRPM | HEART RATE: 62 BPM

## 2024-11-01 DIAGNOSIS — N40.1 BENIGN PROSTATIC HYPERPLASIA WITH LOWER URINARY TRACT SYMPTOMS, SYMPTOM DETAILS UNSPECIFIED: ICD-10-CM

## 2024-11-01 DIAGNOSIS — R33.9 URINARY RETENTION: Primary | ICD-10-CM

## 2024-11-01 PROCEDURE — 1111F DSCHRG MED/CURRENT MED MERGE: CPT | Performed by: UROLOGY

## 2024-11-01 PROCEDURE — 1123F ACP DISCUSS/DSCN MKR DOCD: CPT | Performed by: UROLOGY

## 2024-11-01 PROCEDURE — 99213 OFFICE O/P EST LOW 20 MIN: CPT | Performed by: UROLOGY

## 2024-11-01 PROCEDURE — 1157F ADVNC CARE PLAN IN RCRD: CPT | Performed by: UROLOGY

## 2024-11-01 PROCEDURE — 3078F DIAST BP <80 MM HG: CPT | Performed by: UROLOGY

## 2024-11-01 PROCEDURE — 3074F SYST BP LT 130 MM HG: CPT | Performed by: UROLOGY

## 2024-11-01 ASSESSMENT — ENCOUNTER SYMPTOMS
DENIES PAIN: 1
PERSON REPORTING PAIN: PATIENT

## 2024-11-04 ENCOUNTER — HOME CARE VISIT (OUTPATIENT)
Dept: HOME HEALTH SERVICES | Facility: HOME HEALTH | Age: 89
End: 2024-11-04
Payer: MEDICARE

## 2024-11-04 PROCEDURE — G0158 HHC OT ASSISTANT EA 15: HCPCS | Mod: CO,HHH

## 2024-11-04 ASSESSMENT — ENCOUNTER SYMPTOMS
DENIES PAIN: 1
PERSON REPORTING PAIN: PATIENT

## 2024-11-05 ENCOUNTER — HOME CARE VISIT (OUTPATIENT)
Dept: HOME HEALTH SERVICES | Facility: HOME HEALTH | Age: 89
End: 2024-11-05
Payer: MEDICARE

## 2024-11-05 VITALS
HEART RATE: 77 BPM | OXYGEN SATURATION: 98 % | RESPIRATION RATE: 17 BRPM | SYSTOLIC BLOOD PRESSURE: 122 MMHG | TEMPERATURE: 97.7 F | DIASTOLIC BLOOD PRESSURE: 65 MMHG

## 2024-11-05 PROCEDURE — G0157 HHC PT ASSISTANT EA 15: HCPCS | Mod: CQ,HHH

## 2024-11-05 SDOH — HEALTH STABILITY: PHYSICAL HEALTH
EXERCISE COMMENTS: 1 SET OF 15 EACH, 2# ANKLE WEIGHTS FOR SEATED THER EX  SEATED THER EX: MARCHES, LEG EXT, LAQ, TBAND HIP ABD, BALL SQUEEZES HIP ADD  SUPINE THER EX: SLR, HIP ABD, HEEL SLIDES, SAQ, GLUT SETS 5 SEC HOLDS QUAD SETS 5 SEC HOLDS  STANDING THER EX: MARCHES

## 2024-11-05 SDOH — HEALTH STABILITY: PHYSICAL HEALTH: EXERCISE COMMENTS: , HEEL/TOE RAISES, HIP ABD, HIP FLEX/EXT, MINI SQUATS , HAM CURLS

## 2024-11-05 SDOH — HEALTH STABILITY: PHYSICAL HEALTH: EXERCISE TYPE: B LE STRENGTHENING THER EX, ENDURANCE TRAINING

## 2024-11-05 ASSESSMENT — ACTIVITIES OF DAILY LIVING (ADL)
BATHING EQUIPMENT USED: SHOWER CHAIR
BATHING_CURRENT_FUNCTION: STAND BY ASSIST
AMBULATION ASSISTANCE ON FLAT SURFACES: 1
AMBULATION_DISTANCE/DURATION_TOLERATED: 100 FT
BATHING ASSESSED: 1
AMBULATION ASSISTANCE: 1
AMBULATION ASSISTANCE: STAND BY ASSIST

## 2024-11-05 ASSESSMENT — ENCOUNTER SYMPTOMS
PAIN: 1
PAIN LOCATION - RELIEVING FACTORS: TIME OF DAY
PAIN SEVERITY GOAL: 0/10
OCCASIONAL FEELINGS OF UNSTEADINESS: 1
LOWEST PAIN SEVERITY IN PAST 24 HOURS: 4/10
PAIN LOCATION - EXACERBATING FACTORS: TIME OF DAY
PAIN LOCATION - PAIN QUALITY: ACHING
MUSCLE WEAKNESS: 1
PERSON REPORTING PAIN: PATIENT
PAIN LOCATION - PAIN SEVERITY: 5/10
HIGHEST PAIN SEVERITY IN PAST 24 HOURS: 7/10
PAIN LOCATION: BACK
PAIN LOCATION - PAIN FREQUENCY: INTERMITTENT
SUBJECTIVE PAIN PROGRESSION: UNCHANGED

## 2024-11-06 ENCOUNTER — HOME CARE VISIT (OUTPATIENT)
Dept: HOME HEALTH SERVICES | Facility: HOME HEALTH | Age: 89
End: 2024-11-06
Payer: MEDICARE

## 2024-11-06 ENCOUNTER — APPOINTMENT (OUTPATIENT)
Dept: PRIMARY CARE | Facility: CLINIC | Age: 89
End: 2024-11-06
Payer: MEDICARE

## 2024-11-06 ENCOUNTER — ANTICOAGULATION - WARFARIN VISIT (OUTPATIENT)
Dept: PRIMARY CARE | Facility: CLINIC | Age: 89
End: 2024-11-06

## 2024-11-06 PROCEDURE — G0158 HHC OT ASSISTANT EA 15: HCPCS | Mod: CO,HHH

## 2024-11-07 ENCOUNTER — HOME CARE VISIT (OUTPATIENT)
Dept: HOME HEALTH SERVICES | Facility: HOME HEALTH | Age: 89
End: 2024-11-07
Payer: MEDICARE

## 2024-11-07 VITALS
SYSTOLIC BLOOD PRESSURE: 122 MMHG | HEART RATE: 77 BPM | RESPIRATION RATE: 17 BRPM | OXYGEN SATURATION: 97 % | DIASTOLIC BLOOD PRESSURE: 61 MMHG | TEMPERATURE: 97.7 F

## 2024-11-07 PROCEDURE — G0157 HHC PT ASSISTANT EA 15: HCPCS | Mod: CQ,HHH

## 2024-11-07 SDOH — HEALTH STABILITY: PHYSICAL HEALTH: EXERCISE TYPE: B LE STRENGTHENING THER EX, ENDURANCE TRAINING

## 2024-11-07 SDOH — HEALTH STABILITY: PHYSICAL HEALTH
EXERCISE COMMENTS: 1 SET OF 10 EACH, BLUE THERABAND 2# ANKLE WEIGHTS FOR SEATED THER EX  SEATED THER EX: MARCHES, LEG EXT, LAQ, TBAND HIP ABD, BALL SQUEEZES HIP ADD  SUPINE THER EX: SLR, HIP ABD, HEEL SLIDES, SAQ, GLUT SETS 5 SEC HOLDS QUAD SETS 5 SEC HOLDS  STANDING T

## 2024-11-07 SDOH — HEALTH STABILITY: PHYSICAL HEALTH: EXERCISE COMMENTS: HER EX: MARCHES, HEEL/TOE RAISES, HIP ABD, HIP FLEX/EXT, MINI SQUATS , HAM CURLS

## 2024-11-07 ASSESSMENT — ENCOUNTER SYMPTOMS
DENIES PAIN: 1
MUSCLE WEAKNESS: 1
ARTHRALGIAS: 1
PERSON REPORTING PAIN: PATIENT
OCCASIONAL FEELINGS OF UNSTEADINESS: 1

## 2024-11-07 ASSESSMENT — ACTIVITIES OF DAILY LIVING (ADL)
BATHING ASSESSED: 1
AMBULATION ASSISTANCE ON FLAT SURFACES: 1
AMBULATION ASSISTANCE: 1
AMBULATION ASSISTANCE: STAND BY ASSIST
BATHING EQUIPMENT USED: SHOWER CHAIR
BATHING_CURRENT_FUNCTION: STAND BY ASSIST
AMBULATION_DISTANCE/DURATION_TOLERATED: 100 FT

## 2024-11-11 ENCOUNTER — HOME CARE VISIT (OUTPATIENT)
Dept: HOME HEALTH SERVICES | Facility: HOME HEALTH | Age: 89
End: 2024-11-11
Payer: MEDICARE

## 2024-11-11 VITALS
DIASTOLIC BLOOD PRESSURE: 62 MMHG | RESPIRATION RATE: 18 BRPM | OXYGEN SATURATION: 95 % | SYSTOLIC BLOOD PRESSURE: 105 MMHG | HEART RATE: 66 BPM

## 2024-11-11 VITALS
TEMPERATURE: 97.9 F | DIASTOLIC BLOOD PRESSURE: 62 MMHG | HEART RATE: 55 BPM | RESPIRATION RATE: 17 BRPM | OXYGEN SATURATION: 92 % | SYSTOLIC BLOOD PRESSURE: 105 MMHG

## 2024-11-11 PROCEDURE — G0157 HHC PT ASSISTANT EA 15: HCPCS | Mod: CQ,HHH

## 2024-11-11 PROCEDURE — G0158 HHC OT ASSISTANT EA 15: HCPCS | Mod: CO,HHH

## 2024-11-11 SDOH — HEALTH STABILITY: PHYSICAL HEALTH
EXERCISE COMMENTS: 1 SET OF 15 EACH, BLUE THERABAND, 2# ANKLE WEIGHTS  SEATED THER EX: MARCHES, LEG EXT, LAQ, TBAND HIP ABD, BALL SQUEEZES HIP ADD  STANDING THER EX: MARCHES, HEEL/TOE RAISES, HIP ABD, HIP FLEX/EXT, MINI SQUATS , HAM CURLS

## 2024-11-11 SDOH — HEALTH STABILITY: PHYSICAL HEALTH: EXERCISE TYPE: B LE STRENGTHENING THER EX, ENDURANCE TRAINING

## 2024-11-11 ASSESSMENT — ENCOUNTER SYMPTOMS
PAIN: 1
PAIN LOCATION - EXACERBATING FACTORS: TIME OF DAY
PAIN LOCATION - EXACERBATING FACTORS: TIME OF DAY
PAIN: 1
PAIN LOCATION - PAIN QUALITY: ACHING
PAIN LOCATION - RELIEVING FACTORS: TIME OF DAY
PAIN SEVERITY GOAL: 0/10
PAIN LOCATION - PAIN SEVERITY: 3/10
PAIN LOCATION - RELIEVING FACTORS: TIME OF DAY
PAIN LOCATION: LEFT LEG
PAIN LOCATION: RIGHT LEG
PAIN LOCATION - PAIN QUALITY: ACHE
PAIN LOCATION - PAIN SEVERITY: 6/10
PAIN LOCATION - PAIN DURATION: 4-5 DAYS
PAIN LOCATION - PAIN SEVERITY: 6/10
MUSCLE WEAKNESS: 1
PAIN LOCATION - PAIN FREQUENCY: INTERMITTENT
PAIN LOCATION - PAIN QUALITY: ACHING
PERSON REPORTING PAIN: PATIENT
PAIN LOCATION - PAIN FREQUENCY: INTERMITTENT
PAIN LOCATION: RIGHT FOOT
HIGHEST PAIN SEVERITY IN PAST 24 HOURS: 7/10
OCCASIONAL FEELINGS OF UNSTEADINESS: 1
PERSON REPORTING PAIN: PATIENT
SUBJECTIVE PAIN PROGRESSION: UNCHANGED
PAIN LOCATION - PAIN FREQUENCY: CONSTANT
LOWEST PAIN SEVERITY IN PAST 24 HOURS: 5/10

## 2024-11-11 ASSESSMENT — ACTIVITIES OF DAILY LIVING (ADL)
BATHING ASSESSED: 1
AMBULATION ASSISTANCE: STAND BY ASSIST
BATHING_CURRENT_FUNCTION: STAND BY ASSIST
AMBULATION ASSISTANCE ON FLAT SURFACES: 1
AMBULATION ASSISTANCE: 1
BATHING EQUIPMENT USED: SHOWER CHAIR
AMBULATION_DISTANCE/DURATION_TOLERATED: 100 FT

## 2024-11-12 ENCOUNTER — ANTICOAGULATION - WARFARIN VISIT (OUTPATIENT)
Dept: PRIMARY CARE | Facility: CLINIC | Age: 89
End: 2024-11-12
Payer: MEDICARE

## 2024-11-12 DIAGNOSIS — I48.91 ATRIAL FIBRILLATION, UNSPECIFIED TYPE (MULTI): Primary | ICD-10-CM

## 2024-11-12 LAB
POC INR: 2.8
POC PROTHROMBIN TIME: NORMAL

## 2024-11-12 PROCEDURE — 85610 PROTHROMBIN TIME: CPT | Performed by: NURSE PRACTITIONER

## 2024-11-13 ENCOUNTER — HOME CARE VISIT (OUTPATIENT)
Dept: HOME HEALTH SERVICES | Facility: HOME HEALTH | Age: 89
End: 2024-11-13
Payer: MEDICARE

## 2024-11-13 PROCEDURE — G0158 HHC OT ASSISTANT EA 15: HCPCS | Mod: CO,HHH

## 2024-11-14 ASSESSMENT — ENCOUNTER SYMPTOMS
DENIES PAIN: 1
PERSON REPORTING PAIN: PATIENT

## 2024-11-15 ENCOUNTER — HOME CARE VISIT (OUTPATIENT)
Dept: HOME HEALTH SERVICES | Facility: HOME HEALTH | Age: 89
End: 2024-11-15
Payer: MEDICARE

## 2024-11-15 ENCOUNTER — PATIENT OUTREACH (OUTPATIENT)
Dept: PRIMARY CARE | Facility: CLINIC | Age: 89
End: 2024-11-15
Payer: MEDICARE

## 2024-11-15 VITALS — OXYGEN SATURATION: 97 % | RESPIRATION RATE: 16 BRPM

## 2024-11-15 PROCEDURE — G0151 HHCP-SERV OF PT,EA 15 MIN: HCPCS | Mod: HHH

## 2024-11-15 ASSESSMENT — ACTIVITIES OF DAILY LIVING (ADL)
PHYSICAL TRANSFERS ASSESSED: 1
HOME_HEALTH_OASIS: 00
AMBULATION ASSISTANCE: 1
CURRENT_FUNCTION: INDEPENDENT
OASIS_M1830: 02
AMBULATION ASSISTANCE: INDEPENDENT

## 2024-11-15 ASSESSMENT — ENCOUNTER SYMPTOMS
DENIES PAIN: 1
PERSON REPORTING PAIN: PATIENT

## 2024-11-18 ENCOUNTER — HOME CARE VISIT (OUTPATIENT)
Dept: HOME HEALTH SERVICES | Facility: HOME HEALTH | Age: 89
End: 2024-11-18
Payer: MEDICARE

## 2024-11-18 PROCEDURE — G0158 HHC OT ASSISTANT EA 15: HCPCS | Mod: CO,HHH

## 2024-11-18 ASSESSMENT — ENCOUNTER SYMPTOMS
PAIN LOCATION - PAIN SEVERITY: 2/10
PAIN LOCATION - PAIN FREQUENCY: CONSTANT
PAIN LOCATION: RIGHT FOOT
DENIES PAIN: 1
PAIN LOCATION - PAIN QUALITY: ACHE
PAIN LOCATION - PAIN DURATION: ONGOING
PERSON REPORTING PAIN: PATIENT

## 2024-11-19 LAB
INR IN PPP BY COAGULATION ASSAY EXTERNAL: 2.2
PROTHROMBIN TIME (PT) IN PPP BY COAGULATION ASSAY EXTERNAL: NORMAL

## 2024-11-20 ENCOUNTER — ANTICOAGULATION - WARFARIN VISIT (OUTPATIENT)
Dept: PRIMARY CARE | Facility: CLINIC | Age: 89
End: 2024-11-20
Payer: MEDICARE

## 2024-11-20 ENCOUNTER — HOME CARE VISIT (OUTPATIENT)
Dept: HOME HEALTH SERVICES | Facility: HOME HEALTH | Age: 89
End: 2024-11-20
Payer: MEDICARE

## 2024-11-20 VITALS
DIASTOLIC BLOOD PRESSURE: 68 MMHG | OXYGEN SATURATION: 97 % | SYSTOLIC BLOOD PRESSURE: 110 MMHG | HEART RATE: 65 BPM | TEMPERATURE: 97.4 F

## 2024-11-20 PROCEDURE — G0152 HHCP-SERV OF OT,EA 15 MIN: HCPCS | Mod: HHH

## 2024-11-20 ASSESSMENT — ENCOUNTER SYMPTOMS
PAIN LOCATION: RIGHT FOOT
PAIN: 1
PAIN LOCATION - EXACERBATING FACTORS: MOVEMENT
PAIN LOCATION - RELIEVING FACTORS: NOTHING
HIGHEST PAIN SEVERITY IN PAST 24 HOURS: 4/10
PERSON REPORTING PAIN: PATIENT
PAIN LOCATION - PAIN DURATION: DAILY
PAIN LOCATION - PAIN QUALITY: ACHING
PAIN LOCATION - PAIN SEVERITY: 2/10
PAIN LOCATION - PAIN FREQUENCY: INTERMITTENT

## 2024-11-20 ASSESSMENT — ACTIVITIES OF DAILY LIVING (ADL)
GROOMING_WITHIN_DEFINED_LIMITS: 1
HOME_HEALTH_OASIS: 00
WASHING_UPB_CURRENT_FUNCTION: INDEPENDENT
WASHING_LB_CURRENT_FUNCTION: INDEPENDENT
FEEDING_WITHIN_DEFINED_LIMITS: 1
OASIS_M1830: 01

## 2024-11-22 DIAGNOSIS — I47.29 NSVT (NONSUSTAINED VENTRICULAR TACHYCARDIA) (MULTI): ICD-10-CM

## 2024-11-22 RX ORDER — METOPROLOL SUCCINATE 25 MG/1
25 TABLET, EXTENDED RELEASE ORAL DAILY
Qty: 90 TABLET | Refills: 3 | Status: SHIPPED | OUTPATIENT
Start: 2024-11-22

## 2024-11-22 NOTE — TELEPHONE ENCOUNTER
----- Message from Nurse Carmen CHEN sent at 11/22/2024 10:21 AM EST -----  Regarding: Refill  Patient is requesting a refill of metoprolol 25 mg to be sent to [facility]

## 2024-11-27 ENCOUNTER — ANTICOAGULATION - WARFARIN VISIT (OUTPATIENT)
Dept: PRIMARY CARE | Facility: CLINIC | Age: 89
End: 2024-11-27
Payer: MEDICARE

## 2024-12-03 LAB
INR IN PPP BY COAGULATION ASSAY EXTERNAL: 3
PROTHROMBIN TIME (PT) IN PPP BY COAGULATION ASSAY EXTERNAL: NORMAL

## 2024-12-04 ENCOUNTER — TELEPHONE (OUTPATIENT)
Dept: NEUROLOGY | Facility: HOSPITAL | Age: 89
End: 2024-12-04
Payer: MEDICARE

## 2024-12-04 NOTE — TELEPHONE ENCOUNTER
Wife requests refill of Gabapentin 100 mg caps to be taken as 2 caps (200 mg) along with the 300 mg cap Gabapentin at hs please.  [facility].  Thank you.

## 2024-12-05 ENCOUNTER — APPOINTMENT (OUTPATIENT)
Dept: CARDIOLOGY | Facility: HOSPITAL | Age: 89
End: 2024-12-05
Payer: MEDICARE

## 2024-12-05 ENCOUNTER — ANTICOAGULATION - WARFARIN VISIT (OUTPATIENT)
Dept: PRIMARY CARE | Facility: CLINIC | Age: 89
End: 2024-12-05

## 2024-12-05 ENCOUNTER — APPOINTMENT (OUTPATIENT)
Dept: UROLOGY | Facility: CLINIC | Age: 89
End: 2024-12-05
Payer: MEDICARE

## 2024-12-05 DIAGNOSIS — G25.81 RESTLESS LEGS SYNDROME: ICD-10-CM

## 2024-12-05 DIAGNOSIS — E11.42 DIABETIC PERIPHERAL NEUROPATHY (MULTI): ICD-10-CM

## 2024-12-05 RX ORDER — GABAPENTIN 100 MG/1
CAPSULE ORAL
Qty: 60 CAPSULE | Refills: 1 | Status: SHIPPED | OUTPATIENT
Start: 2024-12-05

## 2024-12-09 ENCOUNTER — APPOINTMENT (OUTPATIENT)
Dept: UROLOGY | Facility: CLINIC | Age: 89
End: 2024-12-09
Payer: MEDICARE

## 2024-12-09 DIAGNOSIS — N40.1 BENIGN PROSTATIC HYPERPLASIA WITH LOWER URINARY TRACT SYMPTOMS, SYMPTOM DETAILS UNSPECIFIED: ICD-10-CM

## 2024-12-09 PROCEDURE — 51702 INSERT TEMP BLADDER CATH: CPT | Performed by: UROLOGY

## 2024-12-09 PROCEDURE — 1123F ACP DISCUSS/DSCN MKR DOCD: CPT | Performed by: UROLOGY

## 2024-12-09 PROCEDURE — 1157F ADVNC CARE PLAN IN RCRD: CPT | Performed by: UROLOGY

## 2024-12-09 PROCEDURE — 99024 POSTOP FOLLOW-UP VISIT: CPT | Performed by: UROLOGY

## 2024-12-09 NOTE — PROGRESS NOTES
Patient here today for 4 wk indwelling bower catheter change. Removed and replaced with 16 Guatemalan Bower 10cc balloon. Patient prepped with iodine. Catheter draining clear yellow urine and able to be flushed appropriately. Patient tolerated well. Patient to return in 4 wks for next catheter change. Tpotts NR-CMA

## 2024-12-10 ENCOUNTER — ANTICOAGULATION - WARFARIN VISIT (OUTPATIENT)
Dept: PRIMARY CARE | Facility: CLINIC | Age: 89
End: 2024-12-10
Payer: MEDICARE

## 2024-12-16 DIAGNOSIS — E78.00 PURE HYPERCHOLESTEROLEMIA: ICD-10-CM

## 2024-12-16 DIAGNOSIS — I10 PRIMARY HYPERTENSION: ICD-10-CM

## 2024-12-16 DIAGNOSIS — I42.9 CARDIOMYOPATHY, UNSPECIFIED TYPE (MULTI): ICD-10-CM

## 2024-12-16 DIAGNOSIS — E78.5 DYSLIPIDEMIA: ICD-10-CM

## 2024-12-16 DIAGNOSIS — Z79.01 WARFARIN ANTICOAGULATION: ICD-10-CM

## 2024-12-16 DIAGNOSIS — I25.10 ASHD (ARTERIOSCLEROTIC HEART DISEASE): ICD-10-CM

## 2024-12-16 DIAGNOSIS — N18.9 CHRONIC KIDNEY DISEASE, UNSPECIFIED CKD STAGE: ICD-10-CM

## 2024-12-16 DIAGNOSIS — I50.32 CHRONIC HEART FAILURE WITH PRESERVED EJECTION FRACTION: ICD-10-CM

## 2024-12-16 DIAGNOSIS — I48.19 PERSISTENT ATRIAL FIBRILLATION WITH RVR (MULTI): ICD-10-CM

## 2024-12-16 DIAGNOSIS — E11.9 TYPE 2 DIABETES MELLITUS WITHOUT COMPLICATION, WITHOUT LONG-TERM CURRENT USE OF INSULIN (MULTI): ICD-10-CM

## 2024-12-16 RX ORDER — TORSEMIDE 20 MG/1
20 TABLET ORAL DAILY
Qty: 90 TABLET | Refills: 1 | Status: SHIPPED | OUTPATIENT
Start: 2024-12-16 | End: 2025-12-16

## 2024-12-17 ENCOUNTER — ANTICOAGULATION - WARFARIN VISIT (OUTPATIENT)
Dept: PRIMARY CARE | Facility: CLINIC | Age: 89
End: 2024-12-17
Payer: MEDICARE

## 2024-12-17 LAB
INR IN PPP BY COAGULATION ASSAY EXTERNAL: 2.5
PROTHROMBIN TIME (PT) IN PPP BY COAGULATION ASSAY EXTERNAL: NORMAL

## 2024-12-24 ENCOUNTER — ANTICOAGULATION - WARFARIN VISIT (OUTPATIENT)
Dept: PRIMARY CARE | Facility: CLINIC | Age: 89
End: 2024-12-24
Payer: MEDICARE

## 2024-12-31 ENCOUNTER — TELEPHONE (OUTPATIENT)
Dept: NEUROLOGY | Facility: HOSPITAL | Age: 89
End: 2024-12-31
Payer: MEDICARE

## 2024-12-31 ENCOUNTER — TELEPHONE (OUTPATIENT)
Dept: PRIMARY CARE | Facility: CLINIC | Age: 89
End: 2024-12-31
Payer: MEDICARE

## 2024-12-31 ENCOUNTER — ANTICOAGULATION - WARFARIN VISIT (OUTPATIENT)
Dept: PRIMARY CARE | Facility: CLINIC | Age: 89
End: 2024-12-31
Payer: MEDICARE

## 2024-12-31 DIAGNOSIS — E11.42 DIABETIC PERIPHERAL NEUROPATHY (MULTI): ICD-10-CM

## 2024-12-31 DIAGNOSIS — G25.81 RESTLESS LEGS SYNDROME: ICD-10-CM

## 2024-12-31 RX ORDER — GABAPENTIN 300 MG/1
300 CAPSULE ORAL NIGHTLY
Qty: 30 CAPSULE | Refills: 0 | Status: SHIPPED | OUTPATIENT
Start: 2024-12-31 | End: 2025-01-30

## 2024-12-31 RX ORDER — GABAPENTIN 100 MG/1
100 CAPSULE ORAL NIGHTLY
Qty: 30 CAPSULE | Refills: 0 | Status: SHIPPED | OUTPATIENT
Start: 2024-12-31 | End: 2025-01-30

## 2024-12-31 NOTE — PROGRESS NOTES
Subjective   Patient ID: Michele Maya is a 89 y.o. male who presents for No chief complaint on file..    HPI     Review of Systems    Objective   There were no vitals taken for this visit.    Physical Exam    Assessment/Plan

## 2024-12-31 NOTE — TELEPHONE ENCOUNTER
Wife called for refills of pt's meds:    1) Gabapentin 300 mg q hs     This pt should be taking 300 mg plus another 200 mg (100 mg caps) at hs to equal 500 mg    Should then also need Gabapentin 100 mg caps, take 2 at hs with the 300 mg cap for total 500 mg q hs     Call made and [facility] for pt to clarify dose taking.  Will wait to hear back.    Wife also called for Oxcarbazepine refill but there are enough for 4 more months.    [facility]

## 2024-12-31 NOTE — TELEPHONE ENCOUNTER
I called and spoke to wife who states pt is taking 400 mg Gabapentin at hs, hasn't needed to move up to the 500 mg.    Also notified her that there are Oxcarbazepine refills there already.    Pt is out of Gabapentin.  Trudy, could you send this refill in please?  Thank you.

## 2024-12-31 NOTE — TELEPHONE ENCOUNTER
INR 2.2, per Charanjit's instruction patient to keep same dose, and recheck in 1 week. Patient's wife notified and verbalizes understanding of instructions.

## 2025-01-06 ENCOUNTER — APPOINTMENT (OUTPATIENT)
Dept: UROLOGY | Facility: CLINIC | Age: OVER 89
End: 2025-01-06
Payer: MEDICARE

## 2025-01-06 ENCOUNTER — HOSPITAL ENCOUNTER (OUTPATIENT)
Dept: CARDIOLOGY | Facility: HOSPITAL | Age: OVER 89
Discharge: HOME | End: 2025-01-06
Payer: MEDICARE

## 2025-01-06 DIAGNOSIS — I44.2 ATRIOVENTRICULAR BLOCK, COMPLETE (MULTI): ICD-10-CM

## 2025-01-06 DIAGNOSIS — R33.9 URINARY RETENTION: Primary | ICD-10-CM

## 2025-01-06 DIAGNOSIS — Z95.0 PRESENCE OF CARDIAC PACEMAKER: Primary | ICD-10-CM

## 2025-01-06 DIAGNOSIS — Z95.0 PRESENCE OF CARDIAC PACEMAKER: ICD-10-CM

## 2025-01-06 PROCEDURE — 51702 INSERT TEMP BLADDER CATH: CPT | Performed by: UROLOGY

## 2025-01-06 PROCEDURE — 1159F MED LIST DOCD IN RCRD: CPT | Performed by: UROLOGY

## 2025-01-06 PROCEDURE — 1123F ACP DISCUSS/DSCN MKR DOCD: CPT | Performed by: UROLOGY

## 2025-01-06 PROCEDURE — 93280 PM DEVICE PROGR EVAL DUAL: CPT

## 2025-01-06 PROCEDURE — 1157F ADVNC CARE PLAN IN RCRD: CPT | Performed by: UROLOGY

## 2025-01-06 NOTE — PROGRESS NOTES
Chief Complaint   Patient presents with    cath change      Pt here for a week cath change   Patient here today for 4 wk indwelling bower catheter change. Removed and replaced with 16 Belizean Bower 10cc balloon. Patient prepped with iodine. Catheter draining clear yellow urine and able to be flushed appropriately. Patient tolerated well. Patient to return in 4 wks for next catheter change. Tpotts NR-CMA        Physical Exam     TODAYS LAB RESULTS:      ASSESSMENT&PLAN:      IMPRESSIONS:

## 2025-01-07 ENCOUNTER — ANTICOAGULATION - WARFARIN VISIT (OUTPATIENT)
Dept: PRIMARY CARE | Facility: CLINIC | Age: OVER 89
End: 2025-01-07
Payer: MEDICARE

## 2025-01-08 ENCOUNTER — APPOINTMENT (OUTPATIENT)
Dept: NEUROLOGY | Facility: HOSPITAL | Age: OVER 89
End: 2025-01-08
Payer: MEDICARE

## 2025-01-09 ENCOUNTER — OFFICE VISIT (OUTPATIENT)
Dept: UROLOGY | Facility: CLINIC | Age: OVER 89
End: 2025-01-09
Payer: MEDICARE

## 2025-01-09 ENCOUNTER — APPOINTMENT (OUTPATIENT)
Dept: VASCULAR SURGERY | Facility: CLINIC | Age: OVER 89
End: 2025-01-09
Payer: MEDICARE

## 2025-01-09 VITALS
WEIGHT: 180 LBS | HEART RATE: 76 BPM | BODY MASS INDEX: 25.1 KG/M2 | SYSTOLIC BLOOD PRESSURE: 131 MMHG | DIASTOLIC BLOOD PRESSURE: 71 MMHG

## 2025-01-09 DIAGNOSIS — I73.9 PAD (PERIPHERAL ARTERY DISEASE) (CMS-HCC): Primary | ICD-10-CM

## 2025-01-09 DIAGNOSIS — N40.1 BENIGN PROSTATIC HYPERPLASIA WITH LOWER URINARY TRACT SYMPTOMS, SYMPTOM DETAILS UNSPECIFIED: ICD-10-CM

## 2025-01-09 PROCEDURE — 1036F TOBACCO NON-USER: CPT | Performed by: SURGERY

## 2025-01-09 PROCEDURE — 99024 POSTOP FOLLOW-UP VISIT: CPT | Performed by: UROLOGY

## 2025-01-09 PROCEDURE — 1123F ACP DISCUSS/DSCN MKR DOCD: CPT | Performed by: SURGERY

## 2025-01-09 PROCEDURE — 3078F DIAST BP <80 MM HG: CPT | Performed by: SURGERY

## 2025-01-09 PROCEDURE — 99213 OFFICE O/P EST LOW 20 MIN: CPT | Performed by: SURGERY

## 2025-01-09 PROCEDURE — 1036F TOBACCO NON-USER: CPT | Performed by: UROLOGY

## 2025-01-09 PROCEDURE — 51702 INSERT TEMP BLADDER CATH: CPT | Performed by: UROLOGY

## 2025-01-09 PROCEDURE — 3075F SYST BP GE 130 - 139MM HG: CPT | Performed by: SURGERY

## 2025-01-09 PROCEDURE — 1159F MED LIST DOCD IN RCRD: CPT | Performed by: SURGERY

## 2025-01-09 PROCEDURE — 1157F ADVNC CARE PLAN IN RCRD: CPT | Performed by: UROLOGY

## 2025-01-09 PROCEDURE — 1157F ADVNC CARE PLAN IN RCRD: CPT | Performed by: SURGERY

## 2025-01-09 PROCEDURE — 1123F ACP DISCUSS/DSCN MKR DOCD: CPT | Performed by: UROLOGY

## 2025-01-09 NOTE — PROGRESS NOTES
Subjective   Patient ID: Michele Maya is a 89 y.o. male who presents for Follow-up (6 mo /PAD ).  HPI  Patient is here for follow-up secondary peripheral arterial disease  At this time he is doing very well  No new ulcer sores  No evidence of new rest pain  Patient is ambulatory but minimally but this is essentially at baseline with no changes    Review of Systems  Review of Systems    Constitutional:  no generalized malaise overall feels well, energy levels intact, no complaints specifically noted  HEENT:  No blurry vision, no visual aides noted, no hearing loss no ear ache no nose bleeds noted, no dysphagia, no congestion otherwise no pertinent positives noted  Cardiovascular:  no palpitations, chest pain or heaviness noted, no leg swelling, no numbness or tingling in the lower extremity noted  Respiratory:  no shortness of breath, no productive cough noted, no conversation dyspnea or difficulty breathing  Gastrointestinal:  no abdominal pain, no nausea or vomiting, appetite intact, no bowel irregularities noted  Genitourinary:   no urinary incontinence, frequency or urgency issues noted, no hematuria or burning sensation issues  Musculoskeletal:  No muscle aches or pains, no joint discomfort noted, no back pain noted otherwise feels well  Skin: no ulcerations, skin color issues or wounds upper or lower extremities  Neurologic: no dizziness, no hemiplegia, no hemiparesis, no obvious visual deficits noted  Psychiatric: no depression, no memory loss noted, no suicidal ideation  Endocrine: no weight loss or gain, no temperature concerns hot or cold intolerance  Hemogolotic/Lymphatic: no bruising, excessive bleeding, no swelling in the groins or neck noted    Objective   Physical Exam  Physical exam    Constitutional: alert and in no acute distress verbal  Eyes: No erythema swelling or discharge noted  Neck: supple, symmetric, trachea midline, no masses noted  Cardiovascular: Carotid pulses 2+, no obvious bruit,  no Jugular distension noted, no thrill, heart regular rate, lower extremity vascular exam intact, cap refill <2 sec  Pulmonary:  Bilateral breath sounds intact, clear with rales rhonchi or wheeze  Abdomen: soft non tender, no pulsatile masses noted, no rebound rigidity or guarding noted  Skin: intact warm no abnormal turgor  Psychiatric: alert without any obvious cognitive issues, oriented to person, place, and time    Assessment/Plan   Peripheral chill disease  This is stable at this point  No new symptoms noted  Continue activity and ambulation as tolerated  Follow-up 1 year repeat PVRs at that time.         Richard Burleson DO 01/09/25 2:52 PM

## 2025-01-09 NOTE — PROGRESS NOTES
Patient came in today was not getting any urine output for the last 24 hours.  Catheter was dislodged so we changed his catheter and got urine output ,  Removed and replaced with 16 Persian Short 10cc balloon. Patient prepped with iodine. Catheter draining clear yellow urine and able to be flushed appropriately. Patient tolerated well. Patient to return in 4 wks for next catheter change. Tpotts NR-CMA

## 2025-01-10 ENCOUNTER — LAB (OUTPATIENT)
Dept: LAB | Facility: LAB | Age: OVER 89
End: 2025-01-10
Payer: MEDICARE

## 2025-01-10 DIAGNOSIS — N18.32 CHRONIC KIDNEY DISEASE, STAGE 3B (MULTI): Primary | ICD-10-CM

## 2025-01-10 LAB
ALBUMIN SERPL BCP-MCNC: 4.3 G/DL (ref 3.4–5)
ANION GAP SERPL CALC-SCNC: 15 MMOL/L (ref 10–20)
BUN SERPL-MCNC: 44 MG/DL (ref 6–23)
CALCIUM SERPL-MCNC: 9 MG/DL (ref 8.6–10.3)
CHLORIDE SERPL-SCNC: 107 MMOL/L (ref 98–107)
CO2 SERPL-SCNC: 24 MMOL/L (ref 21–32)
CREAT SERPL-MCNC: 1.8 MG/DL (ref 0.5–1.3)
CREAT UR-MCNC: 34.3 MG/DL (ref 20–370)
EGFRCR SERPLBLD CKD-EPI 2021: 35 ML/MIN/1.73M*2
ERYTHROCYTE [DISTWIDTH] IN BLOOD BY AUTOMATED COUNT: 14.9 % (ref 11.5–14.5)
GLUCOSE SERPL-MCNC: 134 MG/DL (ref 74–99)
HCT VFR BLD AUTO: 44.8 % (ref 41–52)
HGB BLD-MCNC: 14.1 G/DL (ref 13.5–17.5)
MCH RBC QN AUTO: 30.7 PG (ref 26–34)
MCHC RBC AUTO-ENTMCNC: 31.5 G/DL (ref 32–36)
MCV RBC AUTO: 98 FL (ref 80–100)
NRBC BLD-RTO: 0 /100 WBCS (ref 0–0)
PHOSPHATE SERPL-MCNC: 3.5 MG/DL (ref 2.5–4.9)
PLATELET # BLD AUTO: 222 X10*3/UL (ref 150–450)
POTASSIUM SERPL-SCNC: 4.9 MMOL/L (ref 3.5–5.3)
PROT UR-ACNC: 10 MG/DL (ref 5–25)
PROT/CREAT UR: 0.29 MG/MG CREAT (ref 0–0.17)
PTH-INTACT SERPL-MCNC: 172.3 PG/ML (ref 18.5–88)
RBC # BLD AUTO: 4.59 X10*6/UL (ref 4.5–5.9)
SODIUM SERPL-SCNC: 141 MMOL/L (ref 136–145)
WBC # BLD AUTO: 8.1 X10*3/UL (ref 4.4–11.3)

## 2025-01-10 PROCEDURE — 82570 ASSAY OF URINE CREATININE: CPT

## 2025-01-10 PROCEDURE — 82652 VIT D 1 25-DIHYDROXY: CPT

## 2025-01-10 PROCEDURE — 85027 COMPLETE CBC AUTOMATED: CPT

## 2025-01-10 PROCEDURE — 36415 COLL VENOUS BLD VENIPUNCTURE: CPT

## 2025-01-10 PROCEDURE — 83970 ASSAY OF PARATHORMONE: CPT

## 2025-01-10 PROCEDURE — 84156 ASSAY OF PROTEIN URINE: CPT

## 2025-01-10 PROCEDURE — 80069 RENAL FUNCTION PANEL: CPT

## 2025-01-13 LAB — 1,25(OH)2D SERPL-MCNC: 33.1 PG/ML (ref 19.9–79.3)

## 2025-01-14 ENCOUNTER — APPOINTMENT (OUTPATIENT)
Dept: NEUROLOGY | Facility: HOSPITAL | Age: OVER 89
End: 2025-01-14
Payer: MEDICARE

## 2025-01-14 ENCOUNTER — ANTICOAGULATION - WARFARIN VISIT (OUTPATIENT)
Dept: PRIMARY CARE | Facility: CLINIC | Age: OVER 89
End: 2025-01-14
Payer: MEDICARE

## 2025-01-14 LAB
INR IN PPP BY COAGULATION ASSAY EXTERNAL: 2.7
PROTHROMBIN TIME (PT) IN PPP BY COAGULATION ASSAY EXTERNAL: NORMAL

## 2025-01-16 ENCOUNTER — PATIENT OUTREACH (OUTPATIENT)
Dept: PRIMARY CARE | Facility: CLINIC | Age: OVER 89
End: 2025-01-16
Payer: MEDICARE

## 2025-01-20 ENCOUNTER — TELEPHONE (OUTPATIENT)
Dept: NEUROLOGY | Facility: HOSPITAL | Age: OVER 89
End: 2025-01-20
Payer: MEDICARE

## 2025-01-20 NOTE — TELEPHONE ENCOUNTER
Requests a refill of Clonazepam 0.5 mg, 1.5 tabs q hs please to [facility].  Thank you.

## 2025-01-21 ENCOUNTER — ANTICOAGULATION - WARFARIN VISIT (OUTPATIENT)
Dept: PRIMARY CARE | Facility: CLINIC | Age: OVER 89
End: 2025-01-21
Payer: MEDICARE

## 2025-01-22 ENCOUNTER — TELEPHONE (OUTPATIENT)
Dept: NEUROLOGY | Facility: HOSPITAL | Age: OVER 89
End: 2025-01-22
Payer: MEDICARE

## 2025-01-22 DIAGNOSIS — G25.81 RESTLESS LEGS SYNDROME: ICD-10-CM

## 2025-01-22 DIAGNOSIS — Z79.899 LONG-TERM USE OF HIGH-RISK MEDICATION: Primary | ICD-10-CM

## 2025-01-22 DIAGNOSIS — E11.42 DIABETIC PERIPHERAL NEUROPATHY (MULTI): ICD-10-CM

## 2025-01-22 RX ORDER — CLONAZEPAM 0.5 MG/1
TABLET ORAL
Qty: 45 TABLET | Refills: 0 | Status: SHIPPED | OUTPATIENT
Start: 2025-01-22

## 2025-01-22 NOTE — TELEPHONE ENCOUNTER
I called wife and clarified the plan to sign CSA and do UDS.  He will do these 2 things at next appt 2/10/25.    30 day Rx sent by Dr Castellanos 1/22/25.  These 2 things need to be done before 2/22/25.    Verbalized understanding.

## 2025-01-22 NOTE — TELEPHONE ENCOUNTER
1/22/25 9:05am  I called pt, still in bed, apparently sick. I spoke to wife Maggie (she comes with him to all appointments).  I received rx request for clonazepam refill.  I called and spoke with her today over the phone about need for updated controlled substance agreement. Pt is on clonazepam from me. I doublechecked patient's listed address in the chart to be patient's correct mailing address. Discussed either update and sign by mail, or patient stops by office. her will fill one month now, this needs to happen prior to next fill of controlled substance, otherwise will not refill next. Also will need UDS done within one month. OARRS checked. Pt expressed understanding.    Dr Castellanos

## 2025-01-28 ENCOUNTER — TELEPHONE (OUTPATIENT)
Dept: PRIMARY CARE | Facility: CLINIC | Age: OVER 89
End: 2025-01-28
Payer: MEDICARE

## 2025-01-28 ENCOUNTER — TELEPHONE (OUTPATIENT)
Dept: NEUROLOGY | Facility: HOSPITAL | Age: OVER 89
End: 2025-01-28
Payer: MEDICARE

## 2025-01-28 DIAGNOSIS — E11.9 TYPE 2 DIABETES MELLITUS WITHOUT COMPLICATION, WITHOUT LONG-TERM CURRENT USE OF INSULIN (MULTI): ICD-10-CM

## 2025-01-28 RX ORDER — BLOOD SUGAR DIAGNOSTIC
1 STRIP MISCELLANEOUS 2 TIMES DAILY
Qty: 90 STRIP | Refills: 3 | Status: SHIPPED | OUTPATIENT
Start: 2025-01-28

## 2025-01-28 RX ORDER — GABAPENTIN 100 MG/1
100 CAPSULE ORAL NIGHTLY
Qty: 30 CAPSULE | Refills: 0 | Status: SHIPPED | OUTPATIENT
Start: 2025-01-28 | End: 2025-02-27

## 2025-01-28 RX ORDER — GABAPENTIN 300 MG/1
300 CAPSULE ORAL NIGHTLY
Qty: 30 CAPSULE | Refills: 0 | Status: SHIPPED | OUTPATIENT
Start: 2025-01-28 | End: 2025-02-27

## 2025-01-28 NOTE — TELEPHONE ENCOUNTER
VMML:    Is pt taking Gabapentin 400 mg total q hs or is he up to 500 mg q hs at this time?    Wife Maggie LVMM yesterday for refills of both 300 mg and 100 mg Gabapentins to be sent to [facility].  Need clarification first.    Wife called back and pt is taking 400 mg q hs.    Thank you Trudy.

## 2025-01-28 NOTE — TELEPHONE ENCOUNTER
Refill on One Touch test trips (tests 2 times a day)    To [facility]

## 2025-01-30 ENCOUNTER — ANTICOAGULATION - WARFARIN VISIT (OUTPATIENT)
Dept: PRIMARY CARE | Facility: CLINIC | Age: OVER 89
End: 2025-01-30
Payer: MEDICARE

## 2025-02-03 DIAGNOSIS — I25.10 ASHD (ARTERIOSCLEROTIC HEART DISEASE): ICD-10-CM

## 2025-02-03 NOTE — TELEPHONE ENCOUNTER
----- Message from Nurse Carmen CHEN sent at 2/3/2025  9:44 AM EST -----  Regarding: Refill  Patient is requesting a refill of atorvastatin 20 mg to be sent to [facility]

## 2025-02-04 ENCOUNTER — ANTICOAGULATION - OTHER VISIT (DOAC) (OUTPATIENT)
Dept: PRIMARY CARE | Facility: CLINIC | Age: OVER 89
End: 2025-02-04
Payer: MEDICARE

## 2025-02-04 ENCOUNTER — ANTICOAGULATION - WARFARIN VISIT (OUTPATIENT)
Dept: PRIMARY CARE | Facility: CLINIC | Age: OVER 89
End: 2025-02-04

## 2025-02-04 LAB
INR IN PPP BY COAGULATION ASSAY EXTERNAL: 2.3
POC INR: 2.3
POC PROTHROMBIN TIME: NORMAL
PROTHROMBIN TIME (PT) IN PPP BY COAGULATION ASSAY EXTERNAL: NORMAL

## 2025-02-04 PROCEDURE — 85610 PROTHROMBIN TIME: CPT | Performed by: NURSE PRACTITIONER

## 2025-02-04 NOTE — TELEPHONE ENCOUNTER
----- Message from Nurse Carmen CHEN sent at 2/3/2025  9:44 AM EST -----  Regarding: Refill  Patient is requesting a refill of atorvastatin 20 mg to be sent to [facility]

## 2025-02-05 RX ORDER — ATORVASTATIN CALCIUM 20 MG/1
20 TABLET, FILM COATED ORAL DAILY
Qty: 30 TABLET | Refills: 0 | Status: SHIPPED | OUTPATIENT
Start: 2025-02-05 | End: 2025-03-07

## 2025-02-05 NOTE — TELEPHONE ENCOUNTER
----- Message from Nurse Carmen CHEN sent at 2/3/2025  9:44 AM EST -----  Regarding: Refill  Patient is requesting a refill of atorvastatin 20 mg to be sent to [facility]

## 2025-02-05 NOTE — TELEPHONE ENCOUNTER
Rn called pt at this time to have him get lab work complete for refills at this time. Pts wife verbalized understanding.

## 2025-02-06 ENCOUNTER — APPOINTMENT (OUTPATIENT)
Dept: UROLOGY | Facility: CLINIC | Age: OVER 89
End: 2025-02-06
Payer: MEDICARE

## 2025-02-06 DIAGNOSIS — N40.1 BENIGN PROSTATIC HYPERPLASIA WITH LOWER URINARY TRACT SYMPTOMS, SYMPTOM DETAILS UNSPECIFIED: ICD-10-CM

## 2025-02-06 PROCEDURE — 1157F ADVNC CARE PLAN IN RCRD: CPT | Performed by: UROLOGY

## 2025-02-06 PROCEDURE — 51702 INSERT TEMP BLADDER CATH: CPT | Performed by: UROLOGY

## 2025-02-06 PROCEDURE — 1123F ACP DISCUSS/DSCN MKR DOCD: CPT | Performed by: UROLOGY

## 2025-02-06 PROCEDURE — 99024 POSTOP FOLLOW-UP VISIT: CPT | Performed by: UROLOGY

## 2025-02-06 NOTE — PROGRESS NOTES
Patient here today for 4 wk indwelling bower catheter change. Removed and replaced with 16 Romanian Bower 10cc balloon. Patient prepped with iodine. Catheter draining clear yellow urine and able to be flushed appropriately. Patient tolerated well. Patient to return in 4 wks for next catheter change. Tpotts NR-CMA

## 2025-02-10 ENCOUNTER — OFFICE VISIT (OUTPATIENT)
Dept: NEUROLOGY | Facility: HOSPITAL | Age: OVER 89
End: 2025-02-10
Payer: MEDICARE

## 2025-02-10 VITALS — DIASTOLIC BLOOD PRESSURE: 75 MMHG | HEART RATE: 65 BPM | SYSTOLIC BLOOD PRESSURE: 113 MMHG

## 2025-02-10 DIAGNOSIS — E11.42 DIABETIC PERIPHERAL NEUROPATHY (MULTI): ICD-10-CM

## 2025-02-10 DIAGNOSIS — Z79.899 LONG-TERM USE OF HIGH-RISK MEDICATION: ICD-10-CM

## 2025-02-10 DIAGNOSIS — G25.81 RESTLESS LEGS SYNDROME: Primary | ICD-10-CM

## 2025-02-10 PROCEDURE — G2211 COMPLEX E/M VISIT ADD ON: HCPCS | Performed by: PSYCHIATRY & NEUROLOGY

## 2025-02-10 PROCEDURE — 99214 OFFICE O/P EST MOD 30 MIN: CPT | Performed by: PSYCHIATRY & NEUROLOGY

## 2025-02-10 PROCEDURE — 3074F SYST BP LT 130 MM HG: CPT | Performed by: PSYCHIATRY & NEUROLOGY

## 2025-02-10 PROCEDURE — 1157F ADVNC CARE PLAN IN RCRD: CPT | Performed by: PSYCHIATRY & NEUROLOGY

## 2025-02-10 PROCEDURE — 1036F TOBACCO NON-USER: CPT | Performed by: PSYCHIATRY & NEUROLOGY

## 2025-02-10 PROCEDURE — 1159F MED LIST DOCD IN RCRD: CPT | Performed by: PSYCHIATRY & NEUROLOGY

## 2025-02-10 PROCEDURE — 1126F AMNT PAIN NOTED NONE PRSNT: CPT | Performed by: PSYCHIATRY & NEUROLOGY

## 2025-02-10 PROCEDURE — 1160F RVW MEDS BY RX/DR IN RCRD: CPT | Performed by: PSYCHIATRY & NEUROLOGY

## 2025-02-10 PROCEDURE — 1123F ACP DISCUSS/DSCN MKR DOCD: CPT | Performed by: PSYCHIATRY & NEUROLOGY

## 2025-02-10 PROCEDURE — 3078F DIAST BP <80 MM HG: CPT | Performed by: PSYCHIATRY & NEUROLOGY

## 2025-02-10 RX ORDER — GABAPENTIN 300 MG/1
300 CAPSULE ORAL NIGHTLY
Qty: 90 CAPSULE | Refills: 1 | Status: SHIPPED | OUTPATIENT
Start: 2025-02-10 | End: 2025-08-09

## 2025-02-10 RX ORDER — VIT C/E/ZN/COPPR/LUTEIN/ZEAXAN 250MG-90MG
CAPSULE ORAL DAILY
COMMUNITY

## 2025-02-10 RX ORDER — LANOLIN ALCOHOL/MO/W.PET/CERES
CREAM (GRAM) TOPICAL DAILY
COMMUNITY

## 2025-02-10 RX ORDER — OXCARBAZEPINE 150 MG/1
TABLET, FILM COATED ORAL
Qty: 270 TABLET | Refills: 1 | Status: SHIPPED | OUTPATIENT
Start: 2025-02-10

## 2025-02-10 RX ORDER — GABAPENTIN 100 MG/1
100 CAPSULE ORAL NIGHTLY
Qty: 90 CAPSULE | Refills: 1 | Status: SHIPPED | OUTPATIENT
Start: 2025-02-10 | End: 2025-08-09

## 2025-02-10 ASSESSMENT — ENCOUNTER SYMPTOMS
OCCASIONAL FEELINGS OF UNSTEADINESS: 0
DEPRESSION: 0
LOSS OF SENSATION IN FEET: 1

## 2025-02-10 ASSESSMENT — PAIN SCALES - GENERAL: PAINLEVEL_OUTOF10: 0-NO PAIN

## 2025-02-10 NOTE — PATIENT INSTRUCTIONS
Continue /300--erx'd  Continue clonazepam 0.5mg take 1.5 tabs at bedtime--no rx today  Continue gabapentin 400mg at bedtime--erx'd  Do UDS--needed before next fill of clonazepam    Rtc 3-4 mo

## 2025-02-10 NOTE — PROGRESS NOTES
"Follow-up visit    Visit type: Follow-up    PCP: Charanjit Mujica, APRN-CNP.    Subjective     Michele Maya is a 89 y.o. year old male here for follow-up. Last seen 10/8/24.     Patient is accompanied by: spouse.       Restless Legs    Neuropathy      I first saw him 4/24/19 for neuropathy. Also has RLS. Prev saw Dr Estrada, then saw Dr Fan. Has had RLS since about 70 years old, legs move at night, has to rearrange feet, prevents him from falling asleep. Legs move as well during sleep. Once asleep, stays asleep. Was on clonazepam 3mg daily. On clonazepam 1mg qhs now, lowered by Dr Fan. Having some trouble falling asleep. Per wife, nighttime movements same even on lower dose of clonazepam. Ropinirole 0.25mg tried caused leg cramps. Pramipexole 0.125mg caused lightheadedness. Has \"very bad neuropathy\" in feet, presumed to be diabetic in origin. Having trouble in calves, muscles are tight in morning. NCT done? States has tried \"everything for neuropathy\". When asked to specify, tried gabapentin, pregabalin, and amitriptyline in past, didn't help. Tried duloxetine didn't help. Has tried \"seizure medications\". Has had back pain. s/p epidural injection and \"didn't work\". On warfarin for afib. CPK/ferritin wnl. Venlafaxine didn't help. Baclofen tried, had behavioral changes, irritable, swearing. Alpha lipoic acid tried didn't help. Horizant never tried due to expense. On B12. I tried to lower clonazepam to 0.25mg qhs, but pt had difficulty and dose increased back to 0.5mg qhs. Horizant 300mg once daily tried thru cash-pay program as well, had more leg movements. Was to continue clonazepam 0.5mg qhs. He was tested for YAYA by PCP and was ordered CPAP. Didn't tolerate. I offered to follow-up for CPAP but pt didn't want to--pt refused YAYA treatment. s/p epidural blocks in the past. Discussed Inspire therapy, not interested. EMG/NCT confirmed neuropathy--pt has tried and failed symptomatic neuropathy " treatments in the past and didn't want anymore treatment. Had seen vascular surgery, suspected vascular claudication, high risk for contrast-induced nephropathy and instrumentation. He is on clonazepam 0.75mg qhs. Previously discussed about use of oxcarbazepine for neuropathic help, prescribed, but evidently did not try. B12 level wnl. He sees vascular surgery for what was thought to be vascular claudication. I restarted trial of OXC, and increased dose to 150/300 and continue clonazepam 0.5mg 1.5 tabs at bedtime. Last visit was on GBP 300mg at bedtime, dose increased to 400mg at bedtime.    Comes in today for follow-up. On wheelchair.    Has had fall 10/18/24 at home. Per wife and PCP, thinks was TIA. On warfarin. Head CT wo and CT c-spine wo with no acute findings.    On GBP 400mg at bedtime, on clonazepam 0.5mg 1.5 tabs at bedtime, on /300. Doing well. Denies SE. Didn't think meds contributed to fall.    Controlled substance agreement for benzo turned in today. Still waiting for urine drug screen. Now has bower catheter. I reminded pt/wife need UDS done.      Patient Active Problem List   Diagnosis    Aortoiliac occlusive disease (Multi)    ASHD (arteriosclerotic heart disease)    Atrial fibrillation (Multi)    BPH (benign prostatic hyperplasia)    Cardiac defibrillator in place    History of cardiomyopathy    Chronic combined systolic and diastolic heart failure, NYHA class 2    Chronic insomnia    Chronic painful diabetic neuropathy (Multi)    Claudication, intermittent (CMS-[facility])    Diabetes mellitus, type 2 (Multi)    Dyslipidemia    Diabetic peripheral neuropathy (Multi)    YAYA (obstructive sleep apnea)    Persistent atrial fibrillation with RVR (Multi)    PVD (peripheral vascular disease) (CMS-[facility])    Restless legs syndrome    SOBOE (shortness of breath on exertion)    Arthritis    Urinary incontinence    Acquired deformity of toe    Callus of toe    Diabetic polyneuropathy associated with type 2  diabetes mellitus (Multi)    History of malignant neoplasm    Hyperlipidemia    Sensorineural hearing loss, bilateral    Mixed conductive and sensorineural hearing loss of both ears    Onychomycosis    Other idiopathic peripheral autonomic neuropathy    Pain in left shoulder    Shoulder pain    Primary hypertension    Stage 3b chronic kidney disease (Multi)    Tinea    Vitamin B12 deficiency    Dilated cardiomyopathy (Multi)    Chronic constipation    Neck pain    Chronic atrial fibrillation (Multi)    Presence of cardiac pacemaker    Peripheral vascular disease, unspecified (CMS-HCC)    Medicare annual wellness visit, subsequent    Flu vaccine need    Advance directive in chart    Warfarin anticoagulation    CKD (chronic kidney disease)    (HFpEF) heart failure with preserved ejection fraction    Daytime somnolence    History of atrial fibrillation    History of transient ischemic attack    Sprain of knee    Gastroesophageal reflux disease    Arteriosclerosis of coronary artery    Chronic combined systolic and diastolic heart failure    Intermittent claudication (CMS-HCC)    Chronic obstructive pulmonary disease (Multi)    Cough, unspecified    Subacute cough    Sensorineural hearing loss (SNHL) of both ears    Obstructive sleep apnea syndrome    Idiopathic peripheral autonomic neuropathy    Pain of left shoulder region    Hypoxia    History of malignant melanoma of skin    Sterile pyuria    Fall, initial encounter    Hospital discharge follow-up    Vitamin D deficiency    CKD (chronic kidney disease) stage 4, GFR 15-29 ml/min (Multi)       Allergies   Allergen Reactions    Pentoxifylline Rash and Other     Burning blisters    Other Unknown    Amiodarone Diarrhea    Baclofen Unknown    Carvedilol Other     fatigue    Cilostazol Unknown    Flecainide Unknown    Quinidine Unknown    Simvastatin Other     Extreme fatigue       Current Outpatient Medications:     atorvastatin (Lipitor) 20 mg tablet, Take 1 tablet (20  mg) by mouth once daily., Disp: 30 tablet, Rfl: 0    blood sugar diagnostic (OneTouch Ultra Test) strip, Inject 1 strip as directed 2 times a day., Disp: 90 strip, Rfl: 3    budesonide-glycopyr-formoterol (BREZTRI) 160-9-4.8 mcg/actuation HFA aerosol inhaler, Inhale 2 puffs 2 times a day., Disp: , Rfl:     clonazePAM (KlonoPIN) 0.5 mg tablet, TAKE 1.5 TABLET at Bedtime, Disp: 45 tablet, Rfl: 0    empagliflozin (Jardiance) 10 mg, Take 1 tablet (10 mg) by mouth once daily., Disp: 30 tablet, Rfl: 11    FreeStyle glucose monitoring kit, 1 each if needed., Disp: , Rfl:     gabapentin (Neurontin) 100 mg capsule, Take 1 capsule (100 mg) by mouth once daily at bedtime. With 300 mg for 400 mg total, Disp: 30 capsule, Rfl: 0    gabapentin (Neurontin) 300 mg capsule, Take 1 capsule (300 mg) by mouth once daily at bedtime., Disp: 30 capsule, Rfl: 0    glipiZIDE XL (Glucotrol XL) 2.5 mg 24 hr tablet, Take 2 tablets (5 mg) by mouth once daily., Disp: 60 tablet, Rfl: 11    lancets (OneTouch UltraSoft Lancets) misc, test twice a day, Disp: 90 each, Rfl: 2    lisinopril 5 mg tablet, Take 1 tablet (5 mg) by mouth once daily. As directed, Disp: 90 tablet, Rfl: 1    metoprolol succinate XL (Toprol-XL) 25 mg 24 hr tablet, Take 1 tablet (25 mg) by mouth once daily. Do not crush or chew., Disp: 90 tablet, Rfl: 3    mv-min/FA/vit K/lutein/zeaxant (PRESERVISION AREDS 2 PLUS MV ORAL), Take 2 tablets by mouth once daily., Disp: , Rfl:     omeprazole (PriLOSEC) 40 mg DR capsule, Take 1 capsule (40 mg) by mouth once daily in the morning. Take before meals., Disp: 30 capsule, Rfl: 11    OXcarbazepine (Trileptal) 150 mg tablet, Take 150 mg every morning and 300 mg every evening, Disp: 270 tablet, Rfl: 1    torsemide (Demadex) 20 mg tablet, Take 1 tablet (20 mg) by mouth once daily., Disp: 90 tablet, Rfl: 1    vitamin B complex/folic acid (B COMPLEX 100 ORAL), Take 1 tablet by mouth once daily., Disp: , Rfl:     warfarin (Coumadin) 5 mg tablet,  Take 1.5 tablets (7.5 mg) by mouth once daily in the evening., Disp: 180 tablet, Rfl: 2     Objective     /75   Pulse 65        Awake, alert, oriented, in no distress  Well-nourished, on wheelchair    Mental status exam as above, conversant   Full EOMs intact, no nystagmus, no ptosis   No facial droop   Hearing grossly intact   No dysarthria    Motor strength is at least antigravity on all extremities  I did not have him stand or walk    Lab Results   Component Value Date    WBC 8.1 01/10/2025    HGB 14.1 01/10/2025    HCT 44.8 01/10/2025    MCV 98 01/10/2025     01/10/2025     Lab Results   Component Value Date    GLUCOSE 134 (H) 01/10/2025    CALCIUM 9.0 01/10/2025     01/10/2025    K 4.9 01/10/2025    CO2 24 01/10/2025     01/10/2025    BUN 44 (H) 01/10/2025    CREATININE 1.80 (H) 01/10/2025     Lab Results   Component Value Date    HGBA1C 7.9 07/19/2021      Lab Results   Component Value Date    EPENBFWM06 634 04/30/2024       Assessment/Plan     1. Vascular claudication  2. Peripheral neuropathy, likely diabetic, as stated  Has pacemaker  No records  On /300 (300mg bid wasn't more helpful), continue--erx'd  s/p multiple failed symptomatic neuropathic treatment in past    On GBP 400mg at bedtime (300mg didn't help, didn't try 500mg at bedtime), continue--erx'd    3. RLS  4. PLMs  Ferritin/CPK wnl  Likely secondary RLS  PSG showed severe PLMs--pt has already tried and failed multiple meds including creams  On clonazepam 0.75mg qhs, continue--no rx today  UDS pending    5. YAYA  YAYA, mild with sleep-related hypoxia  CPAP titration study showed hypoxemia improved with PAP therapy  On CPAP but couldn't tolerate (even at low pressures)--pt has stopped using and adamant on not using  Other options, including oral appliance (thru dental evaluation) and/or Inspire upper airway stimulation surgery previously discussed  Not discussed today      Plans:  Continue /300--erx'd  Continue  clonazepam 0.5mg take 1.5 tabs at bedtime--no rx today  Continue gabapentin 400mg at bedtime--erx'd  Do UDS--needed before next fill of clonazepam    Rtc 3-4 mo    All questions were answered.  Pt knows how to contact my office in case pt has any questions or concerns.    Willian Castellanos MD

## 2025-02-11 ENCOUNTER — ANTICOAGULATION - WARFARIN VISIT (OUTPATIENT)
Dept: PRIMARY CARE | Facility: CLINIC | Age: OVER 89
End: 2025-02-11
Payer: MEDICARE

## 2025-02-11 LAB
INR IN PPP BY COAGULATION ASSAY EXTERNAL: 3.2
PROTHROMBIN TIME (PT) IN PPP BY COAGULATION ASSAY EXTERNAL: NORMAL

## 2025-02-11 NOTE — PROGRESS NOTES
Left detailed message for Maggie on VM. Shannon is to hold Coumadin for 2 days, then resume current dosing and recheck in 1 week.

## 2025-02-15 LAB
ALBUMIN SERPL-MCNC: 4.6 G/DL (ref 3.6–5.1)
ALP SERPL-CCNC: 91 U/L (ref 35–144)
ALT SERPL-CCNC: 22 U/L (ref 9–46)
AMPHETAMINES UR QL: NEGATIVE NG/ML
ANION GAP SERPL CALCULATED.4IONS-SCNC: 7 MMOL/L (CALC) (ref 7–17)
AST SERPL-CCNC: 20 U/L (ref 10–35)
BARBITURATES UR QL: NEGATIVE NG/ML
BENZODIAZ UR QL: NEGATIVE NG/ML
BILIRUB SERPL-MCNC: 0.6 MG/DL (ref 0.2–1.2)
BNP SERPL-MCNC: NORMAL PG/ML
BUN SERPL-MCNC: 45 MG/DL (ref 7–25)
BZE UR QL: NEGATIVE NG/ML
CALCIUM SERPL-MCNC: 9.5 MG/DL (ref 8.6–10.3)
CHLORIDE SERPL-SCNC: 103 MMOL/L (ref 98–110)
CHOLEST SERPL-MCNC: 133 MG/DL
CHOLEST/HDLC SERPL: 3.6 (CALC)
CO2 SERPL-SCNC: 29 MMOL/L (ref 20–32)
CREAT SERPL-MCNC: 1.95 MG/DL (ref 0.7–1.22)
CREAT UR-MCNC: 12.1 MG/DL
EGFRCR SERPLBLD CKD-EPI 2021: 32 ML/MIN/1.73M2
ERYTHROCYTE [DISTWIDTH] IN BLOOD BY AUTOMATED COUNT: 13.1 % (ref 11–15)
GLUCOSE SERPL-MCNC: 150 MG/DL (ref 65–99)
HCT VFR BLD AUTO: 43.4 % (ref 38.5–50)
HDLC SERPL-MCNC: 37 MG/DL
HGB BLD-MCNC: 14.8 G/DL (ref 13.2–17.1)
LDLC SERPL CALC-MCNC: 77 MG/DL (CALC)
MAGNESIUM SERPL-MCNC: 2.2 MG/DL (ref 1.5–2.5)
MCH RBC QN AUTO: 32.7 PG (ref 27–33)
MCHC RBC AUTO-ENTMCNC: 34.1 G/DL (ref 32–36)
MCV RBC AUTO: 96 FL (ref 80–100)
METHADONE UR QL: NEGATIVE NG/ML
NONHDLC SERPL-MCNC: 96 MG/DL (CALC)
OPIATES UR QL: NEGATIVE NG/ML
OXIDANTS UR QL: NEGATIVE MCG/ML
OXYCODONE UR QL: NEGATIVE NG/ML
PCP UR QL: NEGATIVE NG/ML
PH UR: 5 [PH] (ref 4.5–9)
PLATELET # BLD AUTO: 200 THOUSAND/UL (ref 140–400)
PMV BLD REES-ECKER: 9.6 FL (ref 7.5–12.5)
POTASSIUM SERPL-SCNC: 4.5 MMOL/L (ref 3.5–5.3)
PROT SERPL-MCNC: 7.4 G/DL (ref 6.1–8.1)
QUEST NOTES AND COMMENTS: ABNORMAL
RBC # BLD AUTO: 4.52 MILLION/UL (ref 4.2–5.8)
SODIUM SERPL-SCNC: 139 MMOL/L (ref 135–146)
SP GR UR: 1.01
THC UR QL: NEGATIVE NG/ML
TRIGL SERPL-MCNC: 108 MG/DL
WBC # BLD AUTO: 6.8 THOUSAND/UL (ref 3.8–10.8)

## 2025-02-17 LAB
ALBUMIN SERPL-MCNC: 4.6 G/DL (ref 3.6–5.1)
ALP SERPL-CCNC: 91 U/L (ref 35–144)
ALT SERPL-CCNC: 22 U/L (ref 9–46)
ANION GAP SERPL CALCULATED.4IONS-SCNC: 7 MMOL/L (CALC) (ref 7–17)
AST SERPL-CCNC: 20 U/L (ref 10–35)
BILIRUB SERPL-MCNC: 0.6 MG/DL (ref 0.2–1.2)
BNP SERPL-MCNC: 81 PG/ML
BUN SERPL-MCNC: 45 MG/DL (ref 7–25)
CALCIUM SERPL-MCNC: 9.5 MG/DL (ref 8.6–10.3)
CHLORIDE SERPL-SCNC: 103 MMOL/L (ref 98–110)
CHOLEST SERPL-MCNC: 133 MG/DL
CHOLEST/HDLC SERPL: 3.6 (CALC)
CO2 SERPL-SCNC: 29 MMOL/L (ref 20–32)
CREAT SERPL-MCNC: 1.95 MG/DL (ref 0.7–1.22)
EGFRCR SERPLBLD CKD-EPI 2021: 32 ML/MIN/1.73M2
ERYTHROCYTE [DISTWIDTH] IN BLOOD BY AUTOMATED COUNT: 13.1 % (ref 11–15)
GLUCOSE SERPL-MCNC: 150 MG/DL (ref 65–99)
HCT VFR BLD AUTO: 43.4 % (ref 38.5–50)
HDLC SERPL-MCNC: 37 MG/DL
HGB BLD-MCNC: 14.8 G/DL (ref 13.2–17.1)
LDLC SERPL CALC-MCNC: 77 MG/DL (CALC)
MAGNESIUM SERPL-MCNC: 2.2 MG/DL (ref 1.5–2.5)
MCH RBC QN AUTO: 32.7 PG (ref 27–33)
MCHC RBC AUTO-ENTMCNC: 34.1 G/DL (ref 32–36)
MCV RBC AUTO: 96 FL (ref 80–100)
NONHDLC SERPL-MCNC: 96 MG/DL (CALC)
PLATELET # BLD AUTO: 200 THOUSAND/UL (ref 140–400)
PMV BLD REES-ECKER: 9.6 FL (ref 7.5–12.5)
POTASSIUM SERPL-SCNC: 4.5 MMOL/L (ref 3.5–5.3)
PROT SERPL-MCNC: 7.4 G/DL (ref 6.1–8.1)
RBC # BLD AUTO: 4.52 MILLION/UL (ref 4.2–5.8)
SODIUM SERPL-SCNC: 139 MMOL/L (ref 135–146)
TRIGL SERPL-MCNC: 108 MG/DL
WBC # BLD AUTO: 6.8 THOUSAND/UL (ref 3.8–10.8)

## 2025-02-18 ENCOUNTER — TELEPHONE (OUTPATIENT)
Dept: NEUROLOGY | Facility: HOSPITAL | Age: OVER 89
End: 2025-02-18
Payer: MEDICARE

## 2025-02-18 DIAGNOSIS — G25.81 RESTLESS LEGS SYNDROME: ICD-10-CM

## 2025-02-18 LAB
INR IN PPP BY COAGULATION ASSAY EXTERNAL: 2.8 (ref 2–?)
PROTHROMBIN TIME (PT) IN PPP BY COAGULATION ASSAY EXTERNAL: NORMAL

## 2025-02-18 RX ORDER — CLONAZEPAM 0.5 MG/1
TABLET ORAL
Qty: 45 TABLET | Refills: 2 | Status: SHIPPED | OUTPATIENT
Start: 2025-02-18

## 2025-02-18 NOTE — TELEPHONE ENCOUNTER
2/18/25 UDS noted, negative including for benzo. Pt is on clonazepam from me.  I called and spoke to wife just now. She confirms pt is taking clonazepam nightly. Now on catheter.  Also needs refill--sent to [facility].  I have personally reviewed the OARRS report for this patient. This report is in the electronic medical record. I have considered the risks of abuse, dependence, addiction and diversion. I believe that it is clinically appropriate for this patient to be prescribed this medication.  Dr hernandez

## 2025-02-19 ENCOUNTER — ANTICOAGULATION - WARFARIN VISIT (OUTPATIENT)
Dept: PRIMARY CARE | Facility: CLINIC | Age: OVER 89
End: 2025-02-19
Payer: MEDICARE

## 2025-02-19 NOTE — PROGRESS NOTES
Patients INR 2.8 Provider reviewed and ordered to continue current regimen and recheck in a week, spouse notified

## 2025-03-02 NOTE — PROGRESS NOTES
[facility]    Patient Name: Michele Maya  Patient : [id number]    Scribe Attestation  By signing my name below, Carolyn ESTRELLA, Sheltonibquin attest that this documentation has been prepared under the direction and in the presence of Rod Milner DO.    Physician Attestation  oRd ESTRELLA DO, personally performed the services described in the documentation as scribed by Carolyn Mcnamara in my presence, and confirm it is both accurate and complete.    Reason for visit:  This is a 89-year-old male here for follow-up regarding HFpEF with CRT-D device, persistent atrial fibrillation, anticoagulation with warfarin managed through his PCP, moderate coronary artery disease as seen on cardiac catheterization done in 2019, hypertension, dyslipidemia, diabetes mellitus, and CKD.     HPI:  This is a 89-year-old male here for follow-up regarding HFpEF with CRT-D device, persistent atrial fibrillation, anticoagulation with warfarin managed through his PCP, moderate coronary artery disease as seen on cardiac catheterization done in 2019, hypertension, dyslipidemia, diabetes mellitus, and CKD.  The patient was last evaluated by me in 2024 at which time I asked that he follow-up and have blood work as previously recommended which included a CMP/lipid/magnesium/CBC/BNP to be drawn prior to his follow-up visit.  CMP done 2025 showed normal serum sodium and potassium with a serum creatinine of 1.95 consistent with known CKD, normal ALT and AST. Serum magnesium was 2.2. BNP was 81.  CBC showed hemoglobin of 14.8.  Lipid panel done in 2025 showed an LDL cholesterol of 77 and triglycerides of 108 on atorvastatin 20 mg daily. Patient was seen by Neurology on 2/10/2025 at which time he was asked to follow-up again in 3 to 4 months.  Patient seen by Vascular Surgery on 2025 at which time he was asked to follow-up again in 1 year. ECG done today  showed ventricularly paced rhythm with a heart rate of 70 bpm.  The patient reports that he has been feeling generally well from the cardiac standpoint. He denies any new chest pain, shortness of breath, palpitations and lightheadedness but reports some lower extremity edema. He states that he takes all of his medications as prescribed. During my exam, he was resting comfortably on the exam table.            Assessment/Plan:   1. HFpEF  The patient has HFpEF which improved after placement of CRT-D device.  Echocardiogram done in June 2024 showed normal left ventricular systolic function with an ejection fraction of 62%, right ventricle not well-visualized, mild aortic valve stenosis with a mean aortic valve gradients 5.0 and a dimensionless index is 0.69.   He does have 1+ pitting bilateral lower extremity edema on exam today.  I will increase torsemide to 20 mg twice daily for 3 days, then reduce back to 20 mg daily.  He should continue his current cardiac medications.  Recent lab works as noted in the HPI.   Lab works as noted below will be done in 6 months prior to his next visit.   I discussed with him the importance of following a low-sodium heart healthy diet, wearing compression stockings and elevating legs when seated.   Follow up in 6 months and sooner if necessary.      2. Persistent atrial fibrillation  The patient has a history of persistent atrial fibrillation/pacemaker who underwent AV node ablation and pacemaker implant in 1996 but subsequently developed cardiomyopathy and had an ICD implanted subsequently transition to CRT-D device.  He is on warfarin for thromboembolism prophylaxis, which should be continued.  Warfarin dosing and INR monitoring is being managed by his PCP.  ECG done today showed ventricularly paced rhythm with a heart rate of 70 bpm.   He denies chest pain, palpitations or lightheadedness.   Echocardiogram done in June 2024 showed normal left ventricular systolic function with an  ejection fraction of 62%, right ventricle not well-visualized, mild aortic valve stenosis with a mean aortic valve gradients 5.0 and a dimensionless index is 0.69.   Recent lab works as noted in the HPI.  Lab works as noted below will be done in 6 months prior to his next visit.   He should continue to follow with the device clinic.  Follow up in 6 months and sooner if necessary.      3. Anticoagulation with warfarin  The patient is on anticoagulation with warfarin for persistent atrial fibrillation.  Warfarin dosing and INR monitoring is being managed by his PCP.  Recent lab works as noted in the HPI.   Lab works as noted below will be done in 6 months prior to his next visit.       4. Coronary artery disease  The patient has a history of moderate coronary artery disease as seen on cardiac catheterization done in December 2019.  ECG done today showed ventricularly paced rhythm with a heart rate of 70 bpm.   He denies anginal chest discomfort.   Blood pressure appears controlled on exam today.  He should continue his current antihypertensive medications.  Echocardiogram done in June 2024 showed normal left ventricular systolic function with an ejection fraction of 62%, right ventricle not well-visualized, mild aortic valve stenosis with a mean aortic valve gradients 5.0 and a dimensionless index is 0.69.   Recent lab works as noted in the HPI.   Lipid panel done in February 2025 showed an LDL cholesterol of 77 and triglycerides of 108 on atorvastatin 20 mg daily.   Lab works as noted below will be done in 6 months prior to his next visit.   Please see lifestyle recommendations below.  Follow up in 6 months and sooner if necessary.      5. Hypertension  The patient has a history of hypertension which appears controlled on exam today.  He should continue his current antihypertensive medications and monitor his blood pressure at home.      6. Dyslipidemia  Lipid panel done in February 2025 showed an LDL cholesterol of  77 and triglycerides of 108 on atorvastatin 20 mg daily.   Please see lifestyle recommendations below.     7. Diabetes mellitus  Stable, medication management per PCP.     8. CKD   Serum creatinine done in February 2025 was 1.95 consistent with known CKD.  Management as per PCP.         Orders:   Increase torsemide to 20 mg twice daily for 3 days, then reduce back to 20 mg daily.  BMP/BNP/CBC/magnesium in 6 months,   Follow-up in 6 months.    Lifestyle Recommendations  I recommend a whole-food plant-based diet, an eating pattern that encourages the consumption of unrefined plant foods (such as fruits, vegetables, tubers, whole grains, legumes, nuts and seeds) and discourages meats, dairy products, eggs and processed foods.     The AHA/ACC recommends that the patient consume a dietary pattern that emphasizes intake of vegetables, fruits, and whole grains; includes low-fat dairy products, poultry, fish, legumes, non-tropical vegetable oils, and nuts; and limits intake of sodium, sweets, sugar-sweetened beverages, and red meats.  Adapt this dietary pattern to appropriate calorie requirements (a 500-750 kcal/day deficit to loose weight), personal and cultural food preferences, and nutrition therapy for other medical conditions (including diabetes).  Achieve this pattern by following plans such as the [place] Mediterranean, DASH dietary pattern, or AHA diet.     Engage in 2 hours and 30 minutes per week of moderate-intensity physical activity, or 1 hour and 15 minutes (75 minutes) per week of vigorous-intensity aerobic physical activity, or an equivalent combination of moderate and vigorous-intensity aerobic physical activity. Aerobic activity should be performed in episodes of at least 10 minutes preferably spread throughout the week.     Adhering to a heart healthy diet, regular exercise habits, avoidance of tobacco products, and maintenance of a healthy weight are crucial components of their heart disease risk  reduction.     Any positive review of systems not specifically addressed in the office visit today should be evaluated and treated by the patients primary care physician or in an emergency department if necessary     Patient was notified that results from ordered tests will be called to the patient if it changes current management; it will otherwise be discussed at a future appointment and available on [facility].     Thank you for allowing me to participate in the care of this patient.        This document was generated using the assistance of voice recognition software. If there are any errors of spelling, grammar, syntax, or meaning; please feel free to contact me directly for clarification.    Past Medical History:  He has a past medical history of Encounter for screening for malignant neoplasm of prostate (06/15/2016), Long term (current) use of anticoagulants (06/15/2016), Personal history of other diseases of the circulatory system, Personal history of other diseases of the respiratory system (11/01/2019), Personal history of other endocrine, nutritional and metabolic disease, Personal history of transient ischemic attack (TIA), and cerebral infarction without residual deficits, and Unspecified atrial fibrillation (Multi) (10/31/2022).    Past Surgical History:  He has a past surgical history that includes Carpal tunnel release (02/15/2016); Cataract extraction (02/15/2016); Hand surgery (02/15/2016); Total knee arthroplasty (05/26/2016); Foot surgery (06/15/2016); Cardiac pacemaker placement (06/08/2017); Cardiac pacemaker placement (06/08/2017); Other surgical history (06/08/2017); Other surgical history (06/08/2017); and Other surgical history (06/08/2017).      Social History:  He reports that he has quit smoking. His smoking use included cigarettes. He has been exposed to tobacco smoke. He has never used smokeless tobacco. He reports that he does not drink alcohol and does not use drugs.    Family  History:  Family History   Problem Relation Name Age of Onset    Heart failure Mother      Diabetes Mother          mellitus    Colon cancer Father      Diabetes Sister          mellitus    Coronary artery disease Brother          Allergies:  Pentoxifylline, Other, Amiodarone, Baclofen, Carvedilol, Cilostazol, Flecainide, Quinidine, and Simvastatin    Outpatient Medications:  Current Outpatient Medications   Medication Instructions    atorvastatin (LIPITOR) 20 mg, oral, Daily    blood sugar diagnostic (OneTouch Ultra Test) strip 1 strip, injection, 2 times daily    budesonide-glycopyr-formoterol (BREZTRI) 160-9-4.8 mcg/actuation HFA aerosol inhaler Inhale 2 puffs 2 times a day.    cholecalciferol (Vitamin D-3) 25 MCG (1000 UT) capsule Daily    clonazePAM (KlonoPIN) 0.5 mg tablet TAKE 1.5 TABLET at Bedtime    cyanocobalamin (Vitamin B-12) 1,000 mcg tablet Daily    empagliflozin (JARDIANCE) 10 mg, oral, Daily    FreeStyle glucose monitoring kit 1 each, As needed    gabapentin (NEURONTIN) 100 mg, oral, Nightly, With 300 mg for 400 mg total    gabapentin (NEURONTIN) 300 mg, oral, Nightly    glipiZIDE XL (GLUCOTROL XL) 5 mg, oral, Daily    lancets (OneTouch UltraSoft Lancets) misc test twice a day    lisinopril 5 mg, oral, Daily, As directed    metoprolol succinate XL (TOPROL-XL) 25 mg, oral, Daily, Do not crush or chew.    mv-min/FA/vit K/lutein/zeaxant (PRESERVISION AREDS 2 PLUS MV ORAL) 2 tablets, Daily    omeprazole (PRILOSEC) 40 mg, oral, Daily before breakfast    OXcarbazepine (Trileptal) 150 mg tablet Take 150 mg every morning and 300 mg every evening    torsemide (DEMADEX) 20 mg, oral, Daily    vitamin B complex/folic acid (B COMPLEX 100 ORAL) Take 1 tablet by mouth once daily.    warfarin (COUMADIN) 7.5 mg, oral, Every evening        ROS:  A 14 point review of systems was done and is negative other than as stated in HPI    Vitals:      11/7/2024     1:42 PM 11/11/2024    11:53 AM 11/11/2024    12:12 PM  11/15/2024    10:52 AM 11/20/2024     1:27 PM 1/9/2025     2:21 PM 2/10/2025     2:42 PM   Vitals   Systolic 122 105 105  110 131 113   Diastolic 61 62 62  68 71 75   BP Location Left arm  Left arm  Right arm     Heart Rate 77 66 55  65 76 65   Temp 36.5 °C (97.7 °F)  36.6 °C (97.9 °F)  36.3 °C (97.4 °F)     Resp 17 18 17 16      Weight (lb)      180    BMI      25.1 kg/m2    BSA (m2)      2.02 m2         Physical Exam:   Constitutional: Cooperative, in no acute distress, alert, appears stated age.  Skin: Skin color, texture, turgor normal. No rashes or lesions.  Head: Normocephalic. No masses, lesions, tenderness or abnormalities  Eyes: Extraocular movements are grossly intact.  Mouth and throat: Mucous membranes moist  Neck: Neck supple, no carotid bruits, no JVD  Respiratory: Lungs clear to auscultation, no wheezing or rhonchi, no use of accessory muscles  Chest wall: PPM, normal excursion with respiration  Cardiovascular: Regular rhythm without murmur  Gastrointestinal: Abdomen soft, nontender. Bowel sounds normal.  Musculoskeletal: Strength equal in upper extremities  Extremities:  1+ pitting pedal edema  Neurologic: Sensation grossly intact, alert and oriented x3    Intake/Output:   No intake/output data recorded.    Outpatient Medications  Current Outpatient Medications on File Prior to Visit   Medication Sig Dispense Refill    atorvastatin (Lipitor) 20 mg tablet Take 1 tablet (20 mg) by mouth once daily. 30 tablet 0    blood sugar diagnostic (OneTouch Ultra Test) strip Inject 1 strip as directed 2 times a day. 90 strip 3    budesonide-glycopyr-formoterol (BREZTRI) 160-9-4.8 mcg/actuation HFA aerosol inhaler Inhale 2 puffs 2 times a day.      cholecalciferol (Vitamin D-3) 25 MCG (1000 UT) capsule Take by mouth once daily.      clonazePAM (KlonoPIN) 0.5 mg tablet TAKE 1.5 TABLET at Bedtime 45 tablet 2    cyanocobalamin (Vitamin B-12) 1,000 mcg tablet Take by mouth once daily.      empagliflozin (Jardiance) 10  mg Take 1 tablet (10 mg) by mouth once daily. 30 tablet 11    FreeStyle glucose monitoring kit 1 each if needed.      gabapentin (Neurontin) 100 mg capsule Take 1 capsule (100 mg) by mouth once daily at bedtime. With 300 mg for 400 mg total 90 capsule 1    gabapentin (Neurontin) 300 mg capsule Take 1 capsule (300 mg) by mouth once daily at bedtime. 90 capsule 1    glipiZIDE XL (Glucotrol XL) 2.5 mg 24 hr tablet Take 2 tablets (5 mg) by mouth once daily. 60 tablet 11    lancets (OneTouch UltraSoft Lancets) misc test twice a day 90 each 2    lisinopril 5 mg tablet Take 1 tablet (5 mg) by mouth once daily. As directed 90 tablet 1    metoprolol succinate XL (Toprol-XL) 25 mg 24 hr tablet Take 1 tablet (25 mg) by mouth once daily. Do not crush or chew. 90 tablet 3    mv-min/FA/vit K/lutein/zeaxant (PRESERVISION AREDS 2 PLUS MV ORAL) Take 2 tablets by mouth once daily.      omeprazole (PriLOSEC) 40 mg DR capsule Take 1 capsule (40 mg) by mouth once daily in the morning. Take before meals. 30 capsule 11    OXcarbazepine (Trileptal) 150 mg tablet Take 150 mg every morning and 300 mg every evening 270 tablet 1    torsemide (Demadex) 20 mg tablet Take 1 tablet (20 mg) by mouth once daily. 90 tablet 1    vitamin B complex/folic acid (B COMPLEX 100 ORAL) Take 1 tablet by mouth once daily.      warfarin (Coumadin) 5 mg tablet Take 1.5 tablets (7.5 mg) by mouth once daily in the evening. 180 tablet 2     No current facility-administered medications on file prior to visit.       Labs: (past 26 weeks)  Recent Results (from the past 26 weeks)   Protime-INR    Collection Time: 09/03/24 12:00 AM   Result Value Ref Range    Protime External      INR External 3.60    Protime-INR    Collection Time: 09/17/24 12:00 AM   Result Value Ref Range    Protime External      INR External 2.80 2.00 - 3.00   Urinalysis with Reflex Culture and Microscopic    Collection Time: 09/18/24  3:06 PM   Result Value Ref Range    Color, Urine Light-Yellow  Light-Yellow, Yellow, Dark-Yellow    Appearance, Urine Clear Clear    Specific Gravity, Urine 1.009 1.005 - 1.035    pH, Urine 5.0 5.0, 5.5, 6.0, 6.5, 7.0, 7.5, 8.0    Protein, Urine NEGATIVE NEGATIVE, 10 (TRACE), 20 (TRACE) mg/dL    Glucose, Urine 500 (3+) (A) Normal mg/dL    Blood, Urine NEGATIVE NEGATIVE    Ketones, Urine NEGATIVE NEGATIVE mg/dL    Bilirubin, Urine NEGATIVE NEGATIVE    Urobilinogen, Urine Normal Normal mg/dL    Nitrite, Urine NEGATIVE NEGATIVE    Leukocyte Esterase, Urine NEGATIVE NEGATIVE   Extra Urine Gray Tube    Collection Time: 09/18/24  3:06 PM   Result Value Ref Range    Extra Tube Hold for add-ons.    Basic Metabolic Panel    Collection Time: 09/19/24 11:56 AM   Result Value Ref Range    Glucose 119 (H) 74 - 99 mg/dL    Sodium 137 136 - 145 mmol/L    Potassium 4.9 3.5 - 5.3 mmol/L    Chloride 104 98 - 107 mmol/L    Bicarbonate 23 21 - 32 mmol/L    Anion Gap 15 10 - 20 mmol/L    Urea Nitrogen 65 (H) 6 - 23 mg/dL    Creatinine 2.45 (H) 0.50 - 1.30 mg/dL    eGFR 24 (L) >60 mL/min/1.73m*2    Calcium 8.4 (L) 8.6 - 10.3 mg/dL   B-Type Natriuretic Peptide    Collection Time: 09/19/24 11:56 AM   Result Value Ref Range     (H) 0 - 99 pg/mL   Magnesium    Collection Time: 09/19/24 11:56 AM   Result Value Ref Range    Magnesium 2.11 1.60 - 2.40 mg/dL   Protime-INR    Collection Time: 09/24/24 12:00 AM   Result Value Ref Range    Protime External      INR External 2.60 2.00 - 3.00   Protime-INR    Collection Time: 10/01/24 12:00 AM   Result Value Ref Range    Protime External      INR External 2.50 2.00 - 3.00   Protime-INR    Collection Time: 10/08/24 12:00 AM   Result Value Ref Range    Protime External      INR External 2.60    Protime-INR    Collection Time: 10/15/24 12:00 AM   Result Value Ref Range    Protime External      INR External 2.70 2.00 - 3.00   ECG 12 Lead    Collection Time: 10/18/24 12:56 PM   Result Value Ref Range    Ventricular Rate 60 BPM    Atrial Rate 0 BPM    OK  Interval 45 ms    QRS Duration 130 ms    QT Interval 440 ms    QTC Calculation(Bazett) 440 ms    R Axis 259 degrees    T Axis 93 degrees    QRS Count 10 beats    Q Onset 251 ms    T Offset 471 ms    QTC Fredericia 440 ms   CBC and Auto Differential    Collection Time: 10/18/24 12:57 PM   Result Value Ref Range    WBC 14.6 (H) 4.4 - 11.3 x10*3/uL    nRBC 0.0 0.0 - 0.0 /100 WBCs    RBC 4.27 (L) 4.50 - 5.90 x10*6/uL    Hemoglobin 13.8 13.5 - 17.5 g/dL    Hematocrit 41.9 41.0 - 52.0 %    MCV 98 80 - 100 fL    MCH 32.3 26.0 - 34.0 pg    MCHC 32.9 32.0 - 36.0 g/dL    RDW 14.0 11.5 - 14.5 %    Platelets 206 150 - 450 x10*3/uL    Neutrophils % 81.6 40.0 - 80.0 %    Immature Granulocytes %, Automated 0.6 0.0 - 0.9 %    Lymphocytes % 7.9 13.0 - 44.0 %    Monocytes % 9.2 2.0 - 10.0 %    Eosinophils % 0.4 0.0 - 6.0 %    Basophils % 0.3 0.0 - 2.0 %    Neutrophils Absolute 11.88 (H) 1.60 - 5.50 x10*3/uL    Immature Granulocytes Absolute, Automated 0.09 0.00 - 0.50 x10*3/uL    Lymphocytes Absolute 1.15 0.80 - 3.00 x10*3/uL    Monocytes Absolute 1.34 (H) 0.05 - 0.80 x10*3/uL    Eosinophils Absolute 0.06 0.00 - 0.40 x10*3/uL    Basophils Absolute 0.04 0.00 - 0.10 x10*3/uL   Protime-INR    Collection Time: 10/18/24 12:57 PM   Result Value Ref Range    Protime 25.0 (H) 9.8 - 12.8 seconds    INR 2.2 (H) 0.9 - 1.1   Comprehensive Metabolic Panel    Collection Time: 10/18/24 12:57 PM   Result Value Ref Range    Glucose 131 (H) 74 - 99 mg/dL    Sodium 138 136 - 145 mmol/L    Potassium 4.6 3.5 - 5.3 mmol/L    Chloride 103 98 - 107 mmol/L    Bicarbonate 24 21 - 32 mmol/L    Anion Gap 16 10 - 20 mmol/L    Urea Nitrogen 61 (H) 6 - 23 mg/dL    Creatinine 2.62 (H) 0.50 - 1.30 mg/dL    eGFR 22 (L) >60 mL/min/1.73m*2    Calcium 8.9 8.6 - 10.3 mg/dL    Albumin 4.2 3.4 - 5.0 g/dL    Alkaline Phosphatase 79 33 - 136 U/L    Total Protein 7.3 6.4 - 8.2 g/dL    AST 19 9 - 39 U/L    Bilirubin, Total 0.7 0.0 - 1.2 mg/dL    ALT 17 10 - 52 U/L   Magnesium     Collection Time: 10/18/24 12:57 PM   Result Value Ref Range    Magnesium 2.01 1.60 - 2.40 mg/dL   B-Type Natriuretic Peptide    Collection Time: 10/18/24 12:57 PM   Result Value Ref Range     (H) 0 - 99 pg/mL   Troponin I, High Sensitivity, Initial    Collection Time: 10/18/24 12:57 PM   Result Value Ref Range    Troponin I, High Sensitivity 35 (H) 0 - 20 ng/L   BLOOD GAS VENOUS FULL PANEL    Collection Time: 10/18/24 12:57 PM   Result Value Ref Range    POCT pH, Venous 7.40 7.33 - 7.43 pH    POCT pCO2, Venous 42 41 - 51 mm Hg    POCT pO2, Venous 23 (L) 35 - 45 mm Hg    POCT SO2, Venous 32 (L) 45 - 75 %    POCT Oxy Hemoglobin, Venous 31.1 (L) 45.0 - 75.0 %    POCT Hematocrit Calculated, Venous 47.0 41.0 - 52.0 %    POCT Sodium, Venous 133 (L) 136 - 145 mmol/L    POCT Potassium, Venous 4.6 3.5 - 5.3 mmol/L    POCT Chloride, Venous 100 98 - 107 mmol/L    POCT Ionized Calicum, Venous 1.15 1.10 - 1.33 mmol/L    POCT Glucose, Venous 135 (H) 74 - 99 mg/dL    POCT Lactate, Venous 1.9 0.4 - 2.0 mmol/L    POCT Base Excess, Venous 0.9 -2.0 - 3.0 mmol/L    POCT HCO3 Calculated, Venous 26.0 22.0 - 26.0 mmol/L    POCT Hemoglobin, Venous 15.5 13.5 - 17.5 g/dL    POCT Anion Gap, Venous 12.0 10.0 - 25.0 mmol/L    Patient Temperature 37.0 degrees Celsius    FiO2 21 %   Urinalysis with Reflex Culture and Microscopic    Collection Time: 10/18/24 12:58 PM   Result Value Ref Range    Color, Urine Colorless (N) Light-Yellow, Yellow, Dark-Yellow    Appearance, Urine Clear Clear    Specific Gravity, Urine 1.007 1.005 - 1.035    pH, Urine 5.0 5.0, 5.5, 6.0, 6.5, 7.0, 7.5, 8.0    Protein, Urine NEGATIVE NEGATIVE, 10 (TRACE), 20 (TRACE) mg/dL    Glucose, Urine 200 (2+) (A) Normal mg/dL    Blood, Urine 0.03 (TRACE) (A) NEGATIVE    Ketones, Urine NEGATIVE NEGATIVE mg/dL    Bilirubin, Urine NEGATIVE NEGATIVE    Urobilinogen, Urine Normal Normal mg/dL    Nitrite, Urine NEGATIVE NEGATIVE    Leukocyte Esterase, Urine 25 Symone/µL (A)  NEGATIVE   Extra Urine Gray Tube    Collection Time: 10/18/24 12:58 PM   Result Value Ref Range    Extra Tube Hold for add-ons.    Microscopic Only, Urine    Collection Time: 10/18/24 12:58 PM   Result Value Ref Range    WBC, Urine 1-5 1-5, NONE /HPF    RBC, Urine 1-2 NONE, 1-2, 3-5 /HPF    Mucus, Urine FEW Reference range not established. /LPF    Hyaline Casts, Urine 2+ (A) NONE /LPF   Urine Culture    Collection Time: 10/18/24 12:58 PM    Specimen: Clean Catch/Voided; Urine   Result Value Ref Range    Urine Culture Normal genitourinary claudia    Influenza A, and B PCR    Collection Time: 10/18/24  1:00 PM   Result Value Ref Range    Flu A Result Not Detected Not Detected    Flu B Result Not Detected Not Detected   Sars-CoV-2 PCR    Collection Time: 10/18/24  1:00 PM   Result Value Ref Range    Coronavirus 2019, PCR Not Detected Not Detected   Troponin, High Sensitivity, 1 Hour    Collection Time: 10/18/24  1:44 PM   Result Value Ref Range    Troponin I, High Sensitivity 35 (H) 0 - 20 ng/L   Blood Culture    Collection Time: 10/18/24  4:21 PM    Specimen: Peripheral Venipuncture; Blood culture   Result Value Ref Range    Blood Culture No growth at 4 days -  FINAL REPORT    Blood Culture    Collection Time: 10/18/24  4:21 PM    Specimen: Peripheral Venipuncture; Blood culture   Result Value Ref Range    Blood Culture No growth at 4 days -  FINAL REPORT    CBC    Collection Time: 10/19/24  4:12 AM   Result Value Ref Range    WBC 16.4 (H) 4.4 - 11.3 x10*3/uL    nRBC 0.0 0.0 - 0.0 /100 WBCs    RBC 3.92 (L) 4.50 - 5.90 x10*6/uL    Hemoglobin 12.7 (L) 13.5 - 17.5 g/dL    Hematocrit 38.5 (L) 41.0 - 52.0 %    MCV 98 80 - 100 fL    MCH 32.4 26.0 - 34.0 pg    MCHC 33.0 32.0 - 36.0 g/dL    RDW 14.1 11.5 - 14.5 %    Platelets 181 150 - 450 x10*3/uL   Basic metabolic panel    Collection Time: 10/19/24  4:12 AM   Result Value Ref Range    Glucose 133 (H) 74 - 99 mg/dL    Sodium 137 136 - 145 mmol/L    Potassium 4.2 3.5 - 5.3  mmol/L    Chloride 105 98 - 107 mmol/L    Bicarbonate 22 21 - 32 mmol/L    Anion Gap 14 10 - 20 mmol/L    Urea Nitrogen 58 (H) 6 - 23 mg/dL    Creatinine 2.27 (H) 0.50 - 1.30 mg/dL    eGFR 27 (L) >60 mL/min/1.73m*2    Calcium 8.4 (L) 8.6 - 10.3 mg/dL   Protime-INR    Collection Time: 10/19/24  5:13 AM   Result Value Ref Range    Protime 23.1 (H) 9.8 - 12.8 seconds    INR 2.0 (H) 0.9 - 1.1   POCT GLUCOSE    Collection Time: 10/19/24  6:58 AM   Result Value Ref Range    POCT Glucose 147 (H) 74 - 99 mg/dL   POCT GLUCOSE    Collection Time: 10/19/24 11:29 AM   Result Value Ref Range    POCT Glucose 168 (H) 74 - 99 mg/dL   POCT GLUCOSE    Collection Time: 10/19/24  4:07 PM   Result Value Ref Range    POCT Glucose 153 (H) 74 - 99 mg/dL   POCT GLUCOSE    Collection Time: 10/19/24  9:09 PM   Result Value Ref Range    POCT Glucose 178 (H) 74 - 99 mg/dL   Protime-INR    Collection Time: 10/20/24  4:39 AM   Result Value Ref Range    Protime 21.1 (H) 9.8 - 12.8 seconds    INR 1.9 (H) 0.9 - 1.1   CBC    Collection Time: 10/20/24  4:39 AM   Result Value Ref Range    WBC 13.6 (H) 4.4 - 11.3 x10*3/uL    nRBC 0.0 0.0 - 0.0 /100 WBCs    RBC 3.69 (L) 4.50 - 5.90 x10*6/uL    Hemoglobin 11.8 (L) 13.5 - 17.5 g/dL    Hematocrit 35.8 (L) 41.0 - 52.0 %    MCV 97 80 - 100 fL    MCH 32.0 26.0 - 34.0 pg    MCHC 33.0 32.0 - 36.0 g/dL    RDW 13.8 11.5 - 14.5 %    Platelets 168 150 - 450 x10*3/uL   Basic Metabolic Panel    Collection Time: 10/20/24  4:39 AM   Result Value Ref Range    Glucose 131 (H) 74 - 99 mg/dL    Sodium 136 136 - 145 mmol/L    Potassium 4.3 3.5 - 5.3 mmol/L    Chloride 103 98 - 107 mmol/L    Bicarbonate 22 21 - 32 mmol/L    Anion Gap 15 10 - 20 mmol/L    Urea Nitrogen 66 (H) 6 - 23 mg/dL    Creatinine 2.26 (H) 0.50 - 1.30 mg/dL    eGFR 27 (L) >60 mL/min/1.73m*2    Calcium 8.3 (L) 8.6 - 10.3 mg/dL   POCT GLUCOSE    Collection Time: 10/20/24  6:35 AM   Result Value Ref Range    POCT Glucose 142 (H) 74 - 99 mg/dL   POCT GLUCOSE     Collection Time: 10/20/24 11:29 AM   Result Value Ref Range    POCT Glucose 241 (H) 74 - 99 mg/dL   POCT INR manually resulted    Collection Time: 10/22/24 12:00 AM   Result Value Ref Range    POC INR 2.40 Normal Range: .9-1.1    POC Prothrombin Time  Normal Range: 9.3 - 12.5   Protime-INR    Collection Time: 10/29/24 12:00 AM   Result Value Ref Range    Protime External      INR External 2.60    Protime-INR    Collection Time: 11/05/24 12:00 AM   Result Value Ref Range    Protime External      INR External 2.30    POCT INR manually resulted    Collection Time: 11/12/24 12:00 AM   Result Value Ref Range    POC INR 2.80 Normal Range: .9-1.1    POC Prothrombin Time  Normal Range: 9.3 - 12.5   Protime-INR    Collection Time: 11/19/24 12:00 AM   Result Value Ref Range    Protime External      INR External 2.20    Protime-INR    Collection Time: 11/26/24 12:00 AM   Result Value Ref Range    Protime External      INR External 2.90    Protime-INR    Collection Time: 12/03/24 12:00 AM   Result Value Ref Range    Protime External      INR External 3.00    Protime-INR    Collection Time: 12/10/24 12:00 AM   Result Value Ref Range    Protime External      INR External 2.80    Protime-INR    Collection Time: 12/17/24 12:00 AM   Result Value Ref Range    Protime External      INR External 2.50    Protime-INR    Collection Time: 12/24/24 12:00 AM   Result Value Ref Range    Protime External      INR External 2.50    Protime-INR    Collection Time: 12/31/24 12:00 AM   Result Value Ref Range    Protime External      INR External 2.20    Protime-INR    Collection Time: 01/07/25 12:00 AM   Result Value Ref Range    Protime External      INR External 3.00    Protein, Urine Random    Collection Time: 01/10/25 11:14 AM   Result Value Ref Range    Total Protein, Urine Random 10 5 - 25 mg/dL    Creatinine, Urine Random 34.3 20.0 - 370.0 mg/dL    T. Protein/Creatinine Ratio 0.29 (H) 0.00 - 0.17 mg/mg Creat   Vitamin D 1,25 Dihydroxy  (for eval of hypercalcemia)    Collection Time: 01/10/25 11:14 AM   Result Value Ref Range    Vit D, 1,25-Dihydroxy 33.1 19.9 - 79.3 pg/mL   Parathyroid Hormone, Intact    Collection Time: 01/10/25 11:14 AM   Result Value Ref Range    Parathyroid Hormone, Intact 172.3 (H) 18.5 - 88.0 pg/mL   Renal Function Panel    Collection Time: 01/10/25 11:14 AM   Result Value Ref Range    Glucose 134 (H) 74 - 99 mg/dL    Sodium 141 136 - 145 mmol/L    Potassium 4.9 3.5 - 5.3 mmol/L    Chloride 107 98 - 107 mmol/L    Bicarbonate 24 21 - 32 mmol/L    Anion Gap 15 10 - 20 mmol/L    Urea Nitrogen 44 (H) 6 - 23 mg/dL    Creatinine 1.80 (H) 0.50 - 1.30 mg/dL    eGFR 35 (L) >60 mL/min/1.73m*2    Calcium 9.0 8.6 - 10.3 mg/dL    Phosphorus 3.5 2.5 - 4.9 mg/dL    Albumin 4.3 3.4 - 5.0 g/dL   CBC    Collection Time: 01/10/25 11:14 AM   Result Value Ref Range    WBC 8.1 4.4 - 11.3 x10*3/uL    nRBC 0.0 0.0 - 0.0 /100 WBCs    RBC 4.59 4.50 - 5.90 x10*6/uL    Hemoglobin 14.1 13.5 - 17.5 g/dL    Hematocrit 44.8 41.0 - 52.0 %    MCV 98 80 - 100 fL    MCH 30.7 26.0 - 34.0 pg    MCHC 31.5 (L) 32.0 - 36.0 g/dL    RDW 14.9 (H) 11.5 - 14.5 %    Platelets 222 150 - 450 x10*3/uL   Protime-INR    Collection Time: 01/14/25 12:00 AM   Result Value Ref Range    Protime External      INR External 2.70    Protime-INR    Collection Time: 01/21/25 12:00 AM   Result Value Ref Range    Protime External      INR External 3.00    Protime-INR    Collection Time: 01/28/25 12:00 AM   Result Value Ref Range    Protime External      INR External 2.90    POCT INR manually resulted    Collection Time: 02/04/25 12:00 AM   Result Value Ref Range    POC INR 2.30 Normal Range: .9-1.1    POC Prothrombin Time  Normal Range: 9.3 - 12.5   Protime-INR    Collection Time: 02/04/25 12:00 AM   Result Value Ref Range    Protime External      INR External 2.30    Protime-INR    Collection Time: 02/11/25 12:00 AM   Result Value Ref Range    Protime External      INR External 3.20     Drug Screen, Urine With Reflex to Confirmation    Collection Time: 02/14/25 10:13 AM   Result Value Ref Range    Amphetamines NEGATIVE <500 ng/mL    Barbiturates NEGATIVE <300 ng/mL    Benzodiazepines NEGATIVE <100 ng/mL    Cocaine Metabolite NEGATIVE <150 ng/mL    Marijuana Metabolite NEGATIVE <20 ng/mL    Methadone Metabolite NEGATIVE <100 ng/mL    Opiates NEGATIVE <100 ng/mL    Oxycodone NEGATIVE <100 ng/mL    Phencyclidine NEGATIVE <25 ng/mL    Creatinine 12.1 (L) > or = 20.0 mg/dL    Specific Gravity 1.006 > or = 1.003    pH 5.0 4.5 - 9.0    Oxidant NEGATIVE <200 mcg/mL    Notes and Comments     Lipid Panel    Collection Time: 02/14/25 10:15 AM   Result Value Ref Range    CHOLESTEROL, TOTAL 133 <200 mg/dL    HDL CHOLESTEROL 37 (L) > OR = 40 mg/dL    TRIGLYCERIDES 108 <150 mg/dL    LDL-CHOLESTEROL 77 mg/dL (calc)    CHOL/HDLC RATIO 3.6 <5.0 (calc)    NON HDL CHOLESTEROL 96 <130 mg/dL (calc)   Magnesium    Collection Time: 02/14/25 10:15 AM   Result Value Ref Range    MAGNESIUM 2.2 1.5 - 2.5 mg/dL   Comprehensive Metabolic Panel    Collection Time: 02/14/25 10:15 AM   Result Value Ref Range    GLUCOSE 150 (H) 65 - 99 mg/dL    UREA NITROGEN (BUN) 45 (H) 7 - 25 mg/dL    CREATININE 1.95 (H) 0.70 - 1.22 mg/dL    EGFR 32 (L) > OR = 60 mL/min/1.73m2    SODIUM 139 135 - 146 mmol/L    POTASSIUM 4.5 3.5 - 5.3 mmol/L    CHLORIDE 103 98 - 110 mmol/L    CARBON DIOXIDE 29 20 - 32 mmol/L    ELECTROLYTE BALANCE 7 7 - 17 mmol/L (calc)    CALCIUM 9.5 8.6 - 10.3 mg/dL    PROTEIN, TOTAL 7.4 6.1 - 8.1 g/dL    ALBUMIN 4.6 3.6 - 5.1 g/dL    BILIRUBIN, TOTAL 0.6 0.2 - 1.2 mg/dL    ALKALINE PHOSPHATASE 91 35 - 144 U/L    AST 20 10 - 35 U/L    ALT 22 9 - 46 U/L   CBC    Collection Time: 02/14/25 10:15 AM   Result Value Ref Range    WHITE BLOOD CELL COUNT 6.8 3.8 - 10.8 Thousand/uL    RED BLOOD CELL COUNT 4.52 4.20 - 5.80 Million/uL    HEMOGLOBIN 14.8 13.2 - 17.1 g/dL    HEMATOCRIT 43.4 38.5 - 50.0 %    MCV 96.0 80.0 - 100.0 fL    MCH  32.7 27.0 - 33.0 pg    MCHC 34.1 32.0 - 36.0 g/dL    RDW 13.1 11.0 - 15.0 %    PLATELET COUNT 200 140 - 400 Thousand/uL    MPV 9.6 7.5 - 12.5 fL   B-Type Natriuretic Peptide    Collection Time: 02/14/25 10:15 AM   Result Value Ref Range    B TYPE NATRIURETIC PEPTIDE (BNP) 81 <100 pg/mL   Protime-INR    Collection Time: 02/18/25 12:00 AM   Result Value Ref Range    Protime External      INR External 2.80 >=2.00       ECG  Encounter Date: 10/25/24   ECG 12 Lead    Narrative    Ventricular paced rhythm, heart rate 62 bpm       Echocardiogram  No results found for this or any previous visit from the past 1095 days.      CV Studies:  EKG:  Encounter Date: 10/25/24   ECG 12 Lead    Narrative    Ventricular paced rhythm, heart rate 62 bpm     Echocardiogram:   [place]ocardi[place] [address]  Phone [phone] Fax [phone]    TRANSTHORACIC ECHOCARDIOGRAM REPORT      Patient Name:     LESIA PEÑA Reading Physician:   [id number] Sudeep Blackwood MD  Study Date:       10/29/2021         Referring Physician: [id number] MELENDEZ  MRN/PID:          [id number]           PCP:  Accession/Order#: [id number]       Department Location: [facility]  YOB: 1933          Fellow:  Gender:           M                  Nurse:  Admit Date:       10/29/2021         Sonographer:         Raven Elias RDCS  Admission Status: Outpatient         Additional Staff:  Height:           180.34 cm          CC Report to:  Weight:           79.83 kg           Study Type:          Echocardiogram  BSA:              2.00 m2  Blood Pressure: 166 /102 mmHg    Diagnosis/ICD: R06.02-Shortness of breath  Indication:    Dyspnea on Exertion  Procedure/CPT: Echo Complete w Full [id number]    Patient History:  Pacer/Defib:       Permanent pacemaker  Pertinent History: Dyspnea.    Study Detail: The following Echo studies were performed: 2D, M-Mode, Doppler and  color  flow. Technically challenging study due to prominent lung  artifact.      PHYSICIAN INTERPRETATION:  Left Ventricle: The left ventricular systolic function is normal. There are no regional wall motion abnormalities. The left ventricular cavity size is normal. There is mild left ventricular hypertrophy. Abnormal (paradoxical) septal motion, consistent with RV pacemaker. Spectral Doppler shows an impaired relaxation pattern of left ventricular diastolic filling.  Left Atrium: The left atrium is normal in size.  Right Ventricle: The right ventricle is normal in size. There is normal right ventricular global systolic function. A device is visualized in the right ventricle.  Right Atrium: The right atrium is normal in size.  Aortic Valve: The aortic valve appears structurally normal. There is no evidence of aortic valve regurgitation. The peak instantaneous gradient of the aortic valve is 11.7 mmHg. The mean gradient of the aortic valve is 6.0 mmHg.  Mitral Valve: The mitral valve is normal in structure. There is no evidence of mitral valve regurgitation.  Tricuspid Valve: The tricuspid valve is structurally normal. There is trace tricuspid regurgitation.  Pulmonic Valve: The pulmonic valve is structurally normal. There is no indication of pulmonic valve regurgitation.  Pericardium: There is no pericardial effusion noted.  Aorta: The aortic root is normal.      CONCLUSIONS:  1. The left ventricular systolic function is normal.  2. Abnormal septal motion consistent with RV pacemaker.  3. Spectral Doppler shows an impaired relaxation pattern of left ventricular diastolic filling.    QUANTITATIVE DATA SUMMARY:  2D MEASUREMENTS:  Normal Ranges:  Ao Root d:     3.40 cm    (2.0-3.7cm)  IVSd:          1.25 cm    (0.6-1.1cm)  LVPWd:         1.33 cm    (0.6-1.1cm)  LVIDd:         4.47 cm    (3.9-5.9cm)  LVIDs:         3.56 cm  LV Mass Index: 109.1 g/m2  LV % FS        20.4 %    LA VOLUME:  Normal Ranges:  LA Vol A4C:        51.1  ml    (22+/-6mL/m2)  LA Vol A2C:        56.7 ml  LA Vol BP:         53.8 ml  LA Vol Index A4C:  25.6ml/m2  LA Vol Index A2C:  28.4 ml/m2  LA Vol Index BP:   27.0 ml/m2  LA Area A4C:       19.0 cm2  LA Area A2C:       20.0 cm2  LA Major Axis A4C: 6.0 cm  LA Major Axis A2C: 6.0 cm  LA Volume Index:   27.0 ml/m2    RA VOLUME BY A/L METHOD:  Normal Ranges:  RA Area A4C: 17.0 cm2    M-MODE MEASUREMENTS:  Normal Ranges:  Ao Root: 3.30 cm (2.0-3.7cm)  AoV Exc: 1.90 cm (1.5-2.5cm)  LAs:     2.80 cm (2.7-4.0cm)    AORTA MEASUREMENTS:  Normal Ranges:  AoV Exc:   1.90 cm (1.5-2.5cm)  Asc Ao, d: 3.30 cm (2.1-3.4cm)    LV SYSTOLIC FUNCTION BY 2D PLANIMETRY (MOD):  Normal Ranges:  EF-A4C View: 61.7 % (>55%)  EF-A2C View: 62.5 %  EF-Biplane:  61.1 %    LV DIASTOLIC FUNCTION:  Normal Ranges:  MV Peak E:    0.81 m/s   (0.7-1.2 m/s)  MV Peak A:    0.44 m/s   (0.42-0.7 m/s)  E/A Ratio:    1.86       (1.0-2.2)  MV e'         0.10 m/s   (>8.0)  MV lateral e' 0.10 m/s  MV medial e'  0.09 m/s  MV A Dur:     95.00 msec  E/e' Ratio:   8.57       (<8.0)  LV IVRT:      100 msec   (<100ms)    MITRAL VALVE:  Normal Ranges:  MV DT: 135 msec (150-240msec)    AORTIC VALVE:  Normal Ranges:  AoV Vmax:                1.71 m/s  (<1.7m/s)  AoV Peak P.7 mmHg (<20mmHg)  AoV Mean P.0 mmHg  (1.7-11.5mmHg)  LVOT Max King:            0.84 m/s  (<1.1m/s)  AoV VTI:                 33.00 cm  (18-25cm)  LVOT VTI:                17.80 cm  LVOT Diameter:           2.00 cm   (1.8-2.4cm)  AoV Area, VTI:           1.69 cm2  (2.5-5.5cm2)  AoV Area,Vmax:           1.54 cm2  (2.5-4.5cm2)  AoV Area, planim:        2.23 cm2  (2.5-4.5cm2)  AoV Dimensionless Index: 0.54    RIGHT VENTRICLE:  RV 1   3.5 cm  RV 2   3.1 cm  RV 3   6.0 cm  TAPSE: 16.1 mm  RV s'  0.13 m/s    TRICUSPID VALVE/RVSP:  Normal Ranges:  Peak TR Velocity: 2.70 m/s  RV Syst Pressure: 32.2 mmHg (< 30mmHg)  IVC Diam:         1.45 cm      [id number] Sudeep Blackwood MD  Electronically  signed on 10/30/2021 at 10:29:19 AM         Final     Stress Testing CAMPOSVIC([id number]:1:1825):   NM CARDIAC STRESS REST (MYOCARDIAL PERFUSION MIBI) 10/29/2021    Narrative  Addendum Begins  MRN: [id number]  Patient Name: LESIA PEÑA    ADDENDUM:  Please disregard the prior report as it was signed in error.    STUDY:  CARDIAC STRESS/REST INJECTION; CARDIAC STRESS/REST (MYOCARDIAL  PERFUSION/MIBI); PART 2 STRESS OR REST (NO CHARGE); 10/29/2021 10:36  am    INDICATION:  SOBOE  R06.02: SOBOE (shortness of breath on exertion).    COMPARISON:  None.    ACCESSION NUMBER(S):  [id number]; [id number]; [id number]    ORDERING CLINICIAN:  GRISELDA MELENDEZ    TECHNIQUE:  DIVISION OF NUCLEAR MEDICINE  PHARMACOLOGIC STRESS MYOCARDIAL PERFUSION SCAN, ONE DAY PROTOCOL    The patient received an intravenous dose of 11.6 mCi of Tc-99m  tetrofosmin and resting emission tomographic (SPECT) images of the  myocardium were acquired. The patient then received an intravenous  infusion of 0.4mg regadenoson (Lexiscan) followed by an additional  dose of 35.4 mCi of Tc-99m tetrofosmin. Stress phase SPECT images of  the myocardium were then acquired. These included ECG-gated images to  assess and quantify ventricular function.    FINDINGS:  Stress and rest images both demonstrate a normal distribution of  perfusion throughout all LV segments with no sign of ischemia.    ECG-gated images demonstrate normal LV size and myocardial  contractility with an LV ejection fraction of  63 % (normal above 50  percent).    Impression  1. Normal stress myocardial perfusion imaging in response to  pharmacologic stress.  2. Well-maintained left ventricular function.    Addendum Ends  MRN: [id number]  Patient Name: LESIA PEÑA    STUDY:  CARDIAC STRESS/REST INJECTION; PART 2 STRESS OR REST (NO CHARGE);  CARDIAC STRESS/REST (MYOCARDIAL PERFUSION/MIBI);  10/29/2021 10:36 am    INDICATION:  SOBOE  R06.02: SOBOE (shortness of breath on  exertion).    COMPARISON:  None.    ACCESSION NUMBER(S):  [id number]; [id number]; [id number]    ORDERING CLINICIAN:  GRISELDA MELENDEZ    TECHNIQUE:  DIVISION OF NUCLEAR MEDICINE  PHARMACOLOGIC STRESS MYOCARDIAL PERFUSION SCAN, ONE DAY PROTOCOL    The patient received an intravenous dose of  11.4 mCi of Tc-99m  tetrofosmin and resting emission tomographic (SPECT) images of the  myocardium were acquired. The patient then received an intravenous  infusion of 0.4mg regadenoson (Lexiscan) followed by an additional  dose of  34.2 mCi of Tc-99m tetrofosmin. Stress phase SPECT images of  the myocardium were then acquired. These included ECG-gated images to  assess and quantify ventricular function.    FINDINGS:  Stress and rest images both demonstrate a normal distribution of  perfusion throughout all LV segments with no sign of ischemia.    ECG-gated images demonstrate normal LV size and myocardial  contractility with an LV ejection fraction of   86 % (normal above 50  percent).    IMPRESSION:  1. Normal stress myocardial perfusion imaging in response to  pharmacologic stress.  2. Well-maintained left ventricular function.    Cardiac Catheterization: No results found for this or any previous visit from the past 1825 days.  No results found for this or any previous visit from the past 3650 days.     Cardiac Scoring: No results found for this or any previous visit from the past 1825 days.    AAA : No results found for this or any previous visit from the past 1825 days.    OTHER: No results found for this or any previous visit from the past 1825 days.    LAST IMAGING RESULTS  ECG 12 Lead  Ventricular paced rhythm, heart rate 62 bpm      Problem List Items Addressed This Visit       Diabetes mellitus, type 2 (Multi)    Dyslipidemia    Persistent atrial fibrillation with RVR (Multi)    Essential hypertension, benign    Presence of cardiac pacemaker    Warfarin anticoagulation    CKD (chronic kidney disease)    (HFpEF) heart failure with  preserved ejection fraction - Primary    Arteriosclerosis of coronary artery          Rod Milner DO, FACC, FACOI

## 2025-03-05 ENCOUNTER — ANTICOAGULATION - WARFARIN VISIT (OUTPATIENT)
Dept: PRIMARY CARE | Facility: CLINIC | Age: OVER 89
End: 2025-03-05
Payer: MEDICARE

## 2025-03-05 ENCOUNTER — TELEPHONE (OUTPATIENT)
Dept: PRIMARY CARE | Facility: CLINIC | Age: OVER 89
End: 2025-03-05
Payer: MEDICARE

## 2025-03-05 DIAGNOSIS — I25.10 ASHD (ARTERIOSCLEROTIC HEART DISEASE): ICD-10-CM

## 2025-03-05 DIAGNOSIS — K21.9 GASTROESOPHAGEAL REFLUX DISEASE WITHOUT ESOPHAGITIS: ICD-10-CM

## 2025-03-05 DIAGNOSIS — E11.42 DIABETIC PERIPHERAL NEUROPATHY (MULTI): ICD-10-CM

## 2025-03-05 RX ORDER — OMEPRAZOLE 40 MG/1
40 CAPSULE, DELAYED RELEASE ORAL
Qty: 90 CAPSULE | Refills: 3 | Status: SHIPPED | OUTPATIENT
Start: 2025-03-05 | End: 2026-03-05

## 2025-03-05 NOTE — TELEPHONE ENCOUNTER
----- Message from Nurse Carmen CHEN sent at 3/5/2025  3:04 PM EST -----  Regarding: Refill  Patient is requesting a refill of atorvastatin 20 mg [facility]

## 2025-03-05 NOTE — TELEPHONE ENCOUNTER
Medication: omeprazole     Dosage: 40 mg DR capsule     Sig: Take 1 capsule (40 mg) by mouth once daily in the morning. Take before meals.     Dispense Quantity:    Refills:     Pharmacy: [facility]     LOV: 10/24/24    NOV: 4/24/25

## 2025-03-06 ENCOUNTER — APPOINTMENT (OUTPATIENT)
Dept: UROLOGY | Facility: CLINIC | Age: OVER 89
End: 2025-03-06
Payer: MEDICARE

## 2025-03-06 VITALS — HEART RATE: 81 BPM | SYSTOLIC BLOOD PRESSURE: 146 MMHG | DIASTOLIC BLOOD PRESSURE: 77 MMHG

## 2025-03-06 DIAGNOSIS — R33.9 URINARY RETENTION: ICD-10-CM

## 2025-03-06 PROCEDURE — 51703 INSERT BLADDER CATH COMPLEX: CPT | Performed by: UROLOGY

## 2025-03-06 PROCEDURE — 3077F SYST BP >= 140 MM HG: CPT | Performed by: UROLOGY

## 2025-03-06 PROCEDURE — 3078F DIAST BP <80 MM HG: CPT | Performed by: UROLOGY

## 2025-03-06 PROCEDURE — 1123F ACP DISCUSS/DSCN MKR DOCD: CPT | Performed by: UROLOGY

## 2025-03-06 PROCEDURE — 1157F ADVNC CARE PLAN IN RCRD: CPT | Performed by: UROLOGY

## 2025-03-06 PROCEDURE — 99024 POSTOP FOLLOW-UP VISIT: CPT | Performed by: UROLOGY

## 2025-03-06 RX ORDER — ATORVASTATIN CALCIUM 20 MG/1
20 TABLET, FILM COATED ORAL DAILY
Qty: 90 TABLET | Refills: 2 | Status: SHIPPED | OUTPATIENT
Start: 2025-03-06 | End: 2025-12-01

## 2025-03-06 NOTE — PROGRESS NOTES
#18 fr catheter removed and replaced. Cleansed with betadine. Catheter flushed with return of clear yellow urine. RTC in 4 weks

## 2025-03-07 ENCOUNTER — OFFICE VISIT (OUTPATIENT)
Dept: CARDIOLOGY | Facility: HOSPITAL | Age: OVER 89
End: 2025-03-07
Payer: MEDICARE

## 2025-03-07 VITALS
HEART RATE: 70 BPM | BODY MASS INDEX: 25.2 KG/M2 | HEIGHT: 71 IN | DIASTOLIC BLOOD PRESSURE: 68 MMHG | SYSTOLIC BLOOD PRESSURE: 116 MMHG | WEIGHT: 180 LBS

## 2025-03-07 DIAGNOSIS — N18.32 STAGE 3B CHRONIC KIDNEY DISEASE (MULTI): ICD-10-CM

## 2025-03-07 DIAGNOSIS — E78.00 PURE HYPERCHOLESTEROLEMIA: ICD-10-CM

## 2025-03-07 DIAGNOSIS — I50.32 CHRONIC HEART FAILURE WITH PRESERVED EJECTION FRACTION: Primary | ICD-10-CM

## 2025-03-07 DIAGNOSIS — Z95.0 PRESENCE OF CARDIAC PACEMAKER: ICD-10-CM

## 2025-03-07 DIAGNOSIS — I42.9 CARDIOMYOPATHY, UNSPECIFIED TYPE (MULTI): ICD-10-CM

## 2025-03-07 DIAGNOSIS — I10 ESSENTIAL HYPERTENSION, BENIGN: ICD-10-CM

## 2025-03-07 DIAGNOSIS — I48.19 PERSISTENT ATRIAL FIBRILLATION WITH RVR (MULTI): ICD-10-CM

## 2025-03-07 DIAGNOSIS — Z95.810 CARDIAC DEFIBRILLATOR IN PLACE: ICD-10-CM

## 2025-03-07 DIAGNOSIS — E11.00 TYPE 2 DIABETES MELLITUS WITH HYPEROSMOLARITY WITHOUT COMA, WITHOUT LONG-TERM CURRENT USE OF INSULIN (MULTI): ICD-10-CM

## 2025-03-07 DIAGNOSIS — Z79.01 WARFARIN ANTICOAGULATION: ICD-10-CM

## 2025-03-07 DIAGNOSIS — I25.10 ASHD (ARTERIOSCLEROTIC HEART DISEASE): ICD-10-CM

## 2025-03-07 DIAGNOSIS — Z86.79 HISTORY OF CARDIOMYOPATHY: ICD-10-CM

## 2025-03-07 DIAGNOSIS — E11.9 TYPE 2 DIABETES MELLITUS WITHOUT COMPLICATION, WITHOUT LONG-TERM CURRENT USE OF INSULIN (MULTI): ICD-10-CM

## 2025-03-07 DIAGNOSIS — N18.9 CHRONIC KIDNEY DISEASE, UNSPECIFIED CKD STAGE: ICD-10-CM

## 2025-03-07 DIAGNOSIS — I25.10 ARTERIOSCLEROSIS OF CORONARY ARTERY: ICD-10-CM

## 2025-03-07 DIAGNOSIS — E78.5 DYSLIPIDEMIA: ICD-10-CM

## 2025-03-07 DIAGNOSIS — I10 PRIMARY HYPERTENSION: ICD-10-CM

## 2025-03-07 LAB
ATRIAL RATE: 92 BPM
Q ONSET: 199 MS
QRS COUNT: 12 BEATS
QRS DURATION: 146 MS
QT INTERVAL: 442 MS
QTC CALCULATION(BAZETT): 477 MS
QTC FREDERICIA: 465 MS
R AXIS: 262 DEGREES
T AXIS: 72 DEGREES
T OFFSET: 420 MS
VENTRICULAR RATE: 70 BPM

## 2025-03-07 PROCEDURE — 1157F ADVNC CARE PLAN IN RCRD: CPT | Performed by: INTERNAL MEDICINE

## 2025-03-07 PROCEDURE — 99214 OFFICE O/P EST MOD 30 MIN: CPT | Performed by: INTERNAL MEDICINE

## 2025-03-07 PROCEDURE — 3074F SYST BP LT 130 MM HG: CPT | Performed by: INTERNAL MEDICINE

## 2025-03-07 PROCEDURE — 1036F TOBACCO NON-USER: CPT | Performed by: INTERNAL MEDICINE

## 2025-03-07 PROCEDURE — 3078F DIAST BP <80 MM HG: CPT | Performed by: INTERNAL MEDICINE

## 2025-03-07 PROCEDURE — 93010 ELECTROCARDIOGRAM REPORT: CPT | Performed by: INTERNAL MEDICINE

## 2025-03-07 PROCEDURE — 1123F ACP DISCUSS/DSCN MKR DOCD: CPT | Performed by: INTERNAL MEDICINE

## 2025-03-07 PROCEDURE — 99214 OFFICE O/P EST MOD 30 MIN: CPT | Mod: 25 | Performed by: INTERNAL MEDICINE

## 2025-03-07 PROCEDURE — 93005 ELECTROCARDIOGRAM TRACING: CPT | Performed by: INTERNAL MEDICINE

## 2025-03-12 ENCOUNTER — HOSPITAL ENCOUNTER (OUTPATIENT)
Dept: CARDIOLOGY | Facility: HOSPITAL | Age: OVER 89
Discharge: HOME | End: 2025-03-12
Payer: MEDICARE

## 2025-03-12 DIAGNOSIS — I44.2 ATRIOVENTRICULAR BLOCK, COMPLETE (MULTI): ICD-10-CM

## 2025-03-12 DIAGNOSIS — Z95.0 PRESENCE OF CARDIAC PACEMAKER: ICD-10-CM

## 2025-03-13 ENCOUNTER — ANTICOAGULATION - WARFARIN VISIT (OUTPATIENT)
Dept: PRIMARY CARE | Facility: CLINIC | Age: OVER 89
End: 2025-03-13
Payer: MEDICARE

## 2025-03-18 ENCOUNTER — ANTICOAGULATION - WARFARIN VISIT (OUTPATIENT)
Dept: PRIMARY CARE | Facility: CLINIC | Age: OVER 89
End: 2025-03-18
Payer: MEDICARE

## 2025-03-18 ENCOUNTER — ANTICOAGULATION - OTHER VISIT (DOAC) (OUTPATIENT)
Dept: PRIMARY CARE | Facility: CLINIC | Age: OVER 89
End: 2025-03-18
Payer: MEDICARE

## 2025-03-18 ENCOUNTER — TELEPHONE (OUTPATIENT)
Dept: PRIMARY CARE | Facility: CLINIC | Age: OVER 89
End: 2025-03-18
Payer: MEDICARE

## 2025-03-18 LAB
INR IN PPP BY COAGULATION ASSAY EXTERNAL: 2.9
INR IN PPP BY COAGULATION ASSAY EXTERNAL: 2.9
PROTHROMBIN TIME (PT) IN PPP BY COAGULATION ASSAY EXTERNAL: NORMAL
PROTHROMBIN TIME (PT) IN PPP BY COAGULATION ASSAY EXTERNAL: NORMAL

## 2025-03-18 NOTE — PROGRESS NOTES
Per Charanjit   Hold for one day, then continue current dose recheck in one week.   Patient wife was notified

## 2025-03-19 NOTE — PROGRESS NOTES
Subjective   Patient ID: Michele Maya is a 89 y.o. male who presents for No chief complaint on file..    HPI     Review of Systems    Objective   There were no vitals taken for this visit.    Physical Exam    Assessment/Plan   {Assess/PlanSmartLinks:17261}

## 2025-03-25 ENCOUNTER — TELEPHONE (OUTPATIENT)
Dept: PRIMARY CARE | Facility: CLINIC | Age: OVER 89
End: 2025-03-25
Payer: MEDICARE

## 2025-03-25 ENCOUNTER — ANTICOAGULATION - WARFARIN VISIT (OUTPATIENT)
Dept: PRIMARY CARE | Facility: CLINIC | Age: OVER 89
End: 2025-03-25
Payer: MEDICARE

## 2025-03-25 ENCOUNTER — TELEPHONE (OUTPATIENT)
Dept: PRIMARY CARE | Facility: CLINIC | Age: OVER 89
End: 2025-03-25

## 2025-04-03 ENCOUNTER — APPOINTMENT (OUTPATIENT)
Dept: UROLOGY | Facility: CLINIC | Age: OVER 89
End: 2025-04-03
Payer: MEDICARE

## 2025-04-03 ENCOUNTER — ANTICOAGULATION - WARFARIN VISIT (OUTPATIENT)
Dept: PRIMARY CARE | Facility: CLINIC | Age: OVER 89
End: 2025-04-03

## 2025-04-03 VITALS — DIASTOLIC BLOOD PRESSURE: 66 MMHG | HEART RATE: 80 BPM | SYSTOLIC BLOOD PRESSURE: 113 MMHG

## 2025-04-03 DIAGNOSIS — R33.9 URINARY RETENTION: ICD-10-CM

## 2025-04-03 PROCEDURE — 51703 INSERT BLADDER CATH COMPLEX: CPT | Performed by: UROLOGY

## 2025-04-09 ENCOUNTER — TELEPHONE (OUTPATIENT)
Dept: PRIMARY CARE | Facility: CLINIC | Age: OVER 89
End: 2025-04-09
Payer: MEDICARE

## 2025-04-09 ENCOUNTER — ANTICOAGULATION - WARFARIN VISIT (OUTPATIENT)
Dept: PRIMARY CARE | Facility: CLINIC | Age: OVER 89
End: 2025-04-09
Payer: MEDICARE

## 2025-04-09 NOTE — TELEPHONE ENCOUNTER
INR of 2.6. Patient spouse notified. INR is good per provider. Continue current dose and recheck in 1 week.

## 2025-04-14 DIAGNOSIS — I10 PRIMARY HYPERTENSION: ICD-10-CM

## 2025-04-15 RX ORDER — LISINOPRIL 5 MG/1
5 TABLET ORAL DAILY
Qty: 90 TABLET | Refills: 1 | Status: SHIPPED | OUTPATIENT
Start: 2025-04-15

## 2025-04-16 LAB
INR IN PPP BY COAGULATION ASSAY EXTERNAL: 1.9
PROTHROMBIN TIME (PT) IN PPP BY COAGULATION ASSAY EXTERNAL: NORMAL

## 2025-04-17 ENCOUNTER — TELEPHONE (OUTPATIENT)
Dept: PRIMARY CARE | Facility: CLINIC | Age: OVER 89
End: 2025-04-17
Payer: MEDICARE

## 2025-04-17 LAB
25(OH)D3+25(OH)D2 SERPL-MCNC: 26 NG/ML (ref 30–100)
ANION GAP SERPL CALCULATED.4IONS-SCNC: 13 MMOL/L (CALC) (ref 7–17)
BUN SERPL-MCNC: 41 MG/DL (ref 7–25)
BUN/CREAT SERPL: 22 (CALC) (ref 6–22)
CALCIUM SERPL-MCNC: 9.1 MG/DL (ref 8.6–10.3)
CHLORIDE SERPL-SCNC: 106 MMOL/L (ref 98–110)
CO2 SERPL-SCNC: 23 MMOL/L (ref 20–32)
CREAT SERPL-MCNC: 1.86 MG/DL (ref 0.7–1.22)
EGFRCR SERPLBLD CKD-EPI 2021: 34 ML/MIN/1.73M2
ERYTHROCYTE [DISTWIDTH] IN BLOOD BY AUTOMATED COUNT: 13 % (ref 11–15)
GLUCOSE SERPL-MCNC: 143 MG/DL (ref 65–99)
HCT VFR BLD AUTO: 43.7 % (ref 38.5–50)
HGB BLD-MCNC: 14.8 G/DL (ref 13.2–17.1)
INR PPP: 2
MCH RBC QN AUTO: 32.7 PG (ref 27–33)
MCHC RBC AUTO-ENTMCNC: 33.9 G/DL (ref 32–36)
MCV RBC AUTO: 96.5 FL (ref 80–100)
PLATELET # BLD AUTO: 197 THOUSAND/UL (ref 140–400)
PMV BLD REES-ECKER: 9.1 FL (ref 7.5–12.5)
POTASSIUM SERPL-SCNC: 4.5 MMOL/L (ref 3.5–5.3)
PROTHROMBIN TIME: 20.2 SEC (ref 9–11.5)
RBC # BLD AUTO: 4.53 MILLION/UL (ref 4.2–5.8)
SODIUM SERPL-SCNC: 142 MMOL/L (ref 135–146)
VIT B12 SERPL-MCNC: 1064 PG/ML (ref 200–1100)
WBC # BLD AUTO: 7.4 THOUSAND/UL (ref 3.8–10.8)

## 2025-04-22 ENCOUNTER — ANTICOAGULATION - WARFARIN VISIT (OUTPATIENT)
Dept: PRIMARY CARE | Facility: CLINIC | Age: OVER 89
End: 2025-04-22
Payer: MEDICARE

## 2025-04-22 LAB
INR IN PPP BY COAGULATION ASSAY EXTERNAL: 3.3
PROTHROMBIN TIME (PT) IN PPP BY COAGULATION ASSAY EXTERNAL: NORMAL

## 2025-04-24 ENCOUNTER — APPOINTMENT (OUTPATIENT)
Dept: PRIMARY CARE | Facility: CLINIC | Age: OVER 89
End: 2025-04-24
Payer: MEDICARE

## 2025-04-24 ENCOUNTER — TELEPHONE (OUTPATIENT)
Dept: PRIMARY CARE | Facility: CLINIC | Age: OVER 89
End: 2025-04-24

## 2025-04-24 DIAGNOSIS — E11.00 TYPE 2 DIABETES MELLITUS WITH HYPEROSMOLARITY WITHOUT COMA, WITHOUT LONG-TERM CURRENT USE OF INSULIN (MULTI): ICD-10-CM

## 2025-04-24 RX ORDER — GLIPIZIDE 2.5 MG/1
5 TABLET, EXTENDED RELEASE ORAL DAILY
Qty: 60 TABLET | Refills: 11 | Status: SHIPPED | OUTPATIENT
Start: 2025-04-24 | End: 2026-04-24

## 2025-04-24 NOTE — TELEPHONE ENCOUNTER
Med Refill   glipiZIDE XL (Glucotrol XL) 2.5 mg 24 hr tablet [[id number]]     [facility] - [address], [facility] [address]  Phone: [phone]  Fax: [phone]  MARIELLE #: --

## 2025-04-29 ENCOUNTER — ANTICOAGULATION - WARFARIN VISIT (OUTPATIENT)
Dept: PRIMARY CARE | Facility: CLINIC | Age: OVER 89
End: 2025-04-29
Payer: MEDICARE

## 2025-04-29 LAB
INR IN PPP BY COAGULATION ASSAY EXTERNAL: 4.1
PROTHROMBIN TIME (PT) IN PPP BY COAGULATION ASSAY EXTERNAL: NORMAL

## 2025-05-01 ENCOUNTER — APPOINTMENT (OUTPATIENT)
Dept: UROLOGY | Facility: CLINIC | Age: OVER 89
End: 2025-05-01
Payer: MEDICARE

## 2025-05-01 DIAGNOSIS — R33.9 URINARY RETENTION: ICD-10-CM

## 2025-05-01 PROCEDURE — 51703 INSERT BLADDER CATH COMPLEX: CPT | Performed by: UROLOGY

## 2025-05-01 NOTE — PROGRESS NOTES
#18 fr catheter changed. Prepped with iodine. Flushed for return of clear yellow urine. Tolerated well

## 2025-05-06 ENCOUNTER — TELEPHONE (OUTPATIENT)
Dept: PRIMARY CARE | Facility: CLINIC | Age: OVER 89
End: 2025-05-06
Payer: MEDICARE

## 2025-05-06 NOTE — LETTER
May 6, 2025     Michele Maya  [address]  [facility] [address]    Patient: Michele Maya   YOB: 1933           To Whom It May Concern:    Michele is currently under my care at [facility]. He is prescribed Warfarin for Atrial Fibrillation and this is managed by me. In the event the patient needs to have a dental procedure, Michele needs to hold his Warfarin for 5 days prior to procedure.    Should you have any questions please do not hesitate to contact my office.      Sincerely,     Charanjit Mujica, APRN-CNP, DNP

## 2025-05-07 ENCOUNTER — ANTICOAGULATION - WARFARIN VISIT (OUTPATIENT)
Dept: PRIMARY CARE | Facility: CLINIC | Age: OVER 89
End: 2025-05-07
Payer: MEDICARE

## 2025-05-09 ENCOUNTER — TELEPHONE (OUTPATIENT)
Dept: PRIMARY CARE | Facility: CLINIC | Age: OVER 89
End: 2025-05-09
Payer: MEDICARE

## 2025-05-09 ENCOUNTER — TELEPHONE (OUTPATIENT)
Dept: NEUROLOGY | Facility: HOSPITAL | Age: OVER 89
End: 2025-05-09
Payer: MEDICARE

## 2025-05-09 DIAGNOSIS — I48.91 ATRIAL FIBRILLATION, UNSPECIFIED TYPE (MULTI): ICD-10-CM

## 2025-05-09 RX ORDER — WARFARIN SODIUM 5 MG/1
7.5 TABLET ORAL EVERY EVENING
Qty: 180 TABLET | Refills: 2 | Status: SHIPPED | OUTPATIENT
Start: 2025-05-09

## 2025-05-09 NOTE — TELEPHONE ENCOUNTER
The patient's follow up appointment was scheduled on 5-. He is not able to do a virtual appointment. His wife called today to reschedule his appointment. Would you be able to fit him in on an EEG day, 05- or 05-? The patient is free of appointments on these days.     Thank you,

## 2025-05-12 ENCOUNTER — TELEPHONE (OUTPATIENT)
Dept: NEUROLOGY | Facility: HOSPITAL | Age: OVER 89
End: 2025-05-12
Payer: MEDICARE

## 2025-05-13 ENCOUNTER — HOSPITAL ENCOUNTER (OUTPATIENT)
Dept: CARDIOLOGY | Facility: HOSPITAL | Age: OVER 89
Discharge: HOME | End: 2025-05-13
Payer: MEDICARE

## 2025-05-13 ENCOUNTER — ANTICOAGULATION - WARFARIN VISIT (OUTPATIENT)
Dept: PRIMARY CARE | Facility: CLINIC | Age: OVER 89
End: 2025-05-13
Payer: MEDICARE

## 2025-05-13 DIAGNOSIS — Z95.0 PRESENCE OF CARDIAC PACEMAKER: Primary | ICD-10-CM

## 2025-05-13 DIAGNOSIS — Z95.0 PRESENCE OF CARDIAC PACEMAKER: ICD-10-CM

## 2025-05-13 DIAGNOSIS — I44.2 ATRIOVENTRICULAR BLOCK, COMPLETE (MULTI): ICD-10-CM

## 2025-05-13 LAB
INR IN PPP BY COAGULATION ASSAY EXTERNAL: 3.1
PROTHROMBIN TIME (PT) IN PPP BY COAGULATION ASSAY EXTERNAL: NORMAL

## 2025-05-13 PROCEDURE — 93296 REM INTERROG EVL PM/IDS: CPT

## 2025-05-14 ENCOUNTER — APPOINTMENT (OUTPATIENT)
Dept: NEUROLOGY | Facility: HOSPITAL | Age: OVER 89
End: 2025-05-14
Payer: MEDICARE

## 2025-05-15 ENCOUNTER — OFFICE VISIT (OUTPATIENT)
Dept: NEUROLOGY | Facility: HOSPITAL | Age: OVER 89
End: 2025-05-15
Payer: MEDICARE

## 2025-05-15 VITALS
DIASTOLIC BLOOD PRESSURE: 65 MMHG | SYSTOLIC BLOOD PRESSURE: 110 MMHG | WEIGHT: 185 LBS | HEART RATE: 61 BPM | BODY MASS INDEX: 25.8 KG/M2

## 2025-05-15 DIAGNOSIS — Z02.71 ENCOUNTER FOR DISABILITY ASSESSMENT: ICD-10-CM

## 2025-05-15 DIAGNOSIS — E11.42 DIABETIC PERIPHERAL NEUROPATHY (MULTI): Primary | ICD-10-CM

## 2025-05-15 DIAGNOSIS — G25.81 RESTLESS LEGS SYNDROME: ICD-10-CM

## 2025-05-15 DIAGNOSIS — R26.9 IMPAIRED GAIT: ICD-10-CM

## 2025-05-15 PROCEDURE — 3074F SYST BP LT 130 MM HG: CPT | Performed by: PSYCHIATRY & NEUROLOGY

## 2025-05-15 PROCEDURE — 99214 OFFICE O/P EST MOD 30 MIN: CPT | Performed by: PSYCHIATRY & NEUROLOGY

## 2025-05-15 PROCEDURE — 3078F DIAST BP <80 MM HG: CPT | Performed by: PSYCHIATRY & NEUROLOGY

## 2025-05-15 PROCEDURE — 1160F RVW MEDS BY RX/DR IN RCRD: CPT | Performed by: PSYCHIATRY & NEUROLOGY

## 2025-05-15 PROCEDURE — 1159F MED LIST DOCD IN RCRD: CPT | Performed by: PSYCHIATRY & NEUROLOGY

## 2025-05-15 PROCEDURE — G2211 COMPLEX E/M VISIT ADD ON: HCPCS | Performed by: PSYCHIATRY & NEUROLOGY

## 2025-05-15 PROCEDURE — 1125F AMNT PAIN NOTED PAIN PRSNT: CPT | Performed by: PSYCHIATRY & NEUROLOGY

## 2025-05-15 RX ORDER — CLONAZEPAM 0.5 MG/1
TABLET ORAL
Qty: 45 TABLET | Refills: 2 | Status: SHIPPED | OUTPATIENT
Start: 2025-05-15

## 2025-05-15 RX ORDER — GABAPENTIN 100 MG/1
100 CAPSULE ORAL NIGHTLY
Qty: 90 CAPSULE | Refills: 1 | Status: SHIPPED | OUTPATIENT
Start: 2025-05-15 | End: 2025-11-11

## 2025-05-15 RX ORDER — GABAPENTIN 300 MG/1
300 CAPSULE ORAL NIGHTLY
Qty: 90 CAPSULE | Refills: 1 | Status: SHIPPED | OUTPATIENT
Start: 2025-05-15 | End: 2025-11-11

## 2025-05-15 RX ORDER — OXCARBAZEPINE 150 MG/1
TABLET, FILM COATED ORAL
Qty: 270 TABLET | Refills: 1 | Status: SHIPPED | OUTPATIENT
Start: 2025-05-15

## 2025-05-15 ASSESSMENT — PAIN SCALES - GENERAL: PAINLEVEL_OUTOF10: 3

## 2025-05-15 ASSESSMENT — ENCOUNTER SYMPTOMS
OCCASIONAL FEELINGS OF UNSTEADINESS: 0
LOSS OF SENSATION IN FEET: 1
DEPRESSION: 0

## 2025-05-15 NOTE — PROGRESS NOTES
"Follow-up visit    Visit type: Follow-up    PCP: Charanjit Mujica, APRN-CNP, DNP.    Subjective     Michele Maya is a 89 y.o. year old male here for follow-up. Last seen 2/10/25.     Patient is accompanied by: spouse.       Restless Legs    Neuropathy      I first saw him 4/24/19 for neuropathy. Also has RLS. Prev saw Dr Estrada, then saw Dr Fan. Has had RLS since about 70 years old, legs move at night, has to rearrange feet, prevents him from falling asleep. Legs move as well during sleep. Once asleep, stays asleep. Was on clonazepam 3mg daily. On clonazepam 1mg qhs now, lowered by Dr Fan. Having some trouble falling asleep. Per wife, nighttime movements same even on lower dose of clonazepam. Ropinirole 0.25mg tried caused leg cramps. Pramipexole 0.125mg caused lightheadedness. Has \"very bad neuropathy\" in feet, presumed to be diabetic in origin. Having trouble in calves, muscles are tight in morning. NCT done? States has tried \"everything for neuropathy\". When asked to specify, tried gabapentin, pregabalin, and amitriptyline in past, didn't help. Tried duloxetine didn't help. Has tried \"seizure medications\". Has had back pain. s/p epidural injection and \"didn't work\". On warfarin for afib. CPK/ferritin wnl. Venlafaxine didn't help. Baclofen tried, had behavioral changes, irritable, swearing. Alpha lipoic acid tried didn't help. Horizant never tried due to expense. On B12. I tried to lower clonazepam to 0.25mg qhs, but pt had difficulty and dose increased back to 0.5mg qhs. Horizant 300mg once daily tried thru cash-pay program as well, had more leg movements. Was to continue clonazepam 0.5mg qhs. He was tested for YAYA by PCP and was ordered CPAP. Didn't tolerate. I offered to follow-up for CPAP but pt didn't want to--pt refused YAYA treatment. s/p epidural blocks in the past. Discussed Inspire therapy, not interested. EMG/NCT confirmed neuropathy--pt has tried and failed symptomatic neuropathy " treatments in the past and didn't want anymore treatment. Had seen vascular surgery, suspected vascular claudication, high risk for contrast-induced nephropathy and instrumentation. He is on clonazepam 0.75mg qhs. Previously discussed about use of oxcarbazepine for neuropathic help, prescribed, but evidently did not try. B12 level wnl. He sees vascular surgery for what was thought to be vascular claudication. I restarted trial of OXC, and increased dose to 150/300 and continue clonazepam 0.5mg 1.5 tabs at bedtime. Last visit was on GBP 400mg at bedtime, /300, and clonazepam 0.5mg 1.5 tabs at bedtime.    Comes in today for follow-up. On wheelchair.    No falls    On GBP 400mg at bedtime, on clonazepam 0.5mg 1.5 tabs at bedtime, on /300. Doing well. Denies SE.     States has a sore in foot, on abx. Creatinine stable, maybe even slightly better than recent baseline.      Patient Active Problem List   Diagnosis    Aortoiliac occlusive disease (Multi)    ASHD (arteriosclerotic heart disease)    Atrial fibrillation (Multi)    BPH (benign prostatic hyperplasia)    Cardiac defibrillator in place    History of cardiomyopathy    Chronic combined systolic and diastolic heart failure, NYHA class 2    Chronic insomnia    Chronic painful diabetic neuropathy (Multi)    Claudication, intermittent (CMS-HCC)    Diabetes mellitus, type 2 (Multi)    Dyslipidemia    Diabetic peripheral neuropathy (Multi)    YAYA (obstructive sleep apnea)    Persistent atrial fibrillation with RVR (Multi)    PVD (peripheral vascular disease) (CMS-HCC)    Restless legs syndrome    SOBOE (shortness of breath on exertion)    Arthritis    Urinary incontinence    Acquired deformity of toe    Callus of toe    Diabetic polyneuropathy associated with type 2 diabetes mellitus    History of malignant neoplasm    Hyperlipidemia    Sensorineural hearing loss, bilateral    Mixed conductive and sensorineural hearing loss of both ears    Onychomycosis     Other idiopathic peripheral autonomic neuropathy    Pain in left shoulder    Shoulder pain    Essential hypertension, benign    Stage 3b chronic kidney disease (Multi)    Tinea    Vitamin B12 deficiency    Dilated cardiomyopathy (Multi)    Chronic constipation    Neck pain    Chronic atrial fibrillation (Multi)    Presence of cardiac pacemaker    Peripheral vascular disease, unspecified (CMS-HCC)    Medicare annual wellness visit, subsequent    Flu vaccine need    Advance directive in chart    Warfarin anticoagulation    CKD (chronic kidney disease)    (HFpEF) heart failure with preserved ejection fraction    Daytime somnolence    History of atrial fibrillation    History of transient ischemic attack    Sprain of knee    Gastroesophageal reflux disease    Arteriosclerosis of coronary artery    Chronic combined systolic and diastolic heart failure    Intermittent claudication (CMS-HCC)    Chronic obstructive pulmonary disease (Multi)    Cough, unspecified    Subacute cough    Sensorineural hearing loss (SNHL) of both ears    Obstructive sleep apnea syndrome    Idiopathic peripheral autonomic neuropathy    Pain of left shoulder region    Hypoxia    History of malignant melanoma of skin    Sterile pyuria    Fall, initial encounter    Hospital discharge follow-up    Vitamin D deficiency    CKD (chronic kidney disease) stage 4, GFR 15-29 ml/min (Multi)       Allergies   Allergen Reactions    Pentoxifylline Rash and Other     Burning blisters    Other Unknown    Amiodarone Diarrhea    Baclofen Unknown    Carvedilol Other     fatigue    Cilostazol Unknown    Flecainide Unknown    Quinidine Unknown    Simvastatin Other     Extreme fatigue       Current Outpatient Medications:     atorvastatin (Lipitor) 20 mg tablet, Take 1 tablet (20 mg) by mouth once daily., Disp: 90 tablet, Rfl: 2    blood sugar diagnostic (OneTouch Ultra Test) strip, Inject 1 strip as directed 2 times a day., Disp: 90 strip, Rfl: 3     budesonide-glycopyr-formoterol (BREZTRI) 160-9-4.8 mcg/actuation HFA aerosol inhaler, Inhale 2 puffs 2 times a day., Disp: , Rfl:     cholecalciferol (Vitamin D-3) 25 MCG (1000 UT) capsule, Take by mouth once daily., Disp: , Rfl:     clonazePAM (KlonoPIN) 0.5 mg tablet, TAKE 1.5 TABLET at Bedtime, Disp: 45 tablet, Rfl: 2    cyanocobalamin (Vitamin B-12) 1,000 mcg tablet, Take by mouth once daily., Disp: , Rfl:     empagliflozin (Jardiance) 10 mg, Take 1 tablet (10 mg) by mouth once daily., Disp: 30 tablet, Rfl: 11    FreeStyle glucose monitoring kit, 1 each if needed., Disp: , Rfl:     gabapentin (Neurontin) 100 mg capsule, Take 1 capsule (100 mg) by mouth once daily at bedtime. With 300 mg for 400 mg total, Disp: 90 capsule, Rfl: 1    gabapentin (Neurontin) 300 mg capsule, Take 1 capsule (300 mg) by mouth once daily at bedtime., Disp: 90 capsule, Rfl: 1    glipiZIDE XL (Glucotrol XL) 2.5 mg 24 hr tablet, Take 2 tablets (5 mg) by mouth once daily., Disp: 60 tablet, Rfl: 11    lancets (OneTouch UltraSoft Lancets) misc, test twice a day, Disp: 90 each, Rfl: 2    lisinopril 5 mg tablet, Take 1 tablet (5 mg) by mouth once daily. As directed, Disp: 90 tablet, Rfl: 1    metoprolol succinate XL (Toprol-XL) 25 mg 24 hr tablet, Take 1 tablet (25 mg) by mouth once daily. Do not crush or chew., Disp: 90 tablet, Rfl: 3    mv-min/FA/vit K/lutein/zeaxant (PRESERVISION AREDS 2 PLUS MV ORAL), Take 2 tablets by mouth once daily., Disp: , Rfl:     omeprazole (PriLOSEC) 40 mg DR capsule, Take 1 capsule (40 mg) by mouth once daily in the morning. Take before meals., Disp: 90 capsule, Rfl: 3    OXcarbazepine (Trileptal) 150 mg tablet, Take 150 mg every morning and 300 mg every evening, Disp: 270 tablet, Rfl: 1    torsemide (Demadex) 20 mg tablet, Take 1 tablet (20 mg) by mouth once daily., Disp: 90 tablet, Rfl: 1    vitamin B complex/folic acid (B COMPLEX 100 ORAL), Take 1 tablet by mouth once daily., Disp: , Rfl:     warfarin  (Coumadin) 5 mg tablet, Take 1.5 tablets (7.5 mg) by mouth once daily in the evening., Disp: 180 tablet, Rfl: 2     Objective     /65   Pulse 61   Wt 83.9 kg (185 lb)   BMI 25.80 kg/m²        Awake, alert, oriented, in no distress  Well-nourished, on wheelchair    Mental status exam as above, conversant   Full EOMs intact, no nystagmus, no ptosis   No facial droop   Hearing grossly intact   No dysarthria    Motor strength is at least antigravity on all extremities  I did not have him stand or walk    Lab Results   Component Value Date    WBC 7.4 04/16/2025    HGB 14.8 04/16/2025    HCT 43.7 04/16/2025    MCV 96.5 04/16/2025     04/16/2025     Lab Results   Component Value Date    GLUCOSE 143 (H) 04/16/2025    CALCIUM 9.1 04/16/2025     04/16/2025    K 4.5 04/16/2025    CO2 23 04/16/2025     04/16/2025    BUN 41 (H) 04/16/2025    CREATININE 1.86 (H) 04/16/2025     Lab Results   Component Value Date    HGBA1C 7.9 07/19/2021      Lab Results   Component Value Date    NFPNQSLL46 1,064 04/16/2025       Assessment/Plan     1. Vascular claudication  2. Peripheral neuropathy, likely diabetic, as stated  Has pacemaker  No records  On /300 (300mg bid wasn't more helpful), continue--erx'd  s/p multiple failed symptomatic neuropathic treatment in past    On GBP 400mg at bedtime (300mg didn't help, didn't try 500mg at bedtime), continue--erx'd    3. RLS  4. PLMs  Ferritin/CPK wnl  Likely secondary RLS  PSG showed severe PLMs--pt has already tried and failed multiple meds including creams  On clonazepam 0.75mg qhs, continue--erx'd today    5. YAYA  YAYA, mild with sleep-related hypoxia  CPAP titration study showed hypoxemia improved with PAP therapy  On CPAP but couldn't tolerate (even at low pressures)--pt has stopped using and adamant on not using  Other options, including oral appliance (thru dental evaluation) and/or Inspire upper airway stimulation surgery previously discussed  Not discussed  today    Pt also asking for disability placard    Plans:  Continue /300--erx'd  Continue clonazepam 0.5mg take 1.5 tabs at bedtime--erx'd  Continue gabapentin 400mg at bedtime--erx'd  Disability placard rx written today (impaired gait, claudication, PN)    Rtc 3-4 mo    All questions were answered.  Pt knows how to contact my office in case pt has any questions or concerns.    Willian Castellanos MD

## 2025-05-15 NOTE — PATIENT INSTRUCTIONS
Continue /300--erx'd  Continue clonazepam 0.5mg take 1.5 tabs at bedtime--erx'd  Continue gabapentin 400mg at bedtime--erx'd  Disability placard rx written today (impaired gait, claudication, PN)    Rtc 3-4 mo

## 2025-05-18 PROBLEM — Z02.71 ENCOUNTER FOR DISABILITY ASSESSMENT: Status: ACTIVE | Noted: 2025-05-18

## 2025-05-20 ENCOUNTER — ANTICOAGULATION - WARFARIN VISIT (OUTPATIENT)
Dept: PRIMARY CARE | Facility: CLINIC | Age: OVER 89
End: 2025-05-20
Payer: MEDICARE

## 2025-05-28 ENCOUNTER — ANTICOAGULATION - WARFARIN VISIT (OUTPATIENT)
Dept: PRIMARY CARE | Facility: CLINIC | Age: OVER 89
End: 2025-05-28
Payer: MEDICARE

## 2025-05-29 DIAGNOSIS — I25.10 ASHD (ARTERIOSCLEROTIC HEART DISEASE): ICD-10-CM

## 2025-05-29 DIAGNOSIS — Z79.01 WARFARIN ANTICOAGULATION: ICD-10-CM

## 2025-05-29 DIAGNOSIS — I42.9 CARDIOMYOPATHY, UNSPECIFIED TYPE (MULTI): ICD-10-CM

## 2025-05-29 DIAGNOSIS — I10 PRIMARY HYPERTENSION: ICD-10-CM

## 2025-05-29 DIAGNOSIS — I50.32 CHRONIC HEART FAILURE WITH PRESERVED EJECTION FRACTION: ICD-10-CM

## 2025-05-29 DIAGNOSIS — N18.9 CHRONIC KIDNEY DISEASE, UNSPECIFIED CKD STAGE: ICD-10-CM

## 2025-05-29 DIAGNOSIS — I48.19 PERSISTENT ATRIAL FIBRILLATION WITH RVR (MULTI): ICD-10-CM

## 2025-05-29 DIAGNOSIS — N18.32 STAGE 3B CHRONIC KIDNEY DISEASE (MULTI): ICD-10-CM

## 2025-05-29 DIAGNOSIS — Z86.79 HISTORY OF CARDIOMYOPATHY: ICD-10-CM

## 2025-05-29 DIAGNOSIS — E11.9 TYPE 2 DIABETES MELLITUS WITHOUT COMPLICATION, WITHOUT LONG-TERM CURRENT USE OF INSULIN: ICD-10-CM

## 2025-05-29 DIAGNOSIS — Z95.0 PRESENCE OF CARDIAC PACEMAKER: ICD-10-CM

## 2025-05-29 DIAGNOSIS — E11.00 TYPE 2 DIABETES MELLITUS WITH HYPEROSMOLARITY WITHOUT COMA, WITHOUT LONG-TERM CURRENT USE OF INSULIN (MULTI): ICD-10-CM

## 2025-05-29 DIAGNOSIS — E78.5 DYSLIPIDEMIA: ICD-10-CM

## 2025-05-29 DIAGNOSIS — E78.00 PURE HYPERCHOLESTEROLEMIA: ICD-10-CM

## 2025-05-29 NOTE — TELEPHONE ENCOUNTER
----- Message from Nurse Carmen CHEN sent at 5/27/2025 12:02 PM EDT -----  Regarding: Refill  Refill Jardiance 10 mg to [facility]

## 2025-06-03 ENCOUNTER — ANTICOAGULATION - WARFARIN VISIT (OUTPATIENT)
Dept: PRIMARY CARE | Facility: CLINIC | Age: OVER 89
End: 2025-06-03
Payer: MEDICARE

## 2025-06-05 ENCOUNTER — APPOINTMENT (OUTPATIENT)
Dept: UROLOGY | Facility: CLINIC | Age: OVER 89
End: 2025-06-05
Payer: MEDICARE

## 2025-06-05 VITALS — HEART RATE: 80 BPM | SYSTOLIC BLOOD PRESSURE: 119 MMHG | DIASTOLIC BLOOD PRESSURE: 69 MMHG

## 2025-06-05 DIAGNOSIS — R33.9 URINARY RETENTION: ICD-10-CM

## 2025-06-05 PROCEDURE — 51703 INSERT BLADDER CATH COMPLEX: CPT | Performed by: UROLOGY

## 2025-06-05 NOTE — PROGRESS NOTES
#18 fr catheter changed. Skin prepped with iodine, Flushed for return of clear yellow urine. Tolerated well

## 2025-06-09 DIAGNOSIS — E11.9 TYPE 2 DIABETES MELLITUS WITHOUT COMPLICATION, WITHOUT LONG-TERM CURRENT USE OF INSULIN: ICD-10-CM

## 2025-06-09 DIAGNOSIS — I25.10 ASHD (ARTERIOSCLEROTIC HEART DISEASE): ICD-10-CM

## 2025-06-09 DIAGNOSIS — N18.9 CHRONIC KIDNEY DISEASE, UNSPECIFIED CKD STAGE: ICD-10-CM

## 2025-06-09 DIAGNOSIS — Z79.01 WARFARIN ANTICOAGULATION: ICD-10-CM

## 2025-06-09 DIAGNOSIS — I48.19 PERSISTENT ATRIAL FIBRILLATION WITH RVR (MULTI): ICD-10-CM

## 2025-06-09 DIAGNOSIS — E78.5 DYSLIPIDEMIA: ICD-10-CM

## 2025-06-09 DIAGNOSIS — I10 PRIMARY HYPERTENSION: ICD-10-CM

## 2025-06-09 DIAGNOSIS — I42.9 CARDIOMYOPATHY, UNSPECIFIED TYPE (MULTI): ICD-10-CM

## 2025-06-09 DIAGNOSIS — I50.32 CHRONIC HEART FAILURE WITH PRESERVED EJECTION FRACTION: ICD-10-CM

## 2025-06-09 DIAGNOSIS — E78.00 PURE HYPERCHOLESTEROLEMIA: ICD-10-CM

## 2025-06-09 RX ORDER — TORSEMIDE 20 MG/1
20 TABLET ORAL DAILY
Qty: 90 TABLET | Refills: 1 | Status: SHIPPED | OUTPATIENT
Start: 2025-06-09 | End: 2026-06-09

## 2025-06-09 NOTE — TELEPHONE ENCOUNTER
----- Message from Nurse Carmen CHEN sent at 6/9/2025 10:50 AM EDT -----  Regarding: Refill  Refill torsemide 20 mg to [facility]

## 2025-06-10 ENCOUNTER — ANTICOAGULATION - WARFARIN VISIT (OUTPATIENT)
Dept: PRIMARY CARE | Facility: CLINIC | Age: OVER 89
End: 2025-06-10
Payer: MEDICARE

## 2025-06-10 LAB
INR IN PPP BY COAGULATION ASSAY EXTERNAL: 2.4
PROTHROMBIN TIME (PT) IN PPP BY COAGULATION ASSAY EXTERNAL: NORMAL

## 2025-06-17 ENCOUNTER — ANTICOAGULATION - WARFARIN VISIT (OUTPATIENT)
Dept: PRIMARY CARE | Facility: CLINIC | Age: OVER 89
End: 2025-06-17
Payer: MEDICARE

## 2025-06-17 LAB
INR IN PPP BY COAGULATION ASSAY EXTERNAL: 3.4
PROTHROMBIN TIME (PT) IN PPP BY COAGULATION ASSAY EXTERNAL: NORMAL

## 2025-06-24 ENCOUNTER — ANTICOAGULATION - WARFARIN VISIT (OUTPATIENT)
Dept: PRIMARY CARE | Facility: CLINIC | Age: OVER 89
End: 2025-06-24
Payer: MEDICARE

## 2025-06-30 ENCOUNTER — HOSPITAL ENCOUNTER (OUTPATIENT)
Dept: CARDIOLOGY | Facility: HOSPITAL | Age: OVER 89
Discharge: HOME | End: 2025-06-30
Payer: MEDICARE

## 2025-06-30 DIAGNOSIS — I44.2 ATRIOVENTRICULAR BLOCK, COMPLETE (MULTI): ICD-10-CM

## 2025-06-30 DIAGNOSIS — Z95.0 PRESENCE OF CARDIAC PACEMAKER: ICD-10-CM

## 2025-06-30 PROCEDURE — 93281 PM DEVICE PROGR EVAL MULTI: CPT

## 2025-07-01 ENCOUNTER — ANTICOAGULATION - WARFARIN VISIT (OUTPATIENT)
Dept: PRIMARY CARE | Facility: CLINIC | Age: OVER 89
End: 2025-07-01
Payer: MEDICARE

## 2025-07-03 ENCOUNTER — APPOINTMENT (OUTPATIENT)
Dept: PRIMARY CARE | Facility: CLINIC | Age: OVER 89
End: 2025-07-03
Payer: MEDICARE

## 2025-07-03 VITALS
BODY MASS INDEX: 25.8 KG/M2 | WEIGHT: 185 LBS | SYSTOLIC BLOOD PRESSURE: 138 MMHG | HEART RATE: 61 BPM | DIASTOLIC BLOOD PRESSURE: 74 MMHG | OXYGEN SATURATION: 92 %

## 2025-07-03 DIAGNOSIS — Z00.00 MEDICARE ANNUAL WELLNESS VISIT, SUBSEQUENT: ICD-10-CM

## 2025-07-03 DIAGNOSIS — E11.42 DIABETIC PERIPHERAL NEUROPATHY (MULTI): ICD-10-CM

## 2025-07-03 DIAGNOSIS — Z00.00 ROUTINE GENERAL MEDICAL EXAMINATION AT HEALTH CARE FACILITY: Primary | ICD-10-CM

## 2025-07-03 DIAGNOSIS — E55.9 VITAMIN D DEFICIENCY: ICD-10-CM

## 2025-07-03 DIAGNOSIS — E53.8 VITAMIN B12 DEFICIENCY: ICD-10-CM

## 2025-07-03 DIAGNOSIS — E11.42 TYPE 2 DIABETES MELLITUS WITH DIABETIC POLYNEUROPATHY, WITHOUT LONG-TERM CURRENT USE OF INSULIN: ICD-10-CM

## 2025-07-03 DIAGNOSIS — K21.9 GASTROESOPHAGEAL REFLUX DISEASE WITHOUT ESOPHAGITIS: ICD-10-CM

## 2025-07-03 DIAGNOSIS — I48.91 ATRIAL FIBRILLATION, UNSPECIFIED TYPE (MULTI): ICD-10-CM

## 2025-07-03 LAB — POC HEMOGLOBIN A1C: 8.2 % (ref 4.2–6.5)

## 2025-07-03 RX ORDER — DEXTROSE 4 G
1 TABLET,CHEWABLE ORAL DAILY
COMMUNITY
End: 2025-07-03 | Stop reason: SDUPTHER

## 2025-07-03 RX ORDER — DEXTROSE 4 G
1 TABLET,CHEWABLE ORAL DAILY
Qty: 1 EACH | Refills: 0 | Status: SHIPPED | OUTPATIENT
Start: 2025-07-03

## 2025-07-03 ASSESSMENT — ACTIVITIES OF DAILY LIVING (ADL)
GROCERY_SHOPPING: INDEPENDENT
TOILETING: INDEPENDENT
TAKING_MEDICATION: INDEPENDENT
EATING: INDEPENDENT
JUDGMENT_ADEQUATE_SAFELY_COMPLETE_DAILY_ACTIVITIES: YES
BATHING: INDEPENDENT
BATHING: INDEPENDENT
GROCERY SHOPPING: INDEPENDENT
MANAGING_FINANCES: INDEPENDENT
STIL DRIVING: YES
MANAGING FINANCES: NEEDS ASSISTANCE
USING TRANSPORTATION: NEEDS ASSISTANCE
ADEQUATE_TO_COMPLETE_ADL: YES
FEEDING: INDEPENDENT
TAKING MEDICATION: INDEPENDENT
DRESSING: INDEPENDENT
PILL BOX USED: YES
USING TELEPHONE: INDEPENDENT
NEEDS ASSISTANCE WITH FOOD: INDEPENDENT
PREPARING MEALS: INDEPENDENT
DOING HOUSEWORK: INDEPENDENT
DRESSING: INDEPENDENT
DOING_HOUSEWORK: INDEPENDENT

## 2025-07-03 ASSESSMENT — ANXIETY QUESTIONNAIRES
4. TROUBLE RELAXING: NOT AT ALL
5. BEING SO RESTLESS THAT IT IS HARD TO SIT STILL: NOT AT ALL
GAD7 TOTAL SCORE: 0
2. NOT BEING ABLE TO STOP OR CONTROL WORRYING: NOT AT ALL
6. BECOMING EASILY ANNOYED OR IRRITABLE: NOT AT ALL
3. WORRYING TOO MUCH ABOUT DIFFERENT THINGS: NOT AT ALL
7. FEELING AFRAID AS IF SOMETHING AWFUL MIGHT HAPPEN: NOT AT ALL
1. FEELING NERVOUS, ANXIOUS, OR ON EDGE: NOT AT ALL

## 2025-07-03 ASSESSMENT — ENCOUNTER SYMPTOMS
LOSS OF SENSATION IN FEET: 0
DEPRESSION: 0
OCCASIONAL FEELINGS OF UNSTEADINESS: 1

## 2025-07-03 NOTE — ASSESSMENT & PLAN NOTE
Hemoglobin A1c is elevated at 8.2%.  Continue glipizide 5 mg daily.  Recheck hemoglobin A1c with next blood draw.  Annual diabetic eye exam advised.    Orders:    Follow Up In [facility] - PCP - Established; Future    Hemoglobin A1C; Future    Basic metabolic panel; Future    blood-glucose meter misc; 1 each once daily. One touch ultra two glucometer kit    POCT glycosylated hemoglobin (Hb A1C) manually resulted

## 2025-07-03 NOTE — ASSESSMENT & PLAN NOTE
Anticipatory guidance, age-appropriate vaccines, routine screening exams, health promotion and prevention discussed.    Orders:    Follow Up In [facility] - PCP - Medicare Annual

## 2025-07-03 NOTE — PROGRESS NOTES
Subjective   Reason for Visit: Michele Maya is an 89 y.o. male here for a Medicare Wellness visit.     Past Medical, Surgical, and Family History reviewed and updated in chart.    Reviewed all medications by prescribing practitioner or clinical pharmacist (such as prescriptions, OTCs, herbal therapies and supplements) and documented in the medical record.    Patient is in a wheelchair accompanied by his wife following up for annual Medicare wellness exam and management of type 2 diabetes mellitus, A-fib, vitamin B12 deficiency, vitamin D deficiency and GERD.  He is compliant with his prescribed medications.  He is under the care of neurology for management of diabetic peripheral neuropathy and restless leg syndrome.  He follows with cardiology for management of multiple cardiac comorbidity.  Denies acute medical complaint.        Patient Care Team:  EUGENE Blue-CNP, TEE as PCP - General (Family Medicine)  Milton Heath MD as PCP - [employer] PCP     Review of Systems   All other systems reviewed and are negative.      Objective   Vitals:  /72 (BP Location: Left arm, Patient Position: Sitting, BP Cuff Size: Small adult)   Pulse 61   Wt 83.9 kg (185 lb)   SpO2 92%   BMI 25.80 kg/m²       Physical Exam  Constitutional:       Appearance: Normal appearance.   HENT:      Head: Normocephalic and atraumatic.      Right Ear: External ear normal.      Left Ear: External ear normal.      Nose: Nose normal.      Mouth/Throat:      Mouth: Mucous membranes are moist.   Eyes:      Extraocular Movements: Extraocular movements intact.      Conjunctiva/sclera: Conjunctivae normal.      Pupils: Pupils are equal, round, and reactive to light.   Cardiovascular:      Rate and Rhythm: Normal rate and regular rhythm.      Pulses: Normal pulses.      Heart sounds: Normal heart sounds.   Pulmonary:      Effort: Pulmonary effort is normal.      Breath sounds: Normal breath sounds.   Abdominal:       General: Abdomen is flat. Bowel sounds are normal.      Palpations: Abdomen is soft.   Musculoskeletal:      Cervical back: Neck supple.   Skin:     General: Skin is warm and dry.   Neurological:      General: No focal deficit present.      Mental Status: He is alert and oriented to person, place, and time.   Psychiatric:         Mood and Affect: Mood normal.         Behavior: Behavior normal.         Thought Content: Thought content normal.         Judgment: Judgment normal.         Assessment & Plan  Medicare annual wellness visit, subsequent  Anticipatory guidance, age-appropriate vaccines, routine screening exams, health promotion and prevention discussed.    Orders:    Follow Up In [facility] - PCP - Medicare Annual    Routine general medical examination at [facility] facility    Orders:    1 Year Follow Up In [facility] - PCP - Wellness Exam; Future    Type 2 diabetes mellitus with diabetic polyneuropathy, without long-term current use of insulin  Hemoglobin A1c is elevated at 8.2%.  Continue glipizide 5 mg daily.  Recheck hemoglobin A1c with next blood draw.  Annual diabetic eye exam advised.    Orders:    Follow Up In [facility] - PCP - Established; Future    Hemoglobin A1C; Future    Basic metabolic panel; Future    blood-glucose meter misc; 1 each once daily. One touch ultra two glucometer kit    POCT glycosylated hemoglobin (Hb A1C) manually resulted    Atrial fibrillation, unspecified type (Multi)  Heart rate was normal sinus rhythm during this office visit.  Continue warfarin as prescribed.    Orders:    CBC; Future    Vitamin B12 deficiency  Continue current dose of vitamin B supplement.         Vitamin D deficiency  Continue vitamin D3 1000 daily         Gastroesophageal reflux disease without esophagitis  Stable, continue omeprazole 40 mg daily.         Diabetic peripheral neuropathy (Multi)  Managed by neurology.  Will continue to mitigate risk factors.

## 2025-07-03 NOTE — PATIENT INSTRUCTIONS
Your point-of-care hemoglobin A1c equals 8.2%.  Continue taking all current medications as prescribed and complete labs a few days prior to 3-month follow-up.

## 2025-07-03 NOTE — ASSESSMENT & PLAN NOTE
Heart rate was normal sinus rhythm during this office visit.  Continue warfarin as prescribed.    Orders:    CBC; Future

## 2025-07-07 ENCOUNTER — APPOINTMENT (OUTPATIENT)
Dept: UROLOGY | Facility: CLINIC | Age: OVER 89
End: 2025-07-07
Payer: MEDICARE

## 2025-07-07 DIAGNOSIS — N40.1 BENIGN PROSTATIC HYPERPLASIA WITH INCOMPLETE BLADDER EMPTYING: ICD-10-CM

## 2025-07-07 DIAGNOSIS — R39.14 BENIGN PROSTATIC HYPERPLASIA WITH INCOMPLETE BLADDER EMPTYING: ICD-10-CM

## 2025-07-07 PROCEDURE — 51702 INSERT TEMP BLADDER CATH: CPT | Performed by: UROLOGY

## 2025-07-07 NOTE — PROGRESS NOTES
Patient here today for 4 wk indwelling bower catheter change. Removed and replaced with 18 Telugu Bower 10cc balloon. Patient prepped with iodine. Catheter draining clear yellow urine and able to be flushed appropriately. Patient tolerated well. Patient to return in 4 wks for next catheter change.   [facility]

## 2025-07-08 ENCOUNTER — ANTICOAGULATION - WARFARIN VISIT (OUTPATIENT)
Dept: PRIMARY CARE | Facility: CLINIC | Age: OVER 89
End: 2025-07-08
Payer: MEDICARE

## 2025-07-15 ENCOUNTER — TELEPHONE (OUTPATIENT)
Dept: PRIMARY CARE | Facility: CLINIC | Age: OVER 89
End: 2025-07-15
Payer: MEDICARE

## 2025-07-15 ENCOUNTER — ANTICOAGULATION - WARFARIN VISIT (OUTPATIENT)
Dept: PRIMARY CARE | Facility: CLINIC | Age: OVER 89
End: 2025-07-15
Payer: MEDICARE

## 2025-07-15 LAB
INR IN PPP BY COAGULATION ASSAY EXTERNAL: 1.5
PROTHROMBIN TIME (PT) IN PPP BY COAGULATION ASSAY EXTERNAL: NORMAL

## 2025-07-15 NOTE — TELEPHONE ENCOUNTER
----- Message from Charanjit Mujica sent at 7/15/2025 11:01 AM EDT -----  His INR dropped to 1.5 from 2.5 a week earlier.  Please find out if patient has been taking warfarin as prescribed and how much he is currently taking?

## 2025-07-22 ENCOUNTER — ANTICOAGULATION - WARFARIN VISIT (OUTPATIENT)
Dept: PRIMARY CARE | Facility: CLINIC | Age: OVER 89
End: 2025-07-22
Payer: MEDICARE

## 2025-07-22 LAB
INR IN PPP BY COAGULATION ASSAY EXTERNAL: 2
PROTHROMBIN TIME (PT) IN PPP BY COAGULATION ASSAY EXTERNAL: NORMAL

## 2025-07-29 ENCOUNTER — HOSPITAL ENCOUNTER (OUTPATIENT)
Dept: CARDIOLOGY | Facility: HOSPITAL | Age: OVER 89
Discharge: HOME | End: 2025-07-29
Payer: MEDICARE

## 2025-07-29 ENCOUNTER — ANTICOAGULATION - WARFARIN VISIT (OUTPATIENT)
Dept: PRIMARY CARE | Facility: CLINIC | Age: OVER 89
End: 2025-07-29
Payer: MEDICARE

## 2025-07-29 DIAGNOSIS — I44.2 ATRIOVENTRICULAR BLOCK, COMPLETE (MULTI): ICD-10-CM

## 2025-07-29 DIAGNOSIS — Z95.0 PRESENCE OF CARDIAC PACEMAKER: ICD-10-CM

## 2025-07-29 PROCEDURE — 93296 REM INTERROG EVL PM/IDS: CPT

## 2025-07-29 PROCEDURE — 93294 REM INTERROG EVL PM/LDLS PM: CPT | Performed by: STUDENT IN AN ORGANIZED HEALTH CARE EDUCATION/TRAINING PROGRAM

## 2025-08-01 ENCOUNTER — DOCUMENTATION (OUTPATIENT)
Dept: CARDIOLOGY | Facility: HOSPITAL | Age: OVER 89
End: 2025-08-01
Payer: MEDICARE

## 2025-08-01 NOTE — PROGRESS NOTES
Spoke with wife, instructions given to take an extra Torsemide 20mg x 2 days, then resume 1 tablet daily. I educated on strict 2 gm sodium and 2 liter fluid restriction. Wife verbalizes understanding of instructions.

## 2025-08-04 ENCOUNTER — APPOINTMENT (OUTPATIENT)
Dept: UROLOGY | Facility: CLINIC | Age: OVER 89
End: 2025-08-04
Payer: MEDICARE

## 2025-08-04 DIAGNOSIS — R33.9 RETENTION OF URINE: ICD-10-CM

## 2025-08-04 PROCEDURE — 51702 INSERT TEMP BLADDER CATH: CPT | Performed by: UROLOGY

## 2025-08-04 NOTE — PROGRESS NOTES
Patient here today for 4 wk indwelling bower catheter change. Removed and replaced with 18 Greek Bower 10cc balloon. Patient prepped with iodine. Catheter draining clear yellow urine and able to be flushed appropriately. Patient tolerated well. Patient to return in 4 wks for next catheter change.   ROBYN Schumacher

## 2025-08-05 ENCOUNTER — ANTICOAGULATION - WARFARIN VISIT (OUTPATIENT)
Dept: PRIMARY CARE | Facility: CLINIC | Age: OVER 89
End: 2025-08-05
Payer: MEDICARE

## 2025-08-12 ENCOUNTER — ANTICOAGULATION - WARFARIN VISIT (OUTPATIENT)
Dept: PRIMARY CARE | Facility: CLINIC | Age: OVER 89
End: 2025-08-12
Payer: MEDICARE

## 2025-08-12 LAB
INR IN PPP BY COAGULATION ASSAY EXTERNAL: 2
POC INR: 2 (ref 0.9–1.1)
POC PROTHROMBIN TIME: ABNORMAL (ref 9.3–12.5)
PROTHROMBIN TIME (PT) IN PPP BY COAGULATION ASSAY EXTERNAL: NORMAL

## 2025-08-13 DIAGNOSIS — G25.81 RESTLESS LEGS SYNDROME: ICD-10-CM

## 2025-08-13 RX ORDER — CLONAZEPAM 0.5 MG/1
TABLET ORAL
Qty: 45 TABLET | Refills: 2 | Status: SHIPPED | OUTPATIENT
Start: 2025-08-13

## 2025-08-19 ENCOUNTER — ANTICOAGULATION - WARFARIN VISIT (OUTPATIENT)
Dept: PRIMARY CARE | Facility: CLINIC | Age: OVER 89
End: 2025-08-19
Payer: MEDICARE

## 2025-08-24 DIAGNOSIS — Z86.79 HISTORY OF CARDIOMYOPATHY: ICD-10-CM

## 2025-08-24 DIAGNOSIS — N18.9 CHRONIC KIDNEY DISEASE, UNSPECIFIED CKD STAGE: ICD-10-CM

## 2025-08-24 DIAGNOSIS — I48.19 PERSISTENT ATRIAL FIBRILLATION WITH RVR (MULTI): ICD-10-CM

## 2025-08-24 DIAGNOSIS — I10 ESSENTIAL HYPERTENSION, BENIGN: ICD-10-CM

## 2025-08-24 DIAGNOSIS — E11.00 TYPE 2 DIABETES MELLITUS WITH HYPEROSMOLARITY WITHOUT COMA, WITHOUT LONG-TERM CURRENT USE OF INSULIN (MULTI): ICD-10-CM

## 2025-08-24 DIAGNOSIS — I10 PRIMARY HYPERTENSION: ICD-10-CM

## 2025-08-24 DIAGNOSIS — Z79.01 WARFARIN ANTICOAGULATION: ICD-10-CM

## 2025-08-24 DIAGNOSIS — Z95.810 CARDIAC DEFIBRILLATOR IN PLACE: ICD-10-CM

## 2025-08-24 DIAGNOSIS — E78.5 DYSLIPIDEMIA: ICD-10-CM

## 2025-08-24 DIAGNOSIS — Z95.0 PRESENCE OF CARDIAC PACEMAKER: ICD-10-CM

## 2025-08-24 DIAGNOSIS — I25.10 ARTERIOSCLEROSIS OF CORONARY ARTERY: ICD-10-CM

## 2025-08-24 DIAGNOSIS — E11.9 TYPE 2 DIABETES MELLITUS WITHOUT COMPLICATION, WITHOUT LONG-TERM CURRENT USE OF INSULIN: ICD-10-CM

## 2025-08-24 DIAGNOSIS — I50.32 CHRONIC HEART FAILURE WITH PRESERVED EJECTION FRACTION: ICD-10-CM

## 2025-08-24 DIAGNOSIS — I25.10 ASHD (ARTERIOSCLEROTIC HEART DISEASE): ICD-10-CM

## 2025-08-24 DIAGNOSIS — N18.32 STAGE 3B CHRONIC KIDNEY DISEASE (MULTI): ICD-10-CM

## 2025-08-24 DIAGNOSIS — I42.9 CARDIOMYOPATHY, UNSPECIFIED TYPE (MULTI): ICD-10-CM

## 2025-08-24 DIAGNOSIS — E78.00 PURE HYPERCHOLESTEROLEMIA: ICD-10-CM

## 2025-08-26 ENCOUNTER — ANTICOAGULATION - WARFARIN VISIT (OUTPATIENT)
Dept: PRIMARY CARE | Facility: CLINIC | Age: OVER 89
End: 2025-08-26
Payer: MEDICARE

## 2025-09-02 ENCOUNTER — HOSPITAL ENCOUNTER (OUTPATIENT)
Dept: CARDIOLOGY | Facility: HOSPITAL | Age: OVER 89
Discharge: HOME | End: 2025-09-02
Payer: MEDICARE

## 2025-09-02 DIAGNOSIS — I44.2 ATRIOVENTRICULAR BLOCK, COMPLETE (MULTI): ICD-10-CM

## 2025-09-02 DIAGNOSIS — Z95.0 PRESENCE OF CARDIAC PACEMAKER: ICD-10-CM

## 2025-09-02 DIAGNOSIS — E11.9 TYPE 2 DIABETES MELLITUS WITHOUT COMPLICATION, WITHOUT LONG-TERM CURRENT USE OF INSULIN: ICD-10-CM

## 2025-09-02 PROCEDURE — 93296 REM INTERROG EVL PM/IDS: CPT

## 2025-09-02 RX ORDER — BLOOD SUGAR DIAGNOSTIC
1 STRIP MISCELLANEOUS 2 TIMES DAILY
Qty: 90 STRIP | Refills: 3 | Status: SHIPPED | OUTPATIENT
Start: 2025-09-02

## 2025-09-04 ENCOUNTER — ANTICOAGULATION - WARFARIN VISIT (OUTPATIENT)
Dept: PRIMARY CARE | Facility: CLINIC | Age: OVER 89
End: 2025-09-04
Payer: MEDICARE

## 2025-09-05 ENCOUNTER — APPOINTMENT (OUTPATIENT)
Dept: UROLOGY | Facility: CLINIC | Age: OVER 89
End: 2025-09-05
Payer: MEDICARE

## 2025-10-03 ENCOUNTER — APPOINTMENT (OUTPATIENT)
Dept: UROLOGY | Facility: CLINIC | Age: OVER 89
End: 2025-10-03
Payer: MEDICARE

## 2025-10-08 ENCOUNTER — APPOINTMENT (OUTPATIENT)
Dept: PRIMARY CARE | Facility: CLINIC | Age: OVER 89
End: 2025-10-08
Payer: MEDICARE

## 2026-07-02 ENCOUNTER — APPOINTMENT (OUTPATIENT)
Dept: PRIMARY CARE | Facility: CLINIC | Age: OVER 89
End: 2026-07-02
Payer: MEDICARE